# Patient Record
Sex: MALE | Race: OTHER | Employment: UNEMPLOYED | URBAN - METROPOLITAN AREA
[De-identification: names, ages, dates, MRNs, and addresses within clinical notes are randomized per-mention and may not be internally consistent; named-entity substitution may affect disease eponyms.]

---

## 2018-05-16 ENCOUNTER — OFFICE VISIT (OUTPATIENT)
Dept: FAMILY MEDICINE CLINIC | Age: 48
End: 2018-05-16
Payer: MEDICAID

## 2018-05-16 ENCOUNTER — HOSPITAL ENCOUNTER (OUTPATIENT)
Age: 48
Discharge: HOME | End: 2018-05-18
Payer: MEDICAID

## 2018-05-16 VITALS
WEIGHT: 180 LBS | SYSTOLIC BLOOD PRESSURE: 122 MMHG | BODY MASS INDEX: 29.99 KG/M2 | OXYGEN SATURATION: 98 % | HEIGHT: 65 IN | DIASTOLIC BLOOD PRESSURE: 80 MMHG | HEART RATE: 103 BPM | TEMPERATURE: 97.8 F

## 2018-05-16 DIAGNOSIS — Z01.818 PREOPERATIVE CLEARANCE: ICD-10-CM

## 2018-05-16 DIAGNOSIS — M75.101 BILATERAL ROTATOR CUFF SYNDROME: ICD-10-CM

## 2018-05-16 DIAGNOSIS — Z01.818 PREOPERATIVE CLEARANCE: Primary | ICD-10-CM

## 2018-05-16 DIAGNOSIS — M75.102 BILATERAL ROTATOR CUFF SYNDROME: ICD-10-CM

## 2018-05-16 PROBLEM — G43.709 CHRONIC MIGRAINE WITHOUT AURA WITHOUT STATUS MIGRAINOSUS, NOT INTRACTABLE: Status: ACTIVE | Noted: 2017-07-21

## 2018-05-16 PROBLEM — G89.29 CHRONIC BILATERAL LOW BACK PAIN WITH BILATERAL SCIATICA: Status: ACTIVE | Noted: 2018-03-28

## 2018-05-16 PROBLEM — M54.42 CHRONIC BILATERAL LOW BACK PAIN WITH BILATERAL SCIATICA: Status: ACTIVE | Noted: 2018-03-28

## 2018-05-16 PROBLEM — M25.811 SHOULDER IMPINGEMENT, RIGHT: Status: ACTIVE | Noted: 2018-04-16

## 2018-05-16 PROBLEM — M54.41 CHRONIC BILATERAL LOW BACK PAIN WITH BILATERAL SCIATICA: Status: ACTIVE | Noted: 2018-03-28

## 2018-05-16 PROBLEM — M25.812 SHOULDER IMPINGEMENT, LEFT: Status: ACTIVE | Noted: 2018-04-16

## 2018-05-16 PROBLEM — M75.42 SHOULDER IMPINGEMENT, LEFT: Status: ACTIVE | Noted: 2018-04-16

## 2018-05-16 PROBLEM — M75.41 SHOULDER IMPINGEMENT, RIGHT: Status: ACTIVE | Noted: 2018-04-16

## 2018-05-16 LAB
BASOPHILS ABSOLUTE: 0.04 E9/L (ref 0–0.2)
BASOPHILS RELATIVE PERCENT: 0.8 % (ref 0–2)
EOSINOPHILS ABSOLUTE: 0.17 E9/L (ref 0.05–0.5)
EOSINOPHILS RELATIVE PERCENT: 3.3 % (ref 0–6)
HCT VFR BLD CALC: 42.9 % (ref 37–54)
HEMOGLOBIN: 13.5 G/DL (ref 12.5–16.5)
IMMATURE GRANULOCYTES #: 0.02 E9/L
IMMATURE GRANULOCYTES %: 0.4 % (ref 0–5)
LYMPHOCYTES ABSOLUTE: 1.89 E9/L (ref 1.5–4)
LYMPHOCYTES RELATIVE PERCENT: 36.2 % (ref 20–42)
MCH RBC QN AUTO: 22.8 PG (ref 26–35)
MCHC RBC AUTO-ENTMCNC: 31.5 % (ref 32–34.5)
MCV RBC AUTO: 72.5 FL (ref 80–99.9)
MONOCYTES ABSOLUTE: 0.55 E9/L (ref 0.1–0.95)
MONOCYTES RELATIVE PERCENT: 10.5 % (ref 2–12)
NEUTROPHILS ABSOLUTE: 2.55 E9/L (ref 1.8–7.3)
NEUTROPHILS RELATIVE PERCENT: 48.8 % (ref 43–80)
PDW BLD-RTO: 17.5 FL (ref 11.5–15)
PLATELET # BLD: 278 E9/L (ref 130–450)
PMV BLD AUTO: 10.5 FL (ref 7–12)
RBC # BLD: 5.92 E12/L (ref 3.8–5.8)
WBC # BLD: 5.2 E9/L (ref 4.5–11.5)

## 2018-05-16 PROCEDURE — 99204 OFFICE O/P NEW MOD 45 MIN: CPT | Performed by: FAMILY MEDICINE

## 2018-05-16 PROCEDURE — 93000 ELECTROCARDIOGRAM COMPLETE: CPT | Performed by: FAMILY MEDICINE

## 2018-05-16 PROCEDURE — 85025 COMPLETE CBC W/AUTO DIFF WBC: CPT

## 2018-05-16 PROCEDURE — 80053 COMPREHEN METABOLIC PANEL: CPT

## 2018-05-16 ASSESSMENT — PATIENT HEALTH QUESTIONNAIRE - PHQ9
1. LITTLE INTEREST OR PLEASURE IN DOING THINGS: 0
SUM OF ALL RESPONSES TO PHQ9 QUESTIONS 1 & 2: 0
2. FEELING DOWN, DEPRESSED OR HOPELESS: 0
SUM OF ALL RESPONSES TO PHQ QUESTIONS 1-9: 0

## 2018-05-17 LAB
ALBUMIN SERPL-MCNC: 4.6 G/DL (ref 3.5–5.2)
ALP BLD-CCNC: 57 U/L (ref 40–129)
ALT SERPL-CCNC: 110 U/L (ref 0–40)
ANION GAP SERPL CALCULATED.3IONS-SCNC: 13 MMOL/L (ref 7–16)
AST SERPL-CCNC: 60 U/L (ref 0–39)
BILIRUB SERPL-MCNC: 0.4 MG/DL (ref 0–1.2)
BUN BLDV-MCNC: 10 MG/DL (ref 6–20)
CALCIUM SERPL-MCNC: 9.6 MG/DL (ref 8.6–10.2)
CHLORIDE BLD-SCNC: 97 MMOL/L (ref 98–107)
CO2: 28 MMOL/L (ref 22–29)
CREAT SERPL-MCNC: 0.8 MG/DL (ref 0.7–1.2)
GFR AFRICAN AMERICAN: >60
GFR NON-AFRICAN AMERICAN: >60 ML/MIN/1.73
GLUCOSE BLD-MCNC: 105 MG/DL (ref 74–109)
POTASSIUM SERPL-SCNC: 4.5 MMOL/L (ref 3.5–5)
SODIUM BLD-SCNC: 138 MMOL/L (ref 132–146)
TOTAL PROTEIN: 7.7 G/DL (ref 6.4–8.3)

## 2018-05-23 ENCOUNTER — ANESTHESIA EVENT (OUTPATIENT)
Dept: OPERATING ROOM | Age: 48
End: 2018-05-23
Payer: MEDICAID

## 2018-05-24 ENCOUNTER — HOSPITAL ENCOUNTER (OUTPATIENT)
Age: 48
Setting detail: OUTPATIENT SURGERY
Discharge: HOME | End: 2018-05-24
Attending: ORTHOPAEDIC SURGERY | Admitting: ORTHOPAEDIC SURGERY
Payer: MEDICAID

## 2018-05-24 ENCOUNTER — ANESTHESIA (OUTPATIENT)
Dept: OPERATING ROOM | Age: 48
End: 2018-05-24
Payer: MEDICAID

## 2018-05-24 VITALS
HEIGHT: 65 IN | TEMPERATURE: 97 F | HEART RATE: 94 BPM | RESPIRATION RATE: 19 BRPM | SYSTOLIC BLOOD PRESSURE: 141 MMHG | BODY MASS INDEX: 29.16 KG/M2 | DIASTOLIC BLOOD PRESSURE: 87 MMHG | OXYGEN SATURATION: 94 % | WEIGHT: 175 LBS

## 2018-05-24 VITALS
DIASTOLIC BLOOD PRESSURE: 80 MMHG | RESPIRATION RATE: 86 BRPM | TEMPERATURE: 92.7 F | SYSTOLIC BLOOD PRESSURE: 135 MMHG | OXYGEN SATURATION: 97 %

## 2018-05-24 DIAGNOSIS — M75.41 SHOULDER IMPINGEMENT, RIGHT: Primary | ICD-10-CM

## 2018-05-24 PROCEDURE — 7100000000 HC PACU RECOVERY - FIRST 15 MIN: Performed by: ORTHOPAEDIC SURGERY

## 2018-05-24 PROCEDURE — 7100000010 HC PHASE II RECOVERY - FIRST 15 MIN: Performed by: ORTHOPAEDIC SURGERY

## 2018-05-24 PROCEDURE — 3700000000 HC ANESTHESIA ATTENDED CARE: Performed by: ORTHOPAEDIC SURGERY

## 2018-05-24 PROCEDURE — 64415 NJX AA&/STRD BRCH PLXS IMG: CPT | Performed by: ANESTHESIOLOGY

## 2018-05-24 PROCEDURE — 3600000013 HC SURGERY LEVEL 3 ADDTL 15MIN: Performed by: ORTHOPAEDIC SURGERY

## 2018-05-24 PROCEDURE — 6360000002 HC RX W HCPCS: Performed by: ANESTHESIOLOGY

## 2018-05-24 PROCEDURE — 2500000003 HC RX 250 WO HCPCS: Performed by: ORTHOPAEDIC SURGERY

## 2018-05-24 PROCEDURE — 3600000003 HC SURGERY LEVEL 3 BASE: Performed by: ORTHOPAEDIC SURGERY

## 2018-05-24 PROCEDURE — 6360000002 HC RX W HCPCS: Performed by: NURSE ANESTHETIST, CERTIFIED REGISTERED

## 2018-05-24 PROCEDURE — 7100000001 HC PACU RECOVERY - ADDTL 15 MIN: Performed by: ORTHOPAEDIC SURGERY

## 2018-05-24 PROCEDURE — 2500000003 HC RX 250 WO HCPCS

## 2018-05-24 PROCEDURE — 7100000011 HC PHASE II RECOVERY - ADDTL 15 MIN: Performed by: ORTHOPAEDIC SURGERY

## 2018-05-24 PROCEDURE — 6370000000 HC RX 637 (ALT 250 FOR IP): Performed by: ORTHOPAEDIC SURGERY

## 2018-05-24 PROCEDURE — 6360000002 HC RX W HCPCS

## 2018-05-24 PROCEDURE — 2500000003 HC RX 250 WO HCPCS: Performed by: NURSE ANESTHETIST, CERTIFIED REGISTERED

## 2018-05-24 PROCEDURE — A4565 SLINGS: HCPCS | Performed by: ORTHOPAEDIC SURGERY

## 2018-05-24 PROCEDURE — 2709999900 HC NON-CHARGEABLE SUPPLY: Performed by: ORTHOPAEDIC SURGERY

## 2018-05-24 PROCEDURE — 3700000001 HC ADD 15 MINUTES (ANESTHESIA): Performed by: ORTHOPAEDIC SURGERY

## 2018-05-24 PROCEDURE — 2580000003 HC RX 258: Performed by: ORTHOPAEDIC SURGERY

## 2018-05-24 RX ORDER — GLYCOPYRROLATE 1 MG/5 ML
SYRINGE (ML) INTRAVENOUS PRN
Status: DISCONTINUED | OUTPATIENT
Start: 2018-05-24 | End: 2018-05-24 | Stop reason: SDUPTHER

## 2018-05-24 RX ORDER — HYDROCODONE BITARTRATE AND ACETAMINOPHEN 5; 325 MG/1; MG/1
2 TABLET ORAL EVERY 4 HOURS PRN
Status: DISCONTINUED | OUTPATIENT
Start: 2018-05-24 | End: 2018-05-24 | Stop reason: HOSPADM

## 2018-05-24 RX ORDER — FENTANYL CITRATE 50 UG/ML
25 INJECTION, SOLUTION INTRAMUSCULAR; INTRAVENOUS ONCE
Status: COMPLETED | OUTPATIENT
Start: 2018-05-24 | End: 2018-05-24

## 2018-05-24 RX ORDER — MIDAZOLAM HYDROCHLORIDE 1 MG/ML
2 INJECTION INTRAMUSCULAR; INTRAVENOUS ONCE
Status: COMPLETED | OUTPATIENT
Start: 2018-05-24 | End: 2018-05-24

## 2018-05-24 RX ORDER — ROPIVACAINE HYDROCHLORIDE 5 MG/ML
30 INJECTION, SOLUTION EPIDURAL; INFILTRATION; PERINEURAL
Status: COMPLETED | OUTPATIENT
Start: 2018-05-24 | End: 2018-05-24

## 2018-05-24 RX ORDER — BUPIVACAINE HYDROCHLORIDE AND EPINEPHRINE 5; 5 MG/ML; UG/ML
INJECTION, SOLUTION EPIDURAL; INTRACAUDAL; PERINEURAL PRN
Status: DISCONTINUED | OUTPATIENT
Start: 2018-05-24 | End: 2018-05-24 | Stop reason: HOSPADM

## 2018-05-24 RX ORDER — ONDANSETRON 2 MG/ML
4 INJECTION INTRAMUSCULAR; INTRAVENOUS EVERY 6 HOURS PRN
Status: DISCONTINUED | OUTPATIENT
Start: 2018-05-24 | End: 2018-05-24 | Stop reason: HOSPADM

## 2018-05-24 RX ORDER — HYDROCODONE BITARTRATE AND ACETAMINOPHEN 5; 325 MG/1; MG/1
1 TABLET ORAL EVERY 4 HOURS PRN
Status: DISCONTINUED | OUTPATIENT
Start: 2018-05-24 | End: 2018-05-24 | Stop reason: HOSPADM

## 2018-05-24 RX ORDER — SODIUM CHLORIDE 9 MG/ML
INJECTION, SOLUTION INTRAVENOUS CONTINUOUS
Status: DISCONTINUED | OUTPATIENT
Start: 2018-05-24 | End: 2018-05-24 | Stop reason: HOSPADM

## 2018-05-24 RX ORDER — SODIUM CHLORIDE 0.9 % (FLUSH) 0.9 %
10 SYRINGE (ML) INJECTION PRN
Status: DISCONTINUED | OUTPATIENT
Start: 2018-05-24 | End: 2018-05-24 | Stop reason: HOSPADM

## 2018-05-24 RX ORDER — MIDAZOLAM HYDROCHLORIDE 1 MG/ML
0.5 INJECTION INTRAMUSCULAR; INTRAVENOUS PRN
Status: DISCONTINUED | OUTPATIENT
Start: 2018-05-24 | End: 2018-05-24 | Stop reason: HOSPADM

## 2018-05-24 RX ORDER — SODIUM CHLORIDE 0.9 % (FLUSH) 0.9 %
10 SYRINGE (ML) INJECTION EVERY 12 HOURS SCHEDULED
Status: DISCONTINUED | OUTPATIENT
Start: 2018-05-24 | End: 2018-05-24 | Stop reason: HOSPADM

## 2018-05-24 RX ORDER — PROPOFOL 10 MG/ML
INJECTION, EMULSION INTRAVENOUS PRN
Status: DISCONTINUED | OUTPATIENT
Start: 2018-05-24 | End: 2018-05-24 | Stop reason: SDUPTHER

## 2018-05-24 RX ORDER — SULFAMETHOXAZOLE AND TRIMETHOPRIM 800; 160 MG/1; MG/1
1 TABLET ORAL 2 TIMES DAILY
Qty: 4 TABLET | Refills: 0 | Status: SHIPPED | OUTPATIENT
Start: 2018-05-24 | End: 2018-05-26

## 2018-05-24 RX ORDER — ROPIVACAINE HYDROCHLORIDE 5 MG/ML
30 INJECTION, SOLUTION EPIDURAL; INFILTRATION; PERINEURAL
Status: DISCONTINUED | OUTPATIENT
Start: 2018-05-24 | End: 2018-05-24 | Stop reason: HOSPADM

## 2018-05-24 RX ORDER — ACETAMINOPHEN 325 MG/1
650 TABLET ORAL EVERY 4 HOURS PRN
Status: DISCONTINUED | OUTPATIENT
Start: 2018-05-24 | End: 2018-05-24 | Stop reason: HOSPADM

## 2018-05-24 RX ORDER — DEXAMETHASONE SODIUM PHOSPHATE 4 MG/ML
INJECTION, SOLUTION INTRA-ARTICULAR; INTRALESIONAL; INTRAMUSCULAR; INTRAVENOUS; SOFT TISSUE PRN
Status: DISCONTINUED | OUTPATIENT
Start: 2018-05-24 | End: 2018-05-24 | Stop reason: SDUPTHER

## 2018-05-24 RX ORDER — CLINDAMYCIN PHOSPHATE 900 MG/50ML
900 INJECTION INTRAVENOUS
Status: COMPLETED | OUTPATIENT
Start: 2018-05-24 | End: 2018-05-24

## 2018-05-24 RX ORDER — HYDROCODONE BITARTRATE AND ACETAMINOPHEN 5; 325 MG/1; MG/1
1 TABLET ORAL EVERY 4 HOURS PRN
Qty: 42 TABLET | Refills: 0 | Status: SHIPPED | OUTPATIENT
Start: 2018-05-24 | End: 2018-05-31

## 2018-05-24 RX ORDER — FENTANYL CITRATE 50 UG/ML
INJECTION, SOLUTION INTRAMUSCULAR; INTRAVENOUS PRN
Status: DISCONTINUED | OUTPATIENT
Start: 2018-05-24 | End: 2018-05-24 | Stop reason: SDUPTHER

## 2018-05-24 RX ORDER — ONDANSETRON 2 MG/ML
INJECTION INTRAMUSCULAR; INTRAVENOUS PRN
Status: DISCONTINUED | OUTPATIENT
Start: 2018-05-24 | End: 2018-05-24 | Stop reason: SDUPTHER

## 2018-05-24 RX ORDER — NEOSTIGMINE METHYLSULFATE 1 MG/ML
INJECTION, SOLUTION INTRAVENOUS PRN
Status: DISCONTINUED | OUTPATIENT
Start: 2018-05-24 | End: 2018-05-24 | Stop reason: SDUPTHER

## 2018-05-24 RX ORDER — LIDOCAINE HYDROCHLORIDE 20 MG/ML
INJECTION, SOLUTION EPIDURAL; INFILTRATION; INTRACAUDAL; PERINEURAL PRN
Status: DISCONTINUED | OUTPATIENT
Start: 2018-05-24 | End: 2018-05-24 | Stop reason: SDUPTHER

## 2018-05-24 RX ORDER — ROCURONIUM BROMIDE 10 MG/ML
INJECTION, SOLUTION INTRAVENOUS PRN
Status: DISCONTINUED | OUTPATIENT
Start: 2018-05-24 | End: 2018-05-24 | Stop reason: SDUPTHER

## 2018-05-24 RX ADMIN — Medication 3 MG: at 15:03

## 2018-05-24 RX ADMIN — CLINDAMYCIN PHOSPHATE 900 MG: 900 INJECTION INTRAVENOUS at 12:35

## 2018-05-24 RX ADMIN — ONDANSETRON 4 MG: 2 INJECTION, SOLUTION INTRAMUSCULAR; INTRAVENOUS at 14:51

## 2018-05-24 RX ADMIN — MIDAZOLAM HYDROCHLORIDE 2 MG: 1 INJECTION, SOLUTION INTRAMUSCULAR; INTRAVENOUS at 11:28

## 2018-05-24 RX ADMIN — SODIUM CHLORIDE: 9 INJECTION, SOLUTION INTRAVENOUS at 12:55

## 2018-05-24 RX ADMIN — PROPOFOL 200 MG: 10 INJECTION, EMULSION INTRAVENOUS at 12:41

## 2018-05-24 RX ADMIN — DEXAMETHASONE SODIUM PHOSPHATE 10 MG: 4 INJECTION, SOLUTION INTRA-ARTICULAR; INTRALESIONAL; INTRAMUSCULAR; INTRAVENOUS; SOFT TISSUE at 12:50

## 2018-05-24 RX ADMIN — Medication 0.6 MG: at 15:03

## 2018-05-24 RX ADMIN — ROPIVACAINE HYDROCHLORIDE 30 ML: 5 INJECTION, SOLUTION EPIDURAL; INFILTRATION; PERINEURAL at 11:31

## 2018-05-24 RX ADMIN — MIDAZOLAM HYDROCHLORIDE 2 MG: 1 INJECTION, SOLUTION INTRAMUSCULAR; INTRAVENOUS at 12:40

## 2018-05-24 RX ADMIN — ROCURONIUM BROMIDE 40 MG: 10 SOLUTION INTRAVENOUS at 12:41

## 2018-05-24 RX ADMIN — LIDOCAINE HYDROCHLORIDE 60 MG: 20 INJECTION, SOLUTION EPIDURAL; INFILTRATION; INTRACAUDAL; PERINEURAL at 12:41

## 2018-05-24 RX ADMIN — FENTANYL CITRATE 100 MCG: 50 INJECTION, SOLUTION INTRAMUSCULAR; INTRAVENOUS at 12:41

## 2018-05-24 RX ADMIN — FENTANYL CITRATE 100 MCG: 50 INJECTION, SOLUTION INTRAMUSCULAR; INTRAVENOUS at 11:28

## 2018-05-24 RX ADMIN — SODIUM CHLORIDE: 9 INJECTION, SOLUTION INTRAVENOUS at 12:35

## 2018-05-24 RX ADMIN — HYDROCODONE BITARTRATE AND ACETAMINOPHEN 1 TABLET: 5; 325 TABLET ORAL at 16:34

## 2018-05-24 ASSESSMENT — PULMONARY FUNCTION TESTS
PIF_VALUE: 21
PIF_VALUE: 19
PIF_VALUE: 20
PIF_VALUE: 21
PIF_VALUE: 20
PIF_VALUE: 20
PIF_VALUE: 21
PIF_VALUE: 22
PIF_VALUE: 22
PIF_VALUE: 13
PIF_VALUE: 21
PIF_VALUE: 22
PIF_VALUE: 12
PIF_VALUE: 21
PIF_VALUE: 13
PIF_VALUE: 22
PIF_VALUE: 19
PIF_VALUE: 2
PIF_VALUE: 20
PIF_VALUE: 18
PIF_VALUE: 21
PIF_VALUE: 22
PIF_VALUE: 21
PIF_VALUE: 22
PIF_VALUE: 22
PIF_VALUE: 19
PIF_VALUE: 21
PIF_VALUE: 0
PIF_VALUE: 13
PIF_VALUE: 21
PIF_VALUE: 22
PIF_VALUE: 21
PIF_VALUE: 13
PIF_VALUE: 21
PIF_VALUE: 22
PIF_VALUE: 22
PIF_VALUE: 21
PIF_VALUE: 20
PIF_VALUE: 21
PIF_VALUE: 21
PIF_VALUE: 22
PIF_VALUE: 18
PIF_VALUE: 22
PIF_VALUE: 21
PIF_VALUE: 26
PIF_VALUE: 22
PIF_VALUE: 22
PIF_VALUE: 21
PIF_VALUE: 22
PIF_VALUE: 21
PIF_VALUE: 21
PIF_VALUE: 23
PIF_VALUE: 13
PIF_VALUE: 21
PIF_VALUE: 22
PIF_VALUE: 22
PIF_VALUE: 21
PIF_VALUE: 13
PIF_VALUE: 21
PIF_VALUE: 13
PIF_VALUE: 18
PIF_VALUE: 21
PIF_VALUE: 21
PIF_VALUE: 0
PIF_VALUE: 22
PIF_VALUE: 22
PIF_VALUE: 20
PIF_VALUE: 5
PIF_VALUE: 21
PIF_VALUE: 21
PIF_VALUE: 22
PIF_VALUE: 1
PIF_VALUE: 0
PIF_VALUE: 21
PIF_VALUE: 20
PIF_VALUE: 20
PIF_VALUE: 21
PIF_VALUE: 22
PIF_VALUE: 21
PIF_VALUE: 22
PIF_VALUE: 21
PIF_VALUE: 23
PIF_VALUE: 13
PIF_VALUE: 18
PIF_VALUE: 21
PIF_VALUE: 20
PIF_VALUE: 21
PIF_VALUE: 22
PIF_VALUE: 13
PIF_VALUE: 22
PIF_VALUE: 23
PIF_VALUE: 22
PIF_VALUE: 21
PIF_VALUE: 12
PIF_VALUE: 22
PIF_VALUE: 26
PIF_VALUE: 14
PIF_VALUE: 22
PIF_VALUE: 22
PIF_VALUE: 20
PIF_VALUE: 6
PIF_VALUE: 21
PIF_VALUE: 22
PIF_VALUE: 23
PIF_VALUE: 21
PIF_VALUE: 21
PIF_VALUE: 22
PIF_VALUE: 21
PIF_VALUE: 21
PIF_VALUE: 22
PIF_VALUE: 1
PIF_VALUE: 22
PIF_VALUE: 21
PIF_VALUE: 21
PIF_VALUE: 20
PIF_VALUE: 0
PIF_VALUE: 21
PIF_VALUE: 17
PIF_VALUE: 21
PIF_VALUE: 6
PIF_VALUE: 22
PIF_VALUE: 20
PIF_VALUE: 22
PIF_VALUE: 20
PIF_VALUE: 22
PIF_VALUE: 22
PIF_VALUE: 18
PIF_VALUE: 21
PIF_VALUE: 0
PIF_VALUE: 21
PIF_VALUE: 23
PIF_VALUE: 12
PIF_VALUE: 21
PIF_VALUE: 23
PIF_VALUE: 21
PIF_VALUE: 21
PIF_VALUE: 12
PIF_VALUE: 21
PIF_VALUE: 2
PIF_VALUE: 22
PIF_VALUE: 13
PIF_VALUE: 10
PIF_VALUE: 20
PIF_VALUE: 22
PIF_VALUE: 21

## 2018-05-24 ASSESSMENT — PAIN SCALES - GENERAL
PAINLEVEL_OUTOF10: 0
PAINLEVEL_OUTOF10: 5
PAINLEVEL_OUTOF10: 0

## 2018-05-24 ASSESSMENT — PAIN - FUNCTIONAL ASSESSMENT: PAIN_FUNCTIONAL_ASSESSMENT: 0-10

## 2018-05-26 ENCOUNTER — APPOINTMENT (OUTPATIENT)
Dept: ULTRASOUND IMAGING | Age: 48
End: 2018-05-26
Payer: MEDICAID

## 2018-05-26 ENCOUNTER — HOSPITAL ENCOUNTER (EMERGENCY)
Age: 48
Discharge: HOME | End: 2018-05-26
Attending: EMERGENCY MEDICINE
Payer: MEDICAID

## 2018-05-26 VITALS
HEART RATE: 85 BPM | OXYGEN SATURATION: 97 % | DIASTOLIC BLOOD PRESSURE: 87 MMHG | WEIGHT: 175 LBS | BODY MASS INDEX: 29.16 KG/M2 | RESPIRATION RATE: 16 BRPM | SYSTOLIC BLOOD PRESSURE: 144 MMHG | TEMPERATURE: 97.6 F | HEIGHT: 65 IN

## 2018-05-26 DIAGNOSIS — M79.89 LEG SWELLING: Primary | ICD-10-CM

## 2018-05-26 PROCEDURE — 99283 EMERGENCY DEPT VISIT LOW MDM: CPT

## 2018-05-26 PROCEDURE — 6370000000 HC RX 637 (ALT 250 FOR IP): Performed by: PHYSICIAN ASSISTANT

## 2018-05-26 PROCEDURE — 93970 EXTREMITY STUDY: CPT

## 2018-05-26 RX ORDER — HYDROCODONE BITARTRATE AND ACETAMINOPHEN 5; 325 MG/1; MG/1
1 TABLET ORAL ONCE
Status: COMPLETED | OUTPATIENT
Start: 2018-05-26 | End: 2018-05-26

## 2018-05-26 RX ADMIN — HYDROCODONE BITARTRATE AND ACETAMINOPHEN 1 TABLET: 5; 325 TABLET ORAL at 15:58

## 2018-05-26 ASSESSMENT — PAIN SCALES - GENERAL: PAINLEVEL_OUTOF10: 8

## 2018-05-26 ASSESSMENT — PAIN DESCRIPTION - ORIENTATION: ORIENTATION: LEFT;RIGHT

## 2018-05-26 ASSESSMENT — PAIN DESCRIPTION - DESCRIPTORS: DESCRIPTORS: TIGHTNESS

## 2018-05-26 ASSESSMENT — PAIN DESCRIPTION - LOCATION: LOCATION: LEG

## 2018-05-26 ASSESSMENT — PAIN DESCRIPTION - PAIN TYPE: TYPE: ACUTE PAIN

## 2018-05-26 ASSESSMENT — PAIN DESCRIPTION - FREQUENCY: FREQUENCY: CONTINUOUS

## 2018-05-26 ASSESSMENT — PAIN DESCRIPTION - ONSET: ONSET: SUDDEN

## 2018-05-31 ENCOUNTER — HOSPITAL ENCOUNTER (OUTPATIENT)
Age: 48
Discharge: HOME | End: 2018-06-02
Payer: MEDICAID

## 2018-05-31 ENCOUNTER — OFFICE VISIT (OUTPATIENT)
Dept: FAMILY MEDICINE CLINIC | Age: 48
End: 2018-05-31
Payer: MEDICAID

## 2018-05-31 VITALS
OXYGEN SATURATION: 98 % | SYSTOLIC BLOOD PRESSURE: 122 MMHG | BODY MASS INDEX: 29.32 KG/M2 | HEART RATE: 115 BPM | DIASTOLIC BLOOD PRESSURE: 80 MMHG | TEMPERATURE: 97.4 F | HEIGHT: 65 IN | WEIGHT: 176 LBS

## 2018-05-31 DIAGNOSIS — R60.0 BILATERAL LOWER EXTREMITY EDEMA: Primary | ICD-10-CM

## 2018-05-31 DIAGNOSIS — R60.0 BILATERAL LOWER EXTREMITY EDEMA: ICD-10-CM

## 2018-05-31 LAB
ALBUMIN SERPL-MCNC: 5 G/DL (ref 3.5–5.2)
ALP BLD-CCNC: 78 U/L (ref 40–129)
ALT SERPL-CCNC: 60 U/L (ref 0–40)
ANION GAP SERPL CALCULATED.3IONS-SCNC: 16 MMOL/L (ref 7–16)
AST SERPL-CCNC: 38 U/L (ref 0–39)
BILIRUB SERPL-MCNC: 0.5 MG/DL (ref 0–1.2)
BUN BLDV-MCNC: 15 MG/DL (ref 6–20)
CALCIUM SERPL-MCNC: 10.5 MG/DL (ref 8.6–10.2)
CHLORIDE BLD-SCNC: 96 MMOL/L (ref 98–107)
CO2: 29 MMOL/L (ref 22–29)
CREAT SERPL-MCNC: 1.2 MG/DL (ref 0.7–1.2)
GFR AFRICAN AMERICAN: >60
GFR NON-AFRICAN AMERICAN: >60 ML/MIN/1.73
GLUCOSE BLD-MCNC: 90 MG/DL (ref 74–109)
POTASSIUM SERPL-SCNC: 4.1 MMOL/L (ref 3.5–5)
SODIUM BLD-SCNC: 141 MMOL/L (ref 132–146)
TOTAL PROTEIN: 8.5 G/DL (ref 6.4–8.3)

## 2018-05-31 PROCEDURE — 99213 OFFICE O/P EST LOW 20 MIN: CPT | Performed by: FAMILY MEDICINE

## 2018-05-31 PROCEDURE — 80053 COMPREHEN METABOLIC PANEL: CPT

## 2018-05-31 ASSESSMENT — ENCOUNTER SYMPTOMS
RHINORRHEA: 0
DIARRHEA: 0
VOMITING: 0
NAUSEA: 0
ABDOMINAL PAIN: 0
CONSTIPATION: 0
SINUS PRESSURE: 0
SHORTNESS OF BREATH: 0
SORE THROAT: 0
WHEEZING: 0
COUGH: 0
BACK PAIN: 0

## 2018-07-18 ENCOUNTER — OFFICE VISIT (OUTPATIENT)
Dept: FAMILY MEDICINE CLINIC | Age: 48
End: 2018-07-18

## 2018-07-18 VITALS
HEART RATE: 103 BPM | DIASTOLIC BLOOD PRESSURE: 80 MMHG | BODY MASS INDEX: 30.46 KG/M2 | HEIGHT: 65 IN | OXYGEN SATURATION: 97 % | WEIGHT: 182.8 LBS | SYSTOLIC BLOOD PRESSURE: 120 MMHG

## 2018-07-18 DIAGNOSIS — Z01.818 PRE-OP EXAM: Primary | ICD-10-CM

## 2018-07-31 ENCOUNTER — TELEPHONE (OUTPATIENT)
Dept: FAMILY MEDICINE CLINIC | Age: 48
End: 2018-07-31

## 2018-08-03 ENCOUNTER — OFFICE VISIT (OUTPATIENT)
Dept: FAMILY MEDICINE CLINIC | Age: 48
End: 2018-08-03
Payer: MEDICAID

## 2018-08-03 ENCOUNTER — HOSPITAL ENCOUNTER (OUTPATIENT)
Age: 48
Discharge: HOME | End: 2018-08-05
Payer: MEDICAID

## 2018-08-03 VITALS
HEIGHT: 65 IN | DIASTOLIC BLOOD PRESSURE: 80 MMHG | BODY MASS INDEX: 29.66 KG/M2 | SYSTOLIC BLOOD PRESSURE: 110 MMHG | TEMPERATURE: 98.2 F | HEART RATE: 91 BPM | OXYGEN SATURATION: 95 % | WEIGHT: 178 LBS

## 2018-08-03 DIAGNOSIS — Z01.818 PRE-OP EVALUATION: ICD-10-CM

## 2018-08-03 DIAGNOSIS — L30.9 DERMATITIS: ICD-10-CM

## 2018-08-03 DIAGNOSIS — G89.29 CHRONIC BILATERAL LOW BACK PAIN WITH BILATERAL SCIATICA: ICD-10-CM

## 2018-08-03 DIAGNOSIS — M54.42 CHRONIC BILATERAL LOW BACK PAIN WITH BILATERAL SCIATICA: ICD-10-CM

## 2018-08-03 DIAGNOSIS — Z01.818 PRE-OP EVALUATION: Primary | ICD-10-CM

## 2018-08-03 DIAGNOSIS — M54.41 CHRONIC BILATERAL LOW BACK PAIN WITH BILATERAL SCIATICA: ICD-10-CM

## 2018-08-03 LAB
ALBUMIN SERPL-MCNC: 4.8 G/DL (ref 3.5–5.2)
ALP BLD-CCNC: 60 U/L (ref 40–129)
ALT SERPL-CCNC: 64 U/L (ref 0–40)
ANION GAP SERPL CALCULATED.3IONS-SCNC: 11 MMOL/L (ref 7–16)
AST SERPL-CCNC: 36 U/L (ref 0–39)
BILIRUB SERPL-MCNC: 0.8 MG/DL (ref 0–1.2)
BILIRUBIN, POC: NEGATIVE
BLOOD URINE, POC: NEGATIVE
BUN BLDV-MCNC: 13 MG/DL (ref 6–20)
CALCIUM SERPL-MCNC: 9.7 MG/DL (ref 8.6–10.2)
CHLORIDE BLD-SCNC: 99 MMOL/L (ref 98–107)
CLARITY, POC: CLEAR
CO2: 30 MMOL/L (ref 22–29)
COLOR, POC: YELLOW
CREAT SERPL-MCNC: 0.9 MG/DL (ref 0.7–1.2)
GFR AFRICAN AMERICAN: >60
GFR NON-AFRICAN AMERICAN: >60 ML/MIN/1.73
GLUCOSE BLD-MCNC: 90 MG/DL (ref 74–109)
GLUCOSE URINE, POC: NEGATIVE
HCT VFR BLD CALC: 44.5 % (ref 37–54)
HEMOGLOBIN: 14 G/DL (ref 12.5–16.5)
INR BLD: 1
KETONES, POC: NEGATIVE
LEUKOCYTE EST, POC: NEGATIVE
MCH RBC QN AUTO: 23.2 PG (ref 26–35)
MCHC RBC AUTO-ENTMCNC: 31.5 % (ref 32–34.5)
MCV RBC AUTO: 73.7 FL (ref 80–99.9)
NITRITE, POC: NEGATIVE
PDW BLD-RTO: 17.8 FL (ref 11.5–15)
PH, POC: 8.5
PLATELET # BLD: 264 E9/L (ref 130–450)
PMV BLD AUTO: 10.5 FL (ref 7–12)
POTASSIUM SERPL-SCNC: 4.1 MMOL/L (ref 3.5–5)
PROTEIN, POC: 30
PROTHROMBIN TIME: 11.6 SEC (ref 9.3–12.4)
RBC # BLD: 6.04 E12/L (ref 3.8–5.8)
SODIUM BLD-SCNC: 140 MMOL/L (ref 132–146)
SPECIFIC GRAVITY, POC: 1.01
TOTAL PROTEIN: 8.1 G/DL (ref 6.4–8.3)
UROBILINOGEN, POC: 0.2
WBC # BLD: 5.8 E9/L (ref 4.5–11.5)

## 2018-08-03 PROCEDURE — 99214 OFFICE O/P EST MOD 30 MIN: CPT | Performed by: PHYSICIAN ASSISTANT

## 2018-08-03 PROCEDURE — 81002 URINALYSIS NONAUTO W/O SCOPE: CPT | Performed by: PHYSICIAN ASSISTANT

## 2018-08-03 PROCEDURE — 85027 COMPLETE CBC AUTOMATED: CPT

## 2018-08-03 PROCEDURE — 85610 PROTHROMBIN TIME: CPT

## 2018-08-03 PROCEDURE — 87088 URINE BACTERIA CULTURE: CPT

## 2018-08-03 PROCEDURE — 93000 ELECTROCARDIOGRAM COMPLETE: CPT | Performed by: PHYSICIAN ASSISTANT

## 2018-08-03 PROCEDURE — 80053 COMPREHEN METABOLIC PANEL: CPT

## 2018-08-03 NOTE — PROGRESS NOTES
Father     No Known Problems Maternal Grandmother     No Known Problems Maternal Grandfather     No Known Problems Paternal Grandmother     No Known Problems Paternal Grandfather     No Known Problems Daughter        Social History     Social History    Marital status:      Spouse name: N/A    Number of children: 1    Years of education: N/A     Occupational History    Not on file. Social History Main Topics    Smoking status: Former Smoker     Years: 10.00     Types: Cigarettes    Smokeless tobacco: Never Used      Comment: quit 1980's     Alcohol use Yes      Comment: socially    Drug use: No    Sexual activity: Not on file     Other Topics Concern    Not on file     Social History Narrative    No narrative on file       Review of Systems :  Constitutional: negative for - chills, fever, weight loss, or fatigue  Psychological: negative for - anxiety, depression or suicidal ideation  HEENT: negative for - vision changes, nasal congestion, ear pain or pharyngitis   Respiratory: negative for -  Chest heaviness, cough, shortness of breath, or pleuritic pain  Cardiovascular: negative for - diaphoresis, chest pain, palpitations, or edema   Gastrointestinal: negative for -abdominal pain, change in bowel habits, constipation, diarrhea, or nausea/vomiting  Genito-Urinary: negative for - dysuria, frequency, or nocturia  Neurological: negative for - bowel and bladder control changes, dizziness, or headaches   Dermatological: negative for - dry skin, rash, hair or nail symptoms    Physical Exam:   Vitals:    08/03/18 1318   BP: 110/80   Site: Right Arm   Position: Sitting   Cuff Size: Medium Adult   Pulse: 91   Temp: 98.2 °F (36.8 °C)   TempSrc: Oral   SpO2: 95%   Weight: 178 lb (80.7 kg)   Height: 5' 5\" (1.651 m)     Physical Exam   Constitutional: He is oriented to person, place, and time. He appears well-developed and well-nourished. No distress. HENT:   Head: Normocephalic and atraumatic. Right Ear: External ear normal.   Left Ear: External ear normal.   Nose: Nose normal.   Mouth/Throat: Oropharynx is clear and moist.   Eyes: Conjunctivae and EOM are normal. Pupils are equal, round, and reactive to light. No scleral icterus. Neck: Normal range of motion. Neck supple. No thyromegaly present. Cardiovascular: Normal rate, regular rhythm, normal heart sounds and intact distal pulses. No murmur heard. Pulmonary/Chest: Effort normal and breath sounds normal. No accessory muscle usage. No respiratory distress. He has no wheezes. Musculoskeletal: Normal range of motion. Neurological: He is alert and oriented to person, place, and time. He has normal reflexes. Skin: Skin is warm and dry. No rash noted. Psychiatric: He has a normal mood and affect. His speech is normal and behavior is normal.         Assessment/Plan:     Judd Garcia was seen today for pre-op exam.    Diagnoses and all orders for this visit:    Pre-op evaluation  -     EKG 12 Lead  -     CBC; Future  -     COMPREHENSIVE METABOLIC PANEL; Future  -     URINE CULTURE; Future  -     POCT Urinalysis no Micro  -     APTT; Future  -     PROTIME-INR; Future    Dermatitis  -     fluocinonide (LIDEX) 0.05 % cream; Apply topically 2 times daily. Chronic bilateral low back pain with bilateral sciatica        Return in about 4 weeks (around 8/31/2018). patient presents for pre-op clearance. He has no high risk clinical predictors for poor outcome such as rhythm other than NSR on EKG, valvular heart disease, or signs of chf or valvular heart disease. Labs recommended are pending. Urine culture sent. Will review with Dr Brenda Purvis prior to surgery.      CAROLINE Daily

## 2018-08-06 ENCOUNTER — TELEPHONE (OUTPATIENT)
Dept: FAMILY MEDICINE CLINIC | Age: 48
End: 2018-08-06

## 2018-08-06 LAB — URINE CULTURE, ROUTINE: NORMAL

## 2018-08-06 NOTE — TELEPHONE ENCOUNTER
Patient is requesting a referral to podiatry because he has a ingrown toe nail and pus is coming out.

## 2018-08-07 ENCOUNTER — TELEPHONE (OUTPATIENT)
Dept: FAMILY MEDICINE CLINIC | Age: 48
End: 2018-08-07

## 2018-08-07 ENCOUNTER — OFFICE VISIT (OUTPATIENT)
Dept: NEUROSURGERY | Age: 48
End: 2018-08-07
Payer: MEDICAID

## 2018-08-07 ENCOUNTER — HOSPITAL ENCOUNTER (OUTPATIENT)
Age: 48
Discharge: HOME | End: 2018-08-07
Payer: MEDICAID

## 2018-08-07 VITALS
DIASTOLIC BLOOD PRESSURE: 101 MMHG | HEIGHT: 65 IN | SYSTOLIC BLOOD PRESSURE: 141 MMHG | HEART RATE: 87 BPM | BODY MASS INDEX: 29.99 KG/M2 | WEIGHT: 180 LBS

## 2018-08-07 DIAGNOSIS — Z01.818 PRE-OP EVALUATION: ICD-10-CM

## 2018-08-07 DIAGNOSIS — Z01.818 PRE-OP EVALUATION: Primary | ICD-10-CM

## 2018-08-07 DIAGNOSIS — M54.2 NECK PAIN: Primary | ICD-10-CM

## 2018-08-07 LAB
APTT: 32.9 SEC (ref 24.5–35.1)
INR BLD: 1
PROTHROMBIN TIME: 11.8 SEC (ref 9.3–12.4)

## 2018-08-07 PROCEDURE — 85610 PROTHROMBIN TIME: CPT

## 2018-08-07 PROCEDURE — 99203 OFFICE O/P NEW LOW 30 MIN: CPT | Performed by: NEUROLOGICAL SURGERY

## 2018-08-07 PROCEDURE — 85730 THROMBOPLASTIN TIME PARTIAL: CPT

## 2018-08-07 PROCEDURE — 36415 COLL VENOUS BLD VENIPUNCTURE: CPT

## 2018-08-07 RX ORDER — SUMATRIPTAN 6 MG/.5ML
6 INJECTION, SOLUTION SUBCUTANEOUS
COMMUNITY
End: 2018-10-24 | Stop reason: SDUPTHER

## 2018-08-07 NOTE — PATIENT INSTRUCTIONS
Patient Education        Neck Pain: Care Instructions  Your Care Instructions    You can have neck pain anywhere from the bottom of your head to the top of your shoulders. It can spread to the upper back or arms. Injuries, painting a ceiling, sleeping with your neck twisted, staying in one position for too long, and many other activities can cause neck pain. Most neck pain gets better with home care. Your doctor may recommend medicine to relieve pain or relax your muscles. He or she may suggest exercise and physical therapy to increase flexibility and relieve stress. You may need to wear a special (cervical) collar to support your neck for a day or two. Follow-up care is a key part of your treatment and safety. Be sure to make and go to all appointments, and call your doctor if you are having problems. It's also a good idea to know your test results and keep a list of the medicines you take. How can you care for yourself at home? · Try using a heating pad on a low or medium setting for 15 to 20 minutes every 2 or 3 hours. Try a warm shower in place of one session with the heating pad. · You can also try an ice pack for 10 to 15 minutes every 2 to 3 hours. Put a thin cloth between the ice and your skin. · Take pain medicines exactly as directed. ¨ If the doctor gave you a prescription medicine for pain, take it as prescribed. ¨ If you are not taking a prescription pain medicine, ask your doctor if you can take an over-the-counter medicine. · If your doctor recommends a cervical collar, wear it exactly as directed. When should you call for help? Call your doctor now or seek immediate medical care if:    · You have new or worsening numbness in your arms, buttocks or legs.     · You have new or worsening weakness in your arms or legs.  (This could make it hard to stand up.)     · You lose control of your bladder or bowels.    Watch closely for changes in your health, and be sure to contact your doctor if:    · Your neck pain is getting worse.     · You are not getting better after 1 week.     · You do not get better as expected. Where can you learn more? Go to [email or web address] and sign in to your [employer] account. Enter [id number] in the [facility] box to learn more about \"Neck Pain: Care Instructions. \"     If you do not have an account, please click on the \"Sign Up Now\" link. Current as of: November 29, 2017  Content Version: 11.6  © 9115-2778 [employer]. Care instructions adapted under license by [facility] ([facility]). If you have questions about a medical condition or this instruction, always ask your healthcare professional. [address] any warranty or liability for your use of this information.

## 2018-08-07 NOTE — PROGRESS NOTES
Problems Daughter        Review of Systems:  Denies fever, chills, or night sweats  Denies headache, dizziness, syncope  Denies blurred vision, double vision  Denies chest pain, palpitations, SOB  Denies diarrhea, constipation, n/v  Denies dysuria, hematuria  Denies recent infections  Denies easy bruising  Denies anxiety, depression    Physical Exam:  WDWN, resting comfortable, no apparent distress  Appears stated age  Vitals stable  Non-labored breathing   A&O x 3, normal affect   Head is normocephalic, atraumatic   No palpable lymphadenopathy   Abdomen soft, nontender  Pupils equal and reactive, no scleral icterus  EOMI bilaterally  Cranial nerves II-XII intact bilaterally  No drift  5/5 in BUE, pain noted   5/5 in BLE  Sensation to LT intact x 4 ext  Toes going down  Skin warm and dry    Review of Imaging: MRI of cervical spine reviewed from outside facility. Assessment: Patient with multilevel cervical stenosis. Stable. Plan:  -MRI reviewed with patient--findings out of proportion to clinical exam, as the patient is not having true radicular symptoms and the majority of his symptoms are a results of the Lyme Disease. No surgical intervention warranted   -Pain Management--may benefit from cervical SCS trial.   -PT  -RTC PRN. I have interviewed and examined the patient and agree with above. He has more neck pain than radicular symptoms. He has mainly foraminal stenosis. I do not recommend any surgical intervention at this time.  Okay for cervical spinal cord stimulator trial.       Price Bro

## 2018-08-07 NOTE — TELEPHONE ENCOUNTER
Can you please order a PTT blood draw? This needs recollected because it wasn't picked up by the lab in enough time because it only has a 4 hour window to be resulted.

## 2018-08-09 RX ORDER — POLYETHYLENE GLYCOL 3350 17 G/17G
17 POWDER, FOR SOLUTION ORAL DAILY
Qty: 500 G | Refills: 1 | Status: SHIPPED | OUTPATIENT
Start: 2018-08-09 | End: 2018-09-04 | Stop reason: SDUPTHER

## 2018-08-09 NOTE — TELEPHONE ENCOUNTER
Patient called in requesting refill(s) for Miralax powder. Confirmed pharmacy, dosing, and medication strength. Advised I will send to provider for approval, which may take up to 48 hours.

## 2018-08-14 ENCOUNTER — ANESTHESIA EVENT (OUTPATIENT)
Dept: OPERATING ROOM | Age: 48
End: 2018-08-14
Payer: MEDICAID

## 2018-08-14 NOTE — ANESTHESIA PRE PROCEDURE
Department of Anesthesiology  Preprocedure Note       Name:  Charisse Johnston   Age:  50 y.o.  :  1970                                          MRN:  [id number]         Date:  8/15/2018      Surgeon: Kevin Gibbons):  Evette Cogan, DO    Procedure: Procedure(s):  SURGICAL IMPLANTATION OF MEDTRONIC LUMBAR SPINAL CORD  ELECTRODES AND GENERATOR    Medications prior to admission:   Prior to Admission medications    Medication Sig Start Date End Date Taking? Authorizing Provider   polyethylene glycol (GLYCOLAX) powder Take 17 g by mouth daily 18  Pedro Pablo Rothman DO   SUMAtriptan (IMITREX) 6 MG/0.5ML SOLN injection Inject 6 mg into the skin once as needed for Migraine    Historical Provider, MD   fluocinonide (LIDEX) 0.05 % cream Apply topically 2 times daily. 8/3/18   CAROLINE Lopez   cyclobenzaprine (FLEXERIL) 10 MG tablet Take 10 mg by mouth 3 times daily as needed for Muscle spasms    Historical Provider, MD   SUMAtriptan (IMITREX) 100 MG tablet Take 100 mg by mouth 2 times daily as needed for Migraine    Historical Provider, MD   bisacodyl (DULCOLAX) 5 MG EC tablet Take 5 mg by mouth daily as needed for Constipation    Historical Provider, MD       Current medications:    Current Facility-Administered Medications   Medication Dose Route Frequency Provider Last Rate Last Dose    lactated ringers infusion   Intravenous Continuous Carlo Lisa  mL/hr at 08/15/18 0912      clindamycin (CLEOCIN) 900 mg in dextrose 5 % 50 mL IVPB  900 mg Intravenous Once Evette Cogan, DO           Allergies:     Allergies   Allergen Reactions    Penicillins      Family has history to penicillin        Problem List:    Patient Active Problem List   Diagnosis Code    Chronic bilateral low back pain with bilateral sciatica M54.42, M54.41, G89.29    Chronic migraine without aura without status migrainosus, not intractable G43.709    Tear of right rotator cuff M75.101    Shoulder impingement, left M75.42    Shoulder impingement, right M75.41    Neck pain M54.2    Radiculopathy, lumbar region M54.16       Past Medical History:        Diagnosis Date    GERD (gastroesophageal reflux disease)     Lyme disease     Migraines     Rotator cuff injury     right shoulder - for OR 5-10-18        Past Surgical History:        Procedure Laterality Date    ABDOMEN SURGERY      nissen fundoplication    CARPAL TUNNEL RELEASE Bilateral     COLONOSCOPY      GASTRIC FUNDOPLICATION      1069     LA SHOULDER SCOPE BONE SHAVING Right 5/24/2018    RIGHT SHOULDER ARTHROSCOPY, DEBRIDEMENT, SUBACROMIAL DECOMPRESSION, DISTAL CLAVICLE RESECTION, ACROMIOPLASTY performed by Jona Henley MD at [address] [place] [address] History:    Social History   Substance Use Topics    Smoking status: Former Smoker     Years: 10.00     Types: Cigarettes    Smokeless tobacco: Never Used      Comment: quit 1980's     Alcohol use Yes      Comment: socially                                Counseling given: Not Answered      Vital Signs (Current):   Vitals:    08/02/18 0945 08/15/18 0853   BP:  (!) 138/93   Pulse:  83   Resp:  18   Temp:  97 °F (36.1 °C)   SpO2:  99%   Weight: 173 lb (78.5 kg) 177 lb 6.4 oz (80.5 kg)   Height: 5' 5\" (1.651 m)                                               BP Readings from Last 3 Encounters:   08/15/18 (!) 138/93   08/07/18 (!) 141/101   08/03/18 110/80       NPO Status: Time of last liquid consumption: 2200                        Time of last solid consumption: 2200                        Date of last liquid consumption: 08/14/18                        Date of last solid food consumption: 08/14/18    BMI:   Wt Readings from Last 3 Encounters:   08/15/18 177 lb 6.4 oz (80.5 kg)   08/07/18 180 lb (81.6 kg)   08/03/18 178 lb (80.7 kg)     Body mass index is 29.52 kg/m².     CBC:   Lab Results   Component Value Date    WBC 5.8 08/03/2018    RBC 6.04 08/03/2018    HGB 14.0 08/03/2018    HCT 44.5 08/03/2018    MCV 73.7 08/03/2018    RDW 17.8 08/03/2018     08/03/2018       CMP:   Lab Results   Component Value Date     08/03/2018    K 4.1 08/03/2018    CL 99 08/03/2018    CO2 30 08/03/2018    BUN 13 08/03/2018    CREATININE 0.9 08/03/2018    GFRAA >60 08/03/2018    LABGLOM >60 08/03/2018    GLUCOSE 90 08/03/2018    PROT 8.1 08/03/2018    CALCIUM 9.7 08/03/2018    BILITOT 0.8 08/03/2018    ALKPHOS 60 08/03/2018    AST 36 08/03/2018    ALT 64 08/03/2018       POC Tests: No results for input(s): POCGLU, POCNA, POCK, POCCL, POCBUN, POCHEMO, POCHCT in the last 72 hours. Coags:   Lab Results   Component Value Date    PROTIME 11.8 08/07/2018    INR 1.0 08/07/2018    APTT 32.9 08/07/2018       HCG (If Applicable): No results found for: PREGTESTUR, PREGSERUM, HCG, HCGQUANT     ABGs: No results found for: PHART, PO2ART, GBS1PAB, MFA8KNI, BEART, N7LAQBKO     Type & Screen (If Applicable):  No results found for: LABABO, [address]    Anesthesia Evaluation  Patient summary reviewed no history of anesthetic complications:   Airway: Mallampati: II  TM distance: >3 FB   Neck ROM: full   Dental: normal exam         Pulmonary: breath sounds clear to auscultation      (-) COPD and asthma                          ROS comment: Former smoker   Cardiovascular:Negative CV ROS  Exercise tolerance: good (>4 METS),         ECG reviewed  Rhythm: regular  Rate: normal                    Neuro/Psych:   (+) neuromuscular disease:, headaches: migraine headaches,             GI/Hepatic/Renal:   (+) GERD:,           Endo/Other:                     Abdominal:           Vascular:                                      Anesthesia Plan      MAC     ASA 2       Induction: intravenous. Anesthetic plan and risks discussed with patient. Plan discussed with CRNA.                 Juan David Lozano MD   8/15/2018

## 2018-08-15 ENCOUNTER — ANESTHESIA (OUTPATIENT)
Dept: OPERATING ROOM | Age: 48
End: 2018-08-15
Payer: MEDICAID

## 2018-08-15 ENCOUNTER — HOSPITAL ENCOUNTER (OUTPATIENT)
Age: 48
Setting detail: OUTPATIENT SURGERY
Discharge: HOME | End: 2018-08-15
Attending: ANESTHESIOLOGY | Admitting: ANESTHESIOLOGY
Payer: MEDICAID

## 2018-08-15 ENCOUNTER — HOSPITAL ENCOUNTER (OUTPATIENT)
Dept: OPERATING ROOM | Age: 48
Setting detail: OUTPATIENT SURGERY
Discharge: HOME | End: 2018-08-15
Attending: ANESTHESIOLOGY
Payer: MEDICAID

## 2018-08-15 VITALS
DIASTOLIC BLOOD PRESSURE: 93 MMHG | SYSTOLIC BLOOD PRESSURE: 138 MMHG | WEIGHT: 177.4 LBS | BODY MASS INDEX: 29.56 KG/M2 | OXYGEN SATURATION: 99 % | HEART RATE: 83 BPM | TEMPERATURE: 97 F | RESPIRATION RATE: 18 BRPM | HEIGHT: 65 IN

## 2018-08-15 VITALS
RESPIRATION RATE: 9 BRPM | OXYGEN SATURATION: 97 % | TEMPERATURE: 98.6 F | DIASTOLIC BLOOD PRESSURE: 83 MMHG | SYSTOLIC BLOOD PRESSURE: 122 MMHG

## 2018-08-15 DIAGNOSIS — Z96.89 S/P INSERTION OF SPINAL CORD STIMULATOR: ICD-10-CM

## 2018-08-15 DIAGNOSIS — M54.16 RADICULOPATHY, LUMBAR REGION: Primary | Chronic | ICD-10-CM

## 2018-08-15 PROCEDURE — 3700000000 HC ANESTHESIA ATTENDED CARE: Performed by: ANESTHESIOLOGY

## 2018-08-15 PROCEDURE — 2500000003 HC RX 250 WO HCPCS: Performed by: NURSE ANESTHETIST, CERTIFIED REGISTERED

## 2018-08-15 PROCEDURE — 6370000000 HC RX 637 (ALT 250 FOR IP): Performed by: ANESTHESIOLOGY

## 2018-08-15 PROCEDURE — L8689 EXTERNAL RECHARG SYS INTERN: HCPCS | Performed by: ANESTHESIOLOGY

## 2018-08-15 PROCEDURE — 7100000011 HC PHASE II RECOVERY - ADDTL 15 MIN: Performed by: ANESTHESIOLOGY

## 2018-08-15 PROCEDURE — 3700000001 HC ADD 15 MINUTES (ANESTHESIA): Performed by: ANESTHESIOLOGY

## 2018-08-15 PROCEDURE — 3600000005 HC SURGERY LEVEL 5 BASE: Performed by: ANESTHESIOLOGY

## 2018-08-15 PROCEDURE — 2500000003 HC RX 250 WO HCPCS: Performed by: ANESTHESIOLOGY

## 2018-08-15 PROCEDURE — 7100000010 HC PHASE II RECOVERY - FIRST 15 MIN: Performed by: ANESTHESIOLOGY

## 2018-08-15 PROCEDURE — 2720000010 HC SURG SUPPLY STERILE: Performed by: ANESTHESIOLOGY

## 2018-08-15 PROCEDURE — C1787 PATIENT PROGR, NEUROSTIM: HCPCS | Performed by: ANESTHESIOLOGY

## 2018-08-15 PROCEDURE — 3600000015 HC SURGERY LEVEL 5 ADDTL 15MIN: Performed by: ANESTHESIOLOGY

## 2018-08-15 PROCEDURE — 2580000003 HC RX 258: Performed by: ANESTHESIOLOGY

## 2018-08-15 PROCEDURE — 2580000003 HC RX 258

## 2018-08-15 PROCEDURE — C1820 GENERATOR NEURO RECHG BAT SY: HCPCS | Performed by: ANESTHESIOLOGY

## 2018-08-15 PROCEDURE — C1778 LEAD, NEUROSTIMULATOR: HCPCS | Performed by: ANESTHESIOLOGY

## 2018-08-15 PROCEDURE — 2709999900 HC NON-CHARGEABLE SUPPLY: Performed by: ANESTHESIOLOGY

## 2018-08-15 PROCEDURE — 6360000002 HC RX W HCPCS: Performed by: NURSE ANESTHETIST, CERTIFIED REGISTERED

## 2018-08-15 PROCEDURE — 3209999900 FLUORO FOR SURGICAL PROCEDURES

## 2018-08-15 DEVICE — DEVICE NEUROSTIMULATOR 13.9CC W1.9XH2.2IN 29.1GM RECHRG: Type: IMPLANTABLE DEVICE | Site: BUTTOCKS | Status: FUNCTIONAL

## 2018-08-15 DEVICE — KIT LD L60CM 8 ELECTRD PERC COMP CONTAIN LD ANCHR GWIRE NDL: Type: IMPLANTABLE DEVICE | Status: FUNCTIONAL

## 2018-08-15 RX ORDER — OXYCODONE HYDROCHLORIDE AND ACETAMINOPHEN 5; 325 MG/1; MG/1
2 TABLET ORAL ONCE
Status: COMPLETED | OUTPATIENT
Start: 2018-08-15 | End: 2018-08-15

## 2018-08-15 RX ORDER — LIDOCAINE HYDROCHLORIDE 20 MG/ML
INJECTION, SOLUTION EPIDURAL; INFILTRATION; INTRACAUDAL; PERINEURAL PRN
Status: DISCONTINUED | OUTPATIENT
Start: 2018-08-15 | End: 2018-08-15 | Stop reason: HOSPADM

## 2018-08-15 RX ORDER — ONDANSETRON 2 MG/ML
4 INJECTION INTRAMUSCULAR; INTRAVENOUS
Status: DISCONTINUED | OUTPATIENT
Start: 2018-08-15 | End: 2018-08-15 | Stop reason: HOSPADM

## 2018-08-15 RX ORDER — MIDAZOLAM HYDROCHLORIDE 1 MG/ML
INJECTION INTRAMUSCULAR; INTRAVENOUS PRN
Status: DISCONTINUED | OUTPATIENT
Start: 2018-08-15 | End: 2018-08-15 | Stop reason: SDUPTHER

## 2018-08-15 RX ORDER — KETOROLAC TROMETHAMINE 30 MG/ML
INJECTION, SOLUTION INTRAMUSCULAR; INTRAVENOUS PRN
Status: DISCONTINUED | OUTPATIENT
Start: 2018-08-15 | End: 2018-08-15 | Stop reason: SDUPTHER

## 2018-08-15 RX ORDER — FENTANYL CITRATE 50 UG/ML
50 INJECTION, SOLUTION INTRAMUSCULAR; INTRAVENOUS EVERY 5 MIN PRN
Status: DISCONTINUED | OUTPATIENT
Start: 2018-08-15 | End: 2018-08-15 | Stop reason: HOSPADM

## 2018-08-15 RX ORDER — FENTANYL CITRATE 50 UG/ML
INJECTION, SOLUTION INTRAMUSCULAR; INTRAVENOUS PRN
Status: DISCONTINUED | OUTPATIENT
Start: 2018-08-15 | End: 2018-08-15 | Stop reason: SDUPTHER

## 2018-08-15 RX ORDER — SODIUM CHLORIDE, SODIUM LACTATE, POTASSIUM CHLORIDE, CALCIUM CHLORIDE 600; 310; 30; 20 MG/100ML; MG/100ML; MG/100ML; MG/100ML
INJECTION, SOLUTION INTRAVENOUS CONTINUOUS
Status: DISCONTINUED | OUTPATIENT
Start: 2018-08-15 | End: 2018-08-15 | Stop reason: HOSPADM

## 2018-08-15 RX ORDER — PROPOFOL 10 MG/ML
INJECTION, EMULSION INTRAVENOUS CONTINUOUS PRN
Status: DISCONTINUED | OUTPATIENT
Start: 2018-08-15 | End: 2018-08-15 | Stop reason: SDUPTHER

## 2018-08-15 RX ORDER — CIPROFLOXACIN 500 MG/1
500 TABLET, FILM COATED ORAL 2 TIMES DAILY
Qty: 20 TABLET | Refills: 0 | Status: SHIPPED | OUTPATIENT
Start: 2018-08-15 | End: 2018-08-25

## 2018-08-15 RX ORDER — FENTANYL CITRATE 50 UG/ML
25 INJECTION, SOLUTION INTRAMUSCULAR; INTRAVENOUS EVERY 5 MIN PRN
Status: DISCONTINUED | OUTPATIENT
Start: 2018-08-15 | End: 2018-08-15 | Stop reason: HOSPADM

## 2018-08-15 RX ORDER — CLINDAMYCIN PHOSPHATE 900 MG/50ML
900 INJECTION INTRAVENOUS ONCE
Status: COMPLETED | OUTPATIENT
Start: 2018-08-15 | End: 2018-08-15

## 2018-08-15 RX ORDER — LIDOCAINE HYDROCHLORIDE 20 MG/ML
INJECTION, SOLUTION INFILTRATION; PERINEURAL PRN
Status: DISCONTINUED | OUTPATIENT
Start: 2018-08-15 | End: 2018-08-15 | Stop reason: SDUPTHER

## 2018-08-15 RX ORDER — OXYCODONE HYDROCHLORIDE AND ACETAMINOPHEN 5; 325 MG/1; MG/1
1 TABLET ORAL EVERY 6 HOURS PRN
Qty: 30 TABLET | Refills: 0 | Status: SHIPPED | OUTPATIENT
Start: 2018-08-15 | End: 2018-08-25

## 2018-08-15 RX ADMIN — FENTANYL CITRATE 50 MCG: 50 INJECTION, SOLUTION INTRAMUSCULAR; INTRAVENOUS at 10:33

## 2018-08-15 RX ADMIN — OXYCODONE HYDROCHLORIDE AND ACETAMINOPHEN 2 TABLET: 5; 325 TABLET ORAL at 12:18

## 2018-08-15 RX ADMIN — KETOROLAC TROMETHAMINE 30 MG: 30 INJECTION, SOLUTION INTRAMUSCULAR; INTRAVENOUS at 11:30

## 2018-08-15 RX ADMIN — SODIUM CHLORIDE, SODIUM LACTATE, POTASSIUM CHLORIDE, CALCIUM CHLORIDE: 600; 310; 30; 20 INJECTION, SOLUTION INTRAVENOUS at 09:12

## 2018-08-15 RX ADMIN — CLINDAMYCIN PHOSPHATE 900 MG: 18 INJECTION, SOLUTION INTRAVENOUS at 10:24

## 2018-08-15 RX ADMIN — FENTANYL CITRATE 50 MCG: 50 INJECTION, SOLUTION INTRAMUSCULAR; INTRAVENOUS at 10:29

## 2018-08-15 RX ADMIN — MIDAZOLAM 2 MG: 1 INJECTION INTRAMUSCULAR; INTRAVENOUS at 10:27

## 2018-08-15 RX ADMIN — LIDOCAINE HYDROCHLORIDE 25 MG: 20 INJECTION, SOLUTION INFILTRATION; PERINEURAL at 10:55

## 2018-08-15 RX ADMIN — FENTANYL CITRATE 50 MCG: 50 INJECTION, SOLUTION INTRAMUSCULAR; INTRAVENOUS at 10:45

## 2018-08-15 RX ADMIN — FENTANYL CITRATE 50 MCG: 50 INJECTION, SOLUTION INTRAMUSCULAR; INTRAVENOUS at 10:35

## 2018-08-15 RX ADMIN — PROPOFOL 100 MCG/KG/MIN: 10 INJECTION, EMULSION INTRAVENOUS at 10:55

## 2018-08-15 RX ADMIN — FENTANYL CITRATE 50 MCG: 50 INJECTION, SOLUTION INTRAMUSCULAR; INTRAVENOUS at 10:55

## 2018-08-15 ASSESSMENT — PAIN - FUNCTIONAL ASSESSMENT: PAIN_FUNCTIONAL_ASSESSMENT: 0-10

## 2018-08-15 ASSESSMENT — PULMONARY FUNCTION TESTS
PIF_VALUE: 0

## 2018-08-15 ASSESSMENT — PAIN SCALES - GENERAL: PAINLEVEL_OUTOF10: 8

## 2018-08-16 NOTE — OP NOTE
[address]                                 OPERATIVE REPORT    PATIENT NAME: Lenora Holloway                    :        1970  MED REC NO:   [id number]                            ROOM:  ACCOUNT NO:   [de-identified]                           ADMIT DATE: 08/15/2018  PROVIDER:     Dimitri Fernando DO    DATE OF PROCEDURE:  08/15/2018    LOCATION:  [address] [place] [address] Operating Room #4[facility] PREPROCEDURE DIAGNOSES:  Chronic lumbar radiculopathy, M54.16, chronic  neuropathic pain of the back and legs, and chronic pain syndrome. POSTOPERATIVE DIAGNOSES:  Chronic lumbar radiculopathy, M54.16, chronic  neuropathic pain of the back and legs, and chronic pain syndrome. PROCEDURES:  1. Surgical implantation of Medtronic SureScan, rechargeable battery for  stage 2 spinal cord stimulator surgical implant. 2.  Surgical implantation of Medtronic Octrode electrode lead 1, stage 2  spinal cord stimulator implant. 3.  Surgical implantation of Medtronic SureScan, lead 2, stage 2 spinal  cord stimulator implant SureScan. 4.  Direct x-ray guidance. 5.  Analyze and reprogram spinal cord stimulator and surgery with  physician. SURGEON:  Dimitri Fernando DO    COMPLICATIONS:  None. ASSISTANTS:  None. BLOOD LOSS:  Less than 100 mL of blood loss. ANESTHESIA:  IV anesthesia per Department of Anesthesia. Local per Dr Elisabeth Breaux. INDICATIONS:  The patient comes in today on 08/15/2018 to the [address] [place] [address] Operating Room #4 via Archie Moulding for a stage 2  spinal cord stimulator implant.     The patient is suffering with severe intractable pain of the back and legs  for years and has not responded to conservative care including oral  medications, rehabilitation, physical therapy, therapeutic injections, and  surgical options have been ruled out.    The patient passed a psychological screening profile and underwent a  successful spinal cord stimulator trial.    The patient presents today for the stage 2 permanent implantation. The patient and I discussed this at length including details of the risks,  potential complications, and alternatives to care, and he did request to  proceed as he did in the office. The patient did receive IV antibiotics preoperatively, please see the  medical record. The patient was seen by Department of Anesthesia and IV anesthetic is  required, please see the anesthesia record. OPERATIVE PROCEDURE:  The patient was brought to the operating room #4 of  the [facility] and placed in the prone position in a neutral  fashion per the Department of Anesthesia; and once secured and monitored,  the patient's thoracolumbar spine was prepped and draped including full  surgical sterile technique utilized throughout. Direct x-ray guidance was used to identify the L2 disc space, and this area  was marked on the skin. The patient had marked on the skin in the holding  area the area where we requested the battery be implanted, in this case the  left paraspinal area above the left iliac crest.    We started with a vertical back incision and localized with 10 mL of 1%  Xylocaine and then made our usual incision 2 to 3 cm long at the L2-L3  level. Careful blunt dissection was carried out down in the interspinous  ligament area. A small pocket was carried out. Strict hemostasis was  assured. We then introduced the first Medtronic epidural Touchy needle into the  posterior epidural space without difficulty on the first attempt under  x-ray guidance with using loss of resistance test.    We then advanced the first Medtronic Octrode electrode through the needle  cephalad in the posterior epidural space up to the vertebral body of T8.    Trial stimulation of this lead produced approximately 80% to 85% coverage  of the battery into the battery pocket making sure the stencil  was facing outward and excess curl of the electrode was placed behind the  battery to reduce interference during reprogramming and recharging. We  secured the battery down with 1-0 silk anchor stitches x2 in the usual  manner. The wound was once again checked for strict hemostasis, irrigated  with antibiotic solution, and closed using 2-0 and 1-0 Vicryl interrupted  stitches up to the skin edges followed by continuous 3-0 Polysorb plastic  closure technique along the skin edges followed by Steri-Strips, Aquacel  dressing, and ABD pressure bandage. We then turned our attention to the vertical back incision to make sure the  4x4 had been removed, irrigated with antibiotic solution, checked for  strict hemostasis, and closed using eight 1-0 Vicryl interrupted stitches  up to the skin edges followed by continuous 3-0 Polysorb plastic closure  technique along the skin edges followed by Steri-Strips, Aquacel dressing,  and ABD pressure bandage. The patient was fitted for an abdominal binder. The patient was taken to the recovery room where he was found to be in  stable condition with good results without complications, neurologically  unchanged postprocedure and preprocedure with good results, no  complications. The patient was given written going-home instructions, a copy of which is  on the chart, a copy was given to the patient and the patient will  be followed up in our [facility] in one week or sooner if need be. The  patient will be discharged from the facility depending his ability to meet  full discharge criteria per the Department of Anesthesia. I will sign off  in usual manner.             Zach Salas DO  D: 08/15/2018 11:41:51       T: 08/15/2018 15:12:55     [email or web address]  Job#: [id number]     Doc#: [id number]    CC:

## 2018-09-04 ENCOUNTER — TELEPHONE (OUTPATIENT)
Dept: FAMILY MEDICINE CLINIC | Age: 48
End: 2018-09-04

## 2018-09-04 DIAGNOSIS — L30.9 DERMATITIS: ICD-10-CM

## 2018-09-04 RX ORDER — POLYETHYLENE GLYCOL 3350 17 G/17G
17 POWDER, FOR SOLUTION ORAL DAILY
Qty: 510 G | Refills: 0 | Status: SHIPPED | OUTPATIENT
Start: 2018-09-04 | End: 2018-10-24 | Stop reason: SDUPTHER

## 2018-09-04 NOTE — TELEPHONE ENCOUNTER
Patient called back and said he needs the pain cream frequency increased to 4 times daily. He also requested a refill for Miralax.

## 2018-09-04 NOTE — TELEPHONE ENCOUNTER
Patient left a message stating he takes his medication for \"flare ups\" up to 4 times per day, but we only wrote it for 2 times daily. He is also requesting a referral to dermatology. I called him to clarify which medication he is referring to & inform him he may self-schedule dermatology appointment, but there was no answer. Left detailed message requesting he call the office.

## 2018-09-06 NOTE — TELEPHONE ENCOUNTER
Patient called back, he is not accepting your answer. He is adament that you are not understanding what he is asking. I explained multiple times that you do understand, you are simply not going to fill this prescription any differently than twice daily. He is very upset and being rude to me and he says he wants to talk to you. I told him he needs to make an appointment, he says no, he needs Dr Hieu Logan to call him. There is nothing I can say to get through to him that the answer is no. Please advise.

## 2018-09-07 ENCOUNTER — TELEPHONE (OUTPATIENT)
Dept: FAMILY MEDICINE CLINIC | Age: 48
End: 2018-09-07

## 2018-09-11 ENCOUNTER — TELEPHONE (OUTPATIENT)
Dept: FAMILY MEDICINE CLINIC | Age: 48
End: 2018-09-11

## 2018-09-11 NOTE — TELEPHONE ENCOUNTER
Patient called me numerous times regarding Lidex cream Rx. Stated he had this prescribed for 3+ years by [facility] ([phone]). I left a message for that office to fax medication records to the office. Patient stated that this had been previously Rx'd for 2-4 times daily. Also stated he has tube which states this on it. He is dependent on rides and is unable to currently come into the office. I offered him [facility] as an interim option as well as a referral to dermatology. Patient stated he had previously requested one from the office and was given the number to Dr. Tawana Subramanian. He has an appointment with him in November. I am requesting that the office staff call local dermatologists to see if any can get patient in sooner. He is requesting an adjustment to this medication just until he can be seen by dermatology. Patient was not hostile on the phone with me whatsoever. He stated multiple times that he very much likes the care he receives from Dr. Kwan Brothers and does not want to hinder the provider/patient relationship. If unable to change dosing instructions, are we able to do a prior authorization for an early fill possibly? If not agreeable to any of these recommendations, please have office staff follow up with patient to let him know of any other options. Thank you.

## 2018-09-11 NOTE — TELEPHONE ENCOUNTER
Prescription will not be changed without records. We do not schedule dermatology appointments for patients due to strict no show policies. We give them numbers or tell them to call their insurance company to find out which dermatologists are covered.

## 2018-09-12 NOTE — TELEPHONE ENCOUNTER
Patient's Dermatologist from [place] retired and the current Dr in that office has no access to old medical records. Polina called several offices and only Dae takes his insurance locally.  He's limited to a 30 mile radius for his transportation

## 2018-09-24 ENCOUNTER — HOSPITAL ENCOUNTER (OUTPATIENT)
Age: 48
Discharge: HOME | End: 2018-09-26
Payer: MEDICAID

## 2018-09-24 ENCOUNTER — OFFICE VISIT (OUTPATIENT)
Dept: FAMILY MEDICINE CLINIC | Age: 48
End: 2018-09-24
Payer: MEDICAID

## 2018-09-24 ENCOUNTER — TELEPHONE (OUTPATIENT)
Dept: ADMINISTRATIVE | Age: 48
End: 2018-09-24

## 2018-09-24 VITALS
BODY MASS INDEX: 29.02 KG/M2 | SYSTOLIC BLOOD PRESSURE: 124 MMHG | RESPIRATION RATE: 16 BRPM | OXYGEN SATURATION: 99 % | WEIGHT: 174.2 LBS | DIASTOLIC BLOOD PRESSURE: 86 MMHG | HEIGHT: 65 IN | HEART RATE: 88 BPM

## 2018-09-24 DIAGNOSIS — R09.89 PAIN IN TONSIL: ICD-10-CM

## 2018-09-24 DIAGNOSIS — L30.9 DERMATITIS: ICD-10-CM

## 2018-09-24 DIAGNOSIS — R22.32 AXILLARY LUMP, LEFT: ICD-10-CM

## 2018-09-24 DIAGNOSIS — Z13.220 SCREENING, LIPID: ICD-10-CM

## 2018-09-24 DIAGNOSIS — R13.10 DYSPHAGIA, UNSPECIFIED TYPE: Primary | ICD-10-CM

## 2018-09-24 LAB
BASOPHILS ABSOLUTE: 0.03 E9/L (ref 0–0.2)
BASOPHILS RELATIVE PERCENT: 0.5 % (ref 0–2)
CHOLESTEROL, TOTAL: 230 MG/DL (ref 0–199)
EOSINOPHILS ABSOLUTE: 0.18 E9/L (ref 0.05–0.5)
EOSINOPHILS RELATIVE PERCENT: 2.9 % (ref 0–6)
HCT VFR BLD CALC: 47.4 % (ref 37–54)
HDLC SERPL-MCNC: 37 MG/DL
HEMOGLOBIN: 14.6 G/DL (ref 12.5–16.5)
IMMATURE GRANULOCYTES #: 0.01 E9/L
IMMATURE GRANULOCYTES %: 0.2 % (ref 0–5)
LDL CHOLESTEROL CALCULATED: 172 MG/DL (ref 0–99)
LYMPHOCYTES ABSOLUTE: 2.16 E9/L (ref 1.5–4)
LYMPHOCYTES RELATIVE PERCENT: 34.5 % (ref 20–42)
MCH RBC QN AUTO: 22.7 PG (ref 26–35)
MCHC RBC AUTO-ENTMCNC: 30.8 % (ref 32–34.5)
MCV RBC AUTO: 73.8 FL (ref 80–99.9)
MONOCYTES ABSOLUTE: 0.58 E9/L (ref 0.1–0.95)
MONOCYTES RELATIVE PERCENT: 9.3 % (ref 2–12)
NEUTROPHILS ABSOLUTE: 3.3 E9/L (ref 1.8–7.3)
NEUTROPHILS RELATIVE PERCENT: 52.6 % (ref 43–80)
PDW BLD-RTO: 17.5 FL (ref 11.5–15)
PLATELET # BLD: 274 E9/L (ref 130–450)
PMV BLD AUTO: 10.9 FL (ref 7–12)
RBC # BLD: 6.42 E12/L (ref 3.8–5.8)
TRIGL SERPL-MCNC: 103 MG/DL (ref 0–149)
VLDLC SERPL CALC-MCNC: 21 MG/DL
WBC # BLD: 6.3 E9/L (ref 4.5–11.5)

## 2018-09-24 PROCEDURE — 99214 OFFICE O/P EST MOD 30 MIN: CPT | Performed by: FAMILY MEDICINE

## 2018-09-24 PROCEDURE — 85025 COMPLETE CBC W/AUTO DIFF WBC: CPT

## 2018-09-24 PROCEDURE — 80061 LIPID PANEL: CPT

## 2018-09-24 ASSESSMENT — ENCOUNTER SYMPTOMS
COUGH: 0
RHINORRHEA: 0
ABDOMINAL PAIN: 0
NAUSEA: 0
BACK PAIN: 0
WHEEZING: 0
TROUBLE SWALLOWING: 1
CONSTIPATION: 0
SHORTNESS OF BREATH: 0
VOMITING: 0
SORE THROAT: 0
SINUS PRESSURE: 0
DIARRHEA: 0

## 2018-09-24 NOTE — TELEPHONE ENCOUNTER
Patient had referral in system from Dr Rakesh Monzon, states that pain bilateral tonsils with itch. Patient is very concerned due to a strong family history of cancer. Advised we would ask office to review his chart and if there is a sooner appointment they will contact him at that time.

## 2018-09-28 ENCOUNTER — HOSPITAL ENCOUNTER (OUTPATIENT)
Age: 48
Setting detail: OUTPATIENT SURGERY
Discharge: HOME | End: 2018-09-28
Attending: ORTHOPAEDIC SURGERY | Admitting: ORTHOPAEDIC SURGERY
Payer: MEDICAID

## 2018-09-28 ENCOUNTER — ANESTHESIA EVENT (OUTPATIENT)
Dept: OPERATING ROOM | Age: 48
End: 2018-09-28
Payer: MEDICAID

## 2018-09-28 ENCOUNTER — ANESTHESIA (OUTPATIENT)
Dept: OPERATING ROOM | Age: 48
End: 2018-09-28
Payer: MEDICAID

## 2018-09-28 VITALS
OXYGEN SATURATION: 97 % | DIASTOLIC BLOOD PRESSURE: 61 MMHG | RESPIRATION RATE: 2 BRPM | SYSTOLIC BLOOD PRESSURE: 106 MMHG

## 2018-09-28 VITALS
BODY MASS INDEX: 28.32 KG/M2 | HEIGHT: 65 IN | TEMPERATURE: 97.2 F | HEART RATE: 79 BPM | DIASTOLIC BLOOD PRESSURE: 68 MMHG | OXYGEN SATURATION: 98 % | WEIGHT: 170 LBS | RESPIRATION RATE: 16 BRPM | SYSTOLIC BLOOD PRESSURE: 137 MMHG

## 2018-09-28 DIAGNOSIS — R22.32 AXILLARY LUMP, LEFT: ICD-10-CM

## 2018-09-28 DIAGNOSIS — M75.42 SHOULDER IMPINGEMENT, LEFT: Primary | ICD-10-CM

## 2018-09-28 PROCEDURE — 2720000010 HC SURG SUPPLY STERILE: Performed by: ORTHOPAEDIC SURGERY

## 2018-09-28 PROCEDURE — 3600000013 HC SURGERY LEVEL 3 ADDTL 15MIN: Performed by: ORTHOPAEDIC SURGERY

## 2018-09-28 PROCEDURE — 3700000000 HC ANESTHESIA ATTENDED CARE: Performed by: ORTHOPAEDIC SURGERY

## 2018-09-28 PROCEDURE — 3600000003 HC SURGERY LEVEL 3 BASE: Performed by: ORTHOPAEDIC SURGERY

## 2018-09-28 PROCEDURE — 6360000002 HC RX W HCPCS: Performed by: ANESTHESIOLOGY

## 2018-09-28 PROCEDURE — 7100000000 HC PACU RECOVERY - FIRST 15 MIN: Performed by: ORTHOPAEDIC SURGERY

## 2018-09-28 PROCEDURE — 7100000011 HC PHASE II RECOVERY - ADDTL 15 MIN: Performed by: ORTHOPAEDIC SURGERY

## 2018-09-28 PROCEDURE — 2500000003 HC RX 250 WO HCPCS: Performed by: ANESTHESIOLOGY

## 2018-09-28 PROCEDURE — 7100000010 HC PHASE II RECOVERY - FIRST 15 MIN: Performed by: ORTHOPAEDIC SURGERY

## 2018-09-28 PROCEDURE — 6360000002 HC RX W HCPCS: Performed by: NURSE ANESTHETIST, CERTIFIED REGISTERED

## 2018-09-28 PROCEDURE — 3700000001 HC ADD 15 MINUTES (ANESTHESIA): Performed by: ORTHOPAEDIC SURGERY

## 2018-09-28 PROCEDURE — 2709999900 HC NON-CHARGEABLE SUPPLY: Performed by: ORTHOPAEDIC SURGERY

## 2018-09-28 PROCEDURE — 7100000001 HC PACU RECOVERY - ADDTL 15 MIN: Performed by: ORTHOPAEDIC SURGERY

## 2018-09-28 PROCEDURE — C1713 ANCHOR/SCREW BN/BN,TIS/BN: HCPCS | Performed by: ORTHOPAEDIC SURGERY

## 2018-09-28 PROCEDURE — 2500000003 HC RX 250 WO HCPCS: Performed by: NURSE ANESTHETIST, CERTIFIED REGISTERED

## 2018-09-28 PROCEDURE — 2580000003 HC RX 258: Performed by: NURSE ANESTHETIST, CERTIFIED REGISTERED

## 2018-09-28 PROCEDURE — L3650 SO 8 ABD RESTRAINT PRE OTS: HCPCS | Performed by: ORTHOPAEDIC SURGERY

## 2018-09-28 PROCEDURE — 6370000000 HC RX 637 (ALT 250 FOR IP): Performed by: ORTHOPAEDIC SURGERY

## 2018-09-28 PROCEDURE — 2500000003 HC RX 250 WO HCPCS: Performed by: ORTHOPAEDIC SURGERY

## 2018-09-28 PROCEDURE — 64415 NJX AA&/STRD BRCH PLXS IMG: CPT | Performed by: ANESTHESIOLOGY

## 2018-09-28 DEVICE — ANCHOR SUTURE BIOCOMP 4.75X22 MM DBL LD WHT SWIVELOCK C: Type: IMPLANTABLE DEVICE | Site: SHOULDER | Status: FUNCTIONAL

## 2018-09-28 RX ORDER — DEXAMETHASONE SODIUM PHOSPHATE 4 MG/ML
INJECTION, SOLUTION INTRA-ARTICULAR; INTRALESIONAL; INTRAMUSCULAR; INTRAVENOUS; SOFT TISSUE PRN
Status: DISCONTINUED | OUTPATIENT
Start: 2018-09-28 | End: 2018-09-28

## 2018-09-28 RX ORDER — HYDROCODONE BITARTRATE AND ACETAMINOPHEN 5; 325 MG/1; MG/1
2 TABLET ORAL EVERY 4 HOURS PRN
Status: DISCONTINUED | OUTPATIENT
Start: 2018-09-28 | End: 2018-09-28 | Stop reason: HOSPADM

## 2018-09-28 RX ORDER — SODIUM CHLORIDE 0.9 % (FLUSH) 0.9 %
10 SYRINGE (ML) INJECTION PRN
Status: DISCONTINUED | OUTPATIENT
Start: 2018-09-28 | End: 2018-09-28 | Stop reason: HOSPADM

## 2018-09-28 RX ORDER — NEOSTIGMINE METHYLSULFATE 1 MG/ML
INJECTION, SOLUTION INTRAVENOUS PRN
Status: DISCONTINUED | OUTPATIENT
Start: 2018-09-28 | End: 2018-09-28 | Stop reason: SDUPTHER

## 2018-09-28 RX ORDER — BUPIVACAINE HYDROCHLORIDE AND EPINEPHRINE 5; 5 MG/ML; UG/ML
INJECTION, SOLUTION EPIDURAL; INTRACAUDAL; PERINEURAL PRN
Status: DISCONTINUED | OUTPATIENT
Start: 2018-09-28 | End: 2018-09-28 | Stop reason: HOSPADM

## 2018-09-28 RX ORDER — SODIUM CHLORIDE 9 MG/ML
INJECTION, SOLUTION INTRAVENOUS CONTINUOUS
Status: DISCONTINUED | OUTPATIENT
Start: 2018-09-28 | End: 2018-09-28 | Stop reason: HOSPADM

## 2018-09-28 RX ORDER — LIDOCAINE HYDROCHLORIDE 10 MG/ML
INJECTION, SOLUTION EPIDURAL; INFILTRATION; INTRACAUDAL; PERINEURAL PRN
Status: DISCONTINUED | OUTPATIENT
Start: 2018-09-28 | End: 2018-09-28 | Stop reason: SDUPTHER

## 2018-09-28 RX ORDER — CEPHALEXIN 500 MG/1
500 CAPSULE ORAL 3 TIMES DAILY
Qty: 3 CAPSULE | Refills: 0 | Status: SHIPPED | OUTPATIENT
Start: 2018-09-28 | End: 2018-10-24 | Stop reason: ALTCHOICE

## 2018-09-28 RX ORDER — HYDROCODONE BITARTRATE AND ACETAMINOPHEN 5; 325 MG/1; MG/1
1 TABLET ORAL EVERY 4 HOURS PRN
Qty: 42 TABLET | Refills: 0 | Status: SHIPPED | OUTPATIENT
Start: 2018-09-28 | End: 2018-10-05

## 2018-09-28 RX ORDER — DIPHENHYDRAMINE HYDROCHLORIDE 50 MG/ML
12.5 INJECTION INTRAMUSCULAR; INTRAVENOUS
Status: DISCONTINUED | OUTPATIENT
Start: 2018-09-28 | End: 2018-09-28 | Stop reason: HOSPADM

## 2018-09-28 RX ORDER — SODIUM CHLORIDE 0.9 % (FLUSH) 0.9 %
10 SYRINGE (ML) INJECTION EVERY 12 HOURS SCHEDULED
Status: DISCONTINUED | OUTPATIENT
Start: 2018-09-28 | End: 2018-09-28 | Stop reason: HOSPADM

## 2018-09-28 RX ORDER — MIDAZOLAM HYDROCHLORIDE 1 MG/ML
1 INJECTION INTRAMUSCULAR; INTRAVENOUS EVERY 5 MIN PRN
Status: DISCONTINUED | OUTPATIENT
Start: 2018-09-28 | End: 2018-09-28 | Stop reason: HOSPADM

## 2018-09-28 RX ORDER — PROPOFOL 10 MG/ML
INJECTION, EMULSION INTRAVENOUS PRN
Status: DISCONTINUED | OUTPATIENT
Start: 2018-09-28 | End: 2018-09-28 | Stop reason: SDUPTHER

## 2018-09-28 RX ORDER — DEXAMETHASONE SODIUM PHOSPHATE 4 MG/ML
4 INJECTION, SOLUTION INTRA-ARTICULAR; INTRALESIONAL; INTRAMUSCULAR; INTRAVENOUS; SOFT TISSUE ONCE
Status: DISCONTINUED | OUTPATIENT
Start: 2018-09-28 | End: 2018-09-28 | Stop reason: HOSPADM

## 2018-09-28 RX ORDER — ROPIVACAINE HYDROCHLORIDE 5 MG/ML
30 INJECTION, SOLUTION EPIDURAL; INFILTRATION; PERINEURAL
Status: COMPLETED | OUTPATIENT
Start: 2018-09-28 | End: 2018-09-28

## 2018-09-28 RX ORDER — MEPERIDINE HYDROCHLORIDE 25 MG/ML
12.5 INJECTION INTRAMUSCULAR; INTRAVENOUS; SUBCUTANEOUS EVERY 5 MIN PRN
Status: DISCONTINUED | OUTPATIENT
Start: 2018-09-28 | End: 2018-09-28 | Stop reason: HOSPADM

## 2018-09-28 RX ORDER — FENTANYL CITRATE 50 UG/ML
100 INJECTION, SOLUTION INTRAMUSCULAR; INTRAVENOUS ONCE
Status: DISCONTINUED | OUTPATIENT
Start: 2018-09-28 | End: 2018-09-28 | Stop reason: HOSPADM

## 2018-09-28 RX ORDER — DEXAMETHASONE SODIUM PHOSPHATE 10 MG/ML
4 INJECTION, SOLUTION INTRAMUSCULAR; INTRAVENOUS ONCE
Status: COMPLETED | OUTPATIENT
Start: 2018-09-28 | End: 2018-09-28

## 2018-09-28 RX ORDER — ROCURONIUM BROMIDE 10 MG/ML
INJECTION, SOLUTION INTRAVENOUS PRN
Status: DISCONTINUED | OUTPATIENT
Start: 2018-09-28 | End: 2018-09-28 | Stop reason: SDUPTHER

## 2018-09-28 RX ORDER — LIDOCAINE HYDROCHLORIDE 20 MG/ML
INJECTION, SOLUTION INFILTRATION; PERINEURAL PRN
Status: DISCONTINUED | OUTPATIENT
Start: 2018-09-28 | End: 2018-09-28 | Stop reason: SDUPTHER

## 2018-09-28 RX ORDER — ROPIVACAINE HYDROCHLORIDE 5 MG/ML
INJECTION, SOLUTION EPIDURAL; INFILTRATION; PERINEURAL PRN
Status: DISCONTINUED | OUTPATIENT
Start: 2018-09-28 | End: 2018-09-28 | Stop reason: SDUPTHER

## 2018-09-28 RX ORDER — ONDANSETRON 2 MG/ML
4 INJECTION INTRAMUSCULAR; INTRAVENOUS EVERY 6 HOURS PRN
Status: DISCONTINUED | OUTPATIENT
Start: 2018-09-28 | End: 2018-09-28 | Stop reason: HOSPADM

## 2018-09-28 RX ORDER — GLYCOPYRROLATE 0.2 MG/ML
INJECTION INTRAMUSCULAR; INTRAVENOUS PRN
Status: DISCONTINUED | OUTPATIENT
Start: 2018-09-28 | End: 2018-09-28 | Stop reason: SDUPTHER

## 2018-09-28 RX ORDER — LIDOCAINE HYDROCHLORIDE 10 MG/ML
10 INJECTION, SOLUTION INFILTRATION; PERINEURAL
Status: COMPLETED | OUTPATIENT
Start: 2018-09-28 | End: 2018-09-28

## 2018-09-28 RX ORDER — FENTANYL CITRATE 50 UG/ML
INJECTION, SOLUTION INTRAMUSCULAR; INTRAVENOUS PRN
Status: DISCONTINUED | OUTPATIENT
Start: 2018-09-28 | End: 2018-09-28 | Stop reason: SDUPTHER

## 2018-09-28 RX ORDER — OXYCODONE HYDROCHLORIDE AND ACETAMINOPHEN 5; 325 MG/1; MG/1
1 TABLET ORAL PRN
Status: DISCONTINUED | OUTPATIENT
Start: 2018-09-28 | End: 2018-09-28 | Stop reason: HOSPADM

## 2018-09-28 RX ORDER — DEXAMETHASONE SODIUM PHOSPHATE 10 MG/ML
INJECTION, SOLUTION INTRAMUSCULAR; INTRAVENOUS PRN
Status: DISCONTINUED | OUTPATIENT
Start: 2018-09-28 | End: 2018-09-28 | Stop reason: SDUPTHER

## 2018-09-28 RX ORDER — DEXAMETHASONE SODIUM PHOSPHATE 10 MG/ML
INJECTION, SOLUTION INTRAMUSCULAR; INTRAVENOUS
Status: COMPLETED
Start: 2018-09-28 | End: 2018-09-28

## 2018-09-28 RX ORDER — SODIUM CHLORIDE 9 MG/ML
INJECTION, SOLUTION INTRAVENOUS CONTINUOUS PRN
Status: DISCONTINUED | OUTPATIENT
Start: 2018-09-28 | End: 2018-09-28 | Stop reason: SDUPTHER

## 2018-09-28 RX ORDER — OXYCODONE HYDROCHLORIDE AND ACETAMINOPHEN 5; 325 MG/1; MG/1
2 TABLET ORAL PRN
Status: DISCONTINUED | OUTPATIENT
Start: 2018-09-28 | End: 2018-09-28 | Stop reason: HOSPADM

## 2018-09-28 RX ORDER — HYDROCODONE BITARTRATE AND ACETAMINOPHEN 5; 325 MG/1; MG/1
1 TABLET ORAL EVERY 4 HOURS PRN
Status: DISCONTINUED | OUTPATIENT
Start: 2018-09-28 | End: 2018-09-28 | Stop reason: HOSPADM

## 2018-09-28 RX ORDER — ONDANSETRON 2 MG/ML
INJECTION INTRAMUSCULAR; INTRAVENOUS PRN
Status: DISCONTINUED | OUTPATIENT
Start: 2018-09-28 | End: 2018-09-28 | Stop reason: SDUPTHER

## 2018-09-28 RX ORDER — ACETAMINOPHEN 325 MG/1
650 TABLET ORAL EVERY 4 HOURS PRN
Status: DISCONTINUED | OUTPATIENT
Start: 2018-09-28 | End: 2018-09-28 | Stop reason: HOSPADM

## 2018-09-28 RX ADMIN — DEXAMETHASONE SODIUM PHOSPHATE 8 MG: 10 INJECTION, SOLUTION INTRAMUSCULAR; INTRAVENOUS at 12:55

## 2018-09-28 RX ADMIN — HYDROCODONE BITARTRATE AND ACETAMINOPHEN 2 TABLET: 5; 325 TABLET ORAL at 16:52

## 2018-09-28 RX ADMIN — CEFAZOLIN SODIUM 2 G: 2 SOLUTION INTRAVENOUS at 12:38

## 2018-09-28 RX ADMIN — ROCURONIUM BROMIDE 40 MG: 10 SOLUTION INTRAVENOUS at 12:38

## 2018-09-28 RX ADMIN — ONDANSETRON HYDROCHLORIDE 4 MG: 2 INJECTION, SOLUTION INTRAMUSCULAR; INTRAVENOUS at 14:45

## 2018-09-28 RX ADMIN — LIDOCAINE HYDROCHLORIDE 1 ML: 10 INJECTION, SOLUTION EPIDURAL; INFILTRATION; INTRACAUDAL; PERINEURAL at 11:37

## 2018-09-28 RX ADMIN — SODIUM CHLORIDE: 9 INJECTION, SOLUTION INTRAVENOUS at 14:40

## 2018-09-28 RX ADMIN — FENTANYL CITRATE 100 MCG: 50 INJECTION, SOLUTION INTRAMUSCULAR; INTRAVENOUS at 12:38

## 2018-09-28 RX ADMIN — FENTANYL CITRATE 50 MCG: 50 INJECTION, SOLUTION INTRAMUSCULAR; INTRAVENOUS at 13:47

## 2018-09-28 RX ADMIN — ROPIVACAINE HYDROCHLORIDE 30 ML: 5 INJECTION, SOLUTION EPIDURAL; INFILTRATION; PERINEURAL at 11:41

## 2018-09-28 RX ADMIN — DEXAMETHASONE SODIUM PHOSPHATE 4 MG: 10 INJECTION, SOLUTION INTRAMUSCULAR; INTRAVENOUS at 11:38

## 2018-09-28 RX ADMIN — MIDAZOLAM HYDROCHLORIDE 2 MG: 1 INJECTION, SOLUTION INTRAMUSCULAR; INTRAVENOUS at 11:31

## 2018-09-28 RX ADMIN — GLYCOPYRROLATE 0.6 MG: 0.2 INJECTION, SOLUTION INTRAMUSCULAR; INTRAVENOUS at 14:45

## 2018-09-28 RX ADMIN — LIDOCAINE HYDROCHLORIDE 5 ML: 10 INJECTION, SOLUTION INFILTRATION; PERINEURAL at 11:32

## 2018-09-28 RX ADMIN — DEXAMETHASONE SODIUM PHOSPHATE 4 MG: 10 INJECTION, SOLUTION INTRAMUSCULAR; INTRAVENOUS at 11:41

## 2018-09-28 RX ADMIN — Medication 3 MG: at 14:45

## 2018-09-28 RX ADMIN — ROPIVACAINE HYDROCHLORIDE 30 ML: 5 INJECTION, SOLUTION EPIDURAL; INFILTRATION; PERINEURAL at 11:38

## 2018-09-28 RX ADMIN — LIDOCAINE HYDROCHLORIDE 100 MG: 20 INJECTION, SOLUTION INFILTRATION; PERINEURAL at 12:38

## 2018-09-28 RX ADMIN — PROPOFOL 150 MG: 10 INJECTION, EMULSION INTRAVENOUS at 12:38

## 2018-09-28 RX ADMIN — SODIUM CHLORIDE: 9 INJECTION, SOLUTION INTRAVENOUS at 12:30

## 2018-09-28 ASSESSMENT — PULMONARY FUNCTION TESTS
PIF_VALUE: 22
PIF_VALUE: 20
PIF_VALUE: 21
PIF_VALUE: 23
PIF_VALUE: 22
PIF_VALUE: 28
PIF_VALUE: 24
PIF_VALUE: 23
PIF_VALUE: 18
PIF_VALUE: 20
PIF_VALUE: 23
PIF_VALUE: 24
PIF_VALUE: 24
PIF_VALUE: 18
PIF_VALUE: 0
PIF_VALUE: 22
PIF_VALUE: 23
PIF_VALUE: 11
PIF_VALUE: 24
PIF_VALUE: 23
PIF_VALUE: 21
PIF_VALUE: 21
PIF_VALUE: 24
PIF_VALUE: 24
PIF_VALUE: 22
PIF_VALUE: 24
PIF_VALUE: 20
PIF_VALUE: 23
PIF_VALUE: 24
PIF_VALUE: 41
PIF_VALUE: 22
PIF_VALUE: 25
PIF_VALUE: 22
PIF_VALUE: 24
PIF_VALUE: 24
PIF_VALUE: 25
PIF_VALUE: 21
PIF_VALUE: 23
PIF_VALUE: 23
PIF_VALUE: 21
PIF_VALUE: 24
PIF_VALUE: 21
PIF_VALUE: 24
PIF_VALUE: 24
PIF_VALUE: 21
PIF_VALUE: 22
PIF_VALUE: 22
PIF_VALUE: 41
PIF_VALUE: 24
PIF_VALUE: 21
PIF_VALUE: 21
PIF_VALUE: 20
PIF_VALUE: 22
PIF_VALUE: 8
PIF_VALUE: 19
PIF_VALUE: 22
PIF_VALUE: 24
PIF_VALUE: 22
PIF_VALUE: 21
PIF_VALUE: 22
PIF_VALUE: 26
PIF_VALUE: 27
PIF_VALUE: 2
PIF_VALUE: 14
PIF_VALUE: 22
PIF_VALUE: 1
PIF_VALUE: 36
PIF_VALUE: 22
PIF_VALUE: 21
PIF_VALUE: 23
PIF_VALUE: 35
PIF_VALUE: 35
PIF_VALUE: 24
PIF_VALUE: 23
PIF_VALUE: 21
PIF_VALUE: 21
PIF_VALUE: 22
PIF_VALUE: 23
PIF_VALUE: 22
PIF_VALUE: 0
PIF_VALUE: 22
PIF_VALUE: 24
PIF_VALUE: 23
PIF_VALUE: 21
PIF_VALUE: 22
PIF_VALUE: 21
PIF_VALUE: 23
PIF_VALUE: 21
PIF_VALUE: 24
PIF_VALUE: 23
PIF_VALUE: 25
PIF_VALUE: 22
PIF_VALUE: 22
PIF_VALUE: 16
PIF_VALUE: 21
PIF_VALUE: 22
PIF_VALUE: 22
PIF_VALUE: 20
PIF_VALUE: 24
PIF_VALUE: 24
PIF_VALUE: 23
PIF_VALUE: 4
PIF_VALUE: 24
PIF_VALUE: 23
PIF_VALUE: 24
PIF_VALUE: 21
PIF_VALUE: 22
PIF_VALUE: 23
PIF_VALUE: 0
PIF_VALUE: 23
PIF_VALUE: 21
PIF_VALUE: 25
PIF_VALUE: 21
PIF_VALUE: 22
PIF_VALUE: 2
PIF_VALUE: 24
PIF_VALUE: 39
PIF_VALUE: 24
PIF_VALUE: 24
PIF_VALUE: 25
PIF_VALUE: 23
PIF_VALUE: 22
PIF_VALUE: 23
PIF_VALUE: 42
PIF_VALUE: 22
PIF_VALUE: 0
PIF_VALUE: 24
PIF_VALUE: 23
PIF_VALUE: 22
PIF_VALUE: 24
PIF_VALUE: 22
PIF_VALUE: 24
PIF_VALUE: 24
PIF_VALUE: 23
PIF_VALUE: 22
PIF_VALUE: 2
PIF_VALUE: 30
PIF_VALUE: 23

## 2018-09-28 ASSESSMENT — PAIN SCALES - GENERAL
PAINLEVEL_OUTOF10: 4
PAINLEVEL_OUTOF10: 7
PAINLEVEL_OUTOF10: 4
PAINLEVEL_OUTOF10: 0

## 2018-09-28 NOTE — ANESTHESIA PRE PROCEDURE
(DILAUDID) injection 0.5 mg  0.5 mg Intravenous Q5 Min PRN Chaim Mayfield MD        HYDROmorphone (DILAUDID) injection 0.25 mg  0.25 mg Intravenous Q5 Min PRN Chaim Mayfield MD           Allergies: Allergies   Allergen Reactions    Penicillins      Family has history to penicillin        Problem List:    Patient Active Problem List   Diagnosis Code    Chronic bilateral low back pain with bilateral sciatica M54.42, M54.41, G89.29    Chronic migraine without aura without status migrainosus, not intractable G43.709    Tear of right rotator cuff M75.101    Shoulder impingement, left M75.42    Shoulder impingement, right M75.41    Neck pain M54.2    Radiculopathy, lumbar region M54.16       Past Medical History:        Diagnosis Date    Back pain     Dermatitis     skin intact.     GERD (gastroesophageal reflux disease)     Lyme disease     Migraines     Rotator cuff injury     right shoulder - for OR 5-124-18     Shoulder pain, left     for or 9/28/2018       Past Surgical History:        Procedure Laterality Date    ABDOMEN SURGERY      nissen fundoplication    CARPAL TUNNEL RELEASE Bilateral     COLONOSCOPY      GASTRIC FUNDOPLICATION      2000     OTHER SURGICAL HISTORY  08/15/2018    implantation of medtronic lumbar spinal cord generator    CT IMPLANT NEUROSTIM/ N/A 8/15/2018    SURGICAL IMPLANTATION OF MEDTRONIC LUMBAR SPINAL CORD  ELECTRODES AND GENERATOR performed by Darwin Ramirez DO at [address] Right 5/24/2018    RIGHT SHOULDER ARTHROSCOPY, DEBRIDEMENT, SUBACROMIAL DECOMPRESSION, DISTAL CLAVICLE RESECTION, ACROMIOPLASTY performed by Chaim Hollis MD at [facility] OR       Social History:    Social History   Substance Use Topics    Smoking status: Former Smoker     Years: 10.00     Types: Cigarettes    Smokeless tobacco: Never Used      Comment: quit 1980's     Alcohol use Yes      Comment: socially Counseling given: Not Answered      Vital Signs (Current):   Vitals:    09/20/18 1344   Weight: 170 lb (77.1 kg)   Height: 5' 5\" (1.651 m)                                              BP Readings from Last 3 Encounters:   09/24/18 124/86   08/15/18 122/83   08/15/18 (!) 138/93       NPO Status: Time of last liquid consumption: 2100                                                                               BMI:   Wt Readings from Last 3 Encounters:   09/20/18 170 lb (77.1 kg)   09/24/18 174 lb 3.2 oz (79 kg)   08/15/18 177 lb 6.4 oz (80.5 kg)     Body mass index is 28.29 kg/m². CBC:   Lab Results   Component Value Date    WBC 6.3 09/24/2018    RBC 6.42 09/24/2018    HGB 14.6 09/24/2018    HCT 47.4 09/24/2018    MCV 73.8 09/24/2018    RDW 17.5 09/24/2018     09/24/2018       CMP:   Lab Results   Component Value Date     08/03/2018    K 4.1 08/03/2018    CL 99 08/03/2018    CO2 30 08/03/2018    BUN 13 08/03/2018    CREATININE 0.9 08/03/2018    GFRAA >60 08/03/2018    LABGLOM >60 08/03/2018    GLUCOSE 90 08/03/2018    PROT 8.1 08/03/2018    CALCIUM 9.7 08/03/2018    BILITOT 0.8 08/03/2018    ALKPHOS 60 08/03/2018    AST 36 08/03/2018    ALT 64 08/03/2018       POC Tests: No results for input(s): POCGLU, POCNA, POCK, POCCL, POCBUN, POCHEMO, POCHCT in the last 72 hours.     Coags:   Lab Results   Component Value Date    PROTIME 11.8 08/07/2018    INR 1.0 08/07/2018    APTT 32.9 08/07/2018       HCG (If Applicable): No results found for: PREGTESTUR, PREGSERUM, HCG, HCGQUANT     ABGs: No results found for: PHART, PO2ART, GVV5TCQ, RQF7JWX, BEART, G9TYMFDN     Type & Screen (If Applicable):  No results found for: LABABO, [address]    Anesthesia Evaluation  Patient summary reviewed and Nursing notes reviewed no history of anesthetic complications:   Airway: Mallampati: III  TM distance: >3 FB   Neck ROM: full  Mouth opening: > = 3 FB Dental: normal exam         Pulmonary:normal exam

## 2018-09-28 NOTE — ANESTHESIA POSTPROCEDURE EVALUATION
Department of Anesthesiology  Postprocedure Note    Patient: Nancy Cali  MRN: [id number]  YOB: 1970  Date of evaluation: 9/28/2018  Time:  5:04 PM     Procedure Summary     Date:  09/28/18 Room / Location:  [facility] OR 05 / [facility] OR    Anesthesia Start:  1230 Anesthesia Stop:  9529    Procedure:  LEFT SHOULDER ARTHROSCOPY SUBACROMIAL DECOMPRESSION, CROMIAL PLASTY, DISTAL CLAVICAL EXCISION  ++ARTHREX, BEACH CHAIR, SPIDER++ (Left ) Diagnosis:  (IMPINGEMENT LEFT SHOULDER)    Surgeon:  Best Martínez MD Responsible Provider:  Emilio Melo MD    Anesthesia Type:  general ASA Status:  2          Anesthesia Type: general    Philippe Phase I: Philippe Score: 10    Philippe Phase II: Philippe Score: 10    Last vitals: Reviewed and per EMR flowsheets.        Anesthesia Post Evaluation    Patient location during evaluation: PACU  Patient participation: complete - patient participated  Level of consciousness: awake and alert  Airway patency: patent  Nausea & Vomiting: no nausea and no vomiting  Complications: no  Cardiovascular status: hemodynamically stable  Respiratory status: acceptable  Hydration status: euvolemic

## 2018-10-24 ENCOUNTER — OFFICE VISIT (OUTPATIENT)
Dept: FAMILY MEDICINE CLINIC | Age: 48
End: 2018-10-24
Payer: MEDICAID

## 2018-10-24 VITALS
HEIGHT: 65 IN | HEART RATE: 93 BPM | DIASTOLIC BLOOD PRESSURE: 68 MMHG | OXYGEN SATURATION: 97 % | BODY MASS INDEX: 28.82 KG/M2 | SYSTOLIC BLOOD PRESSURE: 128 MMHG | WEIGHT: 173 LBS

## 2018-10-24 DIAGNOSIS — G43.709 CHRONIC MIGRAINE WITHOUT AURA WITHOUT STATUS MIGRAINOSUS, NOT INTRACTABLE: ICD-10-CM

## 2018-10-24 DIAGNOSIS — R22.32 AXILLARY MASS, LEFT: Primary | ICD-10-CM

## 2018-10-24 PROCEDURE — 99213 OFFICE O/P EST LOW 20 MIN: CPT | Performed by: FAMILY MEDICINE

## 2018-10-24 RX ORDER — POLYETHYLENE GLYCOL 3350 17 G/17G
17 POWDER, FOR SOLUTION ORAL DAILY
Qty: 510 G | Refills: 3 | Status: SHIPPED | OUTPATIENT
Start: 2018-10-24 | End: 2019-01-14

## 2018-10-24 RX ORDER — SUMATRIPTAN 6 MG/.5ML
6 INJECTION, SOLUTION SUBCUTANEOUS
Qty: 0.5 ML | Refills: 1 | Status: SHIPPED | OUTPATIENT
Start: 2018-10-24 | End: 2018-10-31 | Stop reason: SDUPTHER

## 2018-10-24 ASSESSMENT — ENCOUNTER SYMPTOMS
COUGH: 0
VOMITING: 0
WHEEZING: 0
SHORTNESS OF BREATH: 0
RHINORRHEA: 0
ABDOMINAL PAIN: 0
SINUS PRESSURE: 0
BACK PAIN: 0
CONSTIPATION: 0
NAUSEA: 0
DIARRHEA: 0
SORE THROAT: 0

## 2018-10-24 NOTE — PROGRESS NOTES
Patient is a 50 y.o. male presenting today for follow up of axillary mass. He had an ultrasound which was normal.  No mass was seen. He states that he can still feel something but it is not painful. He has not seen anyone for any of the other appointments. Patient refused flu vaccine. Patient's past medical, surgical, social and/or family history reviewed, updated in chart, and are non-contributory (unless otherwise stated). Medications and allergies also reviewed and updated in chart. /68 (Site: Right Upper Arm, Position: Sitting, Cuff Size: Medium Adult)   Pulse 93   Ht 5' 5\" (1.651 m)   Wt 173 lb (78.5 kg)   SpO2 97%   BMI 28.79 kg/m²     Review of Systems   Constitutional: Negative for chills, fatigue and fever. HENT: Negative for congestion, ear discharge, ear pain, postnasal drip, rhinorrhea, sinus pressure, sneezing and sore throat. Respiratory: Negative for cough, shortness of breath and wheezing. Cardiovascular: Negative for chest pain, palpitations and leg swelling. Gastrointestinal: Negative for abdominal pain, constipation, diarrhea, nausea and vomiting. Genitourinary: Negative for dysuria, frequency and hematuria. Musculoskeletal: Negative for arthralgias, back pain and myalgias. Skin: Negative for rash. Axillary mass   Neurological: Negative for dizziness, light-headedness and headaches. Physical Exam   Constitutional: He is oriented to person, place, and time. He appears well-developed and well-nourished. HENT:   Head: Normocephalic and atraumatic. Right Ear: External ear normal.   Left Ear: External ear normal.   Nose: Nose normal.   Mouth/Throat: Oropharynx is clear and moist.   Eyes: Pupils are equal, round, and reactive to light. Conjunctivae and EOM are normal.   Neck: Normal range of motion. Neck supple. No thyromegaly present. Cardiovascular: Normal rate, regular rhythm, normal heart sounds and intact distal pulses.

## 2018-10-26 ENCOUNTER — TELEPHONE (OUTPATIENT)
Dept: FAMILY MEDICINE CLINIC | Age: 48
End: 2018-10-26

## 2018-10-31 DIAGNOSIS — G43.709 CHRONIC MIGRAINE WITHOUT AURA WITHOUT STATUS MIGRAINOSUS, NOT INTRACTABLE: ICD-10-CM

## 2018-10-31 RX ORDER — CYCLOBENZAPRINE HCL 10 MG
10 TABLET ORAL 3 TIMES DAILY PRN
Qty: 90 TABLET | Refills: 0 | Status: ON HOLD
Start: 2018-10-31 | End: 2021-06-11 | Stop reason: SDUPTHER

## 2018-10-31 RX ORDER — SUMATRIPTAN 6 MG/.5ML
6 INJECTION, SOLUTION SUBCUTANEOUS
Qty: 1 VIAL | Refills: 0 | Status: SHIPPED | OUTPATIENT
Start: 2018-10-31 | End: 2019-01-14

## 2018-10-31 NOTE — TELEPHONE ENCOUNTER
He needs to schedule an appointment. imitrex is an as needed medication. If he is taking it daily, he is using it inappropriately and we need to discussed a migraine prophylaxis medication.

## 2018-10-31 NOTE — TELEPHONE ENCOUNTER
We have never prescribed his flexeril. If he is unhappy with our office since he constantly seems to get agitated with us. I'd recommend he find a new physician. We prescribe imitrex the same for all of our patients and if using more than what is prescribed for month, recommend appointments to discuss other options for treatment. If this doesn't suit him then I think it would be best he find a new physician.

## 2018-11-21 ENCOUNTER — TELEPHONE (OUTPATIENT)
Dept: FAMILY MEDICINE CLINIC | Age: 48
End: 2018-11-21

## 2018-11-29 RX ORDER — CYCLOBENZAPRINE HCL 10 MG
10 TABLET ORAL 3 TIMES DAILY PRN
Qty: 90 TABLET | Refills: 0 | OUTPATIENT
Start: 2018-11-29

## 2018-12-04 ENCOUNTER — OFFICE VISIT (OUTPATIENT)
Dept: ENT CLINIC | Age: 48
End: 2018-12-04
Payer: MEDICAID

## 2018-12-04 VITALS
DIASTOLIC BLOOD PRESSURE: 96 MMHG | HEART RATE: 96 BPM | BODY MASS INDEX: 28.32 KG/M2 | WEIGHT: 170 LBS | SYSTOLIC BLOOD PRESSURE: 131 MMHG | HEIGHT: 65 IN

## 2018-12-04 DIAGNOSIS — J35.1 TONSILLAR HYPERTROPHY: Primary | ICD-10-CM

## 2018-12-04 PROCEDURE — 99203 OFFICE O/P NEW LOW 30 MIN: CPT | Performed by: OTOLARYNGOLOGY

## 2018-12-04 ASSESSMENT — ENCOUNTER SYMPTOMS
RHINORRHEA: 0
SINUS PRESSURE: 0
VOICE CHANGE: 0
STRIDOR: 0
EYE PAIN: 0
SORE THROAT: 1
EYE DISCHARGE: 0
ABDOMINAL PAIN: 0
SHORTNESS OF BREATH: 0
NAUSEA: 0

## 2018-12-04 NOTE — PATIENT INSTRUCTIONS
Thank you for choosing our [facility], or [facility]. We are committed to your medical treatment and  care. To prepare you for surgery, the surgery scheduler will be  calling you to confirm a date for both your surgery and follow up  appointment. Please allow at least 72 hours after your office visit to  receive your appointment dates and times. If you need to reschedule or cancel your surgery or follow up  appointment, please call the surgery scheduler at [phone]. INSTRUCTIONS FOR SURGERY    Nothing to eat or drink after midnight the night before surgery unless surgery is at [facility] or otherwise instructed by the hospital.    DO NOT TAKE ANY ASPIRIN PRODUCTS 7 days prior to surgery. Tylenol only. No Advil, Motrin, Aleve, or Ibuprofen    Any illegal drugs in your system (including Marijuana even if legally prescribed) will result in your surgery being cancelled. Please be sure to check with our office or the hospital on time frame for the drugs to be out of your system. Should your insurance change at any time you must contact our office. Failure to do so may result in your surgery being rescheduled. [address], [facility] will call you a couple of days prior to the surgery and will give you further instructions, if any questions call them at [phone] extension # [phone]. [phone] will call you a couple of days prior to the surgery and will give you further instructions, if any questions call them [address] [facility], [address] [facility] will call you a couple of days prior to surgery and give you further instructions, if any questions call them at [address] [facility], [address] [facility] will call you a couple days prior to your surgery and give you further instructions, if any questions call them at [phone]      [address] [facility]), Hayder Becker , [facility] will call you the day prior to your surgery and give you further instructions, if any questions call them at [phone].      ? Pre-Surgery/Anesthesia Video ([facility] ONLY)  Located on [facility]  Steps to locate video online:  1. Scroll over Health Information  1. Select Audio and Video  2. Select [employer]  1. Your Child and Anesthesia  2.  [address] Restrictions ([facility] ONLY)   Food Type Stop Prior to Surgery   Solid Food/Milk Products 8 Hours   Formula 6 Hours   Breast Milk 4 Hours   Clear Liquids   (Water, Gatorade, Pedialtye) 2 Hours

## 2018-12-04 NOTE — PROGRESS NOTES
Eyes: Pupils are equal, round, and reactive to light. EOM are normal.   Neck: Normal range of motion. Neck supple. Cardiovascular: Normal rate. Pulmonary/Chest: Effort normal. No respiratory distress. Abdominal: Soft. He exhibits no distension. Musculoskeletal: Normal range of motion. He exhibits no edema. Neurological: He is alert and oriented to person, place, and time. No cranial nerve deficit. Coordination normal.   Skin: Skin is warm and dry. Nursing note and vitals reviewed. Assessment:       Diagnosis Orders   1. Tonsillar hypertrophy               Plan:      I recommend:  Given the chronic asymetry and and continued pain and infections we recommend tonsillectomy. Patient is aware of surgery and recovery. He is accepting of the plan. Tonsillectomy   I will not keep the patient overnight for observation after surgery  The procedure risks and benefits were discussed with the patient and family including:  --Bleeding occurs in 1 to 4% of patients  --Poor speech (hyper nasal speech) occurs in 1/3000 patients. --Nasopharyngeal Stenosis  --Chipped Teeth  --Electrocautery Purvis  --Death  Pt and family understood and decided to proceed with the surgery. Patient seen, examined, and plan discussed with Dr. Nanette Rodriguez    Electronically signed by Kati Chen DO on 12/4/2018 at [facility]  1970    I have discussed the case, including pertinent history and exam findings with the resident. I have seen and examined the patient and the key elements of the encounter have been performed by me. I agree with the assessment, plan and orders as documented by the  resident    Patient is here to establish care as a new patient in the clinic. Remainder of medical problems as per  resident  note. Patient seen and examined. Agree with above exam, assessment and plan.       Electronically signed by Jeralyn Angelucci, DO on 12/13/18 at 2:44 PM

## 2018-12-07 ENCOUNTER — TELEPHONE (OUTPATIENT)
Dept: ENT CLINIC | Age: 48
End: 2018-12-07

## 2018-12-14 ENCOUNTER — TELEPHONE (OUTPATIENT)
Dept: ENT CLINIC | Age: 48
End: 2018-12-14

## 2018-12-20 ENCOUNTER — TELEPHONE (OUTPATIENT)
Dept: ENT CLINIC | Age: 48
End: 2018-12-20

## 2018-12-20 NOTE — TELEPHONE ENCOUNTER
Spoke with patient, he just wanted to confirm doctor was okay keeping him over night after surgery. Patient notified Dr. Mark Castro does not usually have problems keeping a patient over night if there is issues preventing them from going home same day.

## 2018-12-26 ENCOUNTER — TELEPHONE (OUTPATIENT)
Dept: NEUROLOGY | Age: 48
End: 2018-12-26

## 2019-01-08 ENCOUNTER — OFFICE VISIT (OUTPATIENT)
Dept: NEUROSURGERY | Age: 49
End: 2019-01-08
Payer: MEDICAID

## 2019-01-08 VITALS
SYSTOLIC BLOOD PRESSURE: 146 MMHG | HEIGHT: 65 IN | HEART RATE: 98 BPM | WEIGHT: 189 LBS | DIASTOLIC BLOOD PRESSURE: 101 MMHG | BODY MASS INDEX: 31.49 KG/M2

## 2019-01-08 DIAGNOSIS — M54.2 NECK PAIN: Primary | ICD-10-CM

## 2019-01-08 PROCEDURE — 99213 OFFICE O/P EST LOW 20 MIN: CPT | Performed by: NEUROLOGICAL SURGERY

## 2019-01-14 ENCOUNTER — TELEPHONE (OUTPATIENT)
Dept: ENT CLINIC | Age: 49
End: 2019-01-14

## 2019-01-14 RX ORDER — OXYCODONE HYDROCHLORIDE AND ACETAMINOPHEN 5; 325 MG/1; MG/1
1 TABLET ORAL EVERY 8 HOURS PRN
Status: ON HOLD | COMMUNITY
End: 2019-02-28 | Stop reason: HOSPADM

## 2019-01-18 ENCOUNTER — ANESTHESIA EVENT (OUTPATIENT)
Dept: OPERATING ROOM | Age: 49
End: 2019-01-18
Payer: MEDICAID

## 2019-01-21 ENCOUNTER — TELEPHONE (OUTPATIENT)
Dept: ENT CLINIC | Age: 49
End: 2019-01-21

## 2019-01-21 ENCOUNTER — ANESTHESIA (OUTPATIENT)
Dept: OPERATING ROOM | Age: 49
End: 2019-01-21
Payer: MEDICAID

## 2019-01-21 ENCOUNTER — HOSPITAL ENCOUNTER (OUTPATIENT)
Age: 49
Discharge: HOME | End: 2019-01-22
Attending: OTOLARYNGOLOGY | Admitting: OTOLARYNGOLOGY
Payer: MEDICAID

## 2019-01-21 VITALS — SYSTOLIC BLOOD PRESSURE: 133 MMHG | OXYGEN SATURATION: 94 % | TEMPERATURE: 74.1 F | DIASTOLIC BLOOD PRESSURE: 77 MMHG

## 2019-01-21 PROBLEM — Z98.890 AT RISK FOR BLEEDING ASSOCIATED WITH TONSILLECTOMY AND ADENOIDECTOMY: Status: ACTIVE | Noted: 2019-01-21

## 2019-01-21 PROBLEM — Z91.89 AT RISK FOR BLEEDING ASSOCIATED WITH TONSILLECTOMY AND ADENOIDECTOMY: Status: ACTIVE | Noted: 2019-01-21

## 2019-01-21 PROBLEM — G89.18 POST-OP PAIN: Status: ACTIVE | Noted: 2019-01-21

## 2019-01-21 PROCEDURE — 2720000010 HC SURG SUPPLY STERILE: Performed by: OTOLARYNGOLOGY

## 2019-01-21 PROCEDURE — 3700000000 HC ANESTHESIA ATTENDED CARE: Performed by: OTOLARYNGOLOGY

## 2019-01-21 PROCEDURE — 3600000002 HC SURGERY LEVEL 2 BASE: Performed by: OTOLARYNGOLOGY

## 2019-01-21 PROCEDURE — 6360000002 HC RX W HCPCS

## 2019-01-21 PROCEDURE — 6370000000 HC RX 637 (ALT 250 FOR IP): Performed by: STUDENT IN AN ORGANIZED HEALTH CARE EDUCATION/TRAINING PROGRAM

## 2019-01-21 PROCEDURE — 2700000000 HC OXYGEN THERAPY PER DAY

## 2019-01-21 PROCEDURE — 6360000002 HC RX W HCPCS: Performed by: STUDENT IN AN ORGANIZED HEALTH CARE EDUCATION/TRAINING PROGRAM

## 2019-01-21 PROCEDURE — 7100000001 HC PACU RECOVERY - ADDTL 15 MIN: Performed by: OTOLARYNGOLOGY

## 2019-01-21 PROCEDURE — 2580000003 HC RX 258

## 2019-01-21 PROCEDURE — 6360000002 HC RX W HCPCS: Performed by: ANESTHESIOLOGY

## 2019-01-21 PROCEDURE — 7100000000 HC PACU RECOVERY - FIRST 15 MIN: Performed by: OTOLARYNGOLOGY

## 2019-01-21 PROCEDURE — 88304 TISSUE EXAM BY PATHOLOGIST: CPT

## 2019-01-21 PROCEDURE — 2709999900 HC NON-CHARGEABLE SUPPLY: Performed by: OTOLARYNGOLOGY

## 2019-01-21 PROCEDURE — 3600000012 HC SURGERY LEVEL 2 ADDTL 15MIN: Performed by: OTOLARYNGOLOGY

## 2019-01-21 PROCEDURE — 2580000003 HC RX 258: Performed by: STUDENT IN AN ORGANIZED HEALTH CARE EDUCATION/TRAINING PROGRAM

## 2019-01-21 PROCEDURE — 2500000003 HC RX 250 WO HCPCS

## 2019-01-21 PROCEDURE — 3700000001 HC ADD 15 MINUTES (ANESTHESIA): Performed by: OTOLARYNGOLOGY

## 2019-01-21 RX ORDER — FENTANYL CITRATE 50 UG/ML
25 INJECTION, SOLUTION INTRAMUSCULAR; INTRAVENOUS EVERY 5 MIN PRN
Status: DISCONTINUED | OUTPATIENT
Start: 2019-01-21 | End: 2019-01-21 | Stop reason: HOSPADM

## 2019-01-21 RX ORDER — DEXAMETHASONE SODIUM PHOSPHATE 4 MG/ML
INJECTION, SOLUTION INTRA-ARTICULAR; INTRALESIONAL; INTRAMUSCULAR; INTRAVENOUS; SOFT TISSUE PRN
Status: DISCONTINUED | OUTPATIENT
Start: 2019-01-21 | End: 2019-01-21 | Stop reason: SDUPTHER

## 2019-01-21 RX ORDER — DEXAMETHASONE SODIUM PHOSPHATE 10 MG/ML
10 INJECTION INTRAMUSCULAR; INTRAVENOUS EVERY 8 HOURS
Status: COMPLETED | OUTPATIENT
Start: 2019-01-21 | End: 2019-01-22

## 2019-01-21 RX ORDER — SODIUM CHLORIDE 9 MG/ML
INJECTION, SOLUTION INTRAVENOUS CONTINUOUS PRN
Status: DISCONTINUED | OUTPATIENT
Start: 2019-01-21 | End: 2019-01-21 | Stop reason: SDUPTHER

## 2019-01-21 RX ORDER — ONDANSETRON 2 MG/ML
INJECTION INTRAMUSCULAR; INTRAVENOUS PRN
Status: DISCONTINUED | OUTPATIENT
Start: 2019-01-21 | End: 2019-01-21 | Stop reason: SDUPTHER

## 2019-01-21 RX ORDER — PROPOFOL 10 MG/ML
INJECTION, EMULSION INTRAVENOUS PRN
Status: DISCONTINUED | OUTPATIENT
Start: 2019-01-21 | End: 2019-01-21 | Stop reason: SDUPTHER

## 2019-01-21 RX ORDER — LIDOCAINE HYDROCHLORIDE 20 MG/ML
INJECTION, SOLUTION EPIDURAL; INFILTRATION; INTRACAUDAL; PERINEURAL PRN
Status: DISCONTINUED | OUTPATIENT
Start: 2019-01-21 | End: 2019-01-21 | Stop reason: SDUPTHER

## 2019-01-21 RX ORDER — SODIUM CHLORIDE 0.9 % (FLUSH) 0.9 %
10 SYRINGE (ML) INJECTION EVERY 12 HOURS SCHEDULED
Status: DISCONTINUED | OUTPATIENT
Start: 2019-01-21 | End: 2019-01-21 | Stop reason: HOSPADM

## 2019-01-21 RX ORDER — ACETAMINOPHEN 325 MG/1
325 TABLET ORAL EVERY 4 HOURS
Status: DISCONTINUED | OUTPATIENT
Start: 2019-01-21 | End: 2019-01-22 | Stop reason: HOSPADM

## 2019-01-21 RX ORDER — ROCURONIUM BROMIDE 10 MG/ML
INJECTION, SOLUTION INTRAVENOUS PRN
Status: DISCONTINUED | OUTPATIENT
Start: 2019-01-21 | End: 2019-01-21 | Stop reason: SDUPTHER

## 2019-01-21 RX ORDER — MIDAZOLAM HYDROCHLORIDE 1 MG/ML
INJECTION INTRAMUSCULAR; INTRAVENOUS PRN
Status: DISCONTINUED | OUTPATIENT
Start: 2019-01-21 | End: 2019-01-21 | Stop reason: SDUPTHER

## 2019-01-21 RX ORDER — SODIUM CHLORIDE 0.9 % (FLUSH) 0.9 %
10 SYRINGE (ML) INJECTION EVERY 12 HOURS SCHEDULED
Status: DISCONTINUED | OUTPATIENT
Start: 2019-01-21 | End: 2019-01-22 | Stop reason: HOSPADM

## 2019-01-21 RX ORDER — GLYCOPYRROLATE 0.2 MG/ML
INJECTION INTRAMUSCULAR; INTRAVENOUS PRN
Status: DISCONTINUED | OUTPATIENT
Start: 2019-01-21 | End: 2019-01-21 | Stop reason: SDUPTHER

## 2019-01-21 RX ORDER — SODIUM CHLORIDE 0.9 % (FLUSH) 0.9 %
10 SYRINGE (ML) INJECTION PRN
Status: DISCONTINUED | OUTPATIENT
Start: 2019-01-21 | End: 2019-01-21 | Stop reason: HOSPADM

## 2019-01-21 RX ORDER — OXYCODONE HYDROCHLORIDE AND ACETAMINOPHEN 5; 325 MG/1; MG/1
1 TABLET ORAL EVERY 8 HOURS PRN
Status: DISCONTINUED | OUTPATIENT
Start: 2019-01-21 | End: 2019-01-22 | Stop reason: HOSPADM

## 2019-01-21 RX ORDER — SODIUM CHLORIDE 9 MG/ML
INJECTION, SOLUTION INTRAVENOUS CONTINUOUS
Status: DISCONTINUED | OUTPATIENT
Start: 2019-01-21 | End: 2019-01-22 | Stop reason: HOSPADM

## 2019-01-21 RX ORDER — IBUPROFEN 400 MG/1
400 TABLET ORAL EVERY 4 HOURS
Status: DISCONTINUED | OUTPATIENT
Start: 2019-01-21 | End: 2019-01-22 | Stop reason: HOSPADM

## 2019-01-21 RX ORDER — PREDNISONE 20 MG/1
40 TABLET ORAL DAILY
Qty: 10 TABLET | Refills: 0 | Status: SHIPPED | OUTPATIENT
Start: 2019-01-21 | End: 2019-01-26

## 2019-01-21 RX ORDER — CYCLOBENZAPRINE HCL 10 MG
10 TABLET ORAL 3 TIMES DAILY PRN
Status: DISCONTINUED | OUTPATIENT
Start: 2019-01-21 | End: 2019-01-22 | Stop reason: HOSPADM

## 2019-01-21 RX ORDER — FENTANYL CITRATE 50 UG/ML
INJECTION, SOLUTION INTRAMUSCULAR; INTRAVENOUS PRN
Status: DISCONTINUED | OUTPATIENT
Start: 2019-01-21 | End: 2019-01-21 | Stop reason: SDUPTHER

## 2019-01-21 RX ORDER — SODIUM CHLORIDE 0.9 % (FLUSH) 0.9 %
10 SYRINGE (ML) INJECTION PRN
Status: DISCONTINUED | OUTPATIENT
Start: 2019-01-21 | End: 2019-01-22 | Stop reason: HOSPADM

## 2019-01-21 RX ORDER — ONDANSETRON 2 MG/ML
4 INJECTION INTRAMUSCULAR; INTRAVENOUS EVERY 6 HOURS PRN
Status: DISCONTINUED | OUTPATIENT
Start: 2019-01-21 | End: 2019-01-22 | Stop reason: HOSPADM

## 2019-01-21 RX ORDER — NEOSTIGMINE METHYLSULFATE 1 MG/ML
INJECTION, SOLUTION INTRAVENOUS PRN
Status: DISCONTINUED | OUTPATIENT
Start: 2019-01-21 | End: 2019-01-21 | Stop reason: SDUPTHER

## 2019-01-21 RX ADMIN — OXYCODONE AND ACETAMINOPHEN 1 TABLET: 5; 325 TABLET ORAL at 12:48

## 2019-01-21 RX ADMIN — GLYCOPYRROLATE 0.2 MG: 0.2 INJECTION, SOLUTION INTRAMUSCULAR; INTRAVENOUS at 09:12

## 2019-01-21 RX ADMIN — ACETAMINOPHEN 325 MG: 325 TABLET ORAL at 17:21

## 2019-01-21 RX ADMIN — OXYCODONE AND ACETAMINOPHEN 1 TABLET: 5; 325 TABLET ORAL at 21:24

## 2019-01-21 RX ADMIN — SODIUM CHLORIDE: 9 INJECTION, SOLUTION INTRAVENOUS at 09:12

## 2019-01-21 RX ADMIN — FENTANYL CITRATE 50 MCG: 50 INJECTION, SOLUTION INTRAMUSCULAR; INTRAVENOUS at 09:43

## 2019-01-21 RX ADMIN — SODIUM CHLORIDE: 9 INJECTION, SOLUTION INTRAVENOUS at 12:42

## 2019-01-21 RX ADMIN — IBUPROFEN 400 MG: 400 TABLET, FILM COATED ORAL at 20:35

## 2019-01-21 RX ADMIN — PROPOFOL 200 MG: 10 INJECTION, EMULSION INTRAVENOUS at 09:18

## 2019-01-21 RX ADMIN — Medication 3 MG: at 10:15

## 2019-01-21 RX ADMIN — DEXAMETHASONE SODIUM PHOSPHATE 10 MG: 4 INJECTION, SOLUTION INTRAMUSCULAR; INTRAVENOUS at 09:27

## 2019-01-21 RX ADMIN — GLYCOPYRROLATE 0.6 MG: 0.2 INJECTION, SOLUTION INTRAMUSCULAR; INTRAVENOUS at 10:15

## 2019-01-21 RX ADMIN — BENZOCAINE AND MENTHOL, UNSPECIFIED FORM 1 LOZENGE: 15; 20 LOZENGE ORAL at 17:20

## 2019-01-21 RX ADMIN — IBUPROFEN 400 MG: 400 TABLET, FILM COATED ORAL at 17:21

## 2019-01-21 RX ADMIN — ROCURONIUM BROMIDE 30 MG: 10 SOLUTION INTRAVENOUS at 09:18

## 2019-01-21 RX ADMIN — FENTANYL CITRATE 50 MCG: 50 INJECTION, SOLUTION INTRAMUSCULAR; INTRAVENOUS at 09:58

## 2019-01-21 RX ADMIN — LIDOCAINE HYDROCHLORIDE 100 MG: 20 INJECTION, SOLUTION EPIDURAL; INFILTRATION; INTRACAUDAL; PERINEURAL at 09:18

## 2019-01-21 RX ADMIN — MIDAZOLAM HYDROCHLORIDE 2 MG: 1 INJECTION, SOLUTION INTRAMUSCULAR; INTRAVENOUS at 09:12

## 2019-01-21 RX ADMIN — ONDANSETRON HYDROCHLORIDE 4 MG: 2 INJECTION, SOLUTION INTRAMUSCULAR; INTRAVENOUS at 09:27

## 2019-01-21 RX ADMIN — HYDROMORPHONE HYDROCHLORIDE 0.5 MG: 1 INJECTION, SOLUTION INTRAMUSCULAR; INTRAVENOUS; SUBCUTANEOUS at 10:51

## 2019-01-21 RX ADMIN — DEXAMETHASONE SODIUM PHOSPHATE 10 MG: 10 INJECTION INTRAMUSCULAR; INTRAVENOUS at 17:22

## 2019-01-21 RX ADMIN — ACETAMINOPHEN 325 MG: 325 TABLET ORAL at 20:35

## 2019-01-21 RX ADMIN — HYDROMORPHONE HYDROCHLORIDE 0.5 MG: 1 INJECTION, SOLUTION INTRAMUSCULAR; INTRAVENOUS; SUBCUTANEOUS at 10:58

## 2019-01-21 RX ADMIN — FENTANYL CITRATE 100 MCG: 50 INJECTION, SOLUTION INTRAMUSCULAR; INTRAVENOUS at 09:18

## 2019-01-21 ASSESSMENT — PAIN DESCRIPTION - DESCRIPTORS
DESCRIPTORS: DISCOMFORT;SORE
DESCRIPTORS: ACHING;DISCOMFORT
DESCRIPTORS: ACHING;DISCOMFORT;DULL
DESCRIPTORS: ACHING;DISCOMFORT
DESCRIPTORS: DISCOMFORT;SORE
DESCRIPTORS: DISCOMFORT;SORE
DESCRIPTORS: ACHING;DISCOMFORT
DESCRIPTORS: ACHING;DISCOMFORT;DULL

## 2019-01-21 ASSESSMENT — PULMONARY FUNCTION TESTS
PIF_VALUE: 32
PIF_VALUE: 40
PIF_VALUE: 12
PIF_VALUE: 2
PIF_VALUE: 36
PIF_VALUE: 37
PIF_VALUE: 39
PIF_VALUE: 35
PIF_VALUE: 37
PIF_VALUE: 35
PIF_VALUE: 32
PIF_VALUE: 40
PIF_VALUE: 35
PIF_VALUE: 35
PIF_VALUE: 22
PIF_VALUE: 39
PIF_VALUE: 35
PIF_VALUE: 53
PIF_VALUE: 1
PIF_VALUE: 33
PIF_VALUE: 35
PIF_VALUE: 29
PIF_VALUE: 2
PIF_VALUE: 36
PIF_VALUE: 38
PIF_VALUE: 0
PIF_VALUE: 1
PIF_VALUE: 35
PIF_VALUE: 40
PIF_VALUE: 35
PIF_VALUE: 34
PIF_VALUE: 35
PIF_VALUE: 35
PIF_VALUE: 2
PIF_VALUE: 4
PIF_VALUE: 0
PIF_VALUE: 24
PIF_VALUE: 38
PIF_VALUE: 39
PIF_VALUE: 34
PIF_VALUE: 35
PIF_VALUE: 37
PIF_VALUE: 35
PIF_VALUE: 26
PIF_VALUE: 35
PIF_VALUE: 38
PIF_VALUE: 30
PIF_VALUE: 0
PIF_VALUE: 35
PIF_VALUE: 0
PIF_VALUE: 34
PIF_VALUE: 35
PIF_VALUE: 38
PIF_VALUE: 40
PIF_VALUE: 37
PIF_VALUE: 30
PIF_VALUE: 35
PIF_VALUE: 26
PIF_VALUE: 39
PIF_VALUE: 34
PIF_VALUE: 38
PIF_VALUE: 2
PIF_VALUE: 40
PIF_VALUE: 35
PIF_VALUE: 3
PIF_VALUE: 35
PIF_VALUE: 14
PIF_VALUE: 29
PIF_VALUE: 30
PIF_VALUE: 0
PIF_VALUE: 33
PIF_VALUE: 34
PIF_VALUE: 31
PIF_VALUE: 29
PIF_VALUE: 1
PIF_VALUE: 39

## 2019-01-21 ASSESSMENT — PAIN DESCRIPTION - ORIENTATION
ORIENTATION: INNER

## 2019-01-21 ASSESSMENT — PAIN SCALES - GENERAL
PAINLEVEL_OUTOF10: 3
PAINLEVEL_OUTOF10: 7
PAINLEVEL_OUTOF10: 10
PAINLEVEL_OUTOF10: 2
PAINLEVEL_OUTOF10: 5
PAINLEVEL_OUTOF10: 7
PAINLEVEL_OUTOF10: 0
PAINLEVEL_OUTOF10: 4
PAINLEVEL_OUTOF10: 9
PAINLEVEL_OUTOF10: 0
PAINLEVEL_OUTOF10: 7
PAINLEVEL_OUTOF10: 10
PAINLEVEL_OUTOF10: 9
PAINLEVEL_OUTOF10: 0
PAINLEVEL_OUTOF10: 5

## 2019-01-21 ASSESSMENT — PAIN DESCRIPTION - ONSET
ONSET: ON-GOING
ONSET: ON-GOING
ONSET: GRADUAL

## 2019-01-21 ASSESSMENT — PAIN DESCRIPTION - LOCATION
LOCATION: THROAT

## 2019-01-21 ASSESSMENT — PAIN DESCRIPTION - PROGRESSION
CLINICAL_PROGRESSION: GRADUALLY WORSENING

## 2019-01-21 ASSESSMENT — PAIN DESCRIPTION - PAIN TYPE
TYPE: SURGICAL PAIN

## 2019-01-21 ASSESSMENT — PAIN DESCRIPTION - FREQUENCY
FREQUENCY: CONTINUOUS

## 2019-01-21 ASSESSMENT — PAIN - FUNCTIONAL ASSESSMENT
PAIN_FUNCTIONAL_ASSESSMENT: 0-10
PAIN_FUNCTIONAL_ASSESSMENT: PREVENTS OR INTERFERES SOME ACTIVE ACTIVITIES AND ADLS

## 2019-01-22 VITALS
RESPIRATION RATE: 16 BRPM | OXYGEN SATURATION: 99 % | WEIGHT: 180 LBS | BODY MASS INDEX: 29.99 KG/M2 | HEIGHT: 65 IN | HEART RATE: 94 BPM | SYSTOLIC BLOOD PRESSURE: 131 MMHG | DIASTOLIC BLOOD PRESSURE: 85 MMHG | TEMPERATURE: 97.4 F

## 2019-01-22 PROCEDURE — 42826 REMOVAL OF TONSILS: CPT | Performed by: OTOLARYNGOLOGY

## 2019-01-22 PROCEDURE — 6360000002 HC RX W HCPCS: Performed by: STUDENT IN AN ORGANIZED HEALTH CARE EDUCATION/TRAINING PROGRAM

## 2019-01-22 PROCEDURE — 6370000000 HC RX 637 (ALT 250 FOR IP): Performed by: STUDENT IN AN ORGANIZED HEALTH CARE EDUCATION/TRAINING PROGRAM

## 2019-01-22 RX ADMIN — DEXAMETHASONE SODIUM PHOSPHATE 10 MG: 10 INJECTION INTRAMUSCULAR; INTRAVENOUS at 01:00

## 2019-01-22 RX ADMIN — IBUPROFEN 400 MG: 400 TABLET, FILM COATED ORAL at 08:41

## 2019-01-22 RX ADMIN — DEXAMETHASONE SODIUM PHOSPHATE 10 MG: 10 INJECTION INTRAMUSCULAR; INTRAVENOUS at 08:41

## 2019-01-22 RX ADMIN — IBUPROFEN 400 MG: 400 TABLET, FILM COATED ORAL at 00:59

## 2019-01-22 RX ADMIN — IBUPROFEN 400 MG: 400 TABLET, FILM COATED ORAL at 04:11

## 2019-01-22 RX ADMIN — IBUPROFEN 400 MG: 400 TABLET, FILM COATED ORAL at 12:20

## 2019-01-22 RX ADMIN — ACETAMINOPHEN 325 MG: 325 TABLET ORAL at 00:59

## 2019-01-22 RX ADMIN — ACETAMINOPHEN 325 MG: 325 TABLET ORAL at 12:20

## 2019-01-22 RX ADMIN — ACETAMINOPHEN 325 MG: 325 TABLET ORAL at 08:41

## 2019-01-22 RX ADMIN — OXYCODONE AND ACETAMINOPHEN 1 TABLET: 5; 325 TABLET ORAL at 06:17

## 2019-01-22 RX ADMIN — BENZOCAINE AND MENTHOL, UNSPECIFIED FORM 1 LOZENGE: 15; 20 LOZENGE ORAL at 08:43

## 2019-01-22 RX ADMIN — ACETAMINOPHEN 325 MG: 325 TABLET ORAL at 04:11

## 2019-01-22 ASSESSMENT — PAIN SCALES - GENERAL
PAINLEVEL_OUTOF10: 0
PAINLEVEL_OUTOF10: 3
PAINLEVEL_OUTOF10: 0
PAINLEVEL_OUTOF10: 5
PAINLEVEL_OUTOF10: 8
PAINLEVEL_OUTOF10: 8
PAINLEVEL_OUTOF10: 7
PAINLEVEL_OUTOF10: 7

## 2019-01-22 ASSESSMENT — PAIN DESCRIPTION - ORIENTATION
ORIENTATION: INNER

## 2019-01-22 ASSESSMENT — PAIN - FUNCTIONAL ASSESSMENT: PAIN_FUNCTIONAL_ASSESSMENT: PREVENTS OR INTERFERES SOME ACTIVE ACTIVITIES AND ADLS

## 2019-01-22 ASSESSMENT — PAIN DESCRIPTION - DESCRIPTORS
DESCRIPTORS: ACHING;DISCOMFORT
DESCRIPTORS: ACHING;DISCOMFORT;DULL
DESCRIPTORS: ACHING;DISCOMFORT;DULL

## 2019-01-22 ASSESSMENT — PAIN DESCRIPTION - LOCATION
LOCATION: THROAT

## 2019-01-22 ASSESSMENT — PAIN DESCRIPTION - FREQUENCY
FREQUENCY: CONTINUOUS

## 2019-01-22 ASSESSMENT — PAIN DESCRIPTION - PROGRESSION
CLINICAL_PROGRESSION: GRADUALLY WORSENING

## 2019-01-22 ASSESSMENT — PAIN DESCRIPTION - PAIN TYPE
TYPE: SURGICAL PAIN

## 2019-01-22 ASSESSMENT — PAIN DESCRIPTION - ONSET
ONSET: ON-GOING
ONSET: GRADUAL
ONSET: ON-GOING

## 2019-01-28 ENCOUNTER — TELEPHONE (OUTPATIENT)
Dept: ENT CLINIC | Age: 49
End: 2019-01-28

## 2019-01-30 ENCOUNTER — PREP FOR PROCEDURE (OUTPATIENT)
Dept: NEUROSURGERY | Age: 49
End: 2019-01-30

## 2019-01-30 DIAGNOSIS — M54.2 CERVICALGIA: Primary | ICD-10-CM

## 2019-01-30 RX ORDER — SODIUM CHLORIDE 0.9 % (FLUSH) 0.9 %
10 SYRINGE (ML) INJECTION EVERY 12 HOURS SCHEDULED
Status: CANCELLED | OUTPATIENT
Start: 2019-01-30

## 2019-01-30 RX ORDER — SODIUM CHLORIDE 0.9 % (FLUSH) 0.9 %
10 SYRINGE (ML) INJECTION PRN
Status: CANCELLED | OUTPATIENT
Start: 2019-01-30

## 2019-01-30 RX ORDER — SODIUM CHLORIDE 9 MG/ML
INJECTION, SOLUTION INTRAVENOUS CONTINUOUS
Status: CANCELLED | OUTPATIENT
Start: 2019-01-30

## 2019-02-05 ENCOUNTER — OFFICE VISIT (OUTPATIENT)
Dept: ENT CLINIC | Age: 49
End: 2019-02-05

## 2019-02-05 VITALS
OXYGEN SATURATION: 96 % | HEART RATE: 109 BPM | TEMPERATURE: 98 F | BODY MASS INDEX: 29.99 KG/M2 | DIASTOLIC BLOOD PRESSURE: 83 MMHG | WEIGHT: 180 LBS | HEIGHT: 65 IN | SYSTOLIC BLOOD PRESSURE: 132 MMHG

## 2019-02-05 DIAGNOSIS — J35.1 TONSILLAR HYPERTROPHY: Primary | ICD-10-CM

## 2019-02-05 PROCEDURE — 99024 POSTOP FOLLOW-UP VISIT: CPT | Performed by: OTOLARYNGOLOGY

## 2019-02-05 RX ORDER — SUMATRIPTAN 6 MG/.5ML
6 INJECTION, SOLUTION SUBCUTANEOUS
COMMUNITY

## 2019-02-21 ENCOUNTER — HOSPITAL ENCOUNTER (OUTPATIENT)
Dept: GENERAL RADIOLOGY | Age: 49
Discharge: HOME | End: 2019-02-23
Payer: MEDICAID

## 2019-02-21 ENCOUNTER — HOSPITAL ENCOUNTER (OUTPATIENT)
Dept: PREADMISSION TESTING | Age: 49
Discharge: HOME | End: 2019-02-21
Payer: MEDICAID

## 2019-02-21 VITALS
SYSTOLIC BLOOD PRESSURE: 147 MMHG | HEART RATE: 97 BPM | OXYGEN SATURATION: 95 % | BODY MASS INDEX: 29.32 KG/M2 | HEIGHT: 65 IN | DIASTOLIC BLOOD PRESSURE: 100 MMHG | RESPIRATION RATE: 12 BRPM | WEIGHT: 176 LBS | TEMPERATURE: 98.2 F

## 2019-02-21 DIAGNOSIS — M54.2 CERVICALGIA: ICD-10-CM

## 2019-02-21 LAB
ALBUMIN SERPL-MCNC: 4.7 G/DL (ref 3.5–5.2)
ALP BLD-CCNC: 92 U/L (ref 40–129)
ALT SERPL-CCNC: 186 U/L (ref 0–40)
ANION GAP SERPL CALCULATED.3IONS-SCNC: 12 MMOL/L (ref 7–16)
AST SERPL-CCNC: 151 U/L (ref 0–39)
BASOPHILS ABSOLUTE: 0.02 E9/L (ref 0–0.2)
BASOPHILS RELATIVE PERCENT: 0.3 % (ref 0–2)
BILIRUB SERPL-MCNC: 0.8 MG/DL (ref 0–1.2)
BUN BLDV-MCNC: 17 MG/DL (ref 6–20)
CALCIUM SERPL-MCNC: 9.6 MG/DL (ref 8.6–10.2)
CHLORIDE BLD-SCNC: 98 MMOL/L (ref 98–107)
CO2: 28 MMOL/L (ref 22–29)
CREAT SERPL-MCNC: 0.9 MG/DL (ref 0.7–1.2)
EKG ATRIAL RATE: 88 BPM
EKG P AXIS: 21 DEGREES
EKG P-R INTERVAL: 150 MS
EKG Q-T INTERVAL: 360 MS
EKG QRS DURATION: 86 MS
EKG QTC CALCULATION (BAZETT): 435 MS
EKG R AXIS: -23 DEGREES
EKG T AXIS: 2 DEGREES
EKG VENTRICULAR RATE: 88 BPM
EOSINOPHILS ABSOLUTE: 0.16 E9/L (ref 0.05–0.5)
EOSINOPHILS RELATIVE PERCENT: 2.3 % (ref 0–6)
GFR AFRICAN AMERICAN: >60
GFR NON-AFRICAN AMERICAN: >60 ML/MIN/1.73
GLUCOSE BLD-MCNC: 112 MG/DL (ref 74–99)
HCT VFR BLD CALC: 45.3 % (ref 37–54)
HEMOGLOBIN: 14.5 G/DL (ref 12.5–16.5)
IMMATURE GRANULOCYTES #: 0.02 E9/L
IMMATURE GRANULOCYTES %: 0.3 % (ref 0–5)
INR BLD: 1.1
LYMPHOCYTES ABSOLUTE: 2.1 E9/L (ref 1.5–4)
LYMPHOCYTES RELATIVE PERCENT: 30.6 % (ref 20–42)
MCH RBC QN AUTO: 23 PG (ref 26–35)
MCHC RBC AUTO-ENTMCNC: 32 % (ref 32–34.5)
MCV RBC AUTO: 71.8 FL (ref 80–99.9)
MONOCYTES ABSOLUTE: 0.68 E9/L (ref 0.1–0.95)
MONOCYTES RELATIVE PERCENT: 9.9 % (ref 2–12)
NEUTROPHILS ABSOLUTE: 3.88 E9/L (ref 1.8–7.3)
NEUTROPHILS RELATIVE PERCENT: 56.6 % (ref 43–80)
PDW BLD-RTO: 18 FL (ref 11.5–15)
PLATELET # BLD: 267 E9/L (ref 130–450)
PMV BLD AUTO: 10.1 FL (ref 7–12)
POTASSIUM SERPL-SCNC: 4 MMOL/L (ref 3.5–5)
PROTHROMBIN TIME: 12.2 SEC (ref 9.3–12.4)
RBC # BLD: 6.31 E12/L (ref 3.8–5.8)
SODIUM BLD-SCNC: 138 MMOL/L (ref 132–146)
TOTAL PROTEIN: 8.2 G/DL (ref 6.4–8.3)
WBC # BLD: 6.9 E9/L (ref 4.5–11.5)

## 2019-02-21 PROCEDURE — 93010 ELECTROCARDIOGRAM REPORT: CPT | Performed by: INTERNAL MEDICINE

## 2019-02-21 PROCEDURE — 80053 COMPREHEN METABOLIC PANEL: CPT

## 2019-02-21 PROCEDURE — 93005 ELECTROCARDIOGRAM TRACING: CPT | Performed by: PHYSICIAN ASSISTANT

## 2019-02-21 PROCEDURE — 85025 COMPLETE CBC W/AUTO DIFF WBC: CPT

## 2019-02-21 PROCEDURE — 36415 COLL VENOUS BLD VENIPUNCTURE: CPT

## 2019-02-21 PROCEDURE — 85610 PROTHROMBIN TIME: CPT

## 2019-02-21 PROCEDURE — 71046 X-RAY EXAM CHEST 2 VIEWS: CPT

## 2019-02-21 RX ORDER — IBUPROFEN 200 MG
200 TABLET ORAL EVERY 6 HOURS PRN
Status: ON HOLD | COMMUNITY
End: 2019-02-28 | Stop reason: HOSPADM

## 2019-02-21 ASSESSMENT — PAIN SCALES - GENERAL: PAINLEVEL_OUTOF10: 8

## 2019-02-21 ASSESSMENT — PAIN DESCRIPTION - PROGRESSION: CLINICAL_PROGRESSION: GRADUALLY WORSENING

## 2019-02-21 ASSESSMENT — PAIN DESCRIPTION - LOCATION: LOCATION: BACK

## 2019-02-21 ASSESSMENT — PAIN DESCRIPTION - FREQUENCY: FREQUENCY: CONTINUOUS

## 2019-02-21 ASSESSMENT — PAIN DESCRIPTION - ONSET: ONSET: ON-GOING

## 2019-02-26 ENCOUNTER — TELEPHONE (OUTPATIENT)
Dept: ENT CLINIC | Age: 49
End: 2019-02-26

## 2019-02-27 ENCOUNTER — PREP FOR PROCEDURE (OUTPATIENT)
Dept: NEUROSURGERY | Age: 49
End: 2019-02-27

## 2019-02-27 ENCOUNTER — ANESTHESIA EVENT (OUTPATIENT)
Dept: OPERATING ROOM | Age: 49
End: 2019-02-27
Payer: MEDICAID

## 2019-02-28 ENCOUNTER — HOSPITAL ENCOUNTER (OUTPATIENT)
Age: 49
Setting detail: OUTPATIENT SURGERY
Discharge: HOME | End: 2019-02-28
Attending: NEUROLOGICAL SURGERY | Admitting: NEUROLOGICAL SURGERY
Payer: MEDICAID

## 2019-02-28 ENCOUNTER — ANESTHESIA (OUTPATIENT)
Dept: OPERATING ROOM | Age: 49
End: 2019-02-28
Payer: MEDICAID

## 2019-02-28 ENCOUNTER — APPOINTMENT (OUTPATIENT)
Dept: GENERAL RADIOLOGY | Age: 49
End: 2019-02-28
Attending: NEUROLOGICAL SURGERY
Payer: MEDICAID

## 2019-02-28 VITALS
HEART RATE: 102 BPM | WEIGHT: 176 LBS | BODY MASS INDEX: 29.32 KG/M2 | SYSTOLIC BLOOD PRESSURE: 158 MMHG | RESPIRATION RATE: 16 BRPM | TEMPERATURE: 98 F | HEIGHT: 65 IN | DIASTOLIC BLOOD PRESSURE: 99 MMHG | OXYGEN SATURATION: 95 %

## 2019-02-28 VITALS — TEMPERATURE: 95.5 F | DIASTOLIC BLOOD PRESSURE: 103 MMHG | SYSTOLIC BLOOD PRESSURE: 135 MMHG | OXYGEN SATURATION: 99 %

## 2019-02-28 DIAGNOSIS — M54.41 CHRONIC BILATERAL LOW BACK PAIN WITH BILATERAL SCIATICA: Primary | ICD-10-CM

## 2019-02-28 DIAGNOSIS — G89.29 CHRONIC BILATERAL LOW BACK PAIN WITH BILATERAL SCIATICA: Primary | ICD-10-CM

## 2019-02-28 DIAGNOSIS — M54.42 CHRONIC BILATERAL LOW BACK PAIN WITH BILATERAL SCIATICA: Primary | ICD-10-CM

## 2019-02-28 DIAGNOSIS — M54.2 NECK PAIN: ICD-10-CM

## 2019-02-28 PROCEDURE — C1787 PATIENT PROGR, NEUROSTIM: HCPCS | Performed by: NEUROLOGICAL SURGERY

## 2019-02-28 PROCEDURE — 7100000011 HC PHASE II RECOVERY - ADDTL 15 MIN: Performed by: NEUROLOGICAL SURGERY

## 2019-02-28 PROCEDURE — 7100000000 HC PACU RECOVERY - FIRST 15 MIN: Performed by: NEUROLOGICAL SURGERY

## 2019-02-28 PROCEDURE — 7100000010 HC PHASE II RECOVERY - FIRST 15 MIN: Performed by: NEUROLOGICAL SURGERY

## 2019-02-28 PROCEDURE — 3700000000 HC ANESTHESIA ATTENDED CARE: Performed by: NEUROLOGICAL SURGERY

## 2019-02-28 PROCEDURE — 7100000001 HC PACU RECOVERY - ADDTL 15 MIN: Performed by: NEUROLOGICAL SURGERY

## 2019-02-28 PROCEDURE — 3209999900 FLUORO FOR SURGICAL PROCEDURES

## 2019-02-28 PROCEDURE — C1820 GENERATOR NEURO RECHG BAT SY: HCPCS | Performed by: NEUROLOGICAL SURGERY

## 2019-02-28 PROCEDURE — 6360000002 HC RX W HCPCS: Performed by: NEUROLOGICAL SURGERY

## 2019-02-28 PROCEDURE — 2720000010 HC SURG SUPPLY STERILE: Performed by: NEUROLOGICAL SURGERY

## 2019-02-28 PROCEDURE — 2709999900 HC NON-CHARGEABLE SUPPLY: Performed by: NEUROLOGICAL SURGERY

## 2019-02-28 PROCEDURE — 6370000000 HC RX 637 (ALT 250 FOR IP): Performed by: NEUROLOGICAL SURGERY

## 2019-02-28 PROCEDURE — 2580000003 HC RX 258: Performed by: NEUROLOGICAL SURGERY

## 2019-02-28 PROCEDURE — 6360000002 HC RX W HCPCS: Performed by: ANESTHESIOLOGY

## 2019-02-28 PROCEDURE — 3700000001 HC ADD 15 MINUTES (ANESTHESIA): Performed by: NEUROLOGICAL SURGERY

## 2019-02-28 PROCEDURE — 2580000003 HC RX 258

## 2019-02-28 PROCEDURE — 6360000002 HC RX W HCPCS

## 2019-02-28 PROCEDURE — 63685 INS/RPLC SPI NPG/RCVR POCKET: CPT | Performed by: PHYSICIAN ASSISTANT

## 2019-02-28 PROCEDURE — 2500000003 HC RX 250 WO HCPCS: Performed by: NEUROLOGICAL SURGERY

## 2019-02-28 PROCEDURE — 2580000003 HC RX 258: Performed by: PHYSICIAN ASSISTANT

## 2019-02-28 PROCEDURE — 3600000003 HC SURGERY LEVEL 3 BASE: Performed by: NEUROLOGICAL SURGERY

## 2019-02-28 PROCEDURE — 6360000002 HC RX W HCPCS: Performed by: PHYSICIAN ASSISTANT

## 2019-02-28 PROCEDURE — 3600000013 HC SURGERY LEVEL 3 ADDTL 15MIN: Performed by: NEUROLOGICAL SURGERY

## 2019-02-28 PROCEDURE — 63655 IMPLANT NEUROELECTRODES: CPT | Performed by: PHYSICIAN ASSISTANT

## 2019-02-28 PROCEDURE — 63685 INS/RPLC SPI NPG/RCVR POCKET: CPT | Performed by: NEUROLOGICAL SURGERY

## 2019-02-28 PROCEDURE — L8689 EXTERNAL RECHARG SYS INTERN: HCPCS | Performed by: NEUROLOGICAL SURGERY

## 2019-02-28 PROCEDURE — 63655 IMPLANT NEUROELECTRODES: CPT | Performed by: NEUROLOGICAL SURGERY

## 2019-02-28 PROCEDURE — 2500000003 HC RX 250 WO HCPCS

## 2019-02-28 PROCEDURE — C1778 LEAD, NEUROSTIMULATOR: HCPCS | Performed by: NEUROLOGICAL SURGERY

## 2019-02-28 DEVICE — DEVICE NEUROSTIMULATOR 13.9CC W1.9XH2.2IN 29.1GM RECHRG: Type: IMPLANTABLE DEVICE | Site: BACK | Status: FUNCTIONAL

## 2019-02-28 DEVICE — COLLAGEN DURAL REGENERATION MEMBRANE 2IN X 2IN (5CM X 5CM)
Type: IMPLANTABLE DEVICE | Site: SPINE CERVICAL | Status: FUNCTIONAL
Brand: DURAMATRIX-ONLAY PLUS

## 2019-02-28 DEVICE — KIT LD L65CM 2X8 MRI COMPATIBLE SURESCAN SPEC: Type: IMPLANTABLE DEVICE | Site: SPINE CERVICAL | Status: FUNCTIONAL

## 2019-02-28 DEVICE — DURASEAL® EXACT SPINAL SEALANT SYSTEM 5ML 5 PACK
Type: IMPLANTABLE DEVICE | Site: SPINE CERVICAL | Status: FUNCTIONAL
Brand: DURASEAL EXACT SPINAL SEALANT SYSTEM

## 2019-02-28 RX ORDER — CYCLOBENZAPRINE HCL 10 MG
10 TABLET ORAL 3 TIMES DAILY PRN
Qty: 21 TABLET | Refills: 2 | Status: SHIPPED | OUTPATIENT
Start: 2019-02-28 | End: 2019-03-07

## 2019-02-28 RX ORDER — NEOSTIGMINE METHYLSULFATE 1 MG/ML
INJECTION, SOLUTION INTRAVENOUS PRN
Status: DISCONTINUED | OUTPATIENT
Start: 2019-02-28 | End: 2019-02-28 | Stop reason: SDUPTHER

## 2019-02-28 RX ORDER — CYCLOBENZAPRINE HCL 10 MG
10 TABLET ORAL 3 TIMES DAILY PRN
Status: DISCONTINUED | OUTPATIENT
Start: 2019-02-28 | End: 2019-02-28 | Stop reason: HOSPADM

## 2019-02-28 RX ORDER — PROMETHAZINE HYDROCHLORIDE 25 MG/ML
6.25 INJECTION, SOLUTION INTRAMUSCULAR; INTRAVENOUS
Status: DISCONTINUED | OUTPATIENT
Start: 2019-02-28 | End: 2019-02-28 | Stop reason: HOSPADM

## 2019-02-28 RX ORDER — CLINDAMYCIN HYDROCHLORIDE 300 MG/1
300 CAPSULE ORAL EVERY 8 HOURS SCHEDULED
Qty: 30 CAPSULE | Refills: 0 | Status: SHIPPED | OUTPATIENT
Start: 2019-02-28 | End: 2019-03-10

## 2019-02-28 RX ORDER — SODIUM CHLORIDE 0.9 % (FLUSH) 0.9 %
10 SYRINGE (ML) INJECTION PRN
Status: DISCONTINUED | OUTPATIENT
Start: 2019-02-28 | End: 2019-02-28 | Stop reason: HOSPADM

## 2019-02-28 RX ORDER — GLYCOPYRROLATE 1 MG/5 ML
SYRINGE (ML) INTRAVENOUS PRN
Status: DISCONTINUED | OUTPATIENT
Start: 2019-02-28 | End: 2019-02-28 | Stop reason: SDUPTHER

## 2019-02-28 RX ORDER — SODIUM CHLORIDE 9 MG/ML
INJECTION, SOLUTION INTRAVENOUS CONTINUOUS PRN
Status: DISCONTINUED | OUTPATIENT
Start: 2019-02-28 | End: 2019-02-28 | Stop reason: SDUPTHER

## 2019-02-28 RX ORDER — SODIUM CHLORIDE 9 MG/ML
INJECTION, SOLUTION INTRAVENOUS CONTINUOUS
Status: DISCONTINUED | OUTPATIENT
Start: 2019-02-28 | End: 2019-02-28 | Stop reason: HOSPADM

## 2019-02-28 RX ORDER — SODIUM CHLORIDE 0.9 % (FLUSH) 0.9 %
10 SYRINGE (ML) INJECTION EVERY 12 HOURS SCHEDULED
Status: DISCONTINUED | OUTPATIENT
Start: 2019-02-28 | End: 2019-02-28 | Stop reason: HOSPADM

## 2019-02-28 RX ORDER — CLINDAMYCIN HYDROCHLORIDE 150 MG/1
300 CAPSULE ORAL EVERY 8 HOURS SCHEDULED
Status: DISCONTINUED | OUTPATIENT
Start: 2019-02-28 | End: 2019-02-28 | Stop reason: HOSPADM

## 2019-02-28 RX ORDER — LIDOCAINE HYDROCHLORIDE 20 MG/ML
INJECTION, SOLUTION EPIDURAL; INFILTRATION; INTRACAUDAL; PERINEURAL PRN
Status: DISCONTINUED | OUTPATIENT
Start: 2019-02-28 | End: 2019-02-28 | Stop reason: SDUPTHER

## 2019-02-28 RX ORDER — CEFAZOLIN SODIUM 2 G/50ML
2 SOLUTION INTRAVENOUS
Status: COMPLETED | OUTPATIENT
Start: 2019-02-28 | End: 2019-02-28

## 2019-02-28 RX ORDER — FENTANYL CITRATE 50 UG/ML
INJECTION, SOLUTION INTRAMUSCULAR; INTRAVENOUS PRN
Status: DISCONTINUED | OUTPATIENT
Start: 2019-02-28 | End: 2019-02-28 | Stop reason: SDUPTHER

## 2019-02-28 RX ORDER — DEXAMETHASONE SODIUM PHOSPHATE 10 MG/ML
INJECTION, SOLUTION INTRAMUSCULAR; INTRAVENOUS PRN
Status: DISCONTINUED | OUTPATIENT
Start: 2019-02-28 | End: 2019-02-28 | Stop reason: SDUPTHER

## 2019-02-28 RX ORDER — PROPOFOL 10 MG/ML
INJECTION, EMULSION INTRAVENOUS PRN
Status: DISCONTINUED | OUTPATIENT
Start: 2019-02-28 | End: 2019-02-28 | Stop reason: SDUPTHER

## 2019-02-28 RX ORDER — CEFAZOLIN SODIUM 2 G/50ML
2 SOLUTION INTRAVENOUS
Status: DISCONTINUED | OUTPATIENT
Start: 2019-02-28 | End: 2019-02-28

## 2019-02-28 RX ORDER — VANCOMYCIN HYDROCHLORIDE 500 MG/10ML
INJECTION, POWDER, LYOPHILIZED, FOR SOLUTION INTRAVENOUS PRN
Status: DISCONTINUED | OUTPATIENT
Start: 2019-02-28 | End: 2019-02-28 | Stop reason: ALTCHOICE

## 2019-02-28 RX ORDER — LIDOCAINE HYDROCHLORIDE AND EPINEPHRINE BITARTRATE 20; .01 MG/ML; MG/ML
INJECTION, SOLUTION SUBCUTANEOUS PRN
Status: DISCONTINUED | OUTPATIENT
Start: 2019-02-28 | End: 2019-02-28 | Stop reason: ALTCHOICE

## 2019-02-28 RX ORDER — BUPIVACAINE HYDROCHLORIDE 2.5 MG/ML
INJECTION, SOLUTION EPIDURAL; INFILTRATION; INTRACAUDAL PRN
Status: DISCONTINUED | OUTPATIENT
Start: 2019-02-28 | End: 2019-02-28 | Stop reason: ALTCHOICE

## 2019-02-28 RX ORDER — OXYCODONE HYDROCHLORIDE AND ACETAMINOPHEN 5; 325 MG/1; MG/1
1 TABLET ORAL EVERY 4 HOURS PRN
Status: DISCONTINUED | OUTPATIENT
Start: 2019-02-28 | End: 2019-02-28 | Stop reason: HOSPADM

## 2019-02-28 RX ORDER — MIDAZOLAM HYDROCHLORIDE 1 MG/ML
INJECTION INTRAMUSCULAR; INTRAVENOUS PRN
Status: DISCONTINUED | OUTPATIENT
Start: 2019-02-28 | End: 2019-02-28 | Stop reason: SDUPTHER

## 2019-02-28 RX ORDER — MORPHINE SULFATE 4 MG/ML
4 INJECTION, SOLUTION INTRAMUSCULAR; INTRAVENOUS
Status: DISCONTINUED | OUTPATIENT
Start: 2019-02-28 | End: 2019-02-28 | Stop reason: HOSPADM

## 2019-02-28 RX ORDER — OXYCODONE HYDROCHLORIDE AND ACETAMINOPHEN 5; 325 MG/1; MG/1
1 TABLET ORAL EVERY 4 HOURS PRN
Qty: 42 TABLET | Refills: 0 | Status: ON HOLD | OUTPATIENT
Start: 2019-02-28 | End: 2019-03-03 | Stop reason: HOSPADM

## 2019-02-28 RX ORDER — ONDANSETRON 2 MG/ML
INJECTION INTRAMUSCULAR; INTRAVENOUS PRN
Status: DISCONTINUED | OUTPATIENT
Start: 2019-02-28 | End: 2019-02-28 | Stop reason: SDUPTHER

## 2019-02-28 RX ORDER — ROCURONIUM BROMIDE 10 MG/ML
INJECTION, SOLUTION INTRAVENOUS PRN
Status: DISCONTINUED | OUTPATIENT
Start: 2019-02-28 | End: 2019-02-28 | Stop reason: SDUPTHER

## 2019-02-28 RX ADMIN — DEXAMETHASONE SODIUM PHOSPHATE 10 MG: 10 INJECTION INTRAMUSCULAR; INTRAVENOUS at 08:25

## 2019-02-28 RX ADMIN — HYDROMORPHONE HYDROCHLORIDE 0.5 MG: 1 INJECTION, SOLUTION INTRAMUSCULAR; INTRAVENOUS; SUBCUTANEOUS at 11:43

## 2019-02-28 RX ADMIN — SODIUM CHLORIDE: 9 INJECTION, SOLUTION INTRAVENOUS at 08:00

## 2019-02-28 RX ADMIN — Medication 0.2 MG: at 10:35

## 2019-02-28 RX ADMIN — FENTANYL CITRATE 100 MCG: 50 INJECTION, SOLUTION INTRAMUSCULAR; INTRAVENOUS at 08:03

## 2019-02-28 RX ADMIN — MIDAZOLAM HYDROCHLORIDE 2 MG: 1 INJECTION, SOLUTION INTRAMUSCULAR; INTRAVENOUS at 07:58

## 2019-02-28 RX ADMIN — ONDANSETRON HYDROCHLORIDE 4 MG: 2 INJECTION, SOLUTION INTRAMUSCULAR; INTRAVENOUS at 10:25

## 2019-02-28 RX ADMIN — FENTANYL CITRATE 50 MCG: 50 INJECTION, SOLUTION INTRAMUSCULAR; INTRAVENOUS at 08:35

## 2019-02-28 RX ADMIN — SODIUM CHLORIDE: 9 INJECTION, SOLUTION INTRAVENOUS at 09:00

## 2019-02-28 RX ADMIN — HYDROMORPHONE HYDROCHLORIDE 0.5 MG: 1 INJECTION, SOLUTION INTRAMUSCULAR; INTRAVENOUS; SUBCUTANEOUS at 11:48

## 2019-02-28 RX ADMIN — ROCURONIUM BROMIDE 40 MG: 10 INJECTION, SOLUTION INTRAVENOUS at 08:03

## 2019-02-28 RX ADMIN — SODIUM CHLORIDE: 9 INJECTION, SOLUTION INTRAVENOUS at 07:13

## 2019-02-28 RX ADMIN — LIDOCAINE HYDROCHLORIDE 100 MG: 20 INJECTION, SOLUTION EPIDURAL; INFILTRATION; INTRACAUDAL; PERINEURAL at 08:03

## 2019-02-28 RX ADMIN — Medication 1 MG: at 10:35

## 2019-02-28 RX ADMIN — CEFAZOLIN SODIUM 2 G: 2 SOLUTION INTRAVENOUS at 08:11

## 2019-02-28 RX ADMIN — PROPOFOL 200 MG: 10 INJECTION, EMULSION INTRAVENOUS at 08:03

## 2019-02-28 RX ADMIN — FENTANYL CITRATE 50 MCG: 50 INJECTION, SOLUTION INTRAMUSCULAR; INTRAVENOUS at 10:35

## 2019-02-28 RX ADMIN — CYCLOBENZAPRINE HYDROCHLORIDE 10 MG: 10 TABLET, FILM COATED ORAL at 14:02

## 2019-02-28 RX ADMIN — OXYCODONE AND ACETAMINOPHEN 1 TABLET: 5; 325 TABLET ORAL at 13:21

## 2019-02-28 ASSESSMENT — PULMONARY FUNCTION TESTS
PIF_VALUE: 29
PIF_VALUE: 28
PIF_VALUE: 28
PIF_VALUE: 4
PIF_VALUE: 27
PIF_VALUE: 30
PIF_VALUE: 25
PIF_VALUE: 29
PIF_VALUE: 29
PIF_VALUE: 28
PIF_VALUE: 29
PIF_VALUE: 28
PIF_VALUE: 39
PIF_VALUE: 28
PIF_VALUE: 24
PIF_VALUE: 28
PIF_VALUE: 30
PIF_VALUE: 3
PIF_VALUE: 29
PIF_VALUE: 29
PIF_VALUE: 27
PIF_VALUE: 27
PIF_VALUE: 29
PIF_VALUE: 25
PIF_VALUE: 30
PIF_VALUE: 29
PIF_VALUE: 26
PIF_VALUE: 33
PIF_VALUE: 30
PIF_VALUE: 29
PIF_VALUE: 28
PIF_VALUE: 29
PIF_VALUE: 24
PIF_VALUE: 42
PIF_VALUE: 29
PIF_VALUE: 29
PIF_VALUE: 1
PIF_VALUE: 28
PIF_VALUE: 28
PIF_VALUE: 29
PIF_VALUE: 30
PIF_VALUE: 28
PIF_VALUE: 28
PIF_VALUE: 2
PIF_VALUE: 27
PIF_VALUE: 28
PIF_VALUE: 0
PIF_VALUE: 28
PIF_VALUE: 29
PIF_VALUE: 30
PIF_VALUE: 30
PIF_VALUE: 28
PIF_VALUE: 29
PIF_VALUE: 28
PIF_VALUE: 2
PIF_VALUE: 28
PIF_VALUE: 29
PIF_VALUE: 29
PIF_VALUE: 30
PIF_VALUE: 28
PIF_VALUE: 26
PIF_VALUE: 28
PIF_VALUE: 27
PIF_VALUE: 28
PIF_VALUE: 29
PIF_VALUE: 28
PIF_VALUE: 28
PIF_VALUE: 29
PIF_VALUE: 1
PIF_VALUE: 34
PIF_VALUE: 28
PIF_VALUE: 28
PIF_VALUE: 3
PIF_VALUE: 30
PIF_VALUE: 28
PIF_VALUE: 29
PIF_VALUE: 28
PIF_VALUE: 20
PIF_VALUE: 28
PIF_VALUE: 28
PIF_VALUE: 29
PIF_VALUE: 28
PIF_VALUE: 28
PIF_VALUE: 33
PIF_VALUE: 30
PIF_VALUE: 29
PIF_VALUE: 29
PIF_VALUE: 31
PIF_VALUE: 28
PIF_VALUE: 4
PIF_VALUE: 28
PIF_VALUE: 29
PIF_VALUE: 28
PIF_VALUE: 1
PIF_VALUE: 30
PIF_VALUE: 25
PIF_VALUE: 28
PIF_VALUE: 29
PIF_VALUE: 27
PIF_VALUE: 24
PIF_VALUE: 30
PIF_VALUE: 29
PIF_VALUE: 29
PIF_VALUE: 28
PIF_VALUE: 29
PIF_VALUE: 30
PIF_VALUE: 29
PIF_VALUE: 28
PIF_VALUE: 29
PIF_VALUE: 29
PIF_VALUE: 28
PIF_VALUE: 29
PIF_VALUE: 29
PIF_VALUE: 28
PIF_VALUE: 29
PIF_VALUE: 29
PIF_VALUE: 0
PIF_VALUE: 0
PIF_VALUE: 27
PIF_VALUE: 2
PIF_VALUE: 29
PIF_VALUE: 29
PIF_VALUE: 1
PIF_VALUE: 24
PIF_VALUE: 28
PIF_VALUE: 30
PIF_VALUE: 28
PIF_VALUE: 29
PIF_VALUE: 30
PIF_VALUE: 29
PIF_VALUE: 28
PIF_VALUE: 29
PIF_VALUE: 29
PIF_VALUE: 6
PIF_VALUE: 30
PIF_VALUE: 29
PIF_VALUE: 28
PIF_VALUE: 29
PIF_VALUE: 28
PIF_VALUE: 29
PIF_VALUE: 28
PIF_VALUE: 29
PIF_VALUE: 28
PIF_VALUE: 29
PIF_VALUE: 29
PIF_VALUE: 27
PIF_VALUE: 28
PIF_VALUE: 28
PIF_VALUE: 29
PIF_VALUE: 29
PIF_VALUE: 30
PIF_VALUE: 30
PIF_VALUE: 29
PIF_VALUE: 28
PIF_VALUE: 28
PIF_VALUE: 27
PIF_VALUE: 29
PIF_VALUE: 30
PIF_VALUE: 29
PIF_VALUE: 26
PIF_VALUE: 28

## 2019-02-28 ASSESSMENT — PAIN DESCRIPTION - ORIENTATION
ORIENTATION: RIGHT;LEFT
ORIENTATION: RIGHT;LEFT;UPPER;LOWER
ORIENTATION: RIGHT;LEFT
ORIENTATION: RIGHT
ORIENTATION: RIGHT
ORIENTATION: LEFT;RIGHT;UPPER;LOWER
ORIENTATION: RIGHT;LEFT
ORIENTATION: RIGHT;LEFT

## 2019-02-28 ASSESSMENT — PAIN DESCRIPTION - FREQUENCY
FREQUENCY: CONTINUOUS

## 2019-02-28 ASSESSMENT — PAIN DESCRIPTION - DESCRIPTORS
DESCRIPTORS: ACHING;CRAMPING
DESCRIPTORS: ACHING;DISCOMFORT
DESCRIPTORS: BURNING;SPASM
DESCRIPTORS: ACHING;DISCOMFORT;CRAMPING
DESCRIPTORS: BURNING;CRAMPING
DESCRIPTORS: BURNING;SPASM

## 2019-02-28 ASSESSMENT — PAIN SCALES - GENERAL
PAINLEVEL_OUTOF10: 10
PAINLEVEL_OUTOF10: 10
PAINLEVEL_OUTOF10: 0
PAINLEVEL_OUTOF10: 0
PAINLEVEL_OUTOF10: 10
PAINLEVEL_OUTOF10: 0
PAINLEVEL_OUTOF10: 10
PAINLEVEL_OUTOF10: 0
PAINLEVEL_OUTOF10: 10
PAINLEVEL_OUTOF10: 0
PAINLEVEL_OUTOF10: 10
PAINLEVEL_OUTOF10: 10
PAINLEVEL_OUTOF10: 0
PAINLEVEL_OUTOF10: 10
PAINLEVEL_OUTOF10: 0

## 2019-02-28 ASSESSMENT — PAIN DESCRIPTION - PAIN TYPE
TYPE: SURGICAL PAIN
TYPE: SURGICAL PAIN
TYPE: CHRONIC PAIN;SURGICAL PAIN
TYPE: SURGICAL PAIN
TYPE: CHRONIC PAIN;SURGICAL PAIN

## 2019-02-28 ASSESSMENT — PAIN DESCRIPTION - ONSET
ONSET: AWAKENED FROM SLEEP
ONSET: AWAKENED FROM SLEEP
ONSET: ON-GOING
ONSET: AWAKENED FROM SLEEP
ONSET: ON-GOING

## 2019-02-28 ASSESSMENT — PAIN DESCRIPTION - LOCATION
LOCATION: ARM
LOCATION: GENERALIZED;ARM;LEG
LOCATION: ARM;GENERALIZED
LOCATION: ARM
LOCATION: NECK
LOCATION: GENERALIZED;ARM;LEG
LOCATION: ARM;GENERALIZED
LOCATION: ARM

## 2019-02-28 ASSESSMENT — PAIN - FUNCTIONAL ASSESSMENT: PAIN_FUNCTIONAL_ASSESSMENT: 0-10

## 2019-03-01 ENCOUNTER — TELEPHONE (OUTPATIENT)
Dept: ENT CLINIC | Age: 49
End: 2019-03-01

## 2019-03-02 ENCOUNTER — ANESTHESIA (OUTPATIENT)
Dept: OPERATING ROOM | Age: 49
End: 2019-03-02
Payer: MEDICAID

## 2019-03-02 ENCOUNTER — ANESTHESIA EVENT (OUTPATIENT)
Dept: OPERATING ROOM | Age: 49
End: 2019-03-02
Payer: MEDICAID

## 2019-03-02 ENCOUNTER — HOSPITAL ENCOUNTER (OUTPATIENT)
Age: 49
Setting detail: OBSERVATION
Discharge: HOME | End: 2019-03-03
Attending: EMERGENCY MEDICINE | Admitting: NEUROLOGICAL SURGERY
Payer: MEDICAID

## 2019-03-02 VITALS
RESPIRATION RATE: 10 BRPM | SYSTOLIC BLOOD PRESSURE: 117 MMHG | DIASTOLIC BLOOD PRESSURE: 79 MMHG | OXYGEN SATURATION: 77 %

## 2019-03-02 DIAGNOSIS — M54.41 CHRONIC BILATERAL LOW BACK PAIN WITH BILATERAL SCIATICA: ICD-10-CM

## 2019-03-02 DIAGNOSIS — R52 INTRACTABLE PAIN: ICD-10-CM

## 2019-03-02 DIAGNOSIS — M54.12 CERVICAL NEUROPATHIC PAIN: Primary | ICD-10-CM

## 2019-03-02 DIAGNOSIS — M54.42 CHRONIC BILATERAL LOW BACK PAIN WITH BILATERAL SCIATICA: ICD-10-CM

## 2019-03-02 DIAGNOSIS — G89.29 CHRONIC BILATERAL LOW BACK PAIN WITH BILATERAL SCIATICA: ICD-10-CM

## 2019-03-02 PROCEDURE — 3700000001 HC ADD 15 MINUTES (ANESTHESIA): Performed by: NEUROLOGICAL SURGERY

## 2019-03-02 PROCEDURE — 2709999900 HC NON-CHARGEABLE SUPPLY: Performed by: NEUROLOGICAL SURGERY

## 2019-03-02 PROCEDURE — 6360000002 HC RX W HCPCS: Performed by: NEUROLOGICAL SURGERY

## 2019-03-02 PROCEDURE — 6360000002 HC RX W HCPCS

## 2019-03-02 PROCEDURE — 6370000000 HC RX 637 (ALT 250 FOR IP): Performed by: NEUROLOGICAL SURGERY

## 2019-03-02 PROCEDURE — 3600000012 HC SURGERY LEVEL 2 ADDTL 15MIN: Performed by: NEUROLOGICAL SURGERY

## 2019-03-02 PROCEDURE — G0378 HOSPITAL OBSERVATION PER HR: HCPCS

## 2019-03-02 PROCEDURE — 63662 REMOVE SPINE ELTRD PLATE: CPT | Performed by: NEUROLOGICAL SURGERY

## 2019-03-02 PROCEDURE — 7100000001 HC PACU RECOVERY - ADDTL 15 MIN: Performed by: NEUROLOGICAL SURGERY

## 2019-03-02 PROCEDURE — 96374 THER/PROPH/DIAG INJ IV PUSH: CPT

## 2019-03-02 PROCEDURE — 2500000003 HC RX 250 WO HCPCS: Performed by: NEUROLOGICAL SURGERY

## 2019-03-02 PROCEDURE — 2500000003 HC RX 250 WO HCPCS

## 2019-03-02 PROCEDURE — 63688 REV/RMV IMP SP NPG/R DTCH CN: CPT | Performed by: NEUROLOGICAL SURGERY

## 2019-03-02 PROCEDURE — 99284 EMERGENCY DEPT VISIT MOD MDM: CPT

## 2019-03-02 PROCEDURE — 2580000003 HC RX 258: Performed by: NEUROLOGICAL SURGERY

## 2019-03-02 PROCEDURE — 3600000002 HC SURGERY LEVEL 2 BASE: Performed by: NEUROLOGICAL SURGERY

## 2019-03-02 PROCEDURE — 7100000000 HC PACU RECOVERY - FIRST 15 MIN: Performed by: NEUROLOGICAL SURGERY

## 2019-03-02 PROCEDURE — 6360000002 HC RX W HCPCS: Performed by: EMERGENCY MEDICINE

## 2019-03-02 PROCEDURE — 2700000000 HC OXYGEN THERAPY PER DAY

## 2019-03-02 PROCEDURE — 96375 TX/PRO/DX INJ NEW DRUG ADDON: CPT

## 2019-03-02 PROCEDURE — 3700000000 HC ANESTHESIA ATTENDED CARE: Performed by: NEUROLOGICAL SURGERY

## 2019-03-02 PROCEDURE — 88300 SURGICAL PATH GROSS: CPT

## 2019-03-02 PROCEDURE — 2580000003 HC RX 258

## 2019-03-02 RX ORDER — NEOSTIGMINE METHYLSULFATE 1 MG/ML
INJECTION, SOLUTION INTRAVENOUS PRN
Status: DISCONTINUED | OUTPATIENT
Start: 2019-03-02 | End: 2019-03-02 | Stop reason: SDUPTHER

## 2019-03-02 RX ORDER — METHYLPREDNISOLONE 4 MG/1
4 TABLET ORAL
Status: DISCONTINUED | OUTPATIENT
Start: 2019-03-03 | End: 2019-03-03 | Stop reason: HOSPADM

## 2019-03-02 RX ORDER — METHYLPREDNISOLONE 4 MG/1
4 TABLET ORAL NIGHTLY
Status: DISCONTINUED | OUTPATIENT
Start: 2019-03-04 | End: 2019-03-03 | Stop reason: HOSPADM

## 2019-03-02 RX ORDER — ONDANSETRON 2 MG/ML
4 INJECTION INTRAMUSCULAR; INTRAVENOUS EVERY 6 HOURS PRN
Status: DISCONTINUED | OUTPATIENT
Start: 2019-03-02 | End: 2019-03-03 | Stop reason: HOSPADM

## 2019-03-02 RX ORDER — MORPHINE SULFATE 4 MG/ML
4 INJECTION, SOLUTION INTRAMUSCULAR; INTRAVENOUS
Status: DISCONTINUED | OUTPATIENT
Start: 2019-03-02 | End: 2019-03-03 | Stop reason: HOSPADM

## 2019-03-02 RX ORDER — GLYCOPYRROLATE 1 MG/5 ML
SYRINGE (ML) INTRAVENOUS PRN
Status: DISCONTINUED | OUTPATIENT
Start: 2019-03-02 | End: 2019-03-02 | Stop reason: SDUPTHER

## 2019-03-02 RX ORDER — MIDAZOLAM HYDROCHLORIDE 1 MG/ML
INJECTION INTRAMUSCULAR; INTRAVENOUS PRN
Status: DISCONTINUED | OUTPATIENT
Start: 2019-03-02 | End: 2019-03-02 | Stop reason: SDUPTHER

## 2019-03-02 RX ORDER — NITROGLYCERIN 20 MG/100ML
5 INJECTION INTRAVENOUS CONTINUOUS
Status: DISCONTINUED | OUTPATIENT
Start: 2019-03-02 | End: 2019-03-02

## 2019-03-02 RX ORDER — GABAPENTIN 300 MG/1
300 CAPSULE ORAL 3 TIMES DAILY
Qty: 30 CAPSULE | Refills: 0 | Status: SHIPPED | OUTPATIENT
Start: 2019-03-02 | End: 2020-01-10 | Stop reason: ALTCHOICE

## 2019-03-02 RX ORDER — GABAPENTIN 300 MG/1
300 CAPSULE ORAL 3 TIMES DAILY
Status: DISCONTINUED | OUTPATIENT
Start: 2019-03-02 | End: 2019-03-03 | Stop reason: HOSPADM

## 2019-03-02 RX ORDER — LIDOCAINE HYDROCHLORIDE AND EPINEPHRINE 10; 10 MG/ML; UG/ML
INJECTION, SOLUTION INFILTRATION; PERINEURAL PRN
Status: DISCONTINUED | OUTPATIENT
Start: 2019-03-02 | End: 2019-03-02 | Stop reason: SDUPTHER

## 2019-03-02 RX ORDER — CEFAZOLIN SODIUM 2 G/50ML
2 SOLUTION INTRAVENOUS
Status: CANCELLED | OUTPATIENT
Start: 2019-03-02 | End: 2019-03-02

## 2019-03-02 RX ORDER — FENTANYL CITRATE 50 UG/ML
100 INJECTION, SOLUTION INTRAMUSCULAR; INTRAVENOUS ONCE
Status: COMPLETED | OUTPATIENT
Start: 2019-03-02 | End: 2019-03-02

## 2019-03-02 RX ORDER — METHYLPREDNISOLONE 4 MG/1
TABLET ORAL
Qty: 1 KIT | Status: SHIPPED | OUTPATIENT
Start: 2019-03-02 | End: 2019-03-08

## 2019-03-02 RX ORDER — SODIUM CHLORIDE 0.9 % (FLUSH) 0.9 %
10 SYRINGE (ML) INJECTION PRN
Status: DISCONTINUED | OUTPATIENT
Start: 2019-03-02 | End: 2019-03-03 | Stop reason: HOSPADM

## 2019-03-02 RX ORDER — ONDANSETRON 2 MG/ML
INJECTION INTRAMUSCULAR; INTRAVENOUS PRN
Status: DISCONTINUED | OUTPATIENT
Start: 2019-03-02 | End: 2019-03-02 | Stop reason: SDUPTHER

## 2019-03-02 RX ORDER — SODIUM CHLORIDE 0.9 % (FLUSH) 0.9 %
10 SYRINGE (ML) INJECTION EVERY 12 HOURS SCHEDULED
Status: CANCELLED | OUTPATIENT
Start: 2019-03-02

## 2019-03-02 RX ORDER — METHYLPREDNISOLONE 4 MG/1
24 TABLET ORAL ONCE
Status: DISCONTINUED | OUTPATIENT
Start: 2019-03-02 | End: 2019-03-03 | Stop reason: HOSPADM

## 2019-03-02 RX ORDER — CEFAZOLIN SODIUM 2 G/50ML
2 SOLUTION INTRAVENOUS EVERY 8 HOURS
Status: DISCONTINUED | OUTPATIENT
Start: 2019-03-03 | End: 2019-03-03 | Stop reason: HOSPADM

## 2019-03-02 RX ORDER — SODIUM CHLORIDE 9 MG/ML
INJECTION, SOLUTION INTRAVENOUS CONTINUOUS PRN
Status: DISCONTINUED | OUTPATIENT
Start: 2019-03-02 | End: 2019-03-02 | Stop reason: SDUPTHER

## 2019-03-02 RX ORDER — DIAPER,BRIEF,INFANT-TODD,DISP
EACH MISCELLANEOUS PRN
Status: DISCONTINUED | OUTPATIENT
Start: 2019-03-02 | End: 2019-03-02 | Stop reason: ALTCHOICE

## 2019-03-02 RX ORDER — METHYLPREDNISOLONE 4 MG/1
8 TABLET ORAL NIGHTLY
Status: DISCONTINUED | OUTPATIENT
Start: 2019-03-03 | End: 2019-03-03 | Stop reason: HOSPADM

## 2019-03-02 RX ORDER — CYCLOBENZAPRINE HCL 10 MG
10 TABLET ORAL 3 TIMES DAILY PRN
Status: DISCONTINUED | OUTPATIENT
Start: 2019-03-02 | End: 2019-03-03 | Stop reason: HOSPADM

## 2019-03-02 RX ORDER — SODIUM CHLORIDE 0.9 % (FLUSH) 0.9 %
10 SYRINGE (ML) INJECTION PRN
Status: CANCELLED | OUTPATIENT
Start: 2019-03-02

## 2019-03-02 RX ORDER — CEFAZOLIN SODIUM 1 G/3ML
INJECTION, POWDER, FOR SOLUTION INTRAMUSCULAR; INTRAVENOUS PRN
Status: DISCONTINUED | OUTPATIENT
Start: 2019-03-02 | End: 2019-03-02 | Stop reason: SDUPTHER

## 2019-03-02 RX ORDER — ROCURONIUM BROMIDE 10 MG/ML
INJECTION, SOLUTION INTRAVENOUS PRN
Status: DISCONTINUED | OUTPATIENT
Start: 2019-03-02 | End: 2019-03-02 | Stop reason: SDUPTHER

## 2019-03-02 RX ORDER — 0.9 % SODIUM CHLORIDE 0.9 %
500 INTRAVENOUS SOLUTION INTRAVENOUS ONCE
Status: DISCONTINUED | OUTPATIENT
Start: 2019-03-02 | End: 2019-03-02

## 2019-03-02 RX ORDER — OXYCODONE HYDROCHLORIDE 10 MG/1
10 TABLET ORAL EVERY 4 HOURS PRN
Status: DISCONTINUED | OUTPATIENT
Start: 2019-03-02 | End: 2019-03-03 | Stop reason: HOSPADM

## 2019-03-02 RX ORDER — BUPIVACAINE HYDROCHLORIDE 5 MG/ML
INJECTION, SOLUTION EPIDURAL; INTRACAUDAL PRN
Status: DISCONTINUED | OUTPATIENT
Start: 2019-03-02 | End: 2019-03-02 | Stop reason: ALTCHOICE

## 2019-03-02 RX ORDER — PROPOFOL 10 MG/ML
INJECTION, EMULSION INTRAVENOUS PRN
Status: DISCONTINUED | OUTPATIENT
Start: 2019-03-02 | End: 2019-03-02 | Stop reason: SDUPTHER

## 2019-03-02 RX ORDER — MORPHINE SULFATE 4 MG/ML
6 INJECTION, SOLUTION INTRAMUSCULAR; INTRAVENOUS ONCE
Status: COMPLETED | OUTPATIENT
Start: 2019-03-02 | End: 2019-03-02

## 2019-03-02 RX ORDER — VANCOMYCIN HYDROCHLORIDE 1 G/20ML
INJECTION, POWDER, LYOPHILIZED, FOR SOLUTION INTRAVENOUS PRN
Status: DISCONTINUED | OUTPATIENT
Start: 2019-03-02 | End: 2019-03-02 | Stop reason: ALTCHOICE

## 2019-03-02 RX ORDER — DOCUSATE SODIUM 100 MG/1
100 CAPSULE, LIQUID FILLED ORAL 2 TIMES DAILY
Status: DISCONTINUED | OUTPATIENT
Start: 2019-03-02 | End: 2019-03-03 | Stop reason: HOSPADM

## 2019-03-02 RX ORDER — SODIUM CHLORIDE 0.9 % (FLUSH) 0.9 %
10 SYRINGE (ML) INJECTION EVERY 12 HOURS SCHEDULED
Status: DISCONTINUED | OUTPATIENT
Start: 2019-03-02 | End: 2019-03-03 | Stop reason: HOSPADM

## 2019-03-02 RX ORDER — FENTANYL CITRATE 50 UG/ML
INJECTION, SOLUTION INTRAMUSCULAR; INTRAVENOUS PRN
Status: DISCONTINUED | OUTPATIENT
Start: 2019-03-02 | End: 2019-03-02 | Stop reason: SDUPTHER

## 2019-03-02 RX ORDER — MORPHINE SULFATE 2 MG/ML
2 INJECTION, SOLUTION INTRAMUSCULAR; INTRAVENOUS
Status: DISCONTINUED | OUTPATIENT
Start: 2019-03-02 | End: 2019-03-03 | Stop reason: HOSPADM

## 2019-03-02 RX ORDER — DEXAMETHASONE SODIUM PHOSPHATE 10 MG/ML
INJECTION INTRAMUSCULAR; INTRAVENOUS PRN
Status: DISCONTINUED | OUTPATIENT
Start: 2019-03-02 | End: 2019-03-02 | Stop reason: SDUPTHER

## 2019-03-02 RX ADMIN — MIDAZOLAM HYDROCHLORIDE 2 MG: 1 INJECTION, SOLUTION INTRAMUSCULAR; INTRAVENOUS at 16:59

## 2019-03-02 RX ADMIN — SODIUM CHLORIDE: 9 INJECTION, SOLUTION INTRAVENOUS at 16:59

## 2019-03-02 RX ADMIN — FENTANYL CITRATE 100 MCG: 50 INJECTION INTRAMUSCULAR; INTRAVENOUS at 12:47

## 2019-03-02 RX ADMIN — PROPOFOL 150 MG: 10 INJECTION, EMULSION INTRAVENOUS at 17:04

## 2019-03-02 RX ADMIN — Medication 3 MG: at 17:59

## 2019-03-02 RX ADMIN — MORPHINE SULFATE 6 MG: 4 INJECTION INTRAVENOUS at 14:39

## 2019-03-02 RX ADMIN — LIDOCAINE HYDROCHLORIDE AND EPINEPHRINE 60 ML: 10; 10 INJECTION, SOLUTION INFILTRATION; PERINEURAL at 17:04

## 2019-03-02 RX ADMIN — FENTANYL CITRATE 100 MCG: 50 INJECTION, SOLUTION INTRAMUSCULAR; INTRAVENOUS at 17:04

## 2019-03-02 RX ADMIN — Medication 0.6 MG: at 17:59

## 2019-03-02 RX ADMIN — DEXAMETHASONE SODIUM PHOSPHATE 10 MG: 10 INJECTION INTRAMUSCULAR; INTRAVENOUS at 17:19

## 2019-03-02 RX ADMIN — OXYCODONE HYDROCHLORIDE 10 MG: 10 TABLET ORAL at 23:24

## 2019-03-02 RX ADMIN — ONDANSETRON HYDROCHLORIDE 4 MG: 2 INJECTION, SOLUTION INTRAMUSCULAR; INTRAVENOUS at 16:15

## 2019-03-02 RX ADMIN — ROCURONIUM BROMIDE 30 MG: 10 INJECTION, SOLUTION INTRAVENOUS at 17:04

## 2019-03-02 RX ADMIN — CEFAZOLIN 2000 MG: 1 INJECTION, POWDER, FOR SOLUTION INTRAMUSCULAR; INTRAVENOUS at 16:59

## 2019-03-02 RX ADMIN — MORPHINE SULFATE 4 MG: 4 INJECTION INTRAVENOUS at 20:27

## 2019-03-02 RX ADMIN — Medication 10 ML: at 20:28

## 2019-03-02 ASSESSMENT — PULMONARY FUNCTION TESTS
PIF_VALUE: 29
PIF_VALUE: 32
PIF_VALUE: 32
PIF_VALUE: 29
PIF_VALUE: 31
PIF_VALUE: 29
PIF_VALUE: 29
PIF_VALUE: 22
PIF_VALUE: 32
PIF_VALUE: 3
PIF_VALUE: 32
PIF_VALUE: 33
PIF_VALUE: 29
PIF_VALUE: 38
PIF_VALUE: 1
PIF_VALUE: 22
PIF_VALUE: 3
PIF_VALUE: 29
PIF_VALUE: 33
PIF_VALUE: 29
PIF_VALUE: 29
PIF_VALUE: 31
PIF_VALUE: 27
PIF_VALUE: 33
PIF_VALUE: 32
PIF_VALUE: 34
PIF_VALUE: 2
PIF_VALUE: 28
PIF_VALUE: 33
PIF_VALUE: 29
PIF_VALUE: 1
PIF_VALUE: 32
PIF_VALUE: 1
PIF_VALUE: 33
PIF_VALUE: 32
PIF_VALUE: 29
PIF_VALUE: 49
PIF_VALUE: 6
PIF_VALUE: 32
PIF_VALUE: 29
PIF_VALUE: 1
PIF_VALUE: 29
PIF_VALUE: 32
PIF_VALUE: 32
PIF_VALUE: 29
PIF_VALUE: 32
PIF_VALUE: 29
PIF_VALUE: 32
PIF_VALUE: 32
PIF_VALUE: 23
PIF_VALUE: 35
PIF_VALUE: 29
PIF_VALUE: 29
PIF_VALUE: 1
PIF_VALUE: 29
PIF_VALUE: 9
PIF_VALUE: 18
PIF_VALUE: 32
PIF_VALUE: 45
PIF_VALUE: 29
PIF_VALUE: 29
PIF_VALUE: 33
PIF_VALUE: 36
PIF_VALUE: 29
PIF_VALUE: 29
PIF_VALUE: 30

## 2019-03-02 ASSESSMENT — PAIN DESCRIPTION - ONSET
ONSET: ON-GOING
ONSET: ON-GOING

## 2019-03-02 ASSESSMENT — PAIN SCALES - GENERAL
PAINLEVEL_OUTOF10: 10
PAINLEVEL_OUTOF10: 0
PAINLEVEL_OUTOF10: 10
PAINLEVEL_OUTOF10: 0
PAINLEVEL_OUTOF10: 10
PAINLEVEL_OUTOF10: 0
PAINLEVEL_OUTOF10: 0
PAINLEVEL_OUTOF10: 9
PAINLEVEL_OUTOF10: 9
PAINLEVEL_OUTOF10: 0
PAINLEVEL_OUTOF10: 8

## 2019-03-02 ASSESSMENT — PAIN DESCRIPTION - DESCRIPTORS: DESCRIPTORS: ACHING;CONSTANT;BURNING

## 2019-03-02 ASSESSMENT — PAIN DESCRIPTION - ORIENTATION: ORIENTATION: OTHER (COMMENT)

## 2019-03-02 ASSESSMENT — PAIN DESCRIPTION - LOCATION
LOCATION: BACK
LOCATION: BACK

## 2019-03-02 ASSESSMENT — PAIN DESCRIPTION - PROGRESSION
CLINICAL_PROGRESSION: NOT CHANGED
CLINICAL_PROGRESSION: NOT CHANGED

## 2019-03-02 ASSESSMENT — PAIN - FUNCTIONAL ASSESSMENT
PAIN_FUNCTIONAL_ASSESSMENT: PREVENTS OR INTERFERES SOME ACTIVE ACTIVITIES AND ADLS
PAIN_FUNCTIONAL_ASSESSMENT: PREVENTS OR INTERFERES SOME ACTIVE ACTIVITIES AND ADLS

## 2019-03-02 ASSESSMENT — ENCOUNTER SYMPTOMS: SHORTNESS OF BREATH: 0

## 2019-03-02 ASSESSMENT — PAIN DESCRIPTION - PAIN TYPE
TYPE: ACUTE PAIN;SURGICAL PAIN
TYPE: ACUTE PAIN

## 2019-03-02 ASSESSMENT — PAIN DESCRIPTION - FREQUENCY
FREQUENCY: CONTINUOUS
FREQUENCY: CONTINUOUS

## 2019-03-02 ASSESSMENT — LIFESTYLE VARIABLES: SMOKING_STATUS: 0

## 2019-03-03 VITALS
DIASTOLIC BLOOD PRESSURE: 89 MMHG | WEIGHT: 196 LBS | OXYGEN SATURATION: 95 % | HEIGHT: 65 IN | BODY MASS INDEX: 32.65 KG/M2 | HEART RATE: 85 BPM | TEMPERATURE: 97.8 F | SYSTOLIC BLOOD PRESSURE: 136 MMHG | RESPIRATION RATE: 16 BRPM

## 2019-03-03 PROCEDURE — 6360000002 HC RX W HCPCS: Performed by: NEUROLOGICAL SURGERY

## 2019-03-03 PROCEDURE — 97165 OT EVAL LOW COMPLEX 30 MIN: CPT

## 2019-03-03 PROCEDURE — 6370000000 HC RX 637 (ALT 250 FOR IP): Performed by: NEUROLOGICAL SURGERY

## 2019-03-03 PROCEDURE — G0378 HOSPITAL OBSERVATION PER HR: HCPCS

## 2019-03-03 PROCEDURE — 97162 PT EVAL MOD COMPLEX 30 MIN: CPT

## 2019-03-03 PROCEDURE — 99024 POSTOP FOLLOW-UP VISIT: CPT | Performed by: PHYSICIAN ASSISTANT

## 2019-03-03 PROCEDURE — 2580000003 HC RX 258: Performed by: NEUROLOGICAL SURGERY

## 2019-03-03 PROCEDURE — 2500000003 HC RX 250 WO HCPCS: Performed by: PHYSICIAN ASSISTANT

## 2019-03-03 RX ORDER — METOPROLOL TARTRATE 5 MG/5ML
5 INJECTION INTRAVENOUS ONCE
Status: COMPLETED | OUTPATIENT
Start: 2019-03-03 | End: 2019-03-03

## 2019-03-03 RX ORDER — OXYCODONE HYDROCHLORIDE 10 MG/1
10 TABLET ORAL EVERY 4 HOURS PRN
Qty: 42 TABLET | Refills: 0 | Status: SHIPPED | OUTPATIENT
Start: 2019-03-03 | End: 2019-03-11 | Stop reason: SDUPTHER

## 2019-03-03 RX ORDER — PSEUDOEPHEDRINE HCL 30 MG
100 TABLET ORAL 2 TIMES DAILY PRN
Qty: 60 CAPSULE | Refills: 0 | Status: SHIPPED | OUTPATIENT
Start: 2019-03-03 | End: 2020-01-22 | Stop reason: ALTCHOICE

## 2019-03-03 RX ADMIN — MORPHINE SULFATE 4 MG: 4 INJECTION INTRAVENOUS at 00:34

## 2019-03-03 RX ADMIN — METOPROLOL TARTRATE 5 MG: 1 INJECTION, SOLUTION INTRAVENOUS at 11:37

## 2019-03-03 RX ADMIN — Medication 10 ML: at 08:04

## 2019-03-03 RX ADMIN — OXYCODONE HYDROCHLORIDE 10 MG: 10 TABLET ORAL at 10:42

## 2019-03-03 RX ADMIN — METHYLPREDNISOLONE 4 MG: 4 TABLET ORAL at 06:25

## 2019-03-03 RX ADMIN — CEFAZOLIN SODIUM 2 G: 2 SOLUTION INTRAVENOUS at 00:34

## 2019-03-03 RX ADMIN — MORPHINE SULFATE 4 MG: 4 INJECTION INTRAVENOUS at 02:45

## 2019-03-03 RX ADMIN — MORPHINE SULFATE 4 MG: 4 INJECTION INTRAVENOUS at 08:04

## 2019-03-03 RX ADMIN — CEFAZOLIN SODIUM 2 G: 2 SOLUTION INTRAVENOUS at 10:42

## 2019-03-03 RX ADMIN — DOCUSATE SODIUM 100 MG: 100 CAPSULE, LIQUID FILLED ORAL at 09:33

## 2019-03-03 RX ADMIN — CYCLOBENZAPRINE 10 MG: 10 TABLET, FILM COATED ORAL at 00:34

## 2019-03-03 RX ADMIN — MORPHINE SULFATE 4 MG: 4 INJECTION INTRAVENOUS at 13:44

## 2019-03-03 RX ADMIN — GABAPENTIN 300 MG: 300 CAPSULE ORAL at 13:43

## 2019-03-03 RX ADMIN — MORPHINE SULFATE 4 MG: 4 INJECTION INTRAVENOUS at 11:38

## 2019-03-03 RX ADMIN — GABAPENTIN 300 MG: 300 CAPSULE ORAL at 09:32

## 2019-03-03 RX ADMIN — CYCLOBENZAPRINE 10 MG: 10 TABLET, FILM COATED ORAL at 10:47

## 2019-03-03 RX ADMIN — METHYLPREDNISOLONE 4 MG: 4 TABLET ORAL at 11:38

## 2019-03-03 RX ADMIN — OXYCODONE HYDROCHLORIDE 10 MG: 10 TABLET ORAL at 06:25

## 2019-03-03 ASSESSMENT — PAIN DESCRIPTION - LOCATION: LOCATION: BACK;NECK;HAND

## 2019-03-03 ASSESSMENT — PAIN DESCRIPTION - ORIENTATION: ORIENTATION: POSTERIOR;LEFT;RIGHT

## 2019-03-03 ASSESSMENT — PAIN SCALES - GENERAL
PAINLEVEL_OUTOF10: 6
PAINLEVEL_OUTOF10: 4
PAINLEVEL_OUTOF10: 8
PAINLEVEL_OUTOF10: 7
PAINLEVEL_OUTOF10: 7
PAINLEVEL_OUTOF10: 8
PAINLEVEL_OUTOF10: 8
PAINLEVEL_OUTOF10: 7
PAINLEVEL_OUTOF10: 4

## 2019-03-03 ASSESSMENT — PAIN DESCRIPTION - ONSET: ONSET: ON-GOING

## 2019-03-03 ASSESSMENT — PAIN DESCRIPTION - FREQUENCY: FREQUENCY: CONTINUOUS

## 2019-03-03 ASSESSMENT — PAIN DESCRIPTION - PAIN TYPE: TYPE: ACUTE PAIN;SURGICAL PAIN

## 2019-03-03 ASSESSMENT — PAIN DESCRIPTION - PROGRESSION: CLINICAL_PROGRESSION: NOT CHANGED

## 2019-03-07 ENCOUNTER — TELEPHONE (OUTPATIENT)
Dept: NEUROSURGERY | Age: 49
End: 2019-03-07

## 2019-03-11 ENCOUNTER — TELEPHONE (OUTPATIENT)
Dept: NEUROSURGERY | Age: 49
End: 2019-03-11

## 2019-03-11 DIAGNOSIS — M54.12 CERVICAL NEUROPATHIC PAIN: ICD-10-CM

## 2019-03-11 DIAGNOSIS — M54.42 CHRONIC BILATERAL LOW BACK PAIN WITH BILATERAL SCIATICA: ICD-10-CM

## 2019-03-11 DIAGNOSIS — R52 INTRACTABLE PAIN: ICD-10-CM

## 2019-03-11 DIAGNOSIS — M54.41 CHRONIC BILATERAL LOW BACK PAIN WITH BILATERAL SCIATICA: ICD-10-CM

## 2019-03-11 DIAGNOSIS — G89.29 CHRONIC BILATERAL LOW BACK PAIN WITH BILATERAL SCIATICA: ICD-10-CM

## 2019-03-11 RX ORDER — OXYCODONE HYDROCHLORIDE 10 MG/1
10 TABLET ORAL EVERY 4 HOURS PRN
Qty: 42 TABLET | Refills: 0 | Status: SHIPPED | OUTPATIENT
Start: 2019-03-11 | End: 2019-03-18

## 2019-03-19 ENCOUNTER — OFFICE VISIT (OUTPATIENT)
Dept: NEUROSURGERY | Age: 49
End: 2019-03-19

## 2019-03-19 VITALS
SYSTOLIC BLOOD PRESSURE: 133 MMHG | DIASTOLIC BLOOD PRESSURE: 98 MMHG | BODY MASS INDEX: 31.99 KG/M2 | HEART RATE: 114 BPM | WEIGHT: 192 LBS | HEIGHT: 65 IN

## 2019-03-19 DIAGNOSIS — M54.2 NECK PAIN: Primary | ICD-10-CM

## 2019-03-19 PROCEDURE — 99024 POSTOP FOLLOW-UP VISIT: CPT | Performed by: NEUROLOGICAL SURGERY

## 2019-03-19 RX ORDER — OXYCODONE HYDROCHLORIDE 10 MG/1
10 TABLET ORAL EVERY 8 HOURS PRN
COMMUNITY
End: 2019-03-19 | Stop reason: SDUPTHER

## 2019-03-19 RX ORDER — OXYCODONE HYDROCHLORIDE 10 MG/1
10 TABLET ORAL EVERY 8 HOURS PRN
Qty: 21 TABLET | Refills: 0 | Status: SHIPPED | OUTPATIENT
Start: 2019-03-19 | End: 2019-03-22 | Stop reason: SDUPTHER

## 2019-03-22 ENCOUNTER — TELEPHONE (OUTPATIENT)
Dept: NEUROSURGERY | Age: 49
End: 2019-03-22

## 2019-03-22 DIAGNOSIS — M54.2 NECK PAIN: ICD-10-CM

## 2019-03-22 RX ORDER — OXYCODONE HYDROCHLORIDE 10 MG/1
10 TABLET ORAL EVERY 6 HOURS PRN
Qty: 28 TABLET | Refills: 0 | Status: SHIPPED | OUTPATIENT
Start: 2019-03-22 | End: 2019-04-01 | Stop reason: SDUPTHER

## 2019-03-28 DIAGNOSIS — M54.2 NECK PAIN: ICD-10-CM

## 2019-03-31 DIAGNOSIS — L30.9 DERMATITIS: ICD-10-CM

## 2019-04-01 ENCOUNTER — TELEPHONE (OUTPATIENT)
Dept: NEUROSURGERY | Age: 49
End: 2019-04-01

## 2019-04-01 DIAGNOSIS — M54.2 NECK PAIN: ICD-10-CM

## 2019-04-01 RX ORDER — OXYCODONE HYDROCHLORIDE 10 MG/1
10 TABLET ORAL EVERY 6 HOURS PRN
Qty: 28 TABLET | Refills: 0 | Status: SHIPPED | OUTPATIENT
Start: 2019-04-01 | End: 2019-04-08

## 2019-04-01 NOTE — TELEPHONE ENCOUNTER
Patient requesting Oxy IR refill to [address] on [facility].

## 2019-04-02 ENCOUNTER — TELEPHONE (OUTPATIENT)
Dept: NEUROSURGERY | Age: 49
End: 2019-04-02

## 2019-04-02 ENCOUNTER — OFFICE VISIT (OUTPATIENT)
Dept: NEUROSURGERY | Age: 49
End: 2019-04-02

## 2019-04-02 VITALS
DIASTOLIC BLOOD PRESSURE: 82 MMHG | BODY MASS INDEX: 32.12 KG/M2 | WEIGHT: 193 LBS | SYSTOLIC BLOOD PRESSURE: 148 MMHG | HEART RATE: 106 BPM

## 2019-04-02 DIAGNOSIS — M54.12 CERVICAL RADICULOPATHY: Primary | ICD-10-CM

## 2019-04-02 PROCEDURE — 99024 POSTOP FOLLOW-UP VISIT: CPT | Performed by: PHYSICIAN ASSISTANT

## 2019-04-03 ENCOUNTER — TELEPHONE (OUTPATIENT)
Dept: NEUROSURGERY | Age: 49
End: 2019-04-03

## 2019-04-03 RX ORDER — GABAPENTIN 600 MG/1
600 TABLET ORAL 3 TIMES DAILY
Qty: 90 TABLET | Refills: 3 | Status: SHIPPED | OUTPATIENT
Start: 2019-04-03 | End: 2020-01-10 | Stop reason: ALTCHOICE

## 2019-04-03 RX ORDER — GABAPENTIN 600 MG
600 TABLET ORAL 3 TIMES DAILY
Qty: 90 TABLET | Refills: 3 | Status: SHIPPED | OUTPATIENT
Start: 2019-04-03 | End: 2020-01-10 | Stop reason: ALTCHOICE

## 2019-04-03 NOTE — TELEPHONE ENCOUNTER
Script was written out for [facility] to be written for [facility]abapentin due to insurance.

## 2019-04-03 NOTE — PROGRESS NOTES
Post Operative Follow-up    Patient is status post:  Placement and subsequent removal of cervical SCS. He continues to have severe neck and right arm pain    Physical Exam  Alert and Oriented X 3  PERRLA, EOMI  ARGUETA 5/5, except right arm, patient uncooraperative for exam due to pain  Wound: C/D/I    A/P: patient is s/p  Placement and subsequent removal of cervical SCS with continued pain. Cervical MRI without any significant stenosis or acute abnormality other than expected post op changes. Continue with analgesics if needed. No further neurosurgical intervention indicated. Pt was seen by Dr. Virgen White today as well.

## 2019-04-23 ENCOUNTER — TELEPHONE (OUTPATIENT)
Dept: NEUROSURGERY | Age: 49
End: 2019-04-23

## 2019-04-23 DIAGNOSIS — M54.2 NECK PAIN OF OVER 3 MONTHS DURATION: Primary | ICD-10-CM

## 2019-04-23 RX ORDER — PREGABALIN 75 MG/1
75 CAPSULE ORAL 3 TIMES DAILY
Qty: 60 CAPSULE | Refills: 2 | Status: SHIPPED | OUTPATIENT
Start: 2019-04-23 | End: 2020-01-10 | Stop reason: ALTCHOICE

## 2019-05-08 ENCOUNTER — TELEPHONE (OUTPATIENT)
Dept: NEUROLOGY | Age: 49
End: 2019-05-08

## 2019-05-08 NOTE — TELEPHONE ENCOUNTER
Pt called stating he received a reminder call for his appt with Dr. Anjana Peña 5/9/19 for 10am but when scheduled pt reports his appt was made for 11am. Pt states after the appt was scheduled, he informed his transportation company and they are not able to bring him in earlier at [place] [address]. [facility] rescheduled pt for 6/13/19 at 8am with Dr. Anjana Peña in [facility] but pt understood he is to arrive 20-30 mins early. Pt will inform his transportation company of his new appt.   Electronically signed by Vinay Siddiqui on 5/8/19 at 2:15 PM

## 2019-05-23 ENCOUNTER — TELEPHONE (OUTPATIENT)
Dept: NEUROSURGERY | Age: 49
End: 2019-05-23

## 2019-05-23 NOTE — TELEPHONE ENCOUNTER
Pt called in stating that the lyrica is not helping. His numbness in his right arm is worse to the point that he can no loner use his arm. He is going to pain clinic and they also prescribed cymbalta. Pain clinic also is setting up an EMG. Should he still take lyrica or can there be something else prescribed?

## 2019-06-12 PROBLEM — G44.86 CERVICOGENIC HEADACHE: Status: ACTIVE | Noted: 2019-06-12

## 2019-06-13 ENCOUNTER — TELEPHONE (OUTPATIENT)
Dept: NEUROSURGERY | Age: 49
End: 2019-06-13

## 2019-06-13 ENCOUNTER — OFFICE VISIT (OUTPATIENT)
Dept: NEUROLOGY | Age: 49
End: 2019-06-13
Payer: MEDICAID

## 2019-06-13 VITALS
WEIGHT: 190 LBS | SYSTOLIC BLOOD PRESSURE: 130 MMHG | DIASTOLIC BLOOD PRESSURE: 100 MMHG | BODY MASS INDEX: 31.65 KG/M2 | HEIGHT: 65 IN

## 2019-06-13 DIAGNOSIS — M54.5 CHRONIC BILATERAL LOW BACK PAIN, WITH SCIATICA PRESENCE UNSPECIFIED: ICD-10-CM

## 2019-06-13 DIAGNOSIS — R51.9 CHRONIC DAILY HEADACHE: ICD-10-CM

## 2019-06-13 DIAGNOSIS — M50.20 CERVICAL DISC DISPLACEMENT: Primary | ICD-10-CM

## 2019-06-13 DIAGNOSIS — G43.709 TRANSFORMED MIGRAINE WITHOUT AURA: Chronic | ICD-10-CM

## 2019-06-13 DIAGNOSIS — F41.9 ANXIETY: ICD-10-CM

## 2019-06-13 DIAGNOSIS — G89.29 CHRONIC BILATERAL LOW BACK PAIN, WITH SCIATICA PRESENCE UNSPECIFIED: ICD-10-CM

## 2019-06-13 DIAGNOSIS — G44.86 CERVICOGENIC HEADACHE: ICD-10-CM

## 2019-06-13 DIAGNOSIS — M54.2 CERVICALGIA: ICD-10-CM

## 2019-06-13 PROCEDURE — 99214 OFFICE O/P EST MOD 30 MIN: CPT | Performed by: PSYCHIATRY & NEUROLOGY

## 2019-06-13 RX ORDER — HYDROXYZINE HYDROCHLORIDE 10 MG/1
10 TABLET, FILM COATED ORAL EVERY 4 HOURS PRN
COMMUNITY
End: 2020-01-10 | Stop reason: ALTCHOICE

## 2019-06-13 SDOH — HEALTH STABILITY: MENTAL HEALTH: HOW OFTEN DO YOU HAVE A DRINK CONTAINING ALCOHOL?: MONTHLY OR LESS

## 2019-06-13 ASSESSMENT — ENCOUNTER SYMPTOMS
RESPIRATORY NEGATIVE: 1
PHOTOPHOBIA: 1
GASTROINTESTINAL NEGATIVE: 1
ALLERGIC/IMMUNOLOGIC NEGATIVE: 1
BACK PAIN: 1

## 2019-06-13 NOTE — TELEPHONE ENCOUNTER
Jose A Alexis would like to speak with you and give you an update on his \"ever failing health\" Pt was informed you're in surgery and have patients this afternoon but will call him once you get a moment.  Pt #[phone]    (Callie Ravi)

## 2019-06-13 NOTE — PROGRESS NOTES
Neurology Consult Note:    Patient: Nicanor Mcardle  : 1970  Date: 19  Referring provider: Staci Hoffman MD    Referral to Neurology: hx cervicogenic-related headaches, chronic daily headaches; reports history of migraines and chronic daily headache pattern since . Pain management doctor: Dr. Rita Tao     Dear Staci Hoffman MD     I have seen Nicanor Mcardle referred to the Neurology clinic for hx of chronic daily headaches reported since . 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 He describes intermittent 'chiseling' and throbbing headache pains, varying in intensity and duration. In , he reports he moved to [facility] from [facility] and headaches were becoming progressively worse. He tried chiropractic care, massage therapy, saw many headaches specialists and neurologists at [facility], [facility], and elsewhere, and states he tried release 35 different headache pain meds. and dual meds. for \"anxiety\", \"seizure\", antidepressant, and BP type pain meds. for headache, also evaluated at [facility], [facility], [facility], [facility]. He was treated for Lyme disease in the past and states he saw ID specialists  and was diagnosed with \"chronic Lyme disease\". He was advised that I do not treat Lyme disease. He has chronic lumbar and cervical degenerative disease. He is agitated and argumentative often speaking over me when I have tried to obtain more detailed headache history. He reports he uses prn sumatriptan injections who is prescribed by his primary provider. He states I am \"negative\" since I have indicated that I would be unlikely to help him with his headaches given his above history and past failures with multiple headache meds. and headache specialists. He stated he would need to find another \"headache neurologist\"-\"who better understands him\".  He is also asking for me to prescribe 'darker lenses' for his glasses and tinted car windows and I directed him to see his eye DrTru and primary provider. He states he doesn't want any oral meds. and since he has \"done this all this stuff\". He only wants to continue prn sumatriptan injections which he reports helps his headaches partially-but he reports a chronic daily headache pattern. I suspect he may be experiencing a medication overuse headache, transformed migraine or cervicogenic-related headaches. He does not wish me to proceed further and basically spoke the entire time of this visit. I attempted to ask and interject a few questions to detail his headache history but he indicated he wished to speak and did not want me to interrupt him at all or guide the interview. He decided to leave the clinic and wanted to pursue another headache specialist and referral from your office. Medical records reviewed, [facility], Care Everywhere. Lab Data: 2/21/19, labs reviewed. Imaging Data: 12/18/18, MR brain scan, [facility]: wnl.    MR C-spine, 3/28/19, [facility]: Status post posterior decompression at C3-4 and C4-5 with postoperative   fluid collection in laminectomy defect which extends through the dorsal   fascia into subcutaneous soft tissues as detailed.  There is surrounding   enhancing fibrosis.  There is no associated compromise to the thecal sac. Infection is not excluded. Multilevel spondylosis most notably mild to moderate disc osteophyte   complexes at C4-5, C5-6 and C6-7 with minor cord impingement at C4-5 as   well as spinal canal stenosis  and foraminal stenosis at all these levels   similar to that seen previously. Anatomic Variant:  None.  Assume 7 cervical vertebrae with counting from   the craniocervical junction.     Current Outpatient Medications   Medication Sig Dispense Refill    fluocinonide (LIDEX) 0.05 % cream   3    hydrOXYzine (ATARAX) 10 MG tablet Take 10 mg by mouth every 4 hours as needed for Itching      docusate sodium (COLACE, DULCOLAX) 100 MG CAPS Take 100 mg by mouth 2 times daily as needed for Constipation 60 capsule 0    SUMAtriptan (IMITREX) 6 MG/0.5ML SOLN injection Inject 6 mg into the skin once as needed for Migraine      hydrocortisone 2.5 % cream Apply topically as needed Apply topically 2 times daily.  cyclobenzaprine (FLEXERIL) 10 MG tablet Take 1 tablet by mouth 3 times daily as needed for Muscle spasms 90 tablet 0    pregabalin (LYRICA) 75 MG capsule Take 1 capsule by mouth 3 times daily for 30 days. 60 capsule 2    NEURONTIN 600 MG tablet Take 1 tablet by mouth 3 times daily for 30 days. 90 tablet 3    gabapentin (NEURONTIN) 600 MG tablet Take 1 tablet by mouth 3 times daily for 30 days. 90 tablet 3    gabapentin (NEURONTIN) 300 MG capsule Take 1 capsule by mouth 3 times daily for 10 days. Intended supply: 30 days 30 capsule 0    gabapentin (NEURONTIN) 300 MG capsule Take 1 capsule by mouth 3 times daily for 10 days. Intended supply: 30 days 30 capsule 0     No current facility-administered medications for this visit.         Allergies   Allergen Reactions    Penicillins      Family has history to penicillin        Patient Active Problem List   Diagnosis    Chronic bilateral low back pain with bilateral sciatica    Chronic migraine without aura without status migrainosus, not intractable    Tear of right rotator cuff    Shoulder impingement, left    Shoulder impingement, right    Neck pain    Radiculopathy, lumbar region    At risk for bleeding associated with tonsillectomy and adenoidectomy    Post-op pain    Chronic tonsillitis    Intractable pain    Cervical neuropathic pain    Cervicogenic headache    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    Chronic daily headache       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 [address] Right 5/24/2018    RIGHT SHOULDER ARTHROSCOPY, DEBRIDEMENT, SUBACROMIAL DECOMPRESSION, DISTAL CLAVICLE RESECTION, ACROMIOPLASTY performed by Yasmine Bernal MD at [address] [facility] Left 9/28/2018    LEFT SHOULDER ARTHROSCOPY SUBACROMIAL DECOMPRESSION, CROMIAL PLASTY, DISTAL CLAVICAL EXCISION  ++ARTHREX, BEACH CHAIR, SPIDER++ performed by Yasmine Bernal MD at [address] [place] [address] N/A 2/28/2019    [address] --MEDTRONIC performed by Haritha Lieberman MD at [facility] N/A 1/21/2019    TONSILLECTOMY performed by Mone Wooten DO at [facility] OR       Family History   Problem Relation Age of Onset    No Known Problems Mother     Prostate Cancer Father     No Known Problems Maternal Grandmother     No Known Problems Maternal Grandfather     No Known Problems Paternal Grandmother     No Known Problems Paternal Grandfather     No Known Problems Daughter        Social History     Socioeconomic History    Marital status:      Spouse name: Not on file    Number of children: 1    Years of education: Not on file    Highest education level: Not on file   Occupational History    Not on file   Social Needs    Financial resource strain: Not on file    Food insecurity: Worry: Not on file     Inability: Not on file    Transportation needs:     Medical: Not on file     Non-medical: Not on file   Tobacco Use    Smoking status: Former Smoker     Years: 10.00     Types: Cigarettes    Smokeless tobacco: Never Used    Tobacco comment: quit 1980's    Substance and Sexual Activity    Alcohol use: Yes     Frequency: Monthly or less     Comment: socially    Drug use: No    Sexual activity: Not on file   Lifestyle    Physical activity:     Days per week: Not on file     Minutes per session: Not on file    Stress: Not on file   Relationships    Social connections:     Talks on phone: Not on file     Gets together: Not on file     Attends Faith service: Not on file     Active member of club or organization: Not on file     Attends meetings of clubs or organizations: Not on file     Relationship status: Not on file    Intimate partner violence:     Fear of current or ex partner: Not on file     Emotionally abused: Not on file     Physically abused: Not on file     Forced sexual activity: Not on file   Other Topics Concern    Not on file   Social History Narrative    Not on file     Review of Systems   HENT: Negative. Eyes: Positive for photophobia. Respiratory: Negative. Cardiovascular: Negative. Gastrointestinal: Negative. Endocrine: Negative. Musculoskeletal: Positive for back pain and neck pain. Hx lumbar spine stimulator, Dr. Robert Rodriguez, pain mgmt. Skin: Negative. Allergic/Immunologic: Negative. Neurological: Positive for headaches. Hematological: Negative. Psychiatric/Behavioral: Positive for agitation, decreased concentration and dysphoric mood. The patient is nervous/anxious. All other systems reviewed and are negative.     Neurologic Exam:  BP (!) 130/100 (Site: Left Upper Arm, Position: Sitting, Cuff Size: Medium Adult)   Ht 5' 5\" (1.651 m)   Wt 190 lb (86.2 kg)   BMI 31.62 kg/m²   General appearance: Appears alert, obese, volition and stated he would need to find a \"headache specialist\". Sincerely,      Sanchez George MD    This note was created using speech recognition transcription software. Despite proofreading, there may be several typographical errors present that may affect the meaning of the content. Please call with any questions. Note: More than 50% of this 40-minute face-face visit time included counseling and coordination of care based on clinical impression, review of test results, treatment plan, risk factor reduction and patient and/or family education.

## 2019-07-05 ENCOUNTER — TELEPHONE (OUTPATIENT)
Dept: NEUROSURGERY | Age: 49
End: 2019-07-05

## 2019-11-27 ENCOUNTER — HOSPITAL ENCOUNTER (OUTPATIENT)
Dept: MRI IMAGING | Age: 49
Discharge: HOME | End: 2019-11-29
Payer: MEDICAID

## 2019-11-27 DIAGNOSIS — M75.22 BICEPS TENDINITIS OF LEFT SHOULDER: ICD-10-CM

## 2019-11-27 PROCEDURE — 73221 MRI JOINT UPR EXTREM W/O DYE: CPT

## 2020-01-10 ENCOUNTER — OFFICE VISIT (OUTPATIENT)
Dept: PAIN MANAGEMENT | Age: 50
End: 2020-01-10
Payer: MEDICAID

## 2020-01-10 VITALS
SYSTOLIC BLOOD PRESSURE: 118 MMHG | HEART RATE: 98 BPM | RESPIRATION RATE: 16 BRPM | WEIGHT: 180 LBS | HEIGHT: 65 IN | BODY MASS INDEX: 29.99 KG/M2 | DIASTOLIC BLOOD PRESSURE: 90 MMHG | OXYGEN SATURATION: 95 % | TEMPERATURE: 98.3 F

## 2020-01-10 PROCEDURE — 99205 OFFICE O/P NEW HI 60 MIN: CPT | Performed by: ANESTHESIOLOGY

## 2020-01-10 PROCEDURE — 99213 OFFICE O/P EST LOW 20 MIN: CPT | Performed by: ANESTHESIOLOGY

## 2020-01-10 RX ORDER — CEFUROXIME AXETIL 250 MG/1
TABLET ORAL
Refills: 2 | COMMUNITY
Start: 2019-11-12 | End: 2020-01-22 | Stop reason: SDUPTHER

## 2020-01-10 RX ORDER — POLYETHYLENE GLYCOL 3350 17 G/17G
POWDER, FOR SOLUTION ORAL
Refills: 3 | COMMUNITY
Start: 2019-11-01 | End: 2021-05-21

## 2020-01-10 RX ORDER — OXYCODONE HYDROCHLORIDE AND ACETAMINOPHEN 5; 325 MG/1; MG/1
1 TABLET ORAL EVERY 4 HOURS PRN
Status: ON HOLD | COMMUNITY
Start: 2019-11-12 | End: 2020-01-28 | Stop reason: HOSPADM

## 2020-01-10 NOTE — PROGRESS NOTES
[address] [facility], [address] [facility]      [phone]                  Consult Note      Patient:  Cesar Garrison,  1970    Date of Service:  01/10/20     Requesting Physician:  Ramakrishna Atwood MD    Reason for Consult:      Patient presents with complaints of diffuse whole body pain    HISTORY OF PRESENT ILLNESS:      Mr. Cesar Garrison is a 44 y.o. male presented today to the [facility] Management Center for evaluation of Diffuse whole body pain. Started in  as diffuse pain and daily headaches. Initially was treated for CTS. Subsequently was diagnosed with Lyme's disease. Was treated in [place] - treated with IV atbx. He was treated at different pain centers. Most recently he was treated at [facility] with Dr. Rosalva Archer and Dr. Judith De Oliveira. He has undergone multiple modalities of treatment - conservative treatment and interventions. Failed multiple treatment- PT, Multiple trial of meds, interventions, SCS trial/ implant- subsequent SCS explant due to increased pain (SCS implant - by Dr. Brannon Telles). He has Spinal cord stimulator implant for LE pain: Aug 2018- helps while it is on- still working well. Cervical SCS implant done in 2019. Post implant he had severe neck pain/ UE pain and SCS had to be explanted in 2019. He has been having significant upper extremity, upper chest wall pain, sensitivity, intermittent color changes and pain has increased significantly. He has followed up subsequently with NSG and his pain management MD's. He was evaluated by Dr. Judith De Oliveira - had cervical spine interventions- did not help. Multiple spine interventions in the past; TPI, ROBINSON, etc.    He has been chronic narcotics in 2018. Currently taking percocet 5-325 po tid. OARRS- reviewed.   He was getting medication from doctor's pain clinic and currently getting medication from his primary care physician. She has been evaluated at the [address] [place] [address] pain management center by Dr. Velazquez Son recently on 12/19/2019. Detailed consult note reviewed and there is plan for stellate ganglion block and a multidisciplinary treatment including biofeedback, desensitization and aggressive PT. He has consulted [facility] at [facility] in 2018. He has been evaluated by neurology for his headaches. Pain is constant and is described as aching, burning and throbbing. Diffuse whole body. And has marked the entire body as painful. Patient does not have bladder or bowel dysfunction. Alleviating factors include: nothing. Aggravating factors include: movement, bending, lifting, touching. Pain causes functional limitations/ limits Adl's : Yes    Nursing notes and details of the pain history reviewed. Please see intake notes for details. Extensive outside records were reviewed patient had more than 130 pages of records. Extensive records documented in epic was also reviewed. Previous treatments:   Physical Therapy : yes, multiple sessions     Medications: - NSAID's : yes -             - Membrane stabilizers : yes - Has tried multiple meds            - Opioids : yes            - Adjuvants or Others : yes, multiple meds     TENS Unit: yes,     Surgeries: yes, : SCS implant    Interventional Pain procedures/ nerve blocks: yes, multiple interventional procedures. He has not been on anticoagulation medications no. He has not been on herbal supplements. He is not diabetic. H/O Smoking: no  H/O alcohol abuse : no  H/O Illicit drug use : denies    Employment: disability. Used to work as a musician.     Imaging:   MRI CERVICAL SPINE WO/W IVCON3/28/2019  [facility]  Result Impression   IMPRESSION:    Status post posterior decompression at C3-4 and C4-5 with postoperative   fluid collection in laminectomy defect which extends through the dorsal   fascia into subcutaneous soft tissues as Transportation needs:     Medical: Not on file     Non-medical: Not on file   Tobacco Use    Smoking status: Former Smoker     Years: 10.00     Types: Cigarettes    Smokeless tobacco: Never Used    Tobacco comment: quit 1980's    Substance and Sexual Activity    Alcohol use: Yes     Frequency: Monthly or less     Comment: socially    Drug use: No    Sexual activity: Never   Lifestyle    Physical activity:     Days per week: Not on file     Minutes per session: Not on file    Stress: Not on file   Relationships    Social connections:     Talks on phone: Not on file     Gets together: Not on file     Attends Mormon service: Not on file     Active member of club or organization: Not on file     Attends meetings of clubs or organizations: Not on file     Relationship status: Not on file    Intimate partner violence:     Fear of current or ex partner: Not on file     Emotionally abused: Not on file     Physically abused: Not on file     Forced sexual activity: Not on file   Other Topics Concern    Not on file   Social History Narrative    Not on file       Family History   Problem Relation Age of Onset    No Known Problems Mother     Prostate Cancer Father     No Known Problems Maternal Grandmother     No Known Problems Maternal Grandfather     No Known Problems Paternal Grandmother     No Known Problems Paternal Grandfather     No Known Problems Daughter        REVIEW OF SYSTEMS:     Patient specifically denies fever/chills, chest pain, shortness of breath, new bowel or bladder complaints. All other review of systems was negative.   General ROS: positive for  - weight gain  Psychological ROS: negative  Ophthalmic ROS: negative  ENT ROS: negative  Allergy and Immunology ROS: negative  Hematological and Lymphatic ROS: negative  Endocrine ROS: negative  Respiratory ROS: no cough, shortness of breath, or wheezing  Cardiovascular ROS: no chest pain or dyspnea on exertion  Gastrointestinal ROS: +

## 2020-01-22 NOTE — PROGRESS NOTES
[facility] PRE-ADMISSION TESTING INSTRUCTIONS    The Preadmission Testing patient is instructed accordingly using the following criteria (check applicable):    ARRIVAL INSTRUCTIONS:  [x] Parking the day of Surgery is located in the Main Entrance lot. Upon entering the door, make an immediate right to the surgery reception desk    [] [id number] is available Monday through Friday 6 am to 6 pm    [x] Bring photo ID and insurance card    [] Bring in a copy of Living will or Durable Power of  papers. [x] Please be sure to arrange for responsible adult to provide transportation to and from the hospital    [x] Please arrange for responsible adult to be with you for the 24 hour period post procedure due to having anesthesia      GENERAL INSTRUCTIONS:    [x] Nothing by mouth after midnight, including gum, candy, mints or water    [x] You may brush your teeth, but do not swallow any water    [x] Take medications as instructed with 1-2 oz of water    [] Stop herbal supplements and vitamins 5 days prior to procedure    [] Follow preop dosing of blood thinners per physician instructions    [] Take 1/2 dose of evening insulin, but no insulin after midnight    [x] No oral diabetic medications after midnight    [x] If diabetic and have low blood sugar or feel symptomatic, take 1-2oz apple juice only    [] Bring inhalers day of surgery    [] Bring C-PAP/ Bi-Pap day of surgery    [] Bring urine specimen day of surgery    [x] Shower or bath with soap, lather and rinse well, AM of Surgery, no lotion, powders or creams to surgical site    [] Follow bowel prep as instructed per surgeon    [x] No tobacco products within 24 hours of surgery     [x] No alcohol or illegal drug use within 24 hours of surgery.     [x] Jewelry, body piercing's, eyeglasses, contact lenses and dentures are not permitted into surgery (bring cases)      [] Please do not wear any nail polish, make up or hair products on the day of surgery    [x] If not already done, you can expect a call from registration    [x] You can expect a call the business day prior to procedure to notify you if your arrival time changes    [x] If you receive a survey after surgery we would greatly appreciate your comments    [] Parent/guardian of a minor must accompany their child and remain on the premises  the entire time they are under our care     [] Pediatric patients may bring favorite toy, blanket or comfort item with them    [] A caregiver or family member must remain with the patient during their stay if they are mentally handicapped, have dementia, disoriented or unable to use a call light or would be a safety concern if left unattended    [x] Please notify surgeon if you develop any illness between now and time of surgery (cold, cough, sore throat, fever, nausea, vomiting) or any signs of infections  including skin, wounds, and dental.    [x]  The Outpatient Pharmacy is available to fill your prescription here on your day of surgery, ask your preop nurse for details    [] Other instructions  EDUCATIONAL MATERIALS PROVIDED:    [] PAT Preoperative Education Packet/Booklet     [] Medication List    [] Fluoroscopy Information Pamphlet    [] Transfusion bracelet applied with instructions    [] Joint replacement video reviewed    [] Shower with soap, lather and rinse well, and use CHG wipes provided the evening before surgery as instructed

## 2020-01-27 NOTE — PROGRESS NOTES
Spoke with Carly Heck from Dr. Sage Peterson office, and updated her that we still did not receive the updated  medical clearance from the PCP yet. She will follow up, and call us back.

## 2020-01-28 ENCOUNTER — ANESTHESIA EVENT (OUTPATIENT)
Dept: OPERATING ROOM | Age: 50
End: 2020-01-28
Payer: MEDICAID

## 2020-01-28 ENCOUNTER — HOSPITAL ENCOUNTER (OUTPATIENT)
Age: 50
Setting detail: OBSERVATION
Discharge: HOME | End: 2020-01-29
Attending: ORTHOPAEDIC SURGERY | Admitting: ORTHOPAEDIC SURGERY
Payer: MEDICAID

## 2020-01-28 ENCOUNTER — ANESTHESIA (OUTPATIENT)
Dept: OPERATING ROOM | Age: 50
End: 2020-01-28
Payer: MEDICAID

## 2020-01-28 VITALS
SYSTOLIC BLOOD PRESSURE: 89 MMHG | DIASTOLIC BLOOD PRESSURE: 51 MMHG | OXYGEN SATURATION: 85 % | RESPIRATION RATE: 6 BRPM | TEMPERATURE: 88.5 F

## 2020-01-28 LAB
ANION GAP SERPL CALCULATED.3IONS-SCNC: 13 MMOL/L (ref 7–16)
BUN BLDV-MCNC: 14 MG/DL (ref 6–20)
CALCIUM SERPL-MCNC: 9.9 MG/DL (ref 8.6–10.2)
CHLORIDE BLD-SCNC: 100 MMOL/L (ref 98–107)
CO2: 24 MMOL/L (ref 22–29)
CREAT SERPL-MCNC: 0.7 MG/DL (ref 0.7–1.2)
EKG ATRIAL RATE: 77 BPM
EKG P AXIS: 28 DEGREES
EKG P-R INTERVAL: 144 MS
EKG Q-T INTERVAL: 388 MS
EKG QRS DURATION: 86 MS
EKG QTC CALCULATION (BAZETT): 439 MS
EKG R AXIS: -26 DEGREES
EKG T AXIS: 9 DEGREES
EKG VENTRICULAR RATE: 77 BPM
GFR AFRICAN AMERICAN: >60
GFR NON-AFRICAN AMERICAN: >60 ML/MIN/1.73
GLUCOSE BLD-MCNC: 100 MG/DL (ref 74–99)
HCT VFR BLD CALC: 47.8 % (ref 37–54)
HEMOGLOBIN: 15.1 G/DL (ref 12.5–16.5)
MCH RBC QN AUTO: 23.6 PG (ref 26–35)
MCHC RBC AUTO-ENTMCNC: 31.6 % (ref 32–34.5)
MCV RBC AUTO: 74.6 FL (ref 80–99.9)
PDW BLD-RTO: 18.5 FL (ref 11.5–15)
PLATELET # BLD: 299 E9/L (ref 130–450)
PMV BLD AUTO: 9.8 FL (ref 7–12)
POTASSIUM SERPL-SCNC: 4.3 MMOL/L (ref 3.5–5)
RBC # BLD: 6.41 E12/L (ref 3.8–5.8)
SODIUM BLD-SCNC: 137 MMOL/L (ref 132–146)
WBC # BLD: 7.3 E9/L (ref 4.5–11.5)

## 2020-01-28 PROCEDURE — 93005 ELECTROCARDIOGRAM TRACING: CPT | Performed by: ANESTHESIOLOGY

## 2020-01-28 PROCEDURE — 85027 COMPLETE CBC AUTOMATED: CPT

## 2020-01-28 PROCEDURE — 2709999900 HC NON-CHARGEABLE SUPPLY: Performed by: ORTHOPAEDIC SURGERY

## 2020-01-28 PROCEDURE — 2580000003 HC RX 258: Performed by: ORTHOPAEDIC SURGERY

## 2020-01-28 PROCEDURE — 2720000010 HC SURG SUPPLY STERILE: Performed by: ORTHOPAEDIC SURGERY

## 2020-01-28 PROCEDURE — 36415 COLL VENOUS BLD VENIPUNCTURE: CPT

## 2020-01-28 PROCEDURE — 6360000002 HC RX W HCPCS: Performed by: ANESTHESIOLOGY

## 2020-01-28 PROCEDURE — 3700000001 HC ADD 15 MINUTES (ANESTHESIA): Performed by: ORTHOPAEDIC SURGERY

## 2020-01-28 PROCEDURE — 2500000003 HC RX 250 WO HCPCS: Performed by: NURSE ANESTHETIST, CERTIFIED REGISTERED

## 2020-01-28 PROCEDURE — 6360000002 HC RX W HCPCS: Performed by: ORTHOPAEDIC SURGERY

## 2020-01-28 PROCEDURE — 6370000000 HC RX 637 (ALT 250 FOR IP): Performed by: ORTHOPAEDIC SURGERY

## 2020-01-28 PROCEDURE — 7100000000 HC PACU RECOVERY - FIRST 15 MIN: Performed by: ORTHOPAEDIC SURGERY

## 2020-01-28 PROCEDURE — 2500000003 HC RX 250 WO HCPCS: Performed by: ORTHOPAEDIC SURGERY

## 2020-01-28 PROCEDURE — G0378 HOSPITAL OBSERVATION PER HR: HCPCS

## 2020-01-28 PROCEDURE — 7100000010 HC PHASE II RECOVERY - FIRST 15 MIN: Performed by: ORTHOPAEDIC SURGERY

## 2020-01-28 PROCEDURE — 64415 NJX AA&/STRD BRCH PLXS IMG: CPT | Performed by: ANESTHESIOLOGY

## 2020-01-28 PROCEDURE — 93010 ELECTROCARDIOGRAM REPORT: CPT | Performed by: INTERNAL MEDICINE

## 2020-01-28 PROCEDURE — 3600000013 HC SURGERY LEVEL 3 ADDTL 15MIN: Performed by: ORTHOPAEDIC SURGERY

## 2020-01-28 PROCEDURE — 80048 BASIC METABOLIC PNL TOTAL CA: CPT

## 2020-01-28 PROCEDURE — 7100000001 HC PACU RECOVERY - ADDTL 15 MIN: Performed by: ORTHOPAEDIC SURGERY

## 2020-01-28 PROCEDURE — C1713 ANCHOR/SCREW BN/BN,TIS/BN: HCPCS | Performed by: ORTHOPAEDIC SURGERY

## 2020-01-28 PROCEDURE — 3700000000 HC ANESTHESIA ATTENDED CARE: Performed by: ORTHOPAEDIC SURGERY

## 2020-01-28 PROCEDURE — 2500000003 HC RX 250 WO HCPCS: Performed by: ANESTHESIOLOGY

## 2020-01-28 PROCEDURE — 6360000002 HC RX W HCPCS: Performed by: NURSE ANESTHETIST, CERTIFIED REGISTERED

## 2020-01-28 PROCEDURE — L3650 SO 8 ABD RESTRAINT PRE OTS: HCPCS | Performed by: ORTHOPAEDIC SURGERY

## 2020-01-28 PROCEDURE — 7100000011 HC PHASE II RECOVERY - ADDTL 15 MIN: Performed by: ORTHOPAEDIC SURGERY

## 2020-01-28 PROCEDURE — 3600000003 HC SURGERY LEVEL 3 BASE: Performed by: ORTHOPAEDIC SURGERY

## 2020-01-28 DEVICE — ANCHOR SUTURE BIOCOMP 4.75X22 MM DBL LD WHT SWIVELOCK C: Type: IMPLANTABLE DEVICE | Site: SHOULDER | Status: FUNCTIONAL

## 2020-01-28 RX ORDER — OXYCODONE HYDROCHLORIDE AND ACETAMINOPHEN 5; 325 MG/1; MG/1
1 TABLET ORAL ONCE
Status: DISCONTINUED | OUTPATIENT
Start: 2020-01-28 | End: 2020-01-28 | Stop reason: CLARIF

## 2020-01-28 RX ORDER — DEXAMETHASONE SODIUM PHOSPHATE 10 MG/ML
4 INJECTION, SOLUTION INTRAMUSCULAR; INTRAVENOUS ONCE
Status: COMPLETED | OUTPATIENT
Start: 2020-01-28 | End: 2020-01-28

## 2020-01-28 RX ORDER — OXYCODONE HYDROCHLORIDE AND ACETAMINOPHEN 5; 325 MG/1; MG/1
2 TABLET ORAL EVERY 4 HOURS PRN
Status: DISCONTINUED | OUTPATIENT
Start: 2020-01-28 | End: 2020-01-29 | Stop reason: HOSPADM

## 2020-01-28 RX ORDER — SODIUM CHLORIDE 0.9 % (FLUSH) 0.9 %
10 SYRINGE (ML) INJECTION PRN
Status: DISCONTINUED | OUTPATIENT
Start: 2020-01-28 | End: 2020-01-29 | Stop reason: HOSPADM

## 2020-01-28 RX ORDER — FENTANYL CITRATE 50 UG/ML
25 INJECTION, SOLUTION INTRAMUSCULAR; INTRAVENOUS EVERY 5 MIN PRN
Status: DISCONTINUED | OUTPATIENT
Start: 2020-01-28 | End: 2020-01-28 | Stop reason: HOSPADM

## 2020-01-28 RX ORDER — SODIUM CHLORIDE 9 MG/ML
INJECTION, SOLUTION INTRAVENOUS CONTINUOUS
Status: DISCONTINUED | OUTPATIENT
Start: 2020-01-28 | End: 2020-01-29 | Stop reason: HOSPADM

## 2020-01-28 RX ORDER — CLINDAMYCIN HYDROCHLORIDE 150 MG/1
600 CAPSULE ORAL 3 TIMES DAILY
Status: DISCONTINUED | OUTPATIENT
Start: 2020-01-28 | End: 2020-01-28 | Stop reason: CLARIF

## 2020-01-28 RX ORDER — BUPIVACAINE HYDROCHLORIDE AND EPINEPHRINE 5; 5 MG/ML; UG/ML
INJECTION, SOLUTION EPIDURAL; INTRACAUDAL; PERINEURAL PRN
Status: DISCONTINUED | OUTPATIENT
Start: 2020-01-28 | End: 2020-01-28 | Stop reason: ALTCHOICE

## 2020-01-28 RX ORDER — OXYCODONE HYDROCHLORIDE AND ACETAMINOPHEN 5; 325 MG/1; MG/1
1 TABLET ORAL PRN
Status: DISCONTINUED | OUTPATIENT
Start: 2020-01-28 | End: 2020-01-28

## 2020-01-28 RX ORDER — OXYCODONE HYDROCHLORIDE 5 MG/1
5 TABLET ORAL EVERY 4 HOURS PRN
Status: DISCONTINUED | OUTPATIENT
Start: 2020-01-28 | End: 2020-01-29

## 2020-01-28 RX ORDER — CLINDAMYCIN HYDROCHLORIDE 300 MG/1
600 CAPSULE ORAL 3 TIMES DAILY
Qty: 3 CAPSULE | Refills: 0 | Status: SHIPPED | OUTPATIENT
Start: 2020-01-28 | End: 2021-05-21

## 2020-01-28 RX ORDER — ROPIVACAINE HYDROCHLORIDE 5 MG/ML
INJECTION, SOLUTION EPIDURAL; INFILTRATION; PERINEURAL
Status: COMPLETED | OUTPATIENT
Start: 2020-01-28 | End: 2020-01-28

## 2020-01-28 RX ORDER — KETOROLAC TROMETHAMINE 30 MG/ML
INJECTION, SOLUTION INTRAMUSCULAR; INTRAVENOUS PRN
Status: DISCONTINUED | OUTPATIENT
Start: 2020-01-28 | End: 2020-01-28 | Stop reason: SDUPTHER

## 2020-01-28 RX ORDER — OXYCODONE HYDROCHLORIDE AND ACETAMINOPHEN 5; 325 MG/1; MG/1
1 TABLET ORAL ONCE
Status: COMPLETED | OUTPATIENT
Start: 2020-01-28 | End: 2020-01-28

## 2020-01-28 RX ORDER — GLYCOPYRROLATE 1 MG/5 ML
SYRINGE (ML) INTRAVENOUS PRN
Status: DISCONTINUED | OUTPATIENT
Start: 2020-01-28 | End: 2020-01-28 | Stop reason: SDUPTHER

## 2020-01-28 RX ORDER — ONDANSETRON 2 MG/ML
INJECTION INTRAMUSCULAR; INTRAVENOUS PRN
Status: DISCONTINUED | OUTPATIENT
Start: 2020-01-28 | End: 2020-01-28 | Stop reason: SDUPTHER

## 2020-01-28 RX ORDER — ONDANSETRON 2 MG/ML
4 INJECTION INTRAMUSCULAR; INTRAVENOUS EVERY 6 HOURS PRN
Status: DISCONTINUED | OUTPATIENT
Start: 2020-01-28 | End: 2020-01-29 | Stop reason: HOSPADM

## 2020-01-28 RX ORDER — LABETALOL HYDROCHLORIDE 5 MG/ML
INJECTION, SOLUTION INTRAVENOUS PRN
Status: DISCONTINUED | OUTPATIENT
Start: 2020-01-28 | End: 2020-01-28 | Stop reason: SDUPTHER

## 2020-01-28 RX ORDER — CLINDAMYCIN HYDROCHLORIDE 150 MG/1
600 CAPSULE ORAL 3 TIMES DAILY
Status: COMPLETED | OUTPATIENT
Start: 2020-01-28 | End: 2020-01-29

## 2020-01-28 RX ORDER — DEXAMETHASONE SODIUM PHOSPHATE 4 MG/ML
INJECTION, SOLUTION INTRA-ARTICULAR; INTRALESIONAL; INTRAMUSCULAR; INTRAVENOUS; SOFT TISSUE PRN
Status: DISCONTINUED | OUTPATIENT
Start: 2020-01-28 | End: 2020-01-28 | Stop reason: SDUPTHER

## 2020-01-28 RX ORDER — CLINDAMYCIN PHOSPHATE 900 MG/50ML
900 INJECTION INTRAVENOUS
Status: COMPLETED | OUTPATIENT
Start: 2020-01-28 | End: 2020-01-28

## 2020-01-28 RX ORDER — OXYCODONE HYDROCHLORIDE 5 MG/1
2.5 TABLET ORAL ONCE
Status: COMPLETED | OUTPATIENT
Start: 2020-01-28 | End: 2020-01-28

## 2020-01-28 RX ORDER — NEOSTIGMINE METHYLSULFATE 1 MG/ML
INJECTION, SOLUTION INTRAVENOUS PRN
Status: DISCONTINUED | OUTPATIENT
Start: 2020-01-28 | End: 2020-01-28 | Stop reason: SDUPTHER

## 2020-01-28 RX ORDER — ROCURONIUM BROMIDE 10 MG/ML
INJECTION, SOLUTION INTRAVENOUS PRN
Status: DISCONTINUED | OUTPATIENT
Start: 2020-01-28 | End: 2020-01-28 | Stop reason: SDUPTHER

## 2020-01-28 RX ORDER — PROPOFOL 10 MG/ML
INJECTION, EMULSION INTRAVENOUS PRN
Status: DISCONTINUED | OUTPATIENT
Start: 2020-01-28 | End: 2020-01-28 | Stop reason: SDUPTHER

## 2020-01-28 RX ORDER — SODIUM CHLORIDE 0.9 % (FLUSH) 0.9 %
10 SYRINGE (ML) INJECTION EVERY 12 HOURS SCHEDULED
Status: DISCONTINUED | OUTPATIENT
Start: 2020-01-28 | End: 2020-01-29 | Stop reason: HOSPADM

## 2020-01-28 RX ORDER — MEPERIDINE HYDROCHLORIDE 25 MG/ML
12.5 INJECTION INTRAMUSCULAR; INTRAVENOUS; SUBCUTANEOUS EVERY 10 MIN PRN
Status: DISCONTINUED | OUTPATIENT
Start: 2020-01-28 | End: 2020-01-28 | Stop reason: HOSPADM

## 2020-01-28 RX ORDER — OXYCODONE HYDROCHLORIDE AND ACETAMINOPHEN 5; 325 MG/1; MG/1
1 TABLET ORAL EVERY 6 HOURS PRN
Qty: 40 TABLET | Refills: 0 | Status: SHIPPED | OUTPATIENT
Start: 2020-01-28 | End: 2020-01-28 | Stop reason: HOSPADM

## 2020-01-28 RX ORDER — DOCUSATE SODIUM 100 MG/1
100 CAPSULE, LIQUID FILLED ORAL 2 TIMES DAILY
Status: DISCONTINUED | OUTPATIENT
Start: 2020-01-28 | End: 2020-01-29 | Stop reason: HOSPADM

## 2020-01-28 RX ORDER — MIDAZOLAM HYDROCHLORIDE 1 MG/ML
INJECTION INTRAMUSCULAR; INTRAVENOUS PRN
Status: DISCONTINUED | OUTPATIENT
Start: 2020-01-28 | End: 2020-01-28 | Stop reason: SDUPTHER

## 2020-01-28 RX ORDER — LIDOCAINE HYDROCHLORIDE 10 MG/ML
10 INJECTION, SOLUTION INFILTRATION; PERINEURAL
Status: COMPLETED | OUTPATIENT
Start: 2020-01-28 | End: 2020-01-28

## 2020-01-28 RX ORDER — FENTANYL CITRATE 50 UG/ML
INJECTION, SOLUTION INTRAMUSCULAR; INTRAVENOUS PRN
Status: DISCONTINUED | OUTPATIENT
Start: 2020-01-28 | End: 2020-01-28 | Stop reason: SDUPTHER

## 2020-01-28 RX ORDER — OXYCODONE HYDROCHLORIDE AND ACETAMINOPHEN 5; 325 MG/1; MG/1
1 TABLET ORAL EVERY 4 HOURS PRN
Status: DISCONTINUED | OUTPATIENT
Start: 2020-01-28 | End: 2020-01-29 | Stop reason: HOSPADM

## 2020-01-28 RX ORDER — MIDAZOLAM HYDROCHLORIDE 1 MG/ML
0.5 INJECTION INTRAMUSCULAR; INTRAVENOUS PRN
Status: DISCONTINUED | OUTPATIENT
Start: 2020-01-28 | End: 2020-01-28 | Stop reason: HOSPADM

## 2020-01-28 RX ORDER — OXYCODONE HYDROCHLORIDE AND ACETAMINOPHEN 5; 325 MG/1; MG/1
1 TABLET ORAL EVERY 4 HOURS PRN
Status: DISCONTINUED | OUTPATIENT
Start: 2020-01-28 | End: 2020-01-28

## 2020-01-28 RX ORDER — PROMETHAZINE HYDROCHLORIDE 25 MG/ML
6.25 INJECTION, SOLUTION INTRAMUSCULAR; INTRAVENOUS PRN
Status: DISCONTINUED | OUTPATIENT
Start: 2020-01-28 | End: 2020-01-28 | Stop reason: HOSPADM

## 2020-01-28 RX ORDER — FENTANYL CITRATE 50 UG/ML
25 INJECTION, SOLUTION INTRAMUSCULAR; INTRAVENOUS PRN
Status: DISCONTINUED | OUTPATIENT
Start: 2020-01-28 | End: 2020-01-28 | Stop reason: HOSPADM

## 2020-01-28 RX ORDER — DIPHENHYDRAMINE HYDROCHLORIDE 50 MG/ML
12.5 INJECTION INTRAMUSCULAR; INTRAVENOUS
Status: DISCONTINUED | OUTPATIENT
Start: 2020-01-28 | End: 2020-01-28 | Stop reason: HOSPADM

## 2020-01-28 RX ORDER — OXYCODONE AND ACETAMINOPHEN 7.5; 325 MG/1; MG/1
1 TABLET ORAL EVERY 4 HOURS PRN
Qty: 40 TABLET | Refills: 0 | Status: SHIPPED | OUTPATIENT
Start: 2020-01-28 | End: 2020-02-04

## 2020-01-28 RX ORDER — SODIUM CHLORIDE 0.9 % (FLUSH) 0.9 %
10 SYRINGE (ML) INJECTION EVERY 12 HOURS SCHEDULED
Status: DISCONTINUED | OUTPATIENT
Start: 2020-01-28 | End: 2020-01-28 | Stop reason: HOSPADM

## 2020-01-28 RX ORDER — OXYCODONE HYDROCHLORIDE 5 MG/1
2.5 TABLET ORAL EVERY 4 HOURS PRN
Status: DISCONTINUED | OUTPATIENT
Start: 2020-01-28 | End: 2020-01-29

## 2020-01-28 RX ORDER — LIDOCAINE HYDROCHLORIDE 20 MG/ML
INJECTION, SOLUTION EPIDURAL; INFILTRATION; INTRACAUDAL; PERINEURAL PRN
Status: DISCONTINUED | OUTPATIENT
Start: 2020-01-28 | End: 2020-01-28 | Stop reason: SDUPTHER

## 2020-01-28 RX ORDER — SODIUM CHLORIDE 0.9 % (FLUSH) 0.9 %
10 SYRINGE (ML) INJECTION PRN
Status: DISCONTINUED | OUTPATIENT
Start: 2020-01-28 | End: 2020-01-28 | Stop reason: HOSPADM

## 2020-01-28 RX ORDER — ROPIVACAINE HYDROCHLORIDE 5 MG/ML
30 INJECTION, SOLUTION EPIDURAL; INFILTRATION; PERINEURAL
Status: COMPLETED | OUTPATIENT
Start: 2020-01-28 | End: 2020-01-28

## 2020-01-28 RX ORDER — OXYCODONE HYDROCHLORIDE AND ACETAMINOPHEN 5; 325 MG/1; MG/1
1 TABLET ORAL EVERY 4 HOURS PRN
Status: DISCONTINUED | OUTPATIENT
Start: 2020-01-28 | End: 2020-01-28 | Stop reason: CLARIF

## 2020-01-28 RX ADMIN — ONDANSETRON HYDROCHLORIDE 4 MG: 2 INJECTION, SOLUTION INTRAMUSCULAR; INTRAVENOUS at 16:53

## 2020-01-28 RX ADMIN — FENTANYL CITRATE 100 MCG: 50 INJECTION, SOLUTION INTRAMUSCULAR; INTRAVENOUS at 15:32

## 2020-01-28 RX ADMIN — KETOROLAC TROMETHAMINE 30 MG: 30 INJECTION, SOLUTION INTRAMUSCULAR; INTRAVENOUS at 16:53

## 2020-01-28 RX ADMIN — MIDAZOLAM HYDROCHLORIDE 1 MG: 1 INJECTION, SOLUTION INTRAMUSCULAR; INTRAVENOUS at 13:29

## 2020-01-28 RX ADMIN — OXYCODONE HYDROCHLORIDE 5 MG: 5 TABLET ORAL at 21:41

## 2020-01-28 RX ADMIN — ROPIVACAINE HYDROCHLORIDE 30 ML: 5 INJECTION, SOLUTION EPIDURAL; INFILTRATION; PERINEURAL at 13:50

## 2020-01-28 RX ADMIN — FENTANYL CITRATE 50 MCG: 50 INJECTION, SOLUTION INTRAMUSCULAR; INTRAVENOUS at 13:29

## 2020-01-28 RX ADMIN — DEXAMETHASONE SODIUM PHOSPHATE 4 MG: 10 INJECTION, SOLUTION INTRAMUSCULAR; INTRAVENOUS at 13:47

## 2020-01-28 RX ADMIN — OXYCODONE HYDROCHLORIDE AND ACETAMINOPHEN 2 TABLET: 5; 325 TABLET ORAL at 21:41

## 2020-01-28 RX ADMIN — HYDROMORPHONE HYDROCHLORIDE 1 MG: 1 INJECTION, SOLUTION INTRAMUSCULAR; INTRAVENOUS; SUBCUTANEOUS at 22:44

## 2020-01-28 RX ADMIN — ROCURONIUM BROMIDE 50 MG: 10 SOLUTION INTRAVENOUS at 15:32

## 2020-01-28 RX ADMIN — ROPIVACAINE HYDROCHLORIDE 30 ML: 5 INJECTION, SOLUTION EPIDURAL; INFILTRATION; PERINEURAL at 13:47

## 2020-01-28 RX ADMIN — HYDROMORPHONE HYDROCHLORIDE 0.5 MG: 1 INJECTION, SOLUTION INTRAMUSCULAR; INTRAVENOUS; SUBCUTANEOUS at 19:28

## 2020-01-28 RX ADMIN — FENTANYL CITRATE 50 MCG: 50 INJECTION, SOLUTION INTRAMUSCULAR; INTRAVENOUS at 13:35

## 2020-01-28 RX ADMIN — SODIUM CHLORIDE: 9 INJECTION, SOLUTION INTRAVENOUS at 16:00

## 2020-01-28 RX ADMIN — LABETALOL HYDROCHLORIDE 5 MG: 5 INJECTION INTRAVENOUS at 15:53

## 2020-01-28 RX ADMIN — OXYCODONE HYDROCHLORIDE 2.5 MG: 5 TABLET ORAL at 18:46

## 2020-01-28 RX ADMIN — LIDOCAINE HYDROCHLORIDE 100 MG: 20 INJECTION, SOLUTION EPIDURAL; INFILTRATION; INTRACAUDAL; PERINEURAL at 15:32

## 2020-01-28 RX ADMIN — PROPOFOL 200 MG: 10 INJECTION, EMULSION INTRAVENOUS at 15:32

## 2020-01-28 RX ADMIN — SODIUM CHLORIDE: 9 INJECTION, SOLUTION INTRAVENOUS at 15:26

## 2020-01-28 RX ADMIN — DEXAMETHASONE SODIUM PHOSPHATE 10 MG: 4 INJECTION, SOLUTION INTRAMUSCULAR; INTRAVENOUS at 15:32

## 2020-01-28 RX ADMIN — Medication 3 MG: at 16:53

## 2020-01-28 RX ADMIN — SODIUM CHLORIDE: 9 INJECTION, SOLUTION INTRAVENOUS at 19:40

## 2020-01-28 RX ADMIN — OXYCODONE HYDROCHLORIDE AND ACETAMINOPHEN 1 TABLET: 5; 325 TABLET ORAL at 18:46

## 2020-01-28 RX ADMIN — MIDAZOLAM 2 MG: 1 INJECTION INTRAMUSCULAR; INTRAVENOUS at 15:26

## 2020-01-28 RX ADMIN — LIDOCAINE HYDROCHLORIDE 5 ML: 10 INJECTION, SOLUTION INFILTRATION; PERINEURAL at 13:30

## 2020-01-28 RX ADMIN — CLINDAMYCIN IN 5 PERCENT DEXTROSE 900 MG: 18 INJECTION, SOLUTION INTRAVENOUS at 15:26

## 2020-01-28 RX ADMIN — CLINDAMYCIN HYDROCHLORIDE 600 MG: 150 CAPSULE ORAL at 22:29

## 2020-01-28 RX ADMIN — MIDAZOLAM HYDROCHLORIDE 1 MG: 1 INJECTION, SOLUTION INTRAMUSCULAR; INTRAVENOUS at 13:35

## 2020-01-28 RX ADMIN — Medication 0.4 MG: at 16:53

## 2020-01-28 RX ADMIN — HYDROMORPHONE HYDROCHLORIDE 0.5 MG: 1 INJECTION, SOLUTION INTRAMUSCULAR; INTRAVENOUS; SUBCUTANEOUS at 19:40

## 2020-01-28 ASSESSMENT — PULMONARY FUNCTION TESTS
PIF_VALUE: 22
PIF_VALUE: 22
PIF_VALUE: 20
PIF_VALUE: 21
PIF_VALUE: 20
PIF_VALUE: 22
PIF_VALUE: 21
PIF_VALUE: 22
PIF_VALUE: 21
PIF_VALUE: 3
PIF_VALUE: 21
PIF_VALUE: 2
PIF_VALUE: 22
PIF_VALUE: 21
PIF_VALUE: 22
PIF_VALUE: 5
PIF_VALUE: 12
PIF_VALUE: 22
PIF_VALUE: 20
PIF_VALUE: 22
PIF_VALUE: 21
PIF_VALUE: 22
PIF_VALUE: 22
PIF_VALUE: 21
PIF_VALUE: 20
PIF_VALUE: 22
PIF_VALUE: 22
PIF_VALUE: 21
PIF_VALUE: 22
PIF_VALUE: 22
PIF_VALUE: 21
PIF_VALUE: 22
PIF_VALUE: 20
PIF_VALUE: 21
PIF_VALUE: 22
PIF_VALUE: 2
PIF_VALUE: 21
PIF_VALUE: 2
PIF_VALUE: 22
PIF_VALUE: 22
PIF_VALUE: 20
PIF_VALUE: 22
PIF_VALUE: 20
PIF_VALUE: 22
PIF_VALUE: 22
PIF_VALUE: 15
PIF_VALUE: 21
PIF_VALUE: 21
PIF_VALUE: 22
PIF_VALUE: 25
PIF_VALUE: 22
PIF_VALUE: 6
PIF_VALUE: 22
PIF_VALUE: 22
PIF_VALUE: 21
PIF_VALUE: 22
PIF_VALUE: 15
PIF_VALUE: 4
PIF_VALUE: 22
PIF_VALUE: 20
PIF_VALUE: 22
PIF_VALUE: 22
PIF_VALUE: 21
PIF_VALUE: 21
PIF_VALUE: 15
PIF_VALUE: 24
PIF_VALUE: 20
PIF_VALUE: 20
PIF_VALUE: 22
PIF_VALUE: 20
PIF_VALUE: 24
PIF_VALUE: 8
PIF_VALUE: 22
PIF_VALUE: 20
PIF_VALUE: 22
PIF_VALUE: 20
PIF_VALUE: 22
PIF_VALUE: 21
PIF_VALUE: 22
PIF_VALUE: 6
PIF_VALUE: 22
PIF_VALUE: 21
PIF_VALUE: 21
PIF_VALUE: 20
PIF_VALUE: 20
PIF_VALUE: 22
PIF_VALUE: 21
PIF_VALUE: 15
PIF_VALUE: 16
PIF_VALUE: 22
PIF_VALUE: 18
PIF_VALUE: 22

## 2020-01-28 ASSESSMENT — PAIN SCALES - GENERAL
PAINLEVEL_OUTOF10: 8
PAINLEVEL_OUTOF10: 0
PAINLEVEL_OUTOF10: 8
PAINLEVEL_OUTOF10: 7
PAINLEVEL_OUTOF10: 9
PAINLEVEL_OUTOF10: 9
PAINLEVEL_OUTOF10: 8
PAINLEVEL_OUTOF10: 3

## 2020-01-28 ASSESSMENT — PAIN DESCRIPTION - PAIN TYPE
TYPE: SURGICAL PAIN

## 2020-01-28 ASSESSMENT — PAIN DESCRIPTION - DESCRIPTORS
DESCRIPTORS: DISCOMFORT
DESCRIPTORS: ACHING;DISCOMFORT;DULL
DESCRIPTORS: DISCOMFORT
DESCRIPTORS: DISCOMFORT
DESCRIPTORS: ACHING;DISCOMFORT;DULL

## 2020-01-28 ASSESSMENT — PAIN SCALES - WONG BAKER
WONGBAKER_NUMERICALRESPONSE: 0
WONGBAKER_NUMERICALRESPONSE: 0

## 2020-01-28 ASSESSMENT — PAIN DESCRIPTION - ORIENTATION
ORIENTATION: LEFT

## 2020-01-28 ASSESSMENT — PAIN DESCRIPTION - PROGRESSION
CLINICAL_PROGRESSION: NOT CHANGED
CLINICAL_PROGRESSION: GRADUALLY WORSENING
CLINICAL_PROGRESSION: NOT CHANGED
CLINICAL_PROGRESSION: NOT CHANGED

## 2020-01-28 ASSESSMENT — PAIN DESCRIPTION - LOCATION
LOCATION: BACK;ARM;SHOULDER
LOCATION: SHOULDER
LOCATION: ARM;SHOULDER
LOCATION: SHOULDER
LOCATION: SHOULDER

## 2020-01-28 ASSESSMENT — PAIN DESCRIPTION - FREQUENCY
FREQUENCY: CONTINUOUS
FREQUENCY: INTERMITTENT

## 2020-01-28 ASSESSMENT — PAIN - FUNCTIONAL ASSESSMENT
PAIN_FUNCTIONAL_ASSESSMENT: PREVENTS OR INTERFERES SOME ACTIVE ACTIVITIES AND ADLS
PAIN_FUNCTIONAL_ASSESSMENT: PREVENTS OR INTERFERES SOME ACTIVE ACTIVITIES AND ADLS
PAIN_FUNCTIONAL_ASSESSMENT: 0-10

## 2020-01-28 ASSESSMENT — PAIN DESCRIPTION - ONSET
ONSET: PROGRESSIVE
ONSET: ON-GOING

## 2020-01-28 NOTE — ANESTHESIA PRE PROCEDURE
Department of Anesthesiology  Preprocedure Note       Name:  Fela Coleman   Age:  44 y.o.  :  1970                                          MRN:  [id number]         Date:  2020      Surgeon: Johanny Moya):  Pasquale Manning MD    Procedure: LEFT SHOULDER ARTHROSCOPIC BICEPS TENOTOMY LABRAL DEBRIDEMENT WITH REPAIR WITH CYST DECOMPRESSION  +++ARTHREX+++   +++PNB+++ (Left )    Medications prior to admission:   Prior to Admission medications    Medication Sig Start Date End Date Taking? Authorizing Provider   oxyCODONE-acetaminophen (PERCOCET) 5-325 MG per tablet Take 1 tablet by mouth every 6 hours as needed for Pain for up to 3 days. Intended supply: 3 days.  Take lowest dose possible to manage pain 20 Yes Pasquale Manning MD   clindamycin (CLEOCIN) 300 MG capsule Take 2 capsules by mouth 3 times daily 20  Yes Pasquale Manning MD   metFORMIN (GLUCOPHAGE) 500 MG tablet Take 500 mg by mouth 2 times daily (with meals)  19   Historical Provider, MD   polyethylene glycol (GLYCOLAX) powder MIX 17 GRAMS OF POWDER IN 4 8 OUNCES OF LIQUID OF CHOICE AND DRINK DAILY AS NEEDED 19   Historical Provider, MD   SUMAtriptan (IMITREX) 6 MG/0.5ML SOLN injection Inject 6 mg into the skin once as needed for Migraine    Historical Provider, MD   cyclobenzaprine (FLEXERIL) 10 MG tablet Take 1 tablet by mouth 3 times daily as needed for Muscle spasms 10/31/18   Clinch Glance, DO       Current medications:    Current Facility-Administered Medications   Medication Dose Route Frequency Provider Last Rate Last Dose    0.9 % sodium chloride infusion   Intravenous Continuous Pasquale Manning MD        sodium chloride flush 0.9 % injection 10 mL  10 mL Intravenous 2 times per day Pasquale Manning MD        sodium chloride flush 0.9 % injection 10 mL  10 mL Intravenous PRN Pasquale Manning MD        clindamycin (CLEOCIN) 900 mg in dextrose 5 % 50 mL IVPB  900 mg Intravenous On Call Yeimi Doyle MD at [address] [facility] [address] Left 9/28/2018    LEFT SHOULDER ARTHROSCOPY SUBACROMIAL DECOMPRESSION, CROMIAL PLASTY, DISTAL CLAVICAL EXCISION  ++ARTHREX, BEACH CHAIR, SPIDER++ performed by Nino Ashford MD at [address] [facility] N/A 2/28/2019    [address] [facility] --MEDTRONIC performed by Jessica Pike MD at [address] [facility] N/A 1/21/2019    TONSILLECTOMY performed by Temo Ro DO at [address] [address] History:    Social History     Tobacco Use    Smoking status: Former Smoker     Years: 10.00     Types: Cigarettes    Smokeless tobacco: Never Used    Tobacco comment: quit 1980's    Substance Use Topics    Alcohol use: Yes     Frequency: Monthly or less     Comment: socially                                Counseling given: Not Answered  Comment: quit 1980's       Vital Signs (Current):   Vitals:    01/22/20 1138   Weight: 178 lb (80.7 kg)   Height: 5' 5\" (1.651 m)                                              BP Readings from Last 3 Encounters:   01/10/20 (!) 118/90   06/13/19 (!) 130/100   04/02/19 (!) 148/82       NPO Status:                                                                                 BMI:   Wt Readings from Last 3 Encounters:   01/22/20 178 lb (80.7 kg)   01/10/20 180 lb (81.6 kg)   06/13/19 190 lb (86.2 kg)     Body mass index is 29.62 kg/m².     CBC:   Lab Results   Component Value Date    WBC 7.3 01/28/2020    RBC 6.41 01/28/2020    HGB 15.1 01/28/2020    HCT 47.8 01/28/2020    MCV 74.6 01/28/2020    RDW 18.5 01/28/2020     01/28/2020       CMP:   Lab Results   Component Value Date     01/28/2020    K 4.3 01/28/2020     01/28/2020    CO2 24 01/28/2020    BUN 14 01/28/2020    CREATININE 0.7 01/28/2020    GFRAA >60 01/28/2020    LABGLOM >60 01/28/2020    GLUCOSE 100 01/28/2020    PROT 8.2 02/21/2019    CALCIUM 9.9 01/28/2020    BILITOT 0.8 02/21/2019    ALKPHOS 92 02/21/2019     02/21/2019     02/21/2019       POC Tests: No results for input(s): POCGLU, POCNA, POCK, POCCL, POCBUN, POCHEMO, POCHCT in the last 72 hours. Coags:   Lab Results   Component Value Date    PROTIME 12.2 02/21/2019    INR 1.1 02/21/2019    APTT 32.9 08/07/2018       HCG (If Applicable): No results found for: PREGTESTUR, PREGSERUM, HCG, HCGQUANT     ABGs: No results found for: PHART, PO2ART, DTG8GRJ, IGH1YYA, BEART, B3IALTMC     Type & Screen (If Applicable):  No results found for: LABABO, [address]    Anesthesia Evaluation  Patient summary reviewed and Nursing notes reviewed no history of anesthetic complications:   Airway: Mallampati: II  TM distance: >3 FB   Neck ROM: full  Mouth opening: > = 3 FB Dental: normal exam         Pulmonary:Negative Pulmonary ROS breath sounds clear to auscultation                             Cardiovascular:Negative CV ROS  Exercise tolerance: good (>4 METS),           Rhythm: regular  Rate: normal           Beta Blocker:  Not on Beta Blocker         Neuro/Psych:   (+) neuromuscular disease:, headaches: migraine headaches,              ROS comment: Nerve stimulator for back pain  Chronic pain lyme disease GI/Hepatic/Renal: Neg GI/Hepatic/Renal ROS            Endo/Other:    (+) Diabetes, . Abdominal:           Vascular: negative vascular ROS. Anesthesia Plan      general     ASA 3             Anesthetic plan and risks discussed with patient. PNB Consent  Benefits, alternatives and risks of PNBs were discussed with patient. Risks include but are not limited to; bleeding, LAST, infection and possible nerve trauma. All questions answered. PNB was recommended and encouraged as part of multi-modal pain therapy. After consideration;    patient agrees to:   left        Brachial Plexus Block. for management of post-op pain.       Claudia Floyd MD  Staff Anesthesiologist  January 28,

## 2020-01-29 VITALS
DIASTOLIC BLOOD PRESSURE: 69 MMHG | HEIGHT: 65 IN | BODY MASS INDEX: 29.99 KG/M2 | HEART RATE: 92 BPM | RESPIRATION RATE: 16 BRPM | WEIGHT: 180 LBS | OXYGEN SATURATION: 96 % | SYSTOLIC BLOOD PRESSURE: 122 MMHG | TEMPERATURE: 97.8 F

## 2020-01-29 PROCEDURE — 6360000002 HC RX W HCPCS: Performed by: ORTHOPAEDIC SURGERY

## 2020-01-29 PROCEDURE — 2700000000 HC OXYGEN THERAPY PER DAY

## 2020-01-29 PROCEDURE — 6370000000 HC RX 637 (ALT 250 FOR IP): Performed by: ORTHOPAEDIC SURGERY

## 2020-01-29 PROCEDURE — 2580000003 HC RX 258: Performed by: ORTHOPAEDIC SURGERY

## 2020-01-29 PROCEDURE — 96374 THER/PROPH/DIAG INJ IV PUSH: CPT

## 2020-01-29 PROCEDURE — G0378 HOSPITAL OBSERVATION PER HR: HCPCS

## 2020-01-29 RX ADMIN — CLINDAMYCIN HYDROCHLORIDE 600 MG: 150 CAPSULE ORAL at 16:52

## 2020-01-29 RX ADMIN — CLINDAMYCIN HYDROCHLORIDE 600 MG: 150 CAPSULE ORAL at 09:38

## 2020-01-29 RX ADMIN — OXYCODONE HYDROCHLORIDE AND ACETAMINOPHEN 2 TABLET: 5; 325 TABLET ORAL at 09:38

## 2020-01-29 RX ADMIN — Medication 10 ML: at 08:01

## 2020-01-29 RX ADMIN — OXYCODONE HYDROCHLORIDE AND ACETAMINOPHEN 2 TABLET: 5; 325 TABLET ORAL at 03:04

## 2020-01-29 RX ADMIN — OXYCODONE HYDROCHLORIDE 5 MG: 5 TABLET ORAL at 03:04

## 2020-01-29 RX ADMIN — DOCUSATE SODIUM 100 MG: 100 CAPSULE, LIQUID FILLED ORAL at 09:38

## 2020-01-29 RX ADMIN — HYDROMORPHONE HYDROCHLORIDE 1 MG: 1 INJECTION, SOLUTION INTRAMUSCULAR; INTRAVENOUS; SUBCUTANEOUS at 07:59

## 2020-01-29 RX ADMIN — OXYCODONE HYDROCHLORIDE AND ACETAMINOPHEN 2 TABLET: 5; 325 TABLET ORAL at 18:26

## 2020-01-29 RX ADMIN — OXYCODONE HYDROCHLORIDE AND ACETAMINOPHEN 2 TABLET: 5; 325 TABLET ORAL at 13:50

## 2020-01-29 ASSESSMENT — PAIN SCALES - GENERAL
PAINLEVEL_OUTOF10: 9
PAINLEVEL_OUTOF10: 8

## 2020-01-29 ASSESSMENT — PAIN DESCRIPTION - ONSET: ONSET: ON-GOING

## 2020-01-29 ASSESSMENT — PAIN DESCRIPTION - LOCATION: LOCATION: SHOULDER

## 2020-01-29 ASSESSMENT — PAIN DESCRIPTION - DESCRIPTORS: DESCRIPTORS: ACHING;DISCOMFORT;DULL

## 2020-01-29 ASSESSMENT — PAIN DESCRIPTION - PAIN TYPE: TYPE: SURGICAL PAIN

## 2020-01-29 ASSESSMENT — PAIN - FUNCTIONAL ASSESSMENT: PAIN_FUNCTIONAL_ASSESSMENT: PREVENTS OR INTERFERES SOME ACTIVE ACTIVITIES AND ADLS

## 2020-01-29 ASSESSMENT — PAIN DESCRIPTION - ORIENTATION: ORIENTATION: LEFT

## 2020-01-29 ASSESSMENT — PAIN DESCRIPTION - FREQUENCY: FREQUENCY: CONTINUOUS

## 2020-01-29 ASSESSMENT — PAIN DESCRIPTION - PROGRESSION: CLINICAL_PROGRESSION: GRADUALLY WORSENING

## 2020-01-29 NOTE — ANESTHESIA POSTPROCEDURE EVALUATION
Department of Anesthesiology  Postprocedure Note    Patient: Debra Esqueda  MRN: [id number]  YOB: 1970  Date of evaluation: 1/29/2020  Time:  12:38 AM     Procedure Summary     Date:  01/28/20 Room / Location:  [facility] OR 05 / [facility]    Anesthesia Start:  5006 Anesthesia Stop:  9960    Procedure:  LEFT SHOULDER ARTHROSCOPIC BICEPS TENODESIS, ROTATOR CUFF REPAIR, EXTENSIVE DEBRIDEMENT  +++ARTHREX+++   +++PNB+++ (Left ) Diagnosis:  (BICEPS TEAR)    Surgeon:  Rip Mata MD Responsible Provider:  Roopa Christian MD    Anesthesia Type:  general ASA Status:  3          Anesthesia Type: general    Philippe Phase I: Philippe Score: 10    Philippe Phase II: Philippe Score: 10    Last vitals: Reviewed and per EMR flowsheets.        Anesthesia Post Evaluation    Patient location during evaluation: PACU  Patient participation: complete - patient participated  Level of consciousness: awake and alert  Pain score: 10  Airway patency: patent  Nausea & Vomiting: no vomiting and no nausea  Complications: no  Cardiovascular status: blood pressure returned to baseline  Respiratory status: acceptable  Hydration status: euvolemic

## 2020-01-29 NOTE — PROGRESS NOTES
Hung Haque on [address] [facility] called with report. Dr. Frandy Núñez updated on admission.

## 2020-01-29 NOTE — DISCHARGE SUMMARY
Orthopaedic Surgery Discharge Summary  Nino Ashford MD    Admission Date: 1/28/20    Admitting Attending: Yeimi Doyle    Admission Diagnosis: Left RCR    Discharge Date: 1/29/20    Consults Obtained: none    Chief Complaint: Left shoulder pain. History of Present Illness: 43 yo male with left shoulder pain after RCR. Past Medical History: see H&P    Past Surgical History: see H&P    Family Medical History: see H&P    Social History: see H&P    Allergies: see H&P    Review of Systems: see H&P    Physical:  General - NAD, awake, alert  Extremity - left arm - dressing clean and dry. Diagnostic Tests: labs    Hospital Course: patient had surgery. Had increased pain. Patient fearful of returning home. Admitted. Pain managed.     Assessment: 43 yo male sp left RCR    Discharge Instructions: NWB left arm    Discharge Medications: Percocet    Disposition: home    Condition: good    Follow-Up: Yeimi Doyle in 2 weeks

## 2020-01-29 NOTE — PROGRESS NOTES
Message sent to [employer] [place] regarding patient stating Roxicodone \"makes him sick\".

## 2020-01-29 NOTE — PROGRESS NOTES
CALCIUM 9.9 01/28/2020    BILITOT 0.8 02/21/2019    ALKPHOS 92 02/21/2019     02/21/2019     02/21/2019     BMP:    Lab Results   Component Value Date     01/28/2020    K 4.3 01/28/2020     01/28/2020    CO2 24 01/28/2020    BUN 14 01/28/2020    CREATININE 0.7 01/28/2020    CALCIUM 9.9 01/28/2020    GFRAA >60 01/28/2020    LABGLOM >60 01/28/2020    GLUCOSE 100 01/28/2020     COAGS:    Lab Results   Component Value Date    PROTIME 12.2 02/21/2019    INR 1.1 02/21/2019    APTT 32.9 08/07/2018       Adjuvant pain medication  oxycontin-Thank you for opportunity to participate in this patient's care.     Sarah Kaplan MD

## 2020-01-29 NOTE — PLAN OF CARE
Problem: Pain:  Goal: Pain level will decrease  Description  Pain level will decrease  Outcome: Met This Shift     Problem: Pain:  Goal: Control of acute pain  Description  Control of acute pain  Outcome: Met This Shift     Problem: Falls - Risk of:  Goal: Will remain free from falls  Description  Will remain free from falls  Outcome: Met This Shift     Problem: Infection - Surgical Site:  Goal: Will show no infection signs and symptoms  Description  Will show no infection signs and symptoms  Outcome: Met This Shift

## 2020-02-08 NOTE — H&P
Orthopaedic Surgery History and Physical  Roxie Barreto MD    Admission Date: 1/29/20    Admitting Physician: Dharmesh Lund    Admission Diagnosis: left shoulder pain    Chief Complaint: left shoulder pain    History of Present Illness: 45 yo male who sustained a fall    Past Medical History: RC tear    Past Surgical History: RCR    Family Medical History: not obtained    Social History: not obtained    Allergies: pcn    Review of Systems: neg    Physical:  General - NAD, awake, alert  Extremity - left arm pain with ROM    Diagnostic Tests: none    Assessment: 45 yo male with left RC tear    Plan:   RCR, pedro

## 2020-04-04 ENCOUNTER — NURSE TRIAGE (OUTPATIENT)
Dept: OTHER | Facility: CLINIC | Age: 50
End: 2020-04-04

## 2021-02-23 ENCOUNTER — OFFICE VISIT (OUTPATIENT)
Dept: NEUROSURGERY | Age: 51
End: 2021-02-23
Payer: MEDICAID

## 2021-02-23 ENCOUNTER — HOSPITAL ENCOUNTER (OUTPATIENT)
Age: 51
Discharge: HOME | End: 2021-02-23
Payer: MEDICAID

## 2021-02-23 VITALS
SYSTOLIC BLOOD PRESSURE: 130 MMHG | TEMPERATURE: 98 F | HEART RATE: 120 BPM | DIASTOLIC BLOOD PRESSURE: 94 MMHG | BODY MASS INDEX: 31.16 KG/M2 | HEIGHT: 65 IN | WEIGHT: 187 LBS

## 2021-02-23 DIAGNOSIS — M54.2 NECK PAIN: Primary | ICD-10-CM

## 2021-02-23 LAB
FERRITIN: 120 NG/ML
IRON SATURATION: 46 % (ref 20–55)
IRON: 202 MCG/DL (ref 59–158)
TOTAL IRON BINDING CAPACITY: 442 MCG/DL (ref 250–450)

## 2021-02-23 PROCEDURE — 82103 ALPHA-1-ANTITRYPSIN TOTAL: CPT

## 2021-02-23 PROCEDURE — 82728 ASSAY OF FERRITIN: CPT

## 2021-02-23 PROCEDURE — 86708 HEPATITIS A ANTIBODY: CPT

## 2021-02-23 PROCEDURE — 99212 OFFICE O/P EST SF 10 MIN: CPT | Performed by: NEUROLOGICAL SURGERY

## 2021-02-23 PROCEDURE — 82390 ASSAY OF CERULOPLASMIN: CPT

## 2021-02-23 PROCEDURE — 83550 IRON BINDING TEST: CPT

## 2021-02-23 PROCEDURE — 87340 HEPATITIS B SURFACE AG IA: CPT

## 2021-02-23 PROCEDURE — 86706 HEP B SURFACE ANTIBODY: CPT

## 2021-02-23 PROCEDURE — 86709 HEPATITIS A IGM ANTIBODY: CPT

## 2021-02-23 PROCEDURE — 86039 ANTINUCLEAR ANTIBODIES (ANA): CPT

## 2021-02-23 PROCEDURE — 86038 ANTINUCLEAR ANTIBODIES: CPT

## 2021-02-23 PROCEDURE — 86255 FLUORESCENT ANTIBODY SCREEN: CPT

## 2021-02-23 PROCEDURE — 83540 ASSAY OF IRON: CPT

## 2021-02-23 PROCEDURE — 86803 HEPATITIS C AB TEST: CPT

## 2021-02-23 PROCEDURE — 36415 COLL VENOUS BLD VENIPUNCTURE: CPT

## 2021-02-23 NOTE — PROGRESS NOTES
Patient is here for follow up consult for: neck and arm pain. Physical exam  Alert and Oriented X3  PERRLA, EOMI  ARGUETA 4/5  Sensation intact to LT and PP  Reflexes are 2+ and symmetric    A/P: patient is here for follow up for: neck nohemy arm pain. He has tried physical therapy and oral pain medications for over 3 months. I am recommending a repeat cervical MRI to rule out stenosis. He may be developing RSD. I will send him to Dr. Edyta Patino for stellate ganglion block.   I spent 15 minutes with the patient    Josefa Vickers

## 2021-02-24 LAB
ANTI-MITOCHONDRIAL AB, IFA: NEGATIVE
HAV IGM SER IA-ACNC: NORMAL
HBV SURFACE AB TITR SER: NORMAL {TITER}
HEPATITIS B SURFACE ANTIGEN INTERPRETATION: NORMAL
HEPATITIS C ANTIBODY INTERPRETATION: NORMAL
SMOOTH MUSCLE ANTIBODY: NEGATIVE

## 2021-02-25 LAB
ANA PATTERN: ABNORMAL
ANA TITER: ABNORMAL
ANTI-NUCLEAR ANTIBODY (ANA): POSITIVE
CERULOPLASMIN: 28 MG/DL (ref 15–30)

## 2021-02-26 LAB
ALPHA-1 ANTITRYPSIN: 118 MG/DL (ref 90–200)
HAV AB SERPL IA-ACNC: NEGATIVE

## 2021-03-08 ENCOUNTER — TELEPHONE (OUTPATIENT)
Dept: NEUROSURGERY | Age: 51
End: 2021-03-08

## 2021-03-08 NOTE — TELEPHONE ENCOUNTER
Tulio told patient he can't see Dr. Meli James because he previously saw Dr. Kia Amador. Patient feels Tulio is not treating him properly and he should be able to see who Dr. Carlos Del Rio referred him to. Told him I will talk to Dr. Carlos Del Rio and call him back tomorrow.

## 2021-03-08 NOTE — TELEPHONE ENCOUNTER
Megan from [facility] called stating they received Tao's referral on 2/24. She left him a message on 3/1 and 3/3. She called him again and was told he has an emergency and will call her back. He called today for an appointment. Patient previously seen Dr. Kia Amador but is requesting Dr. Meli James. Patient was informed their office policy is patients can only see one provider and since he saw Dr. Kia Amador he will stay with Dr. Kia Amador. Patient was nice to her but just didn't understand why he couldn't see Dr. Meli James like Dr. Carlos Del Rio referred him to see. Patient was explained the policy again but which point he asked to speak with the manager. Once he got on the phone with Olinda Portillo he became irate and was very rude to Olinda Portillo. At which point, Olinda Portillo informed patient to speak calmly and respectfully. Patient continued to be irate. Patient was previously seen by Dr. Kia Amador and prescribed Oxycodone but had a drug test that did not show Oxycodone. At that time he was switched to a non-narcotic plan. Per Martin Geller at [address] [facility] they will not see the patient in their office.

## 2021-03-09 ENCOUNTER — TELEPHONE (OUTPATIENT)
Dept: NEUROSURGERY | Age: 51
End: 2021-03-09

## 2021-03-29 ENCOUNTER — TELEPHONE (OUTPATIENT)
Dept: NEUROSURGERY | Age: 51
End: 2021-03-29

## 2021-03-29 DIAGNOSIS — M54.2 NECK PAIN: Primary | ICD-10-CM

## 2021-03-29 DIAGNOSIS — G89.4 CHRONIC PAIN SYNDROME: ICD-10-CM

## 2021-03-29 NOTE — TELEPHONE ENCOUNTER
Dr. Laurie Hassan referred patient to Dr. Geovanni Abarca for stellate ganglion block. [facility] will not see him. Needs new referral to [facility].

## 2021-04-06 ENCOUNTER — HOSPITAL ENCOUNTER (OUTPATIENT)
Dept: MRI IMAGING | Age: 51
Discharge: HOME | End: 2021-04-08
Payer: MEDICAID

## 2021-04-06 DIAGNOSIS — M54.2 NECK PAIN: ICD-10-CM

## 2021-04-12 ENCOUNTER — IMMUNIZATION (OUTPATIENT)
Dept: PRIMARY CARE CLINIC | Age: 51
End: 2021-04-12
Payer: MEDICAID

## 2021-04-12 PROCEDURE — 91301 COVID-19, MODERNA VACCINE 100MCG/0.5ML DOSE: CPT | Performed by: FAMILY MEDICINE

## 2021-04-12 PROCEDURE — 0011A COVID-19, MODERNA VACCINE 100MCG/0.5ML DOSE: CPT | Performed by: FAMILY MEDICINE

## 2021-04-20 ENCOUNTER — HOSPITAL ENCOUNTER (OUTPATIENT)
Dept: MRI IMAGING | Age: 51
Discharge: HOME | End: 2021-04-22
Payer: MEDICAID

## 2021-04-20 PROCEDURE — 72141 MRI NECK SPINE W/O DYE: CPT

## 2021-05-03 ENCOUNTER — TELEPHONE (OUTPATIENT)
Dept: NEUROSURGERY | Age: 51
End: 2021-05-03

## 2021-05-03 NOTE — TELEPHONE ENCOUNTER
Pt had his cervical mri done and would like results . Pt stated he had his results given to him by a family member who is a neurosurgeon or in the field. He said his family member told him to reach out because he needs to see a surgeon. Pt wanted me to inform you of this .

## 2021-05-18 ENCOUNTER — PREP FOR PROCEDURE (OUTPATIENT)
Dept: NEUROSURGERY | Age: 51
End: 2021-05-18

## 2021-05-18 DIAGNOSIS — Z01.818 PRE-OP TESTING: Primary | ICD-10-CM

## 2021-05-18 RX ORDER — SODIUM CHLORIDE 0.9 % (FLUSH) 0.9 %
10 SYRINGE (ML) INJECTION EVERY 12 HOURS SCHEDULED
Status: CANCELLED | OUTPATIENT
Start: 2021-05-18

## 2021-05-18 RX ORDER — SODIUM CHLORIDE 9 MG/ML
INJECTION, SOLUTION INTRAVENOUS CONTINUOUS
Status: CANCELLED | OUTPATIENT
Start: 2021-05-18

## 2021-05-18 RX ORDER — SODIUM CHLORIDE 9 MG/ML
25 INJECTION, SOLUTION INTRAVENOUS PRN
Status: CANCELLED | OUTPATIENT
Start: 2021-05-18

## 2021-05-18 RX ORDER — SODIUM CHLORIDE 0.9 % (FLUSH) 0.9 %
10 SYRINGE (ML) INJECTION PRN
Status: CANCELLED | OUTPATIENT
Start: 2021-05-18

## 2021-05-21 RX ORDER — DEXTROMETHORPHAN HYDROBROMIDE AND PROMETHAZINE HYDROCHLORIDE 15; 6.25 MG/5ML; MG/5ML
5 SYRUP ORAL 4 TIMES DAILY PRN
COMMUNITY
Start: 2021-04-22 | End: 2022-04-25 | Stop reason: ALTCHOICE

## 2021-05-21 NOTE — PROGRESS NOTES
[address] PRE-ADMISSION TESTING GENERAL INSTRUCTIONS- [facility]-phone number:[phone]    GENERAL INSTRUCTIONS  [x] Antibacterial Soap shower Night before followed by CHG wipes   [x] CHG wipe instruction sheet and wipes given. [x] Nothing by mouth after midnight, including gum, candy, mints, or water. [x] You may brush your teeth, gargle, but do NOT swallow water. [x]No smoking, chewing tobacco, illegal drugs, or alcohol within 24 hours of your surgery. [x] Jewelry, valuables or body piercing's should not be brought to the hospital. All body and/or tongue piercing's must be removed prior to arriving to hospital.  ALL hair pins must be removed. [x] Do not wear makeup, lotions, powders, deodorant. Nail polish as directed by the nurse. [x] Bring insurance card and photo ID. [x] Transfusion Bracelet: Please bring with you to hospital, day of surgery    PARKING INSTRUCTIONS:   [x] Arrival Time:  5 AM.  ONE PERSON MAY COME IN WITH YOU. WEAR MASK. · [x] Parking lot '\"I\"  is located on [facility] (the corner of [address] and [facility]). To enter, press the button and the gate will lift. A free token will be provided to exit the lot. Follow the sidewalk to the [facility] entrance. The door will be locked and someone will let you enter. EDUCATION INSTRUCTIONS:      [x] Pre-admission Testing educational folder given  [x] Incentive Spirometry,coughing & deep breathing exercises reviewed. [x]Medication information sheet(s)   [x]Fluoroscopy-Xray used in surgery reviewed with patient. Educational pamphlet placed in chart. [x]Pain: Post-op pain is normal and to be expected. You will be asked to rate your pain from 0-10(a zero is not acceptable-education is needed). Your post-op pain goal is:5  [x] Ask your nurse for your pain medication. [x] Other:  Swallowing my be difficult after surgery, avoid large bites and meats till returns to normal.    Bring brace.     No bending, lifting or twisting until permitted by surgeon. MEDICATION INSTRUCTIONS:   [x]Bring a complete list of your medications, please write the last time you took the medicine, give this list to the nurse. [x] Take the following medications the morning of surgery with 1-2 ounces of water: Fanta Zuñiga  [] Stop herbal supplements and vitamins 5 days before your surgery. [x] DO NOT take any diabetic medicine the morning of surgery. WHAT TO EXPECT:  [x] The day of surgery you will be greeted and checked in by the [employer].  In addition, you will be registered in the [place] by a Patient Access Representative. Please bring your photo ID and insurance card. A nurse will greet you in accordance to the time you are needed in the pre-op area to prepare you for surgery. Please do not be discouraged if you are not greeted in the order you arrive as there are many variables that are involved in patient preparation. Your patience is greatly appreciated as you wait for your nurse. [x]  Delays may occur with surgery and staff will make a sincere effort to keep you informed of delays. If any delays occur with your procedure, we apologize ahead of time for your inconvenience as we recognize the value of your time.

## 2021-05-26 ENCOUNTER — HOSPITAL ENCOUNTER (OUTPATIENT)
Dept: PREADMISSION TESTING | Age: 51
Discharge: HOME | End: 2021-05-26
Payer: MEDICAID

## 2021-05-26 ENCOUNTER — HOSPITAL ENCOUNTER (OUTPATIENT)
Dept: GENERAL RADIOLOGY | Age: 51
Discharge: HOME | End: 2021-05-28
Payer: MEDICAID

## 2021-05-26 VITALS
HEART RATE: 98 BPM | HEIGHT: 65 IN | BODY MASS INDEX: 30.49 KG/M2 | WEIGHT: 183 LBS | TEMPERATURE: 98.1 F | DIASTOLIC BLOOD PRESSURE: 100 MMHG | OXYGEN SATURATION: 97 % | SYSTOLIC BLOOD PRESSURE: 184 MMHG

## 2021-05-26 DIAGNOSIS — Z01.818 PRE-OP TESTING: ICD-10-CM

## 2021-05-26 LAB
ABO/RH: NORMAL
ANION GAP SERPL CALCULATED.3IONS-SCNC: 14 MMOL/L (ref 7–16)
ANTIBODY SCREEN: NORMAL
BASOPHILS ABSOLUTE: 0.05 E9/L (ref 0–0.2)
BASOPHILS RELATIVE PERCENT: 0.8 % (ref 0–2)
BILIRUBIN URINE: NEGATIVE
BLOOD, URINE: NEGATIVE
BUN BLDV-MCNC: 13 MG/DL (ref 6–20)
CALCIUM SERPL-MCNC: 9.5 MG/DL (ref 8.6–10.2)
CHLORIDE BLD-SCNC: 100 MMOL/L (ref 98–107)
CLARITY: CLEAR
CO2: 25 MMOL/L (ref 22–29)
COLOR: YELLOW
CREAT SERPL-MCNC: 1 MG/DL (ref 0.7–1.2)
EKG ATRIAL RATE: 85 BPM
EKG P AXIS: 30 DEGREES
EKG P-R INTERVAL: 148 MS
EKG Q-T INTERVAL: 354 MS
EKG QRS DURATION: 84 MS
EKG QTC CALCULATION (BAZETT): 421 MS
EKG R AXIS: -26 DEGREES
EKG T AXIS: 12 DEGREES
EKG VENTRICULAR RATE: 85 BPM
EOSINOPHILS ABSOLUTE: 0.17 E9/L (ref 0.05–0.5)
EOSINOPHILS RELATIVE PERCENT: 2.6 % (ref 0–6)
GFR AFRICAN AMERICAN: >60
GFR NON-AFRICAN AMERICAN: >60 ML/MIN/1.73
GLUCOSE BLD-MCNC: 107 MG/DL (ref 74–99)
GLUCOSE URINE: NEGATIVE MG/DL
HCT VFR BLD CALC: 43.6 % (ref 37–54)
HEMOGLOBIN: 13.9 G/DL (ref 12.5–16.5)
IMMATURE GRANULOCYTES #: 0.02 E9/L
IMMATURE GRANULOCYTES %: 0.3 % (ref 0–5)
INR BLD: 1
KETONES, URINE: NEGATIVE MG/DL
LEUKOCYTE ESTERASE, URINE: NEGATIVE
LYMPHOCYTES ABSOLUTE: 2.63 E9/L (ref 1.5–4)
LYMPHOCYTES RELATIVE PERCENT: 40.4 % (ref 20–42)
MCH RBC QN AUTO: 23 PG (ref 26–35)
MCHC RBC AUTO-ENTMCNC: 31.9 % (ref 32–34.5)
MCV RBC AUTO: 72.1 FL (ref 80–99.9)
MONOCYTES ABSOLUTE: 0.57 E9/L (ref 0.1–0.95)
MONOCYTES RELATIVE PERCENT: 8.8 % (ref 2–12)
NEUTROPHILS ABSOLUTE: 3.07 E9/L (ref 1.8–7.3)
NEUTROPHILS RELATIVE PERCENT: 47.1 % (ref 43–80)
NITRITE, URINE: NEGATIVE
PDW BLD-RTO: 16.8 FL (ref 11.5–15)
PH UA: 5.5 (ref 5–9)
PLATELET # BLD: 242 E9/L (ref 130–450)
PMV BLD AUTO: 10.5 FL (ref 7–12)
POTASSIUM REFLEX MAGNESIUM: 4.3 MMOL/L (ref 3.5–5)
PROTEIN UA: NEGATIVE MG/DL
PROTHROMBIN TIME: 10.8 SEC (ref 9.3–12.4)
RBC # BLD: 6.05 E12/L (ref 3.8–5.8)
SODIUM BLD-SCNC: 139 MMOL/L (ref 132–146)
SPECIFIC GRAVITY UA: >=1.03 (ref 1–1.03)
UROBILINOGEN, URINE: 0.2 E.U./DL
WBC # BLD: 6.5 E9/L (ref 4.5–11.5)

## 2021-05-26 PROCEDURE — 71046 X-RAY EXAM CHEST 2 VIEWS: CPT

## 2021-05-26 PROCEDURE — 87088 URINE BACTERIA CULTURE: CPT

## 2021-05-26 PROCEDURE — 93010 ELECTROCARDIOGRAM REPORT: CPT | Performed by: INTERNAL MEDICINE

## 2021-05-26 PROCEDURE — 85610 PROTHROMBIN TIME: CPT

## 2021-05-26 PROCEDURE — 36415 COLL VENOUS BLD VENIPUNCTURE: CPT

## 2021-05-26 PROCEDURE — 86900 BLOOD TYPING SEROLOGIC ABO: CPT

## 2021-05-26 PROCEDURE — 87081 CULTURE SCREEN ONLY: CPT

## 2021-05-26 PROCEDURE — 93005 ELECTROCARDIOGRAM TRACING: CPT

## 2021-05-26 PROCEDURE — 85025 COMPLETE CBC W/AUTO DIFF WBC: CPT

## 2021-05-26 PROCEDURE — 86850 RBC ANTIBODY SCREEN: CPT

## 2021-05-26 PROCEDURE — 86901 BLOOD TYPING SEROLOGIC RH(D): CPT

## 2021-05-26 PROCEDURE — 80048 BASIC METABOLIC PNL TOTAL CA: CPT

## 2021-05-26 PROCEDURE — 81003 URINALYSIS AUTO W/O SCOPE: CPT

## 2021-05-26 ASSESSMENT — PAIN DESCRIPTION - PAIN TYPE: TYPE: CHRONIC PAIN

## 2021-05-26 ASSESSMENT — PAIN DESCRIPTION - DESCRIPTORS: DESCRIPTORS: SHOOTING;ACHING

## 2021-05-26 ASSESSMENT — PAIN DESCRIPTION - LOCATION: LOCATION: NECK

## 2021-05-27 LAB — MRSA CULTURE ONLY: NORMAL

## 2021-05-28 LAB — URINE CULTURE, ROUTINE: NORMAL

## 2021-06-01 ENCOUNTER — ANESTHESIA EVENT (OUTPATIENT)
Dept: OPERATING ROOM | Age: 51
DRG: 321 | End: 2021-06-01
Payer: MEDICAID

## 2021-06-02 ENCOUNTER — HOSPITAL ENCOUNTER (INPATIENT)
Age: 51
LOS: 11 days | Discharge: HOME HEALTH | DRG: 321 | End: 2021-06-14
Attending: NEUROLOGICAL SURGERY | Admitting: NEUROLOGICAL SURGERY
Payer: MEDICAID

## 2021-06-02 ENCOUNTER — APPOINTMENT (OUTPATIENT)
Dept: GENERAL RADIOLOGY | Age: 51
DRG: 321 | End: 2021-06-02
Attending: NEUROLOGICAL SURGERY
Payer: MEDICAID

## 2021-06-02 ENCOUNTER — ANESTHESIA (OUTPATIENT)
Dept: OPERATING ROOM | Age: 51
DRG: 321 | End: 2021-06-02
Payer: MEDICAID

## 2021-06-02 VITALS — OXYGEN SATURATION: 81 % | DIASTOLIC BLOOD PRESSURE: 95 MMHG | TEMPERATURE: 95.5 F | SYSTOLIC BLOOD PRESSURE: 151 MMHG

## 2021-06-02 DIAGNOSIS — M54.2 NECK PAIN: ICD-10-CM

## 2021-06-02 DIAGNOSIS — Z01.818 PRE-OP TESTING: Primary | ICD-10-CM

## 2021-06-02 PROBLEM — M50.20 CERVICAL HERNIATED DISC: Status: ACTIVE | Noted: 2021-06-02

## 2021-06-02 LAB
METER GLUCOSE: 101 MG/DL (ref 74–99)
METER GLUCOSE: 125 MG/DL (ref 74–99)
METER GLUCOSE: 182 MG/DL (ref 74–99)

## 2021-06-02 PROCEDURE — 3600000014 HC SURGERY LEVEL 4 ADDTL 15MIN: Performed by: NEUROLOGICAL SURGERY

## 2021-06-02 PROCEDURE — 6370000000 HC RX 637 (ALT 250 FOR IP): Performed by: NEUROLOGICAL SURGERY

## 2021-06-02 PROCEDURE — 2580000003 HC RX 258: Performed by: NEUROLOGICAL SURGERY

## 2021-06-02 PROCEDURE — 22552 ARTHRD ANT NTRBD CERVICAL EA: CPT | Performed by: NEUROLOGICAL SURGERY

## 2021-06-02 PROCEDURE — G0378 HOSPITAL OBSERVATION PER HR: HCPCS

## 2021-06-02 PROCEDURE — 2500000003 HC RX 250 WO HCPCS

## 2021-06-02 PROCEDURE — 2709999900 HC NON-CHARGEABLE SUPPLY: Performed by: NEUROLOGICAL SURGERY

## 2021-06-02 PROCEDURE — 3209999900 FLUORO FOR SURGICAL PROCEDURES

## 2021-06-02 PROCEDURE — 3700000001 HC ADD 15 MINUTES (ANESTHESIA): Performed by: NEUROLOGICAL SURGERY

## 2021-06-02 PROCEDURE — 7100000000 HC PACU RECOVERY - FIRST 15 MIN: Performed by: NEUROLOGICAL SURGERY

## 2021-06-02 PROCEDURE — C1713 ANCHOR/SCREW BN/BN,TIS/BN: HCPCS | Performed by: NEUROLOGICAL SURGERY

## 2021-06-02 PROCEDURE — 22551 ARTHRD ANT NTRBDY CERVICAL: CPT | Performed by: NEUROLOGICAL SURGERY

## 2021-06-02 PROCEDURE — 2780000010 HC IMPLANT OTHER: Performed by: NEUROLOGICAL SURGERY

## 2021-06-02 PROCEDURE — 0RG2070 FUSION OF 2 OR MORE CERVICAL VERTEBRAL JOINTS WITH AUTOLOGOUS TISSUE SUBSTITUTE, ANTERIOR APPROACH, ANTERIOR COLUMN, OPEN APPROACH: ICD-10-PCS | Performed by: NEUROLOGICAL SURGERY

## 2021-06-02 PROCEDURE — 22552 ARTHRD ANT NTRBD CERVICAL EA: CPT | Performed by: PHYSICIAN ASSISTANT

## 2021-06-02 PROCEDURE — 22853 INSJ BIOMECHANICAL DEVICE: CPT | Performed by: PHYSICIAN ASSISTANT

## 2021-06-02 PROCEDURE — 3700000000 HC ANESTHESIA ATTENDED CARE: Performed by: NEUROLOGICAL SURGERY

## 2021-06-02 PROCEDURE — 2700000000 HC OXYGEN THERAPY PER DAY

## 2021-06-02 PROCEDURE — 3600000004 HC SURGERY LEVEL 4 BASE: Performed by: NEUROLOGICAL SURGERY

## 2021-06-02 PROCEDURE — 88311 DECALCIFY TISSUE: CPT

## 2021-06-02 PROCEDURE — 6360000002 HC RX W HCPCS: Performed by: PHYSICIAN ASSISTANT

## 2021-06-02 PROCEDURE — 6360000002 HC RX W HCPCS

## 2021-06-02 PROCEDURE — 7100000001 HC PACU RECOVERY - ADDTL 15 MIN: Performed by: NEUROLOGICAL SURGERY

## 2021-06-02 PROCEDURE — 2580000003 HC RX 258: Performed by: PHYSICIAN ASSISTANT

## 2021-06-02 PROCEDURE — 0RB30ZZ EXCISION OF CERVICAL VERTEBRAL DISC, OPEN APPROACH: ICD-10-PCS | Performed by: NEUROLOGICAL SURGERY

## 2021-06-02 PROCEDURE — 2580000003 HC RX 258

## 2021-06-02 PROCEDURE — 6360000002 HC RX W HCPCS: Performed by: ANESTHESIOLOGY

## 2021-06-02 PROCEDURE — 2720000010 HC SURG SUPPLY STERILE: Performed by: NEUROLOGICAL SURGERY

## 2021-06-02 PROCEDURE — 2500000003 HC RX 250 WO HCPCS: Performed by: ANESTHESIOLOGY

## 2021-06-02 PROCEDURE — 6360000002 HC RX W HCPCS: Performed by: NEUROLOGICAL SURGERY

## 2021-06-02 PROCEDURE — 97161 PT EVAL LOW COMPLEX 20 MIN: CPT

## 2021-06-02 PROCEDURE — 22853 INSJ BIOMECHANICAL DEVICE: CPT | Performed by: NEUROLOGICAL SURGERY

## 2021-06-02 PROCEDURE — 2500000003 HC RX 250 WO HCPCS: Performed by: NEUROLOGICAL SURGERY

## 2021-06-02 PROCEDURE — 88304 TISSUE EXAM BY PATHOLOGIST: CPT

## 2021-06-02 PROCEDURE — 82962 GLUCOSE BLOOD TEST: CPT

## 2021-06-02 PROCEDURE — 22551 ARTHRD ANT NTRBDY CERVICAL: CPT | Performed by: PHYSICIAN ASSISTANT

## 2021-06-02 DEVICE — XTEND 4.2MM VARIABLE ANGLE SCREW, SELF-DRILLING, 14MM
Type: IMPLANTABLE DEVICE | Site: NECK | Status: FUNCTIONAL
Brand: XTEND

## 2021-06-02 DEVICE — IMPLANTABLE DEVICE: Type: IMPLANTABLE DEVICE | Site: NECK | Status: FUNCTIONAL

## 2021-06-02 DEVICE — XTEND 4.2MM VARIABLE ANGLE SCREW, SELF-DRILLING, 16MM
Type: IMPLANTABLE DEVICE | Site: NECK | Status: FUNCTIONAL
Brand: XTEND

## 2021-06-02 DEVICE — XTEND ANTERIOR CERVICAL PLATE, 3-LEVEL, 42MM
Type: IMPLANTABLE DEVICE | Site: NECK | Status: FUNCTIONAL
Brand: XTEND

## 2021-06-02 DEVICE — GRAFT BNE SUB W12XH7XL14MM CANC CORT ANT CERV INTBDY FUS: Type: IMPLANTABLE DEVICE | Site: NECK | Status: FUNCTIONAL

## 2021-06-02 RX ORDER — POLYETHYLENE GLYCOL 3350 17 G/17G
17 POWDER, FOR SOLUTION ORAL DAILY
Status: DISCONTINUED | OUTPATIENT
Start: 2021-06-02 | End: 2021-06-14 | Stop reason: HOSPADM

## 2021-06-02 RX ORDER — PROPOFOL 10 MG/ML
INJECTION, EMULSION INTRAVENOUS PRN
Status: DISCONTINUED | OUTPATIENT
Start: 2021-06-02 | End: 2021-06-02 | Stop reason: SDUPTHER

## 2021-06-02 RX ORDER — CYCLOBENZAPRINE HCL 10 MG
10 TABLET ORAL 3 TIMES DAILY PRN
Status: DISCONTINUED | OUTPATIENT
Start: 2021-06-02 | End: 2021-06-13

## 2021-06-02 RX ORDER — DIPHENHYDRAMINE HYDROCHLORIDE 50 MG/ML
25 INJECTION INTRAMUSCULAR; INTRAVENOUS EVERY 6 HOURS PRN
Status: DISCONTINUED | OUTPATIENT
Start: 2021-06-02 | End: 2021-06-14 | Stop reason: HOSPADM

## 2021-06-02 RX ORDER — SODIUM CHLORIDE 0.9 % (FLUSH) 0.9 %
10 SYRINGE (ML) INJECTION PRN
Status: DISCONTINUED | OUTPATIENT
Start: 2021-06-02 | End: 2021-06-02 | Stop reason: HOSPADM

## 2021-06-02 RX ORDER — SODIUM CHLORIDE 9 MG/ML
25 INJECTION, SOLUTION INTRAVENOUS PRN
Status: DISCONTINUED | OUTPATIENT
Start: 2021-06-02 | End: 2021-06-02 | Stop reason: HOSPADM

## 2021-06-02 RX ORDER — MORPHINE SULFATE 2 MG/ML
2 INJECTION, SOLUTION INTRAMUSCULAR; INTRAVENOUS
Status: DISCONTINUED | OUTPATIENT
Start: 2021-06-02 | End: 2021-06-05

## 2021-06-02 RX ORDER — OXYCODONE HYDROCHLORIDE 10 MG/1
10 TABLET ORAL EVERY 4 HOURS PRN
Status: DISCONTINUED | OUTPATIENT
Start: 2021-06-02 | End: 2021-06-05

## 2021-06-02 RX ORDER — SENNA AND DOCUSATE SODIUM 50; 8.6 MG/1; MG/1
1 TABLET, FILM COATED ORAL 2 TIMES DAILY
Status: DISCONTINUED | OUTPATIENT
Start: 2021-06-02 | End: 2021-06-14 | Stop reason: HOSPADM

## 2021-06-02 RX ORDER — SODIUM CHLORIDE 0.9 % (FLUSH) 0.9 %
10 SYRINGE (ML) INJECTION EVERY 12 HOURS SCHEDULED
Status: DISCONTINUED | OUTPATIENT
Start: 2021-06-02 | End: 2021-06-02 | Stop reason: HOSPADM

## 2021-06-02 RX ORDER — DEXAMETHASONE SODIUM PHOSPHATE 10 MG/ML
INJECTION INTRAMUSCULAR; INTRAVENOUS PRN
Status: DISCONTINUED | OUTPATIENT
Start: 2021-06-02 | End: 2021-06-02 | Stop reason: SDUPTHER

## 2021-06-02 RX ORDER — VANCOMYCIN HYDROCHLORIDE 500 MG/10ML
INJECTION, POWDER, LYOPHILIZED, FOR SOLUTION INTRAVENOUS PRN
Status: DISCONTINUED | OUTPATIENT
Start: 2021-06-02 | End: 2021-06-02 | Stop reason: ALTCHOICE

## 2021-06-02 RX ORDER — LABETALOL HYDROCHLORIDE 5 MG/ML
5 INJECTION, SOLUTION INTRAVENOUS EVERY 10 MIN PRN
Status: DISCONTINUED | OUTPATIENT
Start: 2021-06-02 | End: 2021-06-02 | Stop reason: HOSPADM

## 2021-06-02 RX ORDER — ONDANSETRON 4 MG/1
4 TABLET, ORALLY DISINTEGRATING ORAL EVERY 8 HOURS PRN
Status: DISCONTINUED | OUTPATIENT
Start: 2021-06-02 | End: 2021-06-07

## 2021-06-02 RX ORDER — GLYCOPYRROLATE 1 MG/5 ML
SYRINGE (ML) INTRAVENOUS PRN
Status: DISCONTINUED | OUTPATIENT
Start: 2021-06-02 | End: 2021-06-02 | Stop reason: SDUPTHER

## 2021-06-02 RX ORDER — PROMETHAZINE HYDROCHLORIDE 25 MG/ML
6.25 INJECTION, SOLUTION INTRAMUSCULAR; INTRAVENOUS
Status: DISCONTINUED | OUTPATIENT
Start: 2021-06-02 | End: 2021-06-02 | Stop reason: HOSPADM

## 2021-06-02 RX ORDER — DEXTROSE MONOHYDRATE 50 MG/ML
100 INJECTION, SOLUTION INTRAVENOUS PRN
Status: DISCONTINUED | OUTPATIENT
Start: 2021-06-02 | End: 2021-06-14 | Stop reason: HOSPADM

## 2021-06-02 RX ORDER — MORPHINE SULFATE 4 MG/ML
4 INJECTION, SOLUTION INTRAMUSCULAR; INTRAVENOUS
Status: DISCONTINUED | OUTPATIENT
Start: 2021-06-02 | End: 2021-06-05

## 2021-06-02 RX ORDER — MIDAZOLAM HYDROCHLORIDE 1 MG/ML
INJECTION INTRAMUSCULAR; INTRAVENOUS PRN
Status: DISCONTINUED | OUTPATIENT
Start: 2021-06-02 | End: 2021-06-02 | Stop reason: SDUPTHER

## 2021-06-02 RX ORDER — NICOTINE POLACRILEX 4 MG
15 LOZENGE BUCCAL PRN
Status: DISCONTINUED | OUTPATIENT
Start: 2021-06-02 | End: 2021-06-14 | Stop reason: HOSPADM

## 2021-06-02 RX ORDER — SODIUM CHLORIDE 9 MG/ML
25 INJECTION, SOLUTION INTRAVENOUS PRN
Status: DISCONTINUED | OUTPATIENT
Start: 2021-06-02 | End: 2021-06-14 | Stop reason: HOSPADM

## 2021-06-02 RX ORDER — ROCURONIUM BROMIDE 10 MG/ML
INJECTION, SOLUTION INTRAVENOUS PRN
Status: DISCONTINUED | OUTPATIENT
Start: 2021-06-02 | End: 2021-06-02 | Stop reason: SDUPTHER

## 2021-06-02 RX ORDER — SODIUM CHLORIDE 9 MG/ML
INJECTION, SOLUTION INTRAVENOUS CONTINUOUS PRN
Status: DISCONTINUED | OUTPATIENT
Start: 2021-06-02 | End: 2021-06-02 | Stop reason: SDUPTHER

## 2021-06-02 RX ORDER — ONDANSETRON 2 MG/ML
4 INJECTION INTRAMUSCULAR; INTRAVENOUS EVERY 6 HOURS PRN
Status: DISCONTINUED | OUTPATIENT
Start: 2021-06-02 | End: 2021-06-07

## 2021-06-02 RX ORDER — SODIUM CHLORIDE 0.9 % (FLUSH) 0.9 %
5-40 SYRINGE (ML) INJECTION PRN
Status: DISCONTINUED | OUTPATIENT
Start: 2021-06-02 | End: 2021-06-14 | Stop reason: HOSPADM

## 2021-06-02 RX ORDER — DEXTROSE MONOHYDRATE 25 G/50ML
12.5 INJECTION, SOLUTION INTRAVENOUS PRN
Status: DISCONTINUED | OUTPATIENT
Start: 2021-06-02 | End: 2021-06-14 | Stop reason: HOSPADM

## 2021-06-02 RX ORDER — NEOSTIGMINE METHYLSULFATE 1 MG/ML
INJECTION, SOLUTION INTRAVENOUS PRN
Status: DISCONTINUED | OUTPATIENT
Start: 2021-06-02 | End: 2021-06-02 | Stop reason: SDUPTHER

## 2021-06-02 RX ORDER — OXYCODONE HYDROCHLORIDE 5 MG/1
5 TABLET ORAL EVERY 4 HOURS PRN
Status: DISCONTINUED | OUTPATIENT
Start: 2021-06-02 | End: 2021-06-05

## 2021-06-02 RX ORDER — MEPERIDINE HYDROCHLORIDE 25 MG/ML
12.5 INJECTION INTRAMUSCULAR; INTRAVENOUS; SUBCUTANEOUS EVERY 5 MIN PRN
Status: DISCONTINUED | OUTPATIENT
Start: 2021-06-02 | End: 2021-06-02 | Stop reason: HOSPADM

## 2021-06-02 RX ORDER — SUMATRIPTAN 6 MG/.5ML
6 INJECTION, SOLUTION SUBCUTANEOUS
Status: ACTIVE | OUTPATIENT
Start: 2021-06-02 | End: 2021-06-02

## 2021-06-02 RX ORDER — SODIUM CHLORIDE 9 MG/ML
INJECTION, SOLUTION INTRAVENOUS CONTINUOUS
Status: DISCONTINUED | OUTPATIENT
Start: 2021-06-02 | End: 2021-06-02

## 2021-06-02 RX ORDER — LIDOCAINE HYDROCHLORIDE 20 MG/ML
INJECTION, SOLUTION INTRAVENOUS PRN
Status: DISCONTINUED | OUTPATIENT
Start: 2021-06-02 | End: 2021-06-02 | Stop reason: SDUPTHER

## 2021-06-02 RX ORDER — OXYCODONE HYDROCHLORIDE AND ACETAMINOPHEN 5; 325 MG/1; MG/1
1 TABLET ORAL
Status: DISCONTINUED | OUTPATIENT
Start: 2021-06-02 | End: 2021-06-02 | Stop reason: HOSPADM

## 2021-06-02 RX ORDER — ACETAMINOPHEN 325 MG/1
650 TABLET ORAL EVERY 4 HOURS PRN
Status: DISCONTINUED | OUTPATIENT
Start: 2021-06-02 | End: 2021-06-14 | Stop reason: HOSPADM

## 2021-06-02 RX ORDER — SUCCINYLCHOLINE/SOD CL,ISO/PF 200MG/10ML
SYRINGE (ML) INTRAVENOUS PRN
Status: DISCONTINUED | OUTPATIENT
Start: 2021-06-02 | End: 2021-06-02 | Stop reason: SDUPTHER

## 2021-06-02 RX ORDER — SODIUM CHLORIDE 9 MG/ML
INJECTION, SOLUTION INTRAVENOUS CONTINUOUS
Status: DISCONTINUED | OUTPATIENT
Start: 2021-06-02 | End: 2021-06-03

## 2021-06-02 RX ORDER — ONDANSETRON 2 MG/ML
INJECTION INTRAMUSCULAR; INTRAVENOUS PRN
Status: DISCONTINUED | OUTPATIENT
Start: 2021-06-02 | End: 2021-06-02 | Stop reason: SDUPTHER

## 2021-06-02 RX ORDER — HYDRALAZINE HYDROCHLORIDE 20 MG/ML
5 INJECTION INTRAMUSCULAR; INTRAVENOUS EVERY 10 MIN PRN
Status: DISCONTINUED | OUTPATIENT
Start: 2021-06-02 | End: 2021-06-02 | Stop reason: HOSPADM

## 2021-06-02 RX ORDER — SODIUM CHLORIDE 0.9 % (FLUSH) 0.9 %
5-40 SYRINGE (ML) INJECTION EVERY 12 HOURS SCHEDULED
Status: DISCONTINUED | OUTPATIENT
Start: 2021-06-02 | End: 2021-06-14 | Stop reason: HOSPADM

## 2021-06-02 RX ORDER — SODIUM CHLORIDE 0.9 % (FLUSH) 0.9 %
SYRINGE (ML) INJECTION
Status: DISPENSED
Start: 2021-06-02 | End: 2021-06-03

## 2021-06-02 RX ORDER — FENTANYL CITRATE 50 UG/ML
INJECTION, SOLUTION INTRAMUSCULAR; INTRAVENOUS PRN
Status: DISCONTINUED | OUTPATIENT
Start: 2021-06-02 | End: 2021-06-02 | Stop reason: SDUPTHER

## 2021-06-02 RX ADMIN — OXYCODONE HYDROCHLORIDE 10 MG: 10 TABLET ORAL at 12:40

## 2021-06-02 RX ADMIN — HYDROMORPHONE HYDROCHLORIDE 0.5 MG: 1 INJECTION, SOLUTION INTRAMUSCULAR; INTRAVENOUS; SUBCUTANEOUS at 10:40

## 2021-06-02 RX ADMIN — FENTANYL CITRATE 100 MCG: 50 INJECTION, SOLUTION INTRAMUSCULAR; INTRAVENOUS at 06:43

## 2021-06-02 RX ADMIN — CYCLOBENZAPRINE 10 MG: 10 TABLET, FILM COATED ORAL at 21:47

## 2021-06-02 RX ADMIN — OXYCODONE HYDROCHLORIDE 10 MG: 10 TABLET ORAL at 17:41

## 2021-06-02 RX ADMIN — LIDOCAINE HYDROCHLORIDE 100 MG: 20 INJECTION, SOLUTION INTRAVENOUS at 06:43

## 2021-06-02 RX ADMIN — VANCOMYCIN HYDROCHLORIDE 1000 MG: 1 INJECTION, POWDER, LYOPHILIZED, FOR SOLUTION INTRAVENOUS at 15:04

## 2021-06-02 RX ADMIN — SODIUM CHLORIDE: 9 INJECTION, SOLUTION INTRAVENOUS at 08:12

## 2021-06-02 RX ADMIN — MIDAZOLAM 2 MG: 1 INJECTION INTRAMUSCULAR; INTRAVENOUS at 06:25

## 2021-06-02 RX ADMIN — ONDANSETRON HYDROCHLORIDE 4 MG: 2 INJECTION, SOLUTION INTRAMUSCULAR; INTRAVENOUS at 08:58

## 2021-06-02 RX ADMIN — LABETALOL HYDROCHLORIDE 5 MG: 5 INJECTION INTRAVENOUS at 11:02

## 2021-06-02 RX ADMIN — Medication 160 MG: at 06:43

## 2021-06-02 RX ADMIN — PREGABALIN 225 MG: 150 CAPSULE ORAL at 21:47

## 2021-06-02 RX ADMIN — Medication 10 ML: at 19:11

## 2021-06-02 RX ADMIN — ROCURONIUM BROMIDE 30 MG: 10 INJECTION, SOLUTION INTRAVENOUS at 07:06

## 2021-06-02 RX ADMIN — Medication 0.6 MG: at 09:15

## 2021-06-02 RX ADMIN — BENZOCAINE AND MENTHOL 1 LOZENGE: 15; 3.6 LOZENGE ORAL at 19:16

## 2021-06-02 RX ADMIN — Medication 4 MG: at 19:12

## 2021-06-02 RX ADMIN — SODIUM CHLORIDE, PRESERVATIVE FREE 10 ML: 5 INJECTION INTRAVENOUS at 16:38

## 2021-06-02 RX ADMIN — Medication 4 MG: at 14:21

## 2021-06-02 RX ADMIN — SODIUM CHLORIDE: 9 INJECTION, SOLUTION INTRAVENOUS at 06:25

## 2021-06-02 RX ADMIN — VANCOMYCIN HYDROCHLORIDE 1000 MG: 1 INJECTION, POWDER, LYOPHILIZED, FOR SOLUTION INTRAVENOUS at 05:58

## 2021-06-02 RX ADMIN — DIPHENHYDRAMINE HYDROCHLORIDE 25 MG: 50 INJECTION, SOLUTION INTRAMUSCULAR; INTRAVENOUS at 19:11

## 2021-06-02 RX ADMIN — ROCURONIUM BROMIDE 10 MG: 10 INJECTION, SOLUTION INTRAVENOUS at 06:43

## 2021-06-02 RX ADMIN — PROPOFOL 200 MG: 10 INJECTION, EMULSION INTRAVENOUS at 06:43

## 2021-06-02 RX ADMIN — OXYCODONE HYDROCHLORIDE 10 MG: 10 TABLET ORAL at 21:47

## 2021-06-02 RX ADMIN — SODIUM CHLORIDE: 9 INJECTION, SOLUTION INTRAVENOUS at 06:49

## 2021-06-02 RX ADMIN — FENTANYL CITRATE 50 MCG: 50 INJECTION, SOLUTION INTRAMUSCULAR; INTRAVENOUS at 06:52

## 2021-06-02 RX ADMIN — SENNOSIDES AND DOCUSATE SODIUM 1 TABLET: 8.6; 5 TABLET ORAL at 21:47

## 2021-06-02 RX ADMIN — FENTANYL CITRATE 50 MCG: 50 INJECTION, SOLUTION INTRAMUSCULAR; INTRAVENOUS at 08:38

## 2021-06-02 RX ADMIN — HYDROMORPHONE HYDROCHLORIDE 0.5 MG: 1 INJECTION, SOLUTION INTRAMUSCULAR; INTRAVENOUS; SUBCUTANEOUS at 10:45

## 2021-06-02 RX ADMIN — DEXAMETHASONE SODIUM PHOSPHATE 10 MG: 10 INJECTION INTRAMUSCULAR; INTRAVENOUS at 06:43

## 2021-06-02 RX ADMIN — SODIUM CHLORIDE: 9 INJECTION, SOLUTION INTRAVENOUS at 06:06

## 2021-06-02 RX ADMIN — ACETAMINOPHEN 650 MG: 325 TABLET ORAL at 12:41

## 2021-06-02 RX ADMIN — METFORMIN HYDROCHLORIDE 500 MG: 500 TABLET ORAL at 16:38

## 2021-06-02 RX ADMIN — DIPHENHYDRAMINE HYDROCHLORIDE 25 MG: 50 INJECTION, SOLUTION INTRAMUSCULAR; INTRAVENOUS at 12:43

## 2021-06-02 RX ADMIN — HYDROMORPHONE HYDROCHLORIDE 0.5 MG: 1 INJECTION, SOLUTION INTRAMUSCULAR; INTRAVENOUS; SUBCUTANEOUS at 11:17

## 2021-06-02 RX ADMIN — Medication 4 MG: at 16:37

## 2021-06-02 RX ADMIN — ROCURONIUM BROMIDE 40 MG: 10 INJECTION, SOLUTION INTRAVENOUS at 06:51

## 2021-06-02 RX ADMIN — SODIUM CHLORIDE: 9 INJECTION, SOLUTION INTRAVENOUS at 12:31

## 2021-06-02 RX ADMIN — PREGABALIN 225 MG: 150 CAPSULE ORAL at 15:06

## 2021-06-02 RX ADMIN — LABETALOL HYDROCHLORIDE 5 MG: 5 INJECTION INTRAVENOUS at 10:47

## 2021-06-02 RX ADMIN — FENTANYL CITRATE 50 MCG: 50 INJECTION, SOLUTION INTRAMUSCULAR; INTRAVENOUS at 07:55

## 2021-06-02 RX ADMIN — LABETALOL HYDROCHLORIDE 5 MG: 5 INJECTION INTRAVENOUS at 11:19

## 2021-06-02 RX ADMIN — Medication 3 MG: at 09:15

## 2021-06-02 RX ADMIN — CYCLOBENZAPRINE 10 MG: 10 TABLET, FILM COATED ORAL at 12:40

## 2021-06-02 ASSESSMENT — PULMONARY FUNCTION TESTS
PIF_VALUE: 28
PIF_VALUE: 28
PIF_VALUE: 25
PIF_VALUE: 27
PIF_VALUE: 5
PIF_VALUE: 28
PIF_VALUE: 27
PIF_VALUE: 27
PIF_VALUE: 28
PIF_VALUE: 27
PIF_VALUE: 28
PIF_VALUE: 27
PIF_VALUE: 19
PIF_VALUE: 27
PIF_VALUE: 27
PIF_VALUE: 28
PIF_VALUE: 21
PIF_VALUE: 28
PIF_VALUE: 27
PIF_VALUE: 29
PIF_VALUE: 28
PIF_VALUE: 27
PIF_VALUE: 28
PIF_VALUE: 28
PIF_VALUE: 20
PIF_VALUE: 2
PIF_VALUE: 25
PIF_VALUE: 15
PIF_VALUE: 12
PIF_VALUE: 28
PIF_VALUE: 28
PIF_VALUE: 24
PIF_VALUE: 27
PIF_VALUE: 11
PIF_VALUE: 25
PIF_VALUE: 28
PIF_VALUE: 26
PIF_VALUE: 19
PIF_VALUE: 28
PIF_VALUE: 22
PIF_VALUE: 27
PIF_VALUE: 25
PIF_VALUE: 7
PIF_VALUE: 27
PIF_VALUE: 25
PIF_VALUE: 27
PIF_VALUE: 26
PIF_VALUE: 27
PIF_VALUE: 15
PIF_VALUE: 27
PIF_VALUE: 21
PIF_VALUE: 27
PIF_VALUE: 2
PIF_VALUE: 28
PIF_VALUE: 19
PIF_VALUE: 29
PIF_VALUE: 27
PIF_VALUE: 27
PIF_VALUE: 28
PIF_VALUE: 19
PIF_VALUE: 4
PIF_VALUE: 6
PIF_VALUE: 27
PIF_VALUE: 28
PIF_VALUE: 28
PIF_VALUE: 27
PIF_VALUE: 28
PIF_VALUE: 10
PIF_VALUE: 27
PIF_VALUE: 26
PIF_VALUE: 27
PIF_VALUE: 27
PIF_VALUE: 26
PIF_VALUE: 28
PIF_VALUE: 25
PIF_VALUE: 37
PIF_VALUE: 2
PIF_VALUE: 26
PIF_VALUE: 27
PIF_VALUE: 11
PIF_VALUE: 27
PIF_VALUE: 27
PIF_VALUE: 1
PIF_VALUE: 2
PIF_VALUE: 27
PIF_VALUE: 13
PIF_VALUE: 27
PIF_VALUE: 19
PIF_VALUE: 28
PIF_VALUE: 27
PIF_VALUE: 19
PIF_VALUE: 26
PIF_VALUE: 27
PIF_VALUE: 27
PIF_VALUE: 2
PIF_VALUE: 28
PIF_VALUE: 28
PIF_VALUE: 2
PIF_VALUE: 28
PIF_VALUE: 27
PIF_VALUE: 11
PIF_VALUE: 28
PIF_VALUE: 27
PIF_VALUE: 27
PIF_VALUE: 21
PIF_VALUE: 27
PIF_VALUE: 28
PIF_VALUE: 27
PIF_VALUE: 28
PIF_VALUE: 27
PIF_VALUE: 25
PIF_VALUE: 28
PIF_VALUE: 25
PIF_VALUE: 28
PIF_VALUE: 27
PIF_VALUE: 19
PIF_VALUE: 25
PIF_VALUE: 15
PIF_VALUE: 26
PIF_VALUE: 27
PIF_VALUE: 28
PIF_VALUE: 28
PIF_VALUE: 27
PIF_VALUE: 27
PIF_VALUE: 22
PIF_VALUE: 26
PIF_VALUE: 25
PIF_VALUE: 2
PIF_VALUE: 37
PIF_VALUE: 27
PIF_VALUE: 28
PIF_VALUE: 28
PIF_VALUE: 27
PIF_VALUE: 28
PIF_VALUE: 28
PIF_VALUE: 27
PIF_VALUE: 28
PIF_VALUE: 26
PIF_VALUE: 6
PIF_VALUE: 27
PIF_VALUE: 28
PIF_VALUE: 27
PIF_VALUE: 28
PIF_VALUE: 28
PIF_VALUE: 26
PIF_VALUE: 27

## 2021-06-02 ASSESSMENT — PAIN DESCRIPTION - ORIENTATION
ORIENTATION: ANTERIOR;POSTERIOR
ORIENTATION: ANTERIOR;MID
ORIENTATION: ANTERIOR;MID
ORIENTATION: ANTERIOR;POSTERIOR;RIGHT;LEFT
ORIENTATION: ANTERIOR;MID
ORIENTATION: POSTERIOR;ANTERIOR

## 2021-06-02 ASSESSMENT — PAIN SCALES - GENERAL
PAINLEVEL_OUTOF10: 6
PAINLEVEL_OUTOF10: 0
PAINLEVEL_OUTOF10: 0
PAINLEVEL_OUTOF10: 10
PAINLEVEL_OUTOF10: 8
PAINLEVEL_OUTOF10: 7
PAINLEVEL_OUTOF10: 9
PAINLEVEL_OUTOF10: 3
PAINLEVEL_OUTOF10: 10
PAINLEVEL_OUTOF10: 3
PAINLEVEL_OUTOF10: 8
PAINLEVEL_OUTOF10: 0
PAINLEVEL_OUTOF10: 0
PAINLEVEL_OUTOF10: 7
PAINLEVEL_OUTOF10: 9
PAINLEVEL_OUTOF10: 7

## 2021-06-02 ASSESSMENT — PAIN DESCRIPTION - FREQUENCY
FREQUENCY: CONTINUOUS

## 2021-06-02 ASSESSMENT — PAIN DESCRIPTION - LOCATION
LOCATION: NECK

## 2021-06-02 ASSESSMENT — PAIN DESCRIPTION - PAIN TYPE
TYPE: SURGICAL PAIN
TYPE: ACUTE PAIN;SURGICAL PAIN
TYPE: SURGICAL PAIN
TYPE: SURGICAL PAIN

## 2021-06-02 ASSESSMENT — PAIN DESCRIPTION - ONSET: ONSET: ON-GOING

## 2021-06-02 ASSESSMENT — PAIN DESCRIPTION - DESCRIPTORS
DESCRIPTORS: HEAVINESS;DISCOMFORT
DESCRIPTORS: ACHING;SHARP
DESCRIPTORS: CONSTANT;ACHING;TENDER
DESCRIPTORS: CONSTANT;SHOOTING;ACHING
DESCRIPTORS: HEAVINESS;ACHING

## 2021-06-02 ASSESSMENT — PAIN - FUNCTIONAL ASSESSMENT
PAIN_FUNCTIONAL_ASSESSMENT: PREVENTS OR INTERFERES SOME ACTIVE ACTIVITIES AND ADLS
PAIN_FUNCTIONAL_ASSESSMENT: 0-10
PAIN_FUNCTIONAL_ASSESSMENT: PREVENTS OR INTERFERES SOME ACTIVE ACTIVITIES AND ADLS

## 2021-06-02 ASSESSMENT — PAIN DESCRIPTION - DIRECTION: RADIATING_TOWARDS: SHOULDERS

## 2021-06-02 ASSESSMENT — PAIN DESCRIPTION - PROGRESSION
CLINICAL_PROGRESSION: GRADUALLY IMPROVING
CLINICAL_PROGRESSION: GRADUALLY WORSENING
CLINICAL_PROGRESSION: GRADUALLY IMPROVING

## 2021-06-02 ASSESSMENT — LIFESTYLE VARIABLES: SMOKING_STATUS: 0

## 2021-06-02 NOTE — PROGRESS NOTES
Call placed to [employer]. Ticket to ride, facesheet, order, ICD 10 code, height and weight faxed over.   Electronically signed by Joyce Choe RN on 6/2/2021 at 12:26 PM

## 2021-06-02 NOTE — CONSULTS
Consult       PCP: Robe Dyson MD    Date of Admission: 6/2/2021    Date of Service: Pt seen/examined on 6/2/2021 and is admitted to Inpatient with expected LOS greater than two midnights due to medical therapy. Chief Complaint:  had no chief complaint listed for this encounter. History Of Present Illness:    Mr. Jennie Mccormick, a 44y.o. 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 A 68-year-old male with past medical history significant for chronic generalized pains, headache, migraines who lives by himself with some help by aids. Patient states that he had been having neck issues since 2009, mostly burning/aching and stiffening neck pain, radiating down left side of his back. Patient had topical intra-articular injections with no significant relief. Finally, he decided to go for cervical spine surgery. This surgery was done on 6/2/2021: C4-C5, C5-C6, C6-C7  ANTERIOR CERVICAL DISCECTOMY AND FUSION -- NEEDS REGULAR BED WITH HORSE SHOE, PLATES, SCREWS -- DOCUS  I have seen and examined this patient today in his room. Patient is alert awake oriented x3 and providing most of the information. Complains of some neck pain and discomfort. Denies any chest pain, nausea, vomiting, shortness of breath.       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       Past Surgical History:   Procedure Laterality Date    ABDOMEN SURGERY      nissen fundoplication    CARPAL TUNNEL RELEASE Bilateral     CERVICAL FUSION N/A 6/2/2021    C4-C5, C5-C6, C6-C7  ANTERIOR CERVICAL DISCECTOMY AND FUSION -- NEEDS REGULAR BED WITH HORSE SHOE, PLATES, SCREWS -- GLOBUS performed by Hank Bell MD at [facility] GASTRIC FUNDOPLICATION      [address]  03/37/4250    UMBILICAL- [facility]    INSERT/ REPLACE INFUSN PUMP,PROGRAMMABLE N/A 3/2/2019    SPINAL CORD STIMULATOR REMOVAL performed by Hank Bell MD at [address] [facility] [address]  08/15/2018    implantation of medtronic lumbar spinal cord generator    IN IMPLANT NEUROSTIM/ N/A 8/15/2018    SURGICAL IMPLANTATION OF MEDTRONIC LUMBAR SPINAL CORD  ELECTRODES AND GENERATOR performed by Rodriguez Thomas DO at [address] Right 5/24/2018    RIGHT SHOULDER ARTHROSCOPY, DEBRIDEMENT, SUBACROMIAL DECOMPRESSION, DISTAL CLAVICLE RESECTION, ACROMIOPLASTY performed by Any Underwood MD at [address] [facility] Left 9/28/2018    LEFT SHOULDER ARTHROSCOPY SUBACROMIAL DECOMPRESSION, CROMIAL PLASTY, DISTAL CLAVICAL EXCISION  ++ARTHREX, BEACH CHAIR, SPIDER++ performed by Any Underwood MD at [address] [facility] ARTHROSCOPY Left 1/28/2020    LEFT SHOULDER ARTHROSCOPIC BICEPS TENODESIS, ROTATOR CUFF REPAIR, EXTENSIVE DEBRIDEMENT  +++ARTHREX+++   +++PNB+++ performed by Any Underwood MD at [address] [facility] N/A 2/28/2019    C2 SPINAL CORD STIMULATOR PLACEMENT --MEDTRONIC performed by Hank Bell MD at [address] 1/21/2019    TONSILLECTOMY performed by Allison Rucker DO at [address] [facility] [address]       Prior to Admission medications    Medication Sig Start Date End Date Taking? Authorizing Provider   promethazine-dextromethorphan (PROMETHAZINE-DM) 6.25-15 MG/5ML syrup Take 5 mLs by mouth 4 times daily as needed 4/22/21  Yes Historical Provider, MD   LYRICA 225 MG capsule Take 1 capsule by mouth 2 times daily for 30 days.  4/12/21 6/2/21 Yes Florian Black MD   metFORMIN (GLUCOPHAGE) 500 MG tablet Take 500 mg by mouth 2 times daily (with meals)  8/29/19  Yes Historical Provider, MD   HYDROcodone-acetaminophen (NORCO) 7.5-325 MG per tablet Take 1 tablet by mouth every 8 hours as needed for Pain for up to 30 days. Intended supply: 30 days 5/25/21 6/24/21  Kassidy Gamez MD   SUMAtriptan (IMITREX) 6 MG/0.5ML SOLN injection Inject 6 mg into the skin once as needed for Migraine    Historical Provider, MD   cyclobenzaprine (FLEXERIL) 10 MG tablet Take 1 tablet by mouth 3 times daily as needed for Muscle spasms 10/31/18   Staci Breaker, DO         Allergies:  Penicillins    Social History:    TOBACCO:   reports that he has quit smoking. His smoking use included cigarettes. He quit after 10.00 years of use. He has never used smokeless tobacco.  ETOH:   reports current alcohol use. Family History:    Reviewed in detail and negative for DM, CAD, Cancer, CVA. Positive as follows\"      Problem Relation Age of Onset    No Known Problems Mother     Prostate Cancer Father     No Known Problems Maternal Grandmother     No Known Problems Maternal Grandfather     No Known Problems Paternal Grandmother     No Known Problems Paternal Grandfather     No Known Problems Daughter        REVIEW OF SYSTEMS:   Pertinent positives as noted in the HPI. All other systems reviewed and negative. PHYSICAL EXAM:  BP (!) 162/100   Pulse 84   Temp 97.8 °F (36.6 °C) (Temporal)   Resp 16   Ht 5' 5\" (1.651 m)   Wt 183 lb (83 kg)   SpO2 94%   BMI 30.45 kg/m²   General appearance: No apparent distress, appears stated age and cooperative. HEENT: Normal cephalic, atraumatic without obvious deformity. Pupils equal, round, and reactive to light. Extra ocular muscles intact. Conjunctivae/corneas clear. Neck: Neck collar in place. Respiratory: Clear to auscultation bilateral, no expiratory wheeze or rhonchi  Cardiovascular: S1, S2, no murmurs  Abdomen: Soft, nontender, nondistended  Musculoskeletal: No clubbing, cyanosis, edema of bilateral lower extremities. Brisk capillary refill. Skin: Normal skin color. No rashes or lesions. Neurologic:  Neurovascularly intact without any focal sensory/motor deficits. Cranial nerves: II-XII intact, grossly non-focal.  Psychiatric: Alert and oriented, thought content appropriate, normal insight    Reviewed EKG and CXR personally      CBC:   No results for input(s): WBC, RBC, HGB, HCT, MCV, RDW, PLT in the last 72 hours. BMP: No results for input(s): NA, K, CL, CO2, BUN, CREATININE, MG, PHOS in the last 72 hours. Invalid input(s): CA  LFT:  No results for input(s): PROT, ALB, ALKPHOS, ALT, AST, BILITOT, AMYLASE, LIPASE in the last 72 hours. CE:  No results for input(s): Birder Hof in the last 72 hours. PT/INR: No results for input(s): INR, APTT in the last 72 hours. BNP: No results for input(s): BNP in the last 72 hours.   ESR: No results found for: SEDRATE  CRP: No results found for: CRP  D Dimer: No results found for: DDIMER   Folate and B12: No results found for: NAVRHUFM03, No results found for: FOLATE  Lactic Acid: No results found for: LACTA  Thyroid Studies: No results found for: TSH, X5LOWPN, U7IIRPX, THYROIDAB    Oupatient labs:  Lab Results   Component Value Date    CHOL 230 (H) 09/24/2018    TRIG 103 09/24/2018    HDL 37 09/24/2018    LDLCALC 172 (H) 09/24/2018    INR 1.0 05/26/2021       Urinalysis:    Lab Results   Component Value Date    NITRU Negative 05/26/2021    BLOODU Negative 05/26/2021    SPECGRAV >=1.030 05/26/2021    GLUCOSEU Negative 05/26/2021       ASSESSMENT & PLAN:    Cervical neck pain  Due to herniated disks  Status post C4-C5, C5-C6, C6-C7  ANTERIOR CERVICAL DISCECTOMY AND FUSION -- NEEDS REGULAR BED WITH HORSE SHOE, PLATES, SCREWS -- GLOBUS on 6/2/2021  Pain regimen, bowel regimen, DVT prophylaxis as per neurosurgery  Cheek catheter for 24 hours    Chronic pains  Continue current pain regimen    Prediabetes  Patient is on Metformin, continue    Migraine  Uses Imitrex in case of persistent severe pain    Diet: ADULT DIET; Dysphagia - Soft and Bite Sized  Code Status: Full Code  Surrogate decision maker confirmed with patient:   Extended Emergency Contact Information  Primary Emergency Contact: [address] Phone: [phone]  Mobile Phone: [phone]  Relation: Child    DVT Prophylaxis: []Lovenox []Heparin []PCD [] [address] [facility]  [x]Encouraged ambulation  Disposition: [x]Med/Surg [] Intermediate [] ICU/CCU  Admit status: [] Observation [x] Inpatient     +++++++++++++++++++++++++++++++++++++++++++++++++  Aamir Louie MD  [address]  +++++++++++++++++++++++++++++++++++++++++++++++++  NOTE: This report was transcribed using voice recognition software. Every effort was made to ensure accuracy; however, inadvertent computerized transcription errors may be present.

## 2021-06-02 NOTE — PROGRESS NOTES
Documents faxed to [facility] for custom cervical collar, verified receipt with Ruma Foil

## 2021-06-02 NOTE — PROGRESS NOTES
Saw pt in PAT, reviewed:    Surgical Procedure-reviewed procedure and post-op events:pre-op/PACU, Unit admission, PT/OT evaluation, Discharge Jaleel 18 Stay expectations-reviewed importance of early mobilization. Instructions of Spine Precautions given-PT to review on evaluation. Surgical Pathway Booklet-reviewed and given  Post-op/Discharge Instructions-reviewed activity allowances/restrictions  Use of Incentive Spirometer-instructed on importance of use post-op and continued use @ home to prevent complications. Instructions on use given  Setting realistic pain goals-reaching goal before discharge. ORTHOTICS: as ordered    **Reviewed Cervical Fusion Discharge Instructions    Discharge Plans- home with self/family care. Verbalized understanding of all instructions and questions answered. Contact number given.     Monnie Schilder RN, Spine Navigator  [phone] Office  [phone] Cell

## 2021-06-02 NOTE — OP NOTE
[address]                  [address] [facility], [address] [place] [address]                                OPERATIVE REPORT    PATIENT NAME: Pam Pratt                :        1970  MED REC NO:   [id number]                            ROOM:       5205  ACCOUNT NO:   [de-identified]                           ADMIT DATE: 2021  PROVIDER:     Jie Brady MD    DATE OF PROCEDURE:  2021    PREOPERATIVE DIAGNOSIS:  Cervical herniated nucleus pulposus at C4-C5,  C5-C6, and C6-C7. POSTOPERATIVE DIAGNOSIS:  Cervical herniated nucleus pulposus at C4-C5,  C5-C6, and C6-C7. OPERATIVE PROCEDURES:  1. Anterior cervical diskectomy and fusion at C4-C5, C5-C6, and C6-C7  with use of Globus Forge structured machine allograft and Globus XTEND  plate and 31-OK screws. 2.  Use of intraoperative fluoroscopy interpreted by myself, the  surgeon. 3.  AS modifier for Lizet [facility], who assisted with primary  exposure, primary closure, and retraction of neural elements. ANESTHESIA:  Generalized endotracheal anesthesia. SURGEON:  Jie Brady MD    ASSISTANT:  Lizet Chang [facility]    COMPLICATIONS:  None. ESTIMATED BLOOD LOSS:  50 mL. SPECIMEN:  Disk. OPERATIVE INDICATIONS:  The patient is a 24-year-old gentleman who  presented to the office complaining of neck pain that radiated into his  arms. He had an MRI that showed that he had herniated disk at C4-C5,  C5-C6, and C6-C7. He had failed conservative therapy and after risks,  benefits, and alternatives were discussed with the patient, it was  determined that he would undergo the above-listed procedure. JOHANNA Huerta's services were required as she was the only qualified  assistant to assist with primary exposure, primary closure, and  retraction of neural elements    DESCRIPTION OF OPERATIVE PROCEDURE:  The patient was brought into the  operating room.   A time-out was performed where he was identified by his  name, medical record number, and the operative procedure which he was  about to undergo. Next, induction of generalized endotracheal  anesthesia was then commenced. Upon completion of induction of  generalized endotracheal anesthesia, he received preoperative  antibiotics. Cheek catheter was placed. He was then positioned on the  operating table with his head in a horseshoe and a shoulder roll in  between his shoulder blades. Next, the anterior aspect of his neck was  prepped and draped in the usual sterile fashion. After this was done,  #10 blade was used to make a skin incision. Monopolar cautery was used  to dissect through the subcutaneous tissue. I then undermined the skin  incision both superiorly, inferiorly, medially, and laterally. A  self-retaining Weitlaner retractor was placed into the wound. I then  proceeded to then open up the platysma sharply in a longitudinal  fashion. I carried the dissection through the superficial, middle, and  deep layers of the anterior cervical fascia staying lateral to trachea  and esophagus and medial to the carotid sheath. Once I arrived along  the anterior aspect of the spine, I placed a spinal needle into the  first identifiable disk space. This was the C4-C5 disk space. I then  used a peanut dissector to dissect the prevertebral space. I then  subsequently used monopolar cautery to elevate the longus colli  bilaterally. I placed a self-retaining  retractor into the  wound. After this was done, I placed a [place] post into the C4  vertebral body and another one to the C5 vertebral body. I distracted  across C4-C5 disk space, performed an annulotomy with an 11-blade. I  removed disk material with pituitary rongeurs and curettes. I prepped  both the superior and inferior endplates.   I took down the posterior  longitudinal ligament going from the right uncovertebral to the left  uncovertebral joint and once this was completed, I then placed #7 Globus  rasp, followed by #7 Globus trial and ultimately #7 Globus piece of  structured machine allograft into the C4-C5 disk space. I removed the  Glennie Malling post that was in the C4 vertebral body and placed into the C6  vertebral body. I distracted across the C5-C6 disk space. I performed  an annulotomy with an 11-blade. I removed disk material with pituitary  rongeurs and curettes. I prepared both the superior and inferior  endplates and after this was done, I took down the posterior  longitudinal ligament going the right uncovertebral joint to the left  uncovertebral joint. I then proceeded to then place #6 Globus rasp,  followed by #6 Globus trial and ultimately #6 Globus piece of Forge  structured machine allograft into the C5-C6 disk space. I removed the  Glennie Malling post that was in the C5 vertebral body, placed into the C7  vertebral body. I distracted across the C6-C7 disk space, performed an  annulotomy with an 11-blade. I removed disk material with pituitary  rongeurs and curettes. I prepared both superior and inferior endplates. I took down the posterior longitudinal ligament going from the right  uncovertebral joint to left uncovertebral joint. After this was done, I  then placed #6 Globus rasp, followed by #6 Globus trial and ultimately  #6 Globus piece of Forge machine allograft into the C6-C7 disk space. After this was done, I then placed a Globus XTEND plate along the  anterior aspect of the spine and used a 42-mm plate. I fixed the plate  to the spine using 16-mm screws. I then used intraoperative fluoroscopy  to inspect the construct, it appeared sound. I then subsequently  proceeded to then lock the locking mechanism in place. I then irrigated  the wound copiously with antibiotic-impregnated saline. I obtained  adequate hemostasis with both monopolar and bipolar cautery.   I  proceeded to then close the wound in layers using 2-0 Vicryl for the  platysma, 3-0 Vicryl for the subcutaneous layer, and 4-0 Monocryl in a  subcuticular fashion for the skin. Dermabond was applied over the skin  surface. A dry sterile dressing was placed over this. The patient was  then subsequently extubated and transported to the postanesthesia care  unit in stable condition. There were no complications. Counts were  correct. I was present for the entire case. Please note, Andrew Santoro,  [facility], was the only qualified assistant. She assisted with primary  exposure, primary closure and retraction of neural elements.         Fatmata Lopez MD    D: 06/02/2021 9:20:00       T: 06/02/2021 9:26:36     LUIS [email or web address]  Job#: [id number]     Doc#: [id number]    CC:

## 2021-06-02 NOTE — ANESTHESIA PRE PROCEDURE
mg IVPB in 250 mL D5W addavial  1,000 mg Intravenous On Call to CAROLINE Farrar 250 mL/hr at 06/02/21 0558 1,000 mg at 06/02/21 0558       Allergies:     Allergies   Allergen Reactions    Penicillins      Family has history to penicillin        Problem List:    Patient Active Problem List   Diagnosis Code    Chronic bilateral low back pain with bilateral sciatica M54.42, M54.41, G89.29    Chronic migraine without aura without status migrainosus, not intractable G43.709    Tear of right rotator cuff M75.101    Shoulder impingement, left M75.42    Shoulder impingement, right M75.41    Neck pain M54.2    Radiculopathy, lumbar region M54.16    At risk for bleeding associated with tonsillectomy and adenoidectomy Z91.89, Z98.890    Post-op pain G89.18    Chronic tonsillitis J35.01    Intractable pain R52    Cervical neuropathic pain M54.12    Cervicogenic headache R51.9    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 G43.709    Chronic daily headache R51.9       Past Medical History:        Diagnosis Date    Cervicogenic headache 6/12/2019    Chronic daily headache 6/13/2019    Reported since 2009    Chronic generalized pain     Dermatitis     Lyme disease 2010    Migraines     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 6/13/2019       Past Surgical History:        Procedure Laterality Date    ABDOMEN SURGERY      nissen fundoplication    CARPAL TUNNEL RELEASE Bilateral     COLONOSCOPY      GASTRIC FUNDOPLICATION      0868     HERNIA REPAIR  45/49/7134    UMBILICAL- [facility]    INSERT/ REPLACE INFUSN PUMP,PROGRAMMABLE N/A 3/2/2019    SPINAL CORD STIMULATOR REMOVAL performed by Yadira Brown MD at [facility] HISTORY  08/15/2018    implantation of medtronic lumbar spinal cord generator    [place] IMPLANT NEUROSTIM/ N/A 8/15/2018    SURGICAL IMPLANTATION OF MEDTRONIC LUMBAR SPINAL CORD  ELECTRODES AND GENERATOR performed by Yanick Redd DO at [address] [facility] [place] Right 5/24/2018    RIGHT SHOULDER ARTHROSCOPY, DEBRIDEMENT, SUBACROMIAL DECOMPRESSION, DISTAL CLAVICLE RESECTION, ACROMIOPLASTY performed by Justine Tirado MD at [address] [facility] Left 9/28/2018    LEFT SHOULDER ARTHROSCOPY SUBACROMIAL DECOMPRESSION, CROMIAL PLASTY, DISTAL CLAVICAL EXCISION  ++ARTHREX, BEACH CHAIR, SPIDER++ performed by Justine Tirado MD at [address] [facility] [place] ARTHROSCOPY Left 1/28/2020    LEFT SHOULDER ARTHROSCOPIC BICEPS TENODESIS, ROTATOR CUFF REPAIR, EXTENSIVE DEBRIDEMENT  +++ARTHREX+++   +++PNB+++ performed by Justine Tirado MD at [address] [facility] [address] N/A 2/28/2019    [address] --MEDTRONIC performed by Sinan Simental MD at [facility] N/A 1/21/2019    TONSILLECTOMY performed by Deanie Boxer, DO at [facility] OR       Social History:    Social History     Tobacco Use    Smoking status: Former Smoker     Years: 10.00     Types: Cigarettes    Smokeless tobacco: Never Used    Tobacco comment: quit 1980's    Substance Use Topics    Alcohol use: Yes     Comment: socially- OCCASIONAL                                Counseling given: Not Answered  Comment: quit 1980's       Vital Signs (Current):   Vitals:    06/02/21 0515 06/02/21 0526   BP:  (!) 157/100   Pulse:  96   Resp:  18   Temp:  96.9 °F (36.1 °C)   TempSrc:  Temporal   SpO2:  96%   Weight: 183 lb (83 kg)    Height: 5' 5\" (1.651 m)                                               BP Readings from Last 3 Encounters:   06/02/21 (!) 157/100   05/26/21 (!) 184/100   02/23/21 (!) 130/94       NPO Status: Time of last liquid consumption: 2200                        Time of last solid consumption: 1900                        Date of last liquid consumption: 06/01/21                        Date of last solid food consumption: 06/01/21    BMI:   Wt Readings from Last 3 Encounters:   06/02/21 183 lb (83 kg)   05/21/21 183 lb (83 kg) 02/23/21 187 lb (84.8 kg)     Body mass index is 30.45 kg/m². CBC:   Lab Results   Component Value Date    WBC 6.5 05/26/2021    RBC 6.05 05/26/2021    HGB 13.9 05/26/2021    HCT 43.6 05/26/2021    MCV 72.1 05/26/2021    RDW 16.8 05/26/2021     05/26/2021       CMP:   Lab Results   Component Value Date     05/26/2021    K 4.3 05/26/2021     05/26/2021    CO2 25 05/26/2021    BUN 13 05/26/2021    CREATININE 1.0 05/26/2021    GFRAA >60 05/26/2021    LABGLOM >60 05/26/2021    GLUCOSE 107 05/26/2021    PROT 8.2 02/21/2019    CALCIUM 9.5 05/26/2021    BILITOT 0.8 02/21/2019    ALKPHOS 92 02/21/2019     02/21/2019     02/21/2019       POC Tests: No results for input(s): POCGLU, POCNA, POCK, POCCL, POCBUN, POCHEMO, POCHCT in the last 72 hours. Coags:   Lab Results   Component Value Date    PROTIME 10.8 05/26/2021    INR 1.0 05/26/2021    APTT 32.9 08/07/2018       HCG (If Applicable): No results found for: PREGTESTUR, PREGSERUM, HCG, HCGQUANT     ABGs: No results found for: PHART, PO2ART, QYY5XWU, ELU1OHH, BEART, E8QQULAL     Type & Screen (If Applicable):  No results found for: [facility]    Anesthesia Evaluation  Patient summary reviewed and Nursing notes reviewed no history of anesthetic complications:   Airway: Mallampati: II  TM distance: >3 FB   Neck ROM: full  Mouth opening: > = 3 FB Dental: normal exam         Pulmonary:Negative Pulmonary ROS breath sounds clear to auscultation      (-) not a current smoker                           Cardiovascular:Negative CV ROS  Exercise tolerance: good (>4 METS),         ECG reviewed  Rhythm: regular  Rate: normal           Beta Blocker:  Not on Beta Blocker         Neuro/Psych:   (+) neuromuscular disease (Muscle pain due to lyme disease.):, headaches: migraine headaches,              ROS comment: Nerve stimulator for back pain, Left side and patient shut it off this morning.   Chronic pain lyme disease GI/Hepatic/Renal: Neg

## 2021-06-02 NOTE — H&P
I have examined the patient and reviewed the H and P and no changes are noted.   Left arm is worse    Skyla Villalta MD

## 2021-06-02 NOTE — PROGRESS NOTES
Message sent to [facility] physicians, Cori Arcos NP, and notified of New consult from Dr. Orion Maya for medical management. patient had C4-C5, C5-C6, C6-C7 ANTERIOR CERVICAL DISCECTOMY AND FUSION. Patient's PCP is Dr. Jesenia Sherman.   Electronically signed by Cristiano Holm RN on 6/2/2021 at 1:02 PM

## 2021-06-02 NOTE — PLAN OF CARE
Problem: Cerebrospinal Fluid Leakage - Risk Of:  Goal: Absence of cerebrospinal fluid drainage at surgical site  Description: Absence of cerebrospinal fluid drainage at surgical site  Outcome: Met This Shift     Problem: Infection - Surgical Site:  Goal: Will show no infection signs and symptoms  Description: Will show no infection signs and symptoms  Outcome: Met This Shift     Problem: Mobility - Impaired:  Goal: Mobility will improve to maximum level  Description: Mobility will improve to maximum level  Outcome: Ongoing     Problem: Pain:  Goal: Control of acute pain  Description: Control of acute pain  Outcome: Met This Shift

## 2021-06-02 NOTE — PROGRESS NOTES
to BUE and BLE. Reports BUE is chronic and improved since surgery but experiencing new numbness/tingling to BLE at this time. Edema:  unremarkable    Patient education  Pt educated on role of PT intervention. Pt educated on safety in room with utilization of call light for assistance with mobility. Pt educated on importance of maximizing OOB time by transferring to bedside chair for meals and ambulating to bathroom/transferring to bedside commode with assistance from nursing and therapy staff to increase functional activity tolerance and overall functional independence. Pt educated on spine precautions and log roll technique. Patient response to education:   Pt verbalized understanding Pt demonstrated skill Pt requires further education in this area   yes yes yes     ASSESSMENT:    Conditions Requiring Skilled Therapeutic Intervention:    [x]Decreased strength     [x]Decreased ROM  [x]Decreased functional mobility  [x]Decreased balance   [x]Decreased endurance   []Decreased posture  [x]Decreased sensation  []Decreased coordination   []Decreased vision  []Decreased safety awareness   [x]Increased pain       Comments:  RN cleared pt for activity prior to session. Pt received supine in bed and agreeable to PT intervention at this time. Pt performed all functional mobility as noted above. Pt post-op cervical fusion. He reports lengthy history with chronic pain secondary to lyme disease. Currently experiencing some significant post-op pain and only tolerated light activity. Pt assisted into standing position but was unable to advance BLE for ambulation/transfers. Increased time and effort to complete bed mobility and scoot towards EOB. Pt returned to supine at end of session and left with all needs met and call light in reach. Pt requires continued skilled PT intervention for the purposes of maximizing functional mobility and independence by addressing deficits described above.      Treatment:  Patient practiced and was instructed in the following treatment:     Therapeutic Activities Completed:  o Functional mobility as noted above:   - Bed mobility: as noted above. Max VC and hand over hand guidance to facilitate efficient use of BUE on bed rail to promote more independent completion of task. Max VC for spine precautions. Educated on log roll, but pt unable to tolerate rolling at this time. - Transfer training: sit<>stand: modA from EOB. Cues for proper use of BUE to promote independent and safe completion of task. Static standing held x 1 minute with occasional attempt to step forward but pt was unable.  o Skilled repositioning in supine with HOB elevated for pt comfort and spine neutral posturing. o Pt education as noted above. Pt's/ family goals   1. Return home independently    Prognosis is good for reaching above PT goals. Patient and or family understand(s) diagnosis, prognosis, and plan of care. yes    PHYSICAL THERAPY PLAN OF CARE:    PT POC is established based on physician order and patient diagnosis     Referring provider/PT Order:    06/02/21 1230  PT eval and treat Start: 06/02/21 1230, End: 06/02/21 1230, ONE TIME, Standing Count: 1 Occurrences, R      Telma Juarez MD     Diagnosis:  Cervical herniated disc [M50.20]  Specific instructions for next treatment:  Increase ambulation tolerance. Pain management strategies. Reinforce log roll technique.      Current Treatment Recommendations:     [x] Strengthening to improve independence with functional mobility   [x] ROM to improve independence with functional mobility   [x] Balance Training to improve static/dynamic balance and to reduce fall risk  [x] Endurance Training to improve activity tolerance during functional mobility   [x] Transfer Training to improve safety and independence with all functional transfers   [x] Gait Training to improve gait mechanics, endurance and assess need for appropriate assistive device  [x] Stair Training in preparation for safe discharge home and/or into the community   [x] Positioning to prevent skin breakdown and contractures  [x] Safety and Education Training   [x] Patient/Caregiver Education   [] HEP  [] Other     PT long term treatment goals are located in above grid    Frequency of treatments: 2-5x/week x 1-2 weeks. Time in  1552  Time out  1605    Total Treatment Time  5 minutes     Evaluation Time includes thorough review of current medical information, gathering information on past medical history/social history and prior level of function, completion of standardized testing/informal observation of tasks, assessment of data and education on plan of care and goals.     CPT codes:  [x] Low Complexity PT evaluation 32649  [] Moderate Complexity PT evaluation 32751  [] High Complexity PT evaluation 52929  [] PT Re-evaluation 30487  [] Gait training 14303 0 minutes  [] Manual therapy 48254 0 minutes  [x] Therapeutic activities 93554 5 minutes  [] Therapeutic exercises 26690 0 minutes  [] Neuromuscular reeducation 94506 0 minutes     Terrance Ma, PT, DPT  [id number]

## 2021-06-02 NOTE — BRIEF OP NOTE
Brief Postoperative Note      Patient: Staci Bar  YOB: 1970  MRN: [id number]    Date of Procedure: 6/2/2021    Pre-Op Diagnosis: HERNIATED DISCS    Post-Op Diagnosis: Same       Procedure(s):  C4-C5, C5-C6, C6-C7  ANTERIOR CERVICAL DISCECTOMY AND FUSION -- NEEDS REGULAR BED WITH HORSE SHOE, PLATES, SCREWS -- GLOBUS    Surgeon(s):  Richard Fabian MD    Assistant:  Physician Assistant: Hartford Goltz, PA-C    Anesthesia: General    Estimated Blood Loss (mL): less than 50     Complications: None    Specimens:   ID Type Source Tests Collected by Time Destination   A : CERVICAL DISC  Tissue Tissue SURGICAL PATHOLOGY Richard Fabian MD 6/2/2021 9105        Implants:  Implant Name Type Inv. Item Serial No.  Lot No. LRB No. Used Action   GRAFT BNE SUB [id number] CANC CORY ANT CERV INTBDY FUS  GRAFT BNE SUB [id number] CANC CORY ANT CERV INTBDY FUS  [employer]  N/A 1 Implanted   SPACER SPNL 0 DEG [id number] MM CERV FORGE  SPACER SPNL 0 DEG [id number] MM CERV FORGE  [employer]  N/A 1 Implanted   SPACER SPNL 0 DEG [id number] MM CERV FORGE  SPACER SPNL 0 DEG [id number] MM CERV FORGE  [employer]  N/A 1 Implanted   PLATE SPNL [id number] ANTR CERV TI ALLOY LEV 3XTEND  PLATE SPNL [id number] ANTR CERV TI ALLOY LEV 3XTEND  [employer]  N/A 1 Implanted   SCREW SPNL L14MM DIA4. 2MM ANT CERV TI ALLY SELF DRL MING ANG  SCREW SPNL L14MM DIA4. 2MM ANT CERV TI ALLY SELF DRL MING ANG  [employer]  N/A 2 Implanted   SCREW SPNL L16MM DIA4. 2MM ANT CERV TI ALLY SELF DRL MING ANG  SCREW SPNL L16MM DIA4. 2MM ANT CERV TI ALLY SELF DRL MING ANG  [employer]  N/A 6 Implanted         Drains:   Urethral Catheter Non-latex 16 fr (Active)       Findings: see dictated op note    Electronically signed by Eder Arriaga MD on 6/2/2021 at 9:11 AM

## 2021-06-02 NOTE — ANESTHESIA POSTPROCEDURE EVALUATION
Department of Anesthesiology  Postprocedure Note    Patient: Erika Ceballos  MRN: [id number]  YOB: 1970  Date of evaluation: 6/2/2021  Time:  12:35 PM     Procedure Summary     Date: 06/02/21 Room / Location: [facility] OR 06 / [facility]    Anesthesia Start: 8056 Anesthesia Stop: 1061    Procedure: C4-C5, C5-C6, C6-C7  ANTERIOR CERVICAL DISCECTOMY AND FUSION -- NEEDS REGULAR BED WITH HORSE SHOE, PLATES, SCREWS -- GLOBUS (N/A Neck) Diagnosis: (HERNIATED DISCS)    Surgeons: Ritu Campos MD Responsible Provider: Starla Henry MD    Anesthesia Type: general ASA Status: 2          Anesthesia Type: general    Philippe Phase I: Philippe Score: 9    Philippe Phase II:      Last vitals: Reviewed and per EMR flowsheets.        Anesthesia Post Evaluation    Patient location during evaluation: PACU  Patient participation: complete - patient participated  Level of consciousness: awake and alert  Airway patency: patent  Nausea & Vomiting: no nausea and no vomiting  Complications: no  Cardiovascular status: hemodynamically stable  Respiratory status: acceptable  Hydration status: euvolemic

## 2021-06-03 LAB
METER GLUCOSE: 114 MG/DL (ref 74–99)
METER GLUCOSE: 132 MG/DL (ref 74–99)
METER GLUCOSE: 62 MG/DL (ref 74–99)
METER GLUCOSE: 95 MG/DL (ref 74–99)

## 2021-06-03 PROCEDURE — 97165 OT EVAL LOW COMPLEX 30 MIN: CPT

## 2021-06-03 PROCEDURE — 1200000000 HC SEMI PRIVATE

## 2021-06-03 PROCEDURE — 97535 SELF CARE MNGMENT TRAINING: CPT

## 2021-06-03 PROCEDURE — 96375 TX/PRO/DX INJ NEW DRUG ADDON: CPT

## 2021-06-03 PROCEDURE — 6360000002 HC RX W HCPCS: Performed by: PHYSICIAN ASSISTANT

## 2021-06-03 PROCEDURE — 6360000002 HC RX W HCPCS: Performed by: NEUROLOGICAL SURGERY

## 2021-06-03 PROCEDURE — 96376 TX/PRO/DX INJ SAME DRUG ADON: CPT

## 2021-06-03 PROCEDURE — 97530 THERAPEUTIC ACTIVITIES: CPT

## 2021-06-03 PROCEDURE — 99253 IP/OBS CNSLTJ NEW/EST LOW 45: CPT | Performed by: PHYSICAL MEDICINE & REHABILITATION

## 2021-06-03 PROCEDURE — 82962 GLUCOSE BLOOD TEST: CPT

## 2021-06-03 PROCEDURE — 2580000003 HC RX 258: Performed by: NEUROLOGICAL SURGERY

## 2021-06-03 PROCEDURE — 6370000000 HC RX 637 (ALT 250 FOR IP): Performed by: NEUROLOGICAL SURGERY

## 2021-06-03 PROCEDURE — 96365 THER/PROPH/DIAG IV INF INIT: CPT

## 2021-06-03 RX ORDER — SODIUM CHLORIDE 9 MG/ML
INJECTION, SOLUTION INTRAVENOUS CONTINUOUS
Status: DISCONTINUED | OUTPATIENT
Start: 2021-06-03 | End: 2021-06-06

## 2021-06-03 RX ORDER — DEXAMETHASONE SODIUM PHOSPHATE 4 MG/ML
4 INJECTION, SOLUTION INTRA-ARTICULAR; INTRALESIONAL; INTRAMUSCULAR; INTRAVENOUS; SOFT TISSUE EVERY 6 HOURS
Status: DISCONTINUED | OUTPATIENT
Start: 2021-06-03 | End: 2021-06-06

## 2021-06-03 RX ADMIN — SENNOSIDES AND DOCUSATE SODIUM 1 TABLET: 8.6; 5 TABLET ORAL at 08:48

## 2021-06-03 RX ADMIN — VANCOMYCIN HYDROCHLORIDE 1000 MG: 1 INJECTION, POWDER, LYOPHILIZED, FOR SOLUTION INTRAVENOUS at 13:03

## 2021-06-03 RX ADMIN — OXYCODONE HYDROCHLORIDE 10 MG: 10 TABLET ORAL at 06:48

## 2021-06-03 RX ADMIN — Medication 10 ML: at 08:54

## 2021-06-03 RX ADMIN — CYCLOBENZAPRINE 10 MG: 10 TABLET, FILM COATED ORAL at 14:51

## 2021-06-03 RX ADMIN — SODIUM CHLORIDE, PRESERVATIVE FREE 10 ML: 5 INJECTION INTRAVENOUS at 22:02

## 2021-06-03 RX ADMIN — DIPHENHYDRAMINE HYDROCHLORIDE 25 MG: 50 INJECTION, SOLUTION INTRAMUSCULAR; INTRAVENOUS at 17:10

## 2021-06-03 RX ADMIN — VANCOMYCIN HYDROCHLORIDE 1000 MG: 1 INJECTION, POWDER, LYOPHILIZED, FOR SOLUTION INTRAVENOUS at 01:33

## 2021-06-03 RX ADMIN — Medication 4 MG: at 10:43

## 2021-06-03 RX ADMIN — OXYCODONE HYDROCHLORIDE 10 MG: 10 TABLET ORAL at 11:53

## 2021-06-03 RX ADMIN — OXYCODONE HYDROCHLORIDE 10 MG: 10 TABLET ORAL at 15:54

## 2021-06-03 RX ADMIN — BISACODYL 5 MG: 5 TABLET, COATED ORAL at 08:47

## 2021-06-03 RX ADMIN — Medication 4 MG: at 12:50

## 2021-06-03 RX ADMIN — DEXAMETHASONE SODIUM PHOSPHATE 4 MG: 4 INJECTION, SOLUTION INTRA-ARTICULAR; INTRALESIONAL; INTRAMUSCULAR; INTRAVENOUS; SOFT TISSUE at 22:03

## 2021-06-03 RX ADMIN — SODIUM CHLORIDE: 9 INJECTION, SOLUTION INTRAVENOUS at 22:04

## 2021-06-03 RX ADMIN — METFORMIN HYDROCHLORIDE 500 MG: 500 TABLET ORAL at 16:52

## 2021-06-03 RX ADMIN — Medication 10 ML: at 21:13

## 2021-06-03 RX ADMIN — Medication 4 MG: at 21:12

## 2021-06-03 RX ADMIN — PREGABALIN 225 MG: 150 CAPSULE ORAL at 08:47

## 2021-06-03 RX ADMIN — Medication 4 MG: at 14:48

## 2021-06-03 RX ADMIN — Medication 4 MG: at 04:05

## 2021-06-03 RX ADMIN — METFORMIN HYDROCHLORIDE 500 MG: 500 TABLET ORAL at 08:56

## 2021-06-03 RX ADMIN — SODIUM CHLORIDE, PRESERVATIVE FREE 10 ML: 5 INJECTION INTRAVENOUS at 04:06

## 2021-06-03 RX ADMIN — POLYETHYLENE GLYCOL 3350 17 G: 17 POWDER, FOR SOLUTION ORAL at 08:53

## 2021-06-03 RX ADMIN — DIPHENHYDRAMINE HYDROCHLORIDE 25 MG: 50 INJECTION, SOLUTION INTRAMUSCULAR; INTRAVENOUS at 10:43

## 2021-06-03 RX ADMIN — Medication 4 MG: at 08:48

## 2021-06-03 RX ADMIN — Medication 4 MG: at 00:03

## 2021-06-03 RX ADMIN — DIPHENHYDRAMINE HYDROCHLORIDE 25 MG: 50 INJECTION, SOLUTION INTRAMUSCULAR; INTRAVENOUS at 04:05

## 2021-06-03 RX ADMIN — Medication 4 MG: at 16:52

## 2021-06-03 RX ADMIN — CYCLOBENZAPRINE 10 MG: 10 TABLET, FILM COATED ORAL at 06:48

## 2021-06-03 ASSESSMENT — PAIN SCALES - GENERAL
PAINLEVEL_OUTOF10: 8
PAINLEVEL_OUTOF10: 8
PAINLEVEL_OUTOF10: 7
PAINLEVEL_OUTOF10: 8
PAINLEVEL_OUTOF10: 10
PAINLEVEL_OUTOF10: 6
PAINLEVEL_OUTOF10: 8
PAINLEVEL_OUTOF10: 8
PAINLEVEL_OUTOF10: 7
PAINLEVEL_OUTOF10: 10
PAINLEVEL_OUTOF10: 8
PAINLEVEL_OUTOF10: 8
PAINLEVEL_OUTOF10: 9
PAINLEVEL_OUTOF10: 7
PAINLEVEL_OUTOF10: 7
PAINLEVEL_OUTOF10: 8
PAINLEVEL_OUTOF10: 0
PAINLEVEL_OUTOF10: 7
PAINLEVEL_OUTOF10: 0

## 2021-06-03 ASSESSMENT — PAIN DESCRIPTION - DIRECTION: RADIATING_TOWARDS: SHOULDERS

## 2021-06-03 ASSESSMENT — PAIN DESCRIPTION - ORIENTATION
ORIENTATION: ANTERIOR;POSTERIOR
ORIENTATION: ANTERIOR;POSTERIOR

## 2021-06-03 ASSESSMENT — PAIN DESCRIPTION - DESCRIPTORS
DESCRIPTORS: ACHING;CONSTANT;SORE
DESCRIPTORS: ACHING;CONSTANT;TENDER

## 2021-06-03 ASSESSMENT — PAIN DESCRIPTION - LOCATION
LOCATION: NECK
LOCATION: NECK

## 2021-06-03 ASSESSMENT — PAIN DESCRIPTION - FREQUENCY: FREQUENCY: CONTINUOUS

## 2021-06-03 ASSESSMENT — PAIN DESCRIPTION - PROGRESSION: CLINICAL_PROGRESSION: NOT CHANGED

## 2021-06-03 ASSESSMENT — PAIN DESCRIPTION - ONSET: ONSET: ON-GOING

## 2021-06-03 ASSESSMENT — PAIN - FUNCTIONAL ASSESSMENT: PAIN_FUNCTIONAL_ASSESSMENT: PREVENTS OR INTERFERES SOME ACTIVE ACTIVITIES AND ADLS

## 2021-06-03 ASSESSMENT — PAIN DESCRIPTION - PAIN TYPE
TYPE: SURGICAL PAIN
TYPE: SURGICAL PAIN

## 2021-06-03 NOTE — PROGRESS NOTES
Occupational Therapy  OCCUPATIONAL THERAPY INITIAL EVALUATION    [facility] [address] [address] [address], [address] [place] [address], [address] [place] [address]                                                Patient Name: Ronni Heck  MRN: [id number]  : 1970  Room: [address] [place] [address]    Evaluating OT: Zen Canales OTR/L #[id number]     Referring Provider: Julio Workman MD  Specific Provider Orders/Date: OT eval and treat 21    Diagnosis: Cervical Herniated Disc   Pt admitted to hospital for surgical consult    Surgery / Procedure: 21: C4-C5, C5-C6, C6-C7  ANTERIOR CERVICAL DISCECTOMY AND FUSION -- NEEDS REGULAR BED WITH HORSE SHOE, PLATES, SCREWS -- GLOBUS (N/A Neck)    Pertinent Medical History: Chronic generalized pain, Lyme Disease, Migraines     Precautions:  Fall Risk, Cervical Spinal Precautions, C-Collar    Assessment of current deficits    [x] Functional mobility  [x]ADLs  [x] Strength               []Cognition    [x] Functional transfers   [x] IADLs         [x] Safety Awareness   [x]Endurance    [] Fine Coordination              [x] Balance      [] Vision/perception   []Sensation     []Gross Motor Coordination  [] ROM  [] Delirium                   [] Motor Control     OT PLAN OF CARE   OT POC based on physician orders, patient diagnosis and results of clinical assessment    Frequency/Duration: 1-3 days/wk for 2 weeks PRN   Specific OT Treatment Interventions to include:   * Instruction/training on adapted ADL techniques and AE recommendations to increase functional independence within        precautions  * Training on energy conservation strategies, correct breathing pattern and techniques to improve independence/tolerance for self-care routine  * Functional transfer/mobility training/DME recommendations for increased independence, safety, and fall prevention  * Patient/Family education to increase follow through with safety techniques and functional independence  * Recommendation of environmental modifications for increased safety with functional transfers/mobility and ADLs  * Therapeutic activities to facilitate/challenge dynamic balance, stand tolerance for increased safety and independence with ADLs  * Therapeutic activities to facilitate gross/fine motor skills for increased independence with ADLs  * Positioning to improve skin integrity, interaction with environment and functional independence      Recommended Adaptive Equipment: TBD     Home Living: Pt lives alone in a 1 story home with 3 ALBERT (1HR) to enter, pt reports HHA 10 hours/day    Bathroom setup: tub/shower combo    Equipment owned: [facility]    Prior Level of Function: Mod I with ADLs , Assistance with IADLs; ambulated without AD   Driving: no   Occupation: musician     Pain Level: 9/10 neck/generalized; Therapist facilitated repositioning to address pain    Cognition: A&O: 4/4; Follows 2 step directions   Memory:  Good   Sequencing:  Fair   Problem solving:  Fair   Judgement/safety:  Fair     Functional Assessment:  AM-PAC Daily Activity Raw Score: 15/24   Initial Eval Status  Date: 6/3/21 Treatment Status  Date: STGs = LTGs  Time frame: 10-14 days   Feeding Independent      Grooming SBA    Seated in chair  Independent    UB Dressing Minimal Assist     Seated in bed, pt c/o pain  Independent    LB Dressing Dependent     D/t spinal precautions  Minimal Assist    Bathing Maximal Assist  Minimal Assist    Toileting Maximal Assist  Minimal Assist    Bed Mobility  Supine to sit: Stand by Assist (HOB elevated)  Sit to supine: NT    Pt in chair at end of session (RN notified)  Supine to sit: Mod I   Sit to supine:  Mod I   Functional Transfers Minimal Assist     Modified [place]    Functional Mobility Moderate Assist     Functional mobility using w/w household distance; in preparation for ambulation to/from bathroom; limited due to c/o pain  Modified [place]    Balance Sitting:     Static:  SBA    Dynamic:Min A  Standing: Min A  Sitting:     Static:  Independent    Dynamic:Independent  Standing: Mod I   Activity Tolerance F-  F+   Visual/  Perceptual Glasses: yes, wfl                  Hand Dominance Left   AROM (PROM) Strength Additional Info:    RUE  WFL NT d/t spinal precautions, grossly wfl good  and wfl FMC/dexterity noted during ADL tasks       LUE WFL NT d/t spinal precautions, grossly wfl good  and wfl FMC/dexterity noted during ADL tasks       Hearing: WFL   Sensation:  No c/o numbness or tingling   Tone: WFL   Edema: WFL    Comments: Nursing clearance obtained prior to session. Upon arrival patient semi-supine in bed and agreeable to OT session. Therapist educated pt on role of OT. At end of session, patient seated in chair (RN notified) with call light and phone within reach, all lines and tubes intact. Overall patient demonstrated decreased independence and safety during completion of ADL/functional transfer/mobility tasks. Pt would benefit from continued skilled OT to increase safety and independence with completion of ADL/IADL tasks for functional independence and quality of life. Treatment: OT treatment provided this date includes: Therapist educated pt on role of OT and cervical spinal precautions. Therapist facilitated bed mobility (supine to sit with HOB elevated, scooting), functional transfers from multiple surfaces (sit<>stands to/from bed/chair), graded functional activities (static/dynamic sitting at EOB/chair, static/dynamic standing, functional reaching) within precautions to address activity tolerance and functional ambulation using w/w (short household distance). Therapist provided moderate verbal/tactile cueing on w/w management, adherence to precautions, sequencing, hand placement, body mechanics, posture, energy conservation, and safety.  Therapist facilitated ADL retraining (UB dressing (don/doff gown), LB dressing (socks - cueing on modified techniques within precautions), grooming task (B wiping face while seated in chair)). Therapist provided moderate verbal/tactile cueing on adherence to precautions, sequencing, hand placement, body mechanics, posture, energy conservation, and safety. Therapist educated pt on modified dressing techniques, energy conservation, cervical spinal precautions, and safety. Skilled monitoring of O2 sats, HR, and pt response throughout treatment. Rehab Potential: Good for established goals     Patient / Family Goal: not stated      Patient and/or family were instructed on functional diagnosis, prognosis/goals and OT plan of care. Demonstrated Fair understanding. Eval Complexity: Low    Time In: 0740  Time Out: 0820  Total Treatment Time: 23minutes    Min Units   OT Eval Low 97165  X  1   OT Eval Medium 07956      OT Eval High 41686      OT Re-Eval [id number]       Therapeutic Ex 07538       Therapeutic Activities 87972  12  1   ADL/Self Care 27201 11  1   Orthotic Management 01945       Manual 69019     Neuro Re-Ed 62304       Non-Billable Time          Evaluation Time additionally includes thorough review of current medical information, gathering information on past medical history/social history and prior level of function, interpretation of standardized testing/informal observation of tasks, assessment of data and development of plan of care and goals.         Everette Perez, S/OT    Titi Jerome OTR/L #[id number]

## 2021-06-03 NOTE — PROGRESS NOTES
Per Dr Barron Norwood, patient stated that he was not able or willing to participate in 3 hours of therapy per day. Had poor tolerance of physical exam. Rec TUR at discharge. Will not follow.

## 2021-06-03 NOTE — PLAN OF CARE
Problem: Cerebrospinal Fluid Leakage - Risk Of:  Goal: Absence of cerebrospinal fluid drainage at surgical site  Description: Absence of cerebrospinal fluid drainage at surgical site  6/2/2021 1646 by Odilon Joaquin RN  Outcome: Met This Shift     Problem: Infection - Surgical Site:  Goal: Will show no infection signs and symptoms  Description: Will show no infection signs and symptoms  6/3/2021 0223 by aDyo Reno RN  Outcome: Met This Shift  6/2/2021 1646 by Odilon Joaquin RN  Outcome: Met This Shift     Problem: Pain:  Goal: Pain level will decrease  Description: Pain level will decrease  Outcome: Met This Shift  Goal: Control of acute pain  Description: Control of acute pain  6/3/2021 0223 by Dayo Reno RN  Outcome: Met This Shift  6/2/2021 1646 by Odilon Joaquin RN  Outcome: Met This Shift     Problem: Sensory Perception - Impaired:  Goal: Sensory function intact, lower extremity  Description: Sensory function intact, lower extremity  Outcome: Met This Shift  Goal: Ability to demonstrate correct body alignment while lying, sitting, and standing will improve  Description: Ability to demonstrate correct body alignment while lying, sitting, and standing will improve  Outcome: Met This Shift     Problem: Nutritional:  Goal: Consumption of food in small portions  Description: Consumption of food in small portions  Outcome: Met This Shift     Problem: Respiratory:  Goal: Will remain free from infection  Description: Will remain free from infection  Outcome: Met This Shift     Problem: Safety:  Goal: Ability to chew and swallow food without choking will improve  Description: Ability to chew and swallow food without choking will improve  Outcome: Met This Shift     Problem: Falls - Risk of:  Goal: Will remain free from falls  Description: Will remain free from falls  Outcome: Met This Shift  Goal: Absence of physical injury  Description: Absence of physical injury  Outcome: Met This Shift

## 2021-06-03 NOTE — PROGRESS NOTES
negotiation: ascended and descended  NT NT 5 steps with 1 rail Rip   ROM BUE:  Per OT note  BLE:  WNL     Strength BUE:  Per OT note  BLE:  WFL     Balance Sitting EOB:  SBA  Dynamic Standing:  NT Sitting EOB:  SBA  Dynamic Standing:  Min<>Mod A using [employer] Sitting EOB:  Independent  Dynamic Standing:  Independent     Pt is A & O x 4  Sensation:  Pt reports numbness/tingling to BUE and BLE. Reports BUE is chronic and improved since surgery but experiencing new numbness/tingling to BLE at this time. Edema:  unremarkable    Patient education  Pt educated on spinal precautions, benefits and importance of increasing OOB activity and being upright. Pt required cues for transfer technique and gait training. Patient response to education:   Pt verbalized understanding Pt demonstrated skill Pt requires further education in this area   yes Requires assist yes     ASSESSMENT:    Comments:  RN cleared pt for activity prior to session. Pt received sitting in bed with HOB fully elevated. Pt was agreeable to PT intervention at this time, but insisted on PT/OT being completed at the same time due \"I'm only doing this once\". Pt required very extended time to transfer to sitting EOB due to perseverating on pain and being very pain dominant. Pt requires cues to use BUE to assist with mobility. Pt required lift and steadying assist once in standing. Cues to keep eyes open during all activity. Pt reported his \"back being off balance and wobbly\" with amb. Pt required assist for [employer] management/advancement and very slow gait speed noted. Pt with minimal BLE clearance and foot flat placement. L knee instability noted with amb and pt reported not being able to amb any further. Chair brought up behind pt and pt declined amb back to bedside following rest.  Pt assisted back to room and was left sitting up with call button within reach. Reviewed benefits of being up and OOB. Neural glides performed with cueing to complete.   RN notified of pt's performance. Pt would benefit from continued therapy services to address deficits. Treatment:  Patient practiced and was instructed in the following treatment:     Bed mobility training - pt given verbal and tactile cues to facilitate proper sequencing and safety during rolling and supine>sit   Sitting Balance EOB for >10 minutes for improved postural awareness, upright tolerance, effective breathing and decreased in light headedness.  Transfer training with VCs for hand placement, sequencing and technique. Physical assist to complete task and correct LOB and unsteadiness upon standing.  Standing balance retraining with VCs for upright posture and forward gaze. Physical assist to correct unsteadiness. Standing weight shifts to R/L and mini marches completed with physical assist.    · Gait training- pt was given verbal and tactile cues to facilitate increased step height and body positioning inside walker during ambulation as well as provided with physical assistance to complete task. · Therapeutic Exercises/Activities as noted above. Neural glides performed with AAROM and cueing.  Patient education provided with focus on upright tolerance, safety, correcting posterior LOB and benefits of participating with therapy. PHYSICAL THERAPY PLAN OF CARE:  Patient is making good progress towards established PT goals. Will continue with current POC.      Time in  0745   Time out  0825    Total Treatment Time  40  minutes     CPT codes:  [] PT Re-evaluation 14689  [] Gait training 29234 0 minutes  [] Manual therapy 68816 0 minutes  [x] Therapeutic activities 44926 40 minutes  [] Therapeutic exercises 93259 0 minutes  [] Neuromuscular reeducation 78222 0 minutes     Maris Funez PT, DPT  License [id number]

## 2021-06-03 NOTE — CONSULTS
PM&R Consult Note  Patient: Imelda Guzman  Age/sex: 44 y.o. male  Medical Record #: [id number]      Referring physician: Hank Bell MD  Consulting physician: Dr. Dale John MD  Primary care provider: Marcie Diamond MD      Chief complaint:   Impairments and acitivities limitations in ADLs and mobility secondary to cervical herniated discs with cervical stenosis, now s/p C4-C7 ACDF     HPI:   Imelda Guzman is a 44 y.o. male with history of chronic neck pain as well as chronic generalized pain since being diagnosed with Lyme disease in 2009. Patient was found to have multilevel cervical stenosis. He presented to [facility] on 6/2/2021 and underwent C4-C7 ACDF. Post op course noted for severe pain. Patient is very fixated on pain, reporting incisional pain as well as \"whole body\" pain. He is taking IV morphine 4mg every 2 hours and reports that his pain is not controlled. He participated in therapy evaluations, PT and OT eval'd together as patient insisted he was Zhane Anjum doing this once\". He reports poor tolerance of therapy today, states he was able to get up and ambulate once but had to go back to bed due to severe pain, stating therapy caused him to cry due to the pain. I attempted to discuss rehab options and patient requesting TR[facility], states he does not feel he could do 3 hours of therapy daily, as his short evaluation today was \"incredibly awful\".        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     Past Surgical History:   Procedure Laterality Date    ABDOMEN SURGERY      nissen fundoplication    CARPAL TUNNEL RELEASE Bilateral     CERVICAL FUSION N/A 6/2/2021    C4-C5, C5-C6, C6-C7  ANTERIOR CERVICAL DISCECTOMY AND FUSION -- NEEDS REGULAR BED WITH HORSE SHOE, PLATES, SCREWS -- GLOBUS performed by Telma Juarez MD at [facility] GASTRIC FUNDOPLICATION      1546     HERNIA REPAIR  99/27/1326    UMBILICAL- [facility]    INSERT/ REPLACE INFUSN PUMP,PROGRAMMABLE N/A 3/2/2019    SPINAL CORD STIMULATOR REMOVAL performed by Telma Juarez MD at [address] [facility]  08/15/2018    implantation of medtronic lumbar spinal cord generator    DC IMPLANT NEUROSTIM/ N/A 8/15/2018    SURGICAL IMPLANTATION OF MEDTRONIC LUMBAR SPINAL CORD  ELECTRODES AND GENERATOR performed by Shameka Tomlin DO at [address] Right 5/24/2018    RIGHT SHOULDER ARTHROSCOPY, DEBRIDEMENT, SUBACROMIAL DECOMPRESSION, DISTAL CLAVICLE RESECTION, ACROMIOPLASTY performed by Unique Reyes MD at [address] [facility] Left 9/28/2018    LEFT SHOULDER ARTHROSCOPY SUBACROMIAL DECOMPRESSION, CROMIAL PLASTY, DISTAL CLAVICAL EXCISION  ++ARTHREX, BEACH CHAIR, SPIDER++ performed by Unique Reyes MD at [address] [facility] ARTHROSCOPY Left 1/28/2020    LEFT SHOULDER ARTHROSCOPIC BICEPS TENODESIS, ROTATOR CUFF REPAIR, EXTENSIVE DEBRIDEMENT  +++ARTHREX+++   +++PNB+++ performed by Unique Reyes MD at [address] [facility] N/A 2/28/2019    C2 SPINAL [address] --MEDTRONIC performed by Telma Juarez MD at [facility] N/A 1/21/2019    TONSILLECTOMY performed by Sophia Live DO at [facility] OR       Family History   Problem Relation Age of Onset    No Known Problems Mother     Prostate Cancer Father     No Known Problems Maternal Grandmother     No Known Problems Maternal Grandfather     No Known Problems Paternal Grandmother     No Known Problems Paternal Grandfather     No Known Problems Daughter        Allergies   Allergen Reactions    Penicillins      Family has history to penicillin        Scheduled Meds:  pregabalin, 225 mg, BID  metFORMIN, 500 mg, BID WC  sodium chloride flush, 5-40 mL, 2 times per day  vancomycin (VANCOCIN) IV, 1,000 mg, Q12H  polyethylene glycol, 17 g, Daily  bisacodyl, 5 mg, Daily  sennosides-docusate sodium, 1 tablet, BID  insulin lispro, 0-12 Units, TID WC  insulin lispro, 0-6 Units, Nightly        PRN Meds  cyclobenzaprine, 10 mg, TID PRN  sodium chloride flush, 5-40 mL, PRN  sodium chloride, 25 mL, PRN  ondansetron, 4 mg, Q8H PRN   Or  ondansetron, 4 mg, Q6H PRN  oxyCODONE, 5 mg, Q4H PRN   Or  oxyCODONE, 10 mg, Q4H PRN  morphine, 2 mg, Q2H PRN   Or  morphine, 4 mg, Q2H PRN  benzocaine-menthol, 1 lozenge, Q2H PRN  acetaminophen, 650 mg, Q4H PRN  glucose, 15 g, PRN  dextrose, 12.5 g, PRN  glucagon (rDNA), 1 mg, PRN  dextrose, 100 mL/hr, PRN  diphenhydrAMINE, 25 mg, Q6H PRN        Social History:  Social History     Socioeconomic History    Marital status:      Spouse name: Not on file    Number of children: 1    Years of education: Not on file    Highest education level: Not on file   Occupational History    Not on file   Tobacco Use    Smoking status: Former Smoker     Years: 10.00     Types: Cigarettes    Smokeless tobacco: Never Used    Tobacco comment: quit 1980's    Vaping Use    Vaping Use: Never used   Substance and Sexual Activity    Alcohol use: Yes     Comment: socially- OCCASIONAL    Drug use: No    Sexual activity: Never   Other Topics Concern    Not on file   Social History Narrative    Not on file     Social Determinants of Health     Financial Resource Strain:     Difficulty of Paying Living Expenses:    Food Insecurity:     Worried About Running Out of Food in the Last Year:     Ran Out of Food in the Last Year:    Transportation Needs:     Lack of Transportation (Medical):      Lack of Transportation (Non-Medical):    Physical Activity:     Days of Exercise per Week:     Minutes of Exercise per Session:    Stress:     Feeling of Stress :    Social Connections:     Frequency of Communication with Friends and Family:     Frequency of Social Gatherings with Friends and Family:     Attends Congregational Services:     Active Member of Clubs or Organizations:     Attends Club or Organization Meetings:     Marital Status:    Intimate Partner Violence:     Fear of Current or Ex-Partner:     Emotionally Abused:     Physically Abused:     Sexually Abused:          Home situation: Lives alone in a 1 level set up with 5 steps to enter and 1 rail. Ambulated with no AD, or occasional use of SPC. Mod I for ADLs, assist with iADLs. Has home aids 10 hours per day. Functional History:  Premorbid ADL's: Mod I for ADL, assist with iADL  Premorbid Mobility: SPC or no devide  Present: significant decrease in mobility and function    Physical Therapy:  Bed mobility: Mod A -  SBA with HOB fully elevated, increased time, use of handrails  Transfer: Min A  Ambulation: 20 ft [employer] Mod A    Occupational Therapy:  Grooming: SBA  UB dressing: Min A  LB dressing: Dependent  Bathing: Max A  Toileting:  Max A      Review Of Systems:   Contsitutional: No Fever, chills  HEENT: No HA, blurry vision  Respiratory: No Cough, SOB  Cardiovascular: No CP  Gastrointestinal: No nausea, vomiting, abdominal discomfort  Genitourinary: No Dysuria  Musculoskeletal: per HPI  Neurologic: per HPI  Psychiatric: No Depression, No anxiety      Physical Examination:  Vitals:   Vitals:    06/03/21 0000 06/03/21 0400 06/03/21 0825 06/03/21 1125   BP: (!) 138/92 (!) 160/100 (!) 164/105 (!) 151/80   Pulse: 100 80 91 106   Resp: 16 18 18 16   Temp: 98.3 °F (36.8 °C) 97.5 °F (36.4 °C) 98.5 °F (36.9 °C) 97.6 °F (36.4 °C)   TempSrc: Temporal Temporal Temporal Temporal   SpO2: 94%  96% 91%   Weight:       Height:           GEN: NAD, sitting up in bed, comfortable appearing at rest. Cervical collar donned  HEENT: NC/AT, PERRL, EOMI, No LAD  RESP: CTAB, no R/R/W  CV: +S1 S2, RRR  ABD: soft, NT, ND, normal BS   EXT: No erythema/clubbing/cyanosis, 2+ pulses  Skin: no

## 2021-06-03 NOTE — PROGRESS NOTES
Department of Neurosurgery  Progress Note    CHIEF COMPLAINT: s/p C4-C7 ACDF 6/2    SUBJECTIVE:  C/o severe incision pain. REVIEW OF SYSTEMS :  Constitutional: Negative for chills and fever. Neurological: Negative for dizziness, tremors and speech change.      OBJECTIVE:   VITALS:  BP (!) 160/100 Comment: in pain  Pulse 80   Temp 97.5 °F (36.4 °C) (Temporal)   Resp 18   Ht 5' 5\" (1.651 m)   Wt 183 lb (83 kg)   SpO2 94%   BMI 30.45 kg/m²   PHYSICAL:  Neurologic:  Mental Status Exam:  Level of Alertness:   awake  Orientation:   person, place, time  Motor Exam:  Motor exam is symmetrical 5 out of 5 all extremities bilaterally  Sensory:  Sensory intact  Incision c/d/i  Collar intact     DATA:  CBC:   Lab Results   Component Value Date    WBC 6.5 05/26/2021    RBC 6.05 05/26/2021    HGB 13.9 05/26/2021    HCT 43.6 05/26/2021    MCV 72.1 05/26/2021    MCH 23.0 05/26/2021    MCHC 31.9 05/26/2021    RDW 16.8 05/26/2021     05/26/2021    MPV 10.5 05/26/2021     BMP:    Lab Results   Component Value Date     05/26/2021    K 4.3 05/26/2021     05/26/2021    CO2 25 05/26/2021    BUN 13 05/26/2021    LABALBU 4.7 02/21/2019    CREATININE 1.0 05/26/2021    CALCIUM 9.5 05/26/2021    GFRAA >60 05/26/2021    LABGLOM >60 05/26/2021    GLUCOSE 107 05/26/2021     PT/INR:    Lab Results   Component Value Date    PROTIME 10.8 05/26/2021    INR 1.0 05/26/2021     PTT:    Lab Results   Component Value Date    APTT 32.9 08/07/2018   [APTT}    Current Inpatient Medications  Current Facility-Administered Medications: cyclobenzaprine (FLEXERIL) tablet 10 mg, 10 mg, Oral, TID PRN  pregabalin (LYRICA) capsule 225 mg, 225 mg, Oral, BID  metFORMIN (GLUCOPHAGE) tablet 500 mg, 500 mg, Oral, BID WC  sodium chloride flush 0.9 % injection 5-40 mL, 5-40 mL, Intravenous, 2 times per day  sodium chloride flush 0.9 % injection 5-40 mL, 5-40 mL, Intravenous, PRN  0.9 % sodium chloride infusion, 25 mL, Intravenous, PRN  ondansetron (ZOFRAN-ODT) disintegrating tablet 4 mg, 4 mg, Oral, Q8H PRN **OR** ondansetron (ZOFRAN) injection 4 mg, 4 mg, Intravenous, Q6H PRN  0.9 % sodium chloride infusion, , Intravenous, Continuous  vancomycin 1000 mg IVPB in 250 mL D5W addavial, 1,000 mg, Intravenous, Q12H  oxyCODONE (ROXICODONE) immediate release tablet 5 mg, 5 mg, Oral, Q4H PRN **OR** oxyCODONE HCl (OXY-IR) immediate release tablet 10 mg, 10 mg, Oral, Q4H PRN  morphine (PF) injection 2 mg, 2 mg, Intravenous, Q2H PRN **OR** morphine sulfate (PF) injection 4 mg, 4 mg, Intravenous, Q2H PRN  polyethylene glycol (GLYCOLAX) packet 17 g, 17 g, Oral, Daily  bisacodyl (DULCOLAX) EC tablet 5 mg, 5 mg, Oral, Daily  sennosides-docusate sodium (SENOKOT-S) 8.6-50 MG tablet 1 tablet, 1 tablet, Oral, BID  benzocaine-menthol (CEPACOL SORE THROAT) lozenge 1 lozenge, 1 lozenge, Oral, Q2H PRN  acetaminophen (TYLENOL) tablet 650 mg, 650 mg, Oral, Q4H PRN  insulin lispro (HUMALOG) injection vial 0-12 Units, 0-12 Units, Subcutaneous, TID WC  insulin lispro (HUMALOG) injection vial 0-6 Units, 0-6 Units, Subcutaneous, Nightly  glucose (GLUTOSE) 40 % oral gel 15 g, 15 g, Oral, PRN  dextrose 50 % IV solution, 12.5 g, Intravenous, PRN  glucagon (rDNA) injection 1 mg, 1 mg, Intramuscular, PRN  dextrose 5 % solution, 100 mL/hr, Intravenous, PRN  diphenhydrAMINE (BENADRYL) injection 25 mg, 25 mg, Intravenous, Q6H PRN  sodium chloride flush 0.9 % injection, , ,     ASSESSMENT:   s/p C4-C7 ACDF 6/2    PLAN:  -Pain control  -PT/OT  -PMR consulted  -Custom collar x3 months      Electronically signed by Lauren Camacho PA-C on 6/3/2021 at 8:20 AM

## 2021-06-03 NOTE — PROGRESS NOTES
Hospitalist Progress Note      SYNOPSIS: Patient admitted on 2021 for cervical spine surgery. A 68-year-old male with past medical history significant for chronic generalized pains, headache, migraines who lives by himself with some help by aids. Patient states that he had been having neck issues since , mostly burning/aching and stiffening neck pain, radiating down left side of his back. Patient had topical intra-articular injections with no significant relief. Finally, he decided to go for cervical spine surgery. This surgery was done on 2021: C4-C5, C5-C6, C6-C7  ANTERIOR CERVICAL DISCECTOMY AND FUSION -- NEEDS REGULAR BED WITH HORSE SHOE, PLATES, SCREWS -- GLOBUS. SUBJECTIVE:    Patient seen and examined in his room. Completely upset about his situation, pain control, comfort measures. Denies any chest pain, shortness of breath, nausea, vomiting. Records reviewed. Stable overnight. No other overnight issues reported. Temp (24hrs), Av.7 °F (36.5 °C), Min:97.1 °F (36.2 °C), Max:98.5 °F (36.9 °C)    DIET: ADULT DIET; Dysphagia - Soft and Bite Sized  CODE: Full Code    Intake/Output Summary (Last 24 hours) at 6/3/2021 1037  Last data filed at 6/3/2021 0949  Gross per 24 hour   Intake 1161 ml   Output 4175 ml   Net -3014 ml       OBJECTIVE:    BP (!) 164/105   Pulse 91   Temp 98.5 °F (36.9 °C) (Temporal)   Resp 18   Ht 5' 5\" (1.651 m)   Wt 183 lb (83 kg)   SpO2 96%   BMI 30.45 kg/m²     General appearance: No apparent distress, appears stated age and cooperative. HEENT:  Conjunctivae/corneas clear. Neck: Supple. No jugular venous distention. Respiratory: Clear to auscultation bilaterally, normal respiratory effort  Cardiovascular: Regular rate rhythm, normal S1-S2  Abdomen: Soft, nontender, nondistended  Musculoskeletal: No clubbing, cyanosis, no bilateral lower extremity edema. Brisk capillary refill.    Skin:  No rashes  on visible skin  Neurologic: awake, alert and following commands     ASSESSMENT & PLAN:    Cervical neck pain  Due to herniated disks  Status post C4-C5, C5-C6, C6-C7  ANTERIOR CERVICAL DISCECTOMY AND FUSION -- NEEDS REGULAR BED WITH HORSE SHOE, PLATES, SCREWS -- GLOBUS on 6/2/2021  Pain regimen, bowel regimen, DVT prophylaxis as per neurosurgery  Cheek catheter for 24 hours     Chronic pains  Continue current pain regimen     Prediabetes  Patient is on Metformin, continue     Migraine  Uses Imitrex in case of persistent severe pain       DISPOSITION:   Medically stable for discharge home. Medications:  REVIEWED DAILY    Infusion Medications    sodium chloride      dextrose       Scheduled Medications    pregabalin  225 mg Oral BID    metFORMIN  500 mg Oral BID WC    sodium chloride flush  5-40 mL Intravenous 2 times per day    vancomycin (VANCOCIN) IV  1,000 mg Intravenous Q12H    polyethylene glycol  17 g Oral Daily    bisacodyl  5 mg Oral Daily    sennosides-docusate sodium  1 tablet Oral BID    insulin lispro  0-12 Units Subcutaneous TID WC    insulin lispro  0-6 Units Subcutaneous Nightly     PRN Meds: cyclobenzaprine, sodium chloride flush, sodium chloride, ondansetron **OR** ondansetron, oxyCODONE **OR** oxyCODONE, morphine **OR** morphine, benzocaine-menthol, acetaminophen, glucose, dextrose, glucagon (rDNA), dextrose, diphenhydrAMINE    Labs:     No results for input(s): WBC, HGB, HCT, PLT in the last 72 hours. No results for input(s): NA, K, CL, CO2, BUN, CREATININE, CALCIUM, PHOS in the last 72 hours. Invalid input(s): MAGNES    No results for input(s): PROT, ALB, ALKPHOS, ALT, AST, BILITOT, AMYLASE, LIPASE in the last 72 hours. No results for input(s): INR in the last 72 hours. No results for input(s): Consuelo Mosley in the last 72 hours.     Chronic labs:    Lab Results   Component Value Date    CHOL 230 (H) 09/24/2018    TRIG 103 09/24/2018    HDL 37 09/24/2018    LDLCALC 172 (H) 09/24/2018    INR 1.0 05/26/2021 Radiology: REVIEWED DAILY    +++++++++++++++++++++++++++++++++++++++++++++++++  Giuliana Urban MD  [facility] - [address] [place] [address]  +++++++++++++++++++++++++++++++++++++++++++++++++  NOTE: This report was transcribed using voice recognition software. Every effort was made to ensure accuracy; however, inadvertent computerized transcription errors may be present.

## 2021-06-04 LAB
METER GLUCOSE: 111 MG/DL (ref 74–99)
METER GLUCOSE: 112 MG/DL (ref 74–99)
METER GLUCOSE: 114 MG/DL (ref 74–99)
METER GLUCOSE: 135 MG/DL (ref 74–99)

## 2021-06-04 PROCEDURE — 97535 SELF CARE MNGMENT TRAINING: CPT

## 2021-06-04 PROCEDURE — 6370000000 HC RX 637 (ALT 250 FOR IP): Performed by: NEUROLOGICAL SURGERY

## 2021-06-04 PROCEDURE — 2580000003 HC RX 258: Performed by: NEUROLOGICAL SURGERY

## 2021-06-04 PROCEDURE — 6360000002 HC RX W HCPCS: Performed by: PHYSICIAN ASSISTANT

## 2021-06-04 PROCEDURE — 6360000002 HC RX W HCPCS: Performed by: NEUROLOGICAL SURGERY

## 2021-06-04 PROCEDURE — 97530 THERAPEUTIC ACTIVITIES: CPT

## 2021-06-04 PROCEDURE — 1200000000 HC SEMI PRIVATE

## 2021-06-04 PROCEDURE — 82962 GLUCOSE BLOOD TEST: CPT

## 2021-06-04 RX ADMIN — SODIUM CHLORIDE: 9 INJECTION, SOLUTION INTRAVENOUS at 10:49

## 2021-06-04 RX ADMIN — PREGABALIN 225 MG: 150 CAPSULE ORAL at 20:54

## 2021-06-04 RX ADMIN — ENOXAPARIN SODIUM 40 MG: 40 INJECTION SUBCUTANEOUS at 15:37

## 2021-06-04 RX ADMIN — METFORMIN HYDROCHLORIDE 500 MG: 500 TABLET ORAL at 16:59

## 2021-06-04 RX ADMIN — BISACODYL 5 MG: 5 TABLET, COATED ORAL at 08:14

## 2021-06-04 RX ADMIN — CYCLOBENZAPRINE 10 MG: 10 TABLET, FILM COATED ORAL at 12:33

## 2021-06-04 RX ADMIN — CYCLOBENZAPRINE 10 MG: 10 TABLET, FILM COATED ORAL at 04:45

## 2021-06-04 RX ADMIN — PREGABALIN 225 MG: 150 CAPSULE ORAL at 08:13

## 2021-06-04 RX ADMIN — DIPHENHYDRAMINE HYDROCHLORIDE 25 MG: 50 INJECTION, SOLUTION INTRAMUSCULAR; INTRAVENOUS at 12:51

## 2021-06-04 RX ADMIN — Medication 4 MG: at 04:45

## 2021-06-04 RX ADMIN — DEXAMETHASONE SODIUM PHOSPHATE 4 MG: 4 INJECTION, SOLUTION INTRA-ARTICULAR; INTRALESIONAL; INTRAMUSCULAR; INTRAVENOUS; SOFT TISSUE at 10:48

## 2021-06-04 RX ADMIN — Medication 4 MG: at 20:54

## 2021-06-04 RX ADMIN — Medication 4 MG: at 06:56

## 2021-06-04 RX ADMIN — Medication 4 MG: at 22:59

## 2021-06-04 RX ADMIN — OXYCODONE HYDROCHLORIDE 10 MG: 10 TABLET ORAL at 08:13

## 2021-06-04 RX ADMIN — DIPHENHYDRAMINE HYDROCHLORIDE 25 MG: 50 INJECTION, SOLUTION INTRAMUSCULAR; INTRAVENOUS at 06:56

## 2021-06-04 RX ADMIN — OXYCODONE HYDROCHLORIDE 10 MG: 10 TABLET ORAL at 16:59

## 2021-06-04 RX ADMIN — DIPHENHYDRAMINE HYDROCHLORIDE 25 MG: 50 INJECTION, SOLUTION INTRAMUSCULAR; INTRAVENOUS at 00:13

## 2021-06-04 RX ADMIN — Medication 4 MG: at 09:09

## 2021-06-04 RX ADMIN — VANCOMYCIN HYDROCHLORIDE 1000 MG: 1 INJECTION, POWDER, LYOPHILIZED, FOR SOLUTION INTRAVENOUS at 02:21

## 2021-06-04 RX ADMIN — POLYETHYLENE GLYCOL 3350 17 G: 17 POWDER, FOR SOLUTION ORAL at 08:14

## 2021-06-04 RX ADMIN — Medication 4 MG: at 18:47

## 2021-06-04 RX ADMIN — SENNOSIDES AND DOCUSATE SODIUM 1 TABLET: 8.6; 5 TABLET ORAL at 20:54

## 2021-06-04 RX ADMIN — Medication 4 MG: at 11:10

## 2021-06-04 RX ADMIN — DEXAMETHASONE SODIUM PHOSPHATE 4 MG: 4 INJECTION, SOLUTION INTRA-ARTICULAR; INTRALESIONAL; INTRAMUSCULAR; INTRAVENOUS; SOFT TISSUE at 20:53

## 2021-06-04 RX ADMIN — DIPHENHYDRAMINE HYDROCHLORIDE 25 MG: 50 INJECTION, SOLUTION INTRAMUSCULAR; INTRAVENOUS at 18:47

## 2021-06-04 RX ADMIN — Medication 4 MG: at 15:36

## 2021-06-04 RX ADMIN — Medication 4 MG: at 00:13

## 2021-06-04 RX ADMIN — DEXAMETHASONE SODIUM PHOSPHATE 4 MG: 4 INJECTION, SOLUTION INTRA-ARTICULAR; INTRALESIONAL; INTRAMUSCULAR; INTRAVENOUS; SOFT TISSUE at 15:36

## 2021-06-04 RX ADMIN — DEXAMETHASONE SODIUM PHOSPHATE 4 MG: 4 INJECTION, SOLUTION INTRA-ARTICULAR; INTRALESIONAL; INTRAMUSCULAR; INTRAVENOUS; SOFT TISSUE at 03:39

## 2021-06-04 RX ADMIN — Medication 10 ML: at 20:54

## 2021-06-04 RX ADMIN — OXYCODONE HYDROCHLORIDE 10 MG: 10 TABLET ORAL at 03:39

## 2021-06-04 RX ADMIN — CYCLOBENZAPRINE 10 MG: 10 TABLET, FILM COATED ORAL at 22:02

## 2021-06-04 RX ADMIN — Medication 4 MG: at 13:17

## 2021-06-04 RX ADMIN — OXYCODONE HYDROCHLORIDE 10 MG: 10 TABLET ORAL at 12:33

## 2021-06-04 RX ADMIN — Medication 4 MG: at 02:28

## 2021-06-04 RX ADMIN — OXYCODONE HYDROCHLORIDE 10 MG: 10 TABLET ORAL at 22:02

## 2021-06-04 ASSESSMENT — PAIN - FUNCTIONAL ASSESSMENT
PAIN_FUNCTIONAL_ASSESSMENT: ACTIVITIES ARE NOT PREVENTED
PAIN_FUNCTIONAL_ASSESSMENT: ACTIVITIES ARE NOT PREVENTED
PAIN_FUNCTIONAL_ASSESSMENT: PREVENTS OR INTERFERES SOME ACTIVE ACTIVITIES AND ADLS

## 2021-06-04 ASSESSMENT — PAIN DESCRIPTION - FREQUENCY
FREQUENCY: CONTINUOUS

## 2021-06-04 ASSESSMENT — PAIN DESCRIPTION - LOCATION
LOCATION: NECK
LOCATION: NECK
LOCATION: GENERALIZED
LOCATION: NECK
LOCATION: GENERALIZED;NECK

## 2021-06-04 ASSESSMENT — PAIN DESCRIPTION - ONSET
ONSET: ON-GOING

## 2021-06-04 ASSESSMENT — PAIN SCALES - GENERAL
PAINLEVEL_OUTOF10: 8
PAINLEVEL_OUTOF10: 7
PAINLEVEL_OUTOF10: 10
PAINLEVEL_OUTOF10: 7
PAINLEVEL_OUTOF10: 9
PAINLEVEL_OUTOF10: 8
PAINLEVEL_OUTOF10: 10
PAINLEVEL_OUTOF10: 7
PAINLEVEL_OUTOF10: 7
PAINLEVEL_OUTOF10: 8
PAINLEVEL_OUTOF10: 6
PAINLEVEL_OUTOF10: 9
PAINLEVEL_OUTOF10: 7
PAINLEVEL_OUTOF10: 7
PAINLEVEL_OUTOF10: 9
PAINLEVEL_OUTOF10: 10
PAINLEVEL_OUTOF10: 6
PAINLEVEL_OUTOF10: 7
PAINLEVEL_OUTOF10: 8
PAINLEVEL_OUTOF10: 7
PAINLEVEL_OUTOF10: 8
PAINLEVEL_OUTOF10: 6
PAINLEVEL_OUTOF10: 8
PAINLEVEL_OUTOF10: 6
PAINLEVEL_OUTOF10: 9
PAINLEVEL_OUTOF10: 7

## 2021-06-04 ASSESSMENT — PAIN DESCRIPTION - ORIENTATION
ORIENTATION: ANTERIOR;POSTERIOR
ORIENTATION: ANTERIOR

## 2021-06-04 ASSESSMENT — PAIN DESCRIPTION - PROGRESSION
CLINICAL_PROGRESSION: NOT CHANGED

## 2021-06-04 ASSESSMENT — PAIN DESCRIPTION - DIRECTION
RADIATING_TOWARDS: SHOULDERS

## 2021-06-04 ASSESSMENT — PAIN DESCRIPTION - PAIN TYPE
TYPE: SURGICAL PAIN

## 2021-06-04 ASSESSMENT — PAIN DESCRIPTION - DESCRIPTORS
DESCRIPTORS: SORE;ACHING
DESCRIPTORS: ACHING;SORE;CONSTANT
DESCRIPTORS: SORE;ACHING;DISCOMFORT
DESCRIPTORS: ACHING;SORE;CONSTANT
DESCRIPTORS: ACHING;CONSTANT;SORE
DESCRIPTORS: SORE;ACHING;NAGGING
DESCRIPTORS: ACHING;CONSTANT;DISCOMFORT;NAGGING;SORE

## 2021-06-04 NOTE — PROGRESS NOTES
The patient is complaining of difficulty swallowing. Dr. Shen Redmond was called and notified at [phone]. An order was placed for Decadron 4mg IV every 6 hours.

## 2021-06-04 NOTE — PLAN OF CARE
Problem: Discharge Planning:  Goal: Discharged to appropriate level of care  Description: Discharged to appropriate level of care  Outcome: Ongoing     Problem: Cerebrospinal Fluid Leakage - Risk Of:  Goal: Absence of cerebrospinal fluid drainage at surgical site  Description: Absence of cerebrospinal fluid drainage at surgical site  Outcome: Ongoing     Problem: Infection - Surgical Site:  Goal: Will show no infection signs and symptoms  Description: Will show no infection signs and symptoms  Outcome: Ongoing     Problem: Mobility - Impaired:  Goal: Mobility will improve to maximum level  Description: Mobility will improve to maximum level  Outcome: Ongoing     Problem: Pain:  Goal: Pain level will decrease  Description: Pain level will decrease  Outcome: Ongoing  Goal: Control of acute pain  Description: Control of acute pain  Outcome: Ongoing  Goal: Control of chronic pain  Description: Control of chronic pain  Outcome: Ongoing     Problem: Sensory Perception - Impaired:  Goal: Sensory function intact, lower extremity  Description: Sensory function intact, lower extremity  Outcome: Ongoing  Goal: Ability to demonstrate correct body alignment while lying, sitting, and standing will improve  Description: Ability to demonstrate correct body alignment while lying, sitting, and standing will improve  Outcome: Ongoing  Goal: Circulatory function of lower extremities is within specified parameters  Description: Circulatory function of lower extremities is within specified parameters  Outcome: Ongoing     Problem: Urinary Retention:  Goal: Urinary elimination within specified parameters  Description: Urinary elimination within specified parameters  Outcome: Ongoing     Problem: Venous Thromboembolism:  Goal: Will show no signs or symptoms of venous thromboembolism  Description: Will show no signs or symptoms of venous thromboembolism  Outcome: Ongoing     Problem: Nutritional:  Goal: Consumption of food in small

## 2021-06-04 NOTE — PROGRESS NOTES
Department of Neurosurgery  Progress Note    CHIEF COMPLAINT: s/p C4-C7 ACDF 6/2    SUBJECTIVE:  Continued neck pain and issues swallowing. REVIEW OF SYSTEMS :  Constitutional: Negative for chills and fever. Neurological: Negative for dizziness, tremors and speech change.      OBJECTIVE:   VITALS:  /85   Pulse 106   Temp 98.6 °F (37 °C) (Temporal)   Resp 18   Ht 5' 5\" (1.651 m)   Wt 183 lb (83 kg)   SpO2 94%   BMI 30.45 kg/m²   PHYSICAL:  Neurologic:  Mental Status Exam:  Level of Alertness:   awake  Orientation:   person, place, time  Motor Exam:  Motor exam is symmetrical 5 out of 5 all extremities bilaterally  Sensory:  Sensory intact  Incision c/d/i  Collar intact     DATA:  CBC:   Lab Results   Component Value Date    WBC 6.5 05/26/2021    RBC 6.05 05/26/2021    HGB 13.9 05/26/2021    HCT 43.6 05/26/2021    MCV 72.1 05/26/2021    MCH 23.0 05/26/2021    MCHC 31.9 05/26/2021    RDW 16.8 05/26/2021     05/26/2021    MPV 10.5 05/26/2021     BMP:    Lab Results   Component Value Date     05/26/2021    K 4.3 05/26/2021     05/26/2021    CO2 25 05/26/2021    BUN 13 05/26/2021    LABALBU 4.7 02/21/2019    CREATININE 1.0 05/26/2021    CALCIUM 9.5 05/26/2021    GFRAA >60 05/26/2021    LABGLOM >60 05/26/2021    GLUCOSE 107 05/26/2021     PT/INR:    Lab Results   Component Value Date    PROTIME 10.8 05/26/2021    INR 1.0 05/26/2021     PTT:    Lab Results   Component Value Date    APTT 32.9 08/07/2018   [APTT}    Current Inpatient Medications  Current Facility-Administered Medications: dexamethasone (DECADRON) injection 4 mg, 4 mg, Intravenous, Q6H  0.9 % sodium chloride infusion, , Intravenous, Continuous  cyclobenzaprine (FLEXERIL) tablet 10 mg, 10 mg, Oral, TID PRN  pregabalin (LYRICA) capsule 225 mg, 225 mg, Oral, BID  metFORMIN (GLUCOPHAGE) tablet 500 mg, 500 mg, Oral, BID WC  sodium chloride flush 0.9 % injection 5-40 mL, 5-40 mL, Intravenous, 2 times per day  sodium chloride

## 2021-06-04 NOTE — PROGRESS NOTES
Physical Therapy  Treatment Note     Name: Staci Bar  : 1970  MRN: [id number]      Date of Service: 2021    Evaluating PT:  Gi Bird, PT, DPT [id number]    Room #:  6390/0583-C  Diagnosis:  Cervical herniated disc [M50.20]  PMHx/PSHx:  Migraines, lyme disease, dermatitis, chronic generalized pain, cervicogenic headache, LEFT SHOULDER ARTHROSCOPIC BICEPS TENODESIS, ROTATOR CUFF REPAIR, EXTENSIVE DEBRIDEMENT  2020, spinal cord stimulator placement C2 2019  Procedure/Surgery:  C4-C5, C5-C6, C6-C7  ANTERIOR CERVICAL DISCECTOMY AND FUSION 2021  Precautions:  Falls, c-spine precautions, custom c-collar  Equipment Needs:  TBD    SUBJECTIVE:    Pt lives alone in a 1 story home with 5 stairs to enter and 1 rail. Bed is on first floor and bath is on first floor. Pt ambulated with no AD independently PTA. Occasional use of SPC per pt. Pt has a hospital bed. OBJECTIVE:   Initial Evaluation  Date: 2021 Treatment  21 Short Term/ Long Term   Goals   AM-PAC 6 Clicks 42/54     Was pt agreeable to Eval/treatment? yes Yes     Does pt have pain? 7/10 neck pain combined with generalized full body pain (chronic) 8/10 Neck pain and global chronic pain    Bed Mobility  Rolling: NT  Supine to sit: modA with HOB elevated  Sit to supine: modA with HOB elevated  Scooting: Spenser to EOB Rolling: NT   Supine to sit: Supervision with HOB fully elevated   Sit to supine: NT  Scooting: SBA to EOB Rolling: Independent  Supine to sit: Independent  Sit to supine: Independent  Scooting: Independent   Transfers Sit to stand: modA  Stand to sit: modA  Stand pivot: NT Sit to stand: Min A  Stand to sit: Min A   Stand pivot: Min A using [employer]  Sit to stand: Independent  Stand to sit:  Independent  Stand pivot: Independent   Ambulation    NT d/t pain and difficulty advancing BLE 90 feet using WW with Min A  150 feet with no AD Independent   Stair negotiation: ascended and descended  NT NT 5 steps with 1 rail Rip   ROM BUE:  Per OT note  BLE:  WNL BLE: WNL    Strength BUE:  Per OT note  BLE:  WFL BLE: grossly at least 4/5    Balance Sitting EOB:  SBA  Dynamic Standing:  NT Sitting EOB:  SBA  Dynamic Standing:  Min A using [facility] Sitting EOB:  Independent  Dynamic Standing:  Independent     Pt is A & O x 4  Sensation:  Pt reports numbness/tingling to BUE and BLE. Reports BUE is chronic and improved since surgery but experiencing new numbness/tingling to BLE at this time. Edema:  unremarkable    Patient education  Pt educated on spinal/cervical precautions, benefits and importance of increasing OOB activity and being upright. Pt required cues for transfer technique and gait training. Pt educated on therapy expectations and program at ARU. Patient response to education:   Pt verbalized understanding Pt demonstrated skill Pt requires further education in this area   Yes Requires assist yes     ASSESSMENT:    Comments:  RN cleared pt for activity prior to session. Pt received sitting in bed with HOB fully elevated. Pt reported wanting to participate as much as he can this date. Pt continues to c/o global pain with all mobility, but did demonstrate improved use of BUE and activity tolerance this date. Pt with tremors in all extremities during amb and relies heavily on BUE on [facility] with amb. Pt took multiple standing rest breaks during amb and required cues to keep eyes open. Pt amb with very slow gait speed and VCs for upright posture, forward gaze and [facility] approximation. Pt was assisted to restroom and was left with KIRK. Treatment:  Patient practiced and was instructed in the following treatment:     Bed mobility training - pt given verbal and tactile cues to facilitate proper sequencing and safety during rolling and supine>sit   Sitting Balance EOB for ~5 minutes for improved postural awareness, upright tolerance, effective breathing and decreased in light headedness.      Transfer training with VCs for hand placement, sequencing and technique. Physical assist to complete task and correct LOB and unsteadiness upon standing.  Standing balance retraining with VCs for upright posture and forward gaze. Physical assist to correct unsteadiness. Standing weight shifts to R/L and mini marches completed with physical assist.    · Gait training- pt was given verbal and tactile cues to facilitate increased step height and body positioning inside walker during ambulation as well as provided with physical assistance to complete task. · Therapeutic Exercises/Activities as noted above. Neural glides performed with AAROM and cueing.  Patient education provided with focus on upright tolerance, safety, correcting posterior LOB and benefits of participating with therapy. PHYSICAL THERAPY PLAN OF CARE:  Patient is making good progress towards established PT goals. Will continue with current POC.      Time in  1310  Time out  1340    Total Treatment Time  30  minutes     CPT codes:  [] PT Re-evaluation 94995  [] Gait training 28185 0 minutes  [] Manual therapy 93448 0 minutes  [x] Therapeutic activities 04476 30 minutes  [] Therapeutic exercises 96773 0 minutes  [] Neuromuscular reeducation 94395 0 minutes     Samara Hwang, PT, DPT  License [id number]

## 2021-06-04 NOTE — CARE COORDINATION
6/4, SW met with patient at bedside to discuss PM&R Physician not seeing patient as being appropriate for ARU. Discussed recent PT/OT which has approved and patient going home with [place] [id number]. Patient does not want to go home with [place] [id number] and wants to go to ARU. Patient feels that he is \"better\" and would be able to do the therapy in ARU. Patient would like to speak with Physician at ARU and express his desire to go there. Referred to TRU lists that patient was given list today-patient does not want TRU and instead wants ARU. BECKIE/YAHIR to follow for further discharge planning needs.     KATEY Bucio  [facility] Case Management  [phone]

## 2021-06-04 NOTE — PROGRESS NOTES
Occupational Therapy  OCCUPATIONAL THERAPY INITIAL EVALUATION    [employer] [address] [address]        Date:6/3/2021                                                Patient Name: Gill Lockwood  MRN: [id number]  : 1970  Room: [address] [place] [address]    Per OT Eval:    Evaluating OT: Teresa Lei OTR/L #[id number]      Referring Provider: Lior Mendosa MD  Specific Provider Orders/Date: OT eval and treat 21     Diagnosis: Cervical Herniated Disc   Pt admitted to hospital for surgical consult     Surgery / Procedure: 21: C4-C5, C5-C6, C6-C7  ANTERIOR CERVICAL DISCECTOMY AND FUSION -- NEEDS REGULAR BED WITH HORSE SHOE, PLATES, SCREWS -- GLOBUS (N/A Neck)     Pertinent Medical History: Chronic generalized pain, Lyme Disease, Migraines      Precautions:  Fall Risk, Cervical Spinal Precautions, C-Collar     Assessment of current deficits    [x]? Functional mobility          [x]? ADLs           [x]? Strength                  []?Cognition    [x]? Functional transfers        [x]? IADLs         [x]? Safety Awareness   [x]? Endurance    []? Fine Coordination                        [x]? Balance      []? Vision/perception   []? Sensation      []? Gross Motor Coordination            []? ROM           []?  Delirium                   []? Motor Control      OT PLAN OF CARE   OT POC based on physician orders, patient diagnosis and results of clinical assessment     Frequency/Duration: 1-3 days/wk for 2 weeks PRN   Specific OT Treatment Interventions to include:   * Instruction/training on adapted ADL techniques and AE recommendations to increase functional independence within        precautions  * Training on energy conservation strategies, correct breathing pattern and techniques to improve independence/tolerance for self-care routine  * Functional transfer/mobility training/DME recommendations for increased independence, safety, and fall prevention  * Patient/Family education to increase follow through with safety techniques and functional independence  * Recommendation of environmental modifications for increased safety with functional transfers/mobility and ADLs  * Therapeutic activities to facilitate/challenge dynamic balance, stand tolerance for increased safety and independence with ADLs  * Therapeutic activities to facilitate gross/fine motor skills for increased independence with ADLs  * Positioning to improve skin integrity, interaction with environment and functional independence        Recommended Adaptive Equipment: TBD      Home Living: Pt lives alone in a 1 story home with 3 ALBERT (1HR) to enter, pt reports HHA 10 hours/day    Bathroom setup: tub/shower combo    Equipment owned: [facility]     Prior Level of Function: Mod I with ADLs , Assistance with IADLs; ambulated without AD   Driving: no   Occupation: musician      Pain Level: Pt complained of 8/10 neck pain this session     Cognition: A&O: 4/4; Follows 2 step directions             Memory:  Good             Sequencing:  Fair             Problem solving:  Fair             Judgement/safety:  Fair               Functional Assessment:  AM-PAC Daily Activity Raw Score: 17/24    Initial Eval Status  Date: 6/3/21 Treatment Status  Date: 6/4/21 STGs = LTGs  Time frame: 10-14 days   Feeding Independent   Independent     Grooming SBA     Seated in chair  Setup  Pt washed face, applied deodorant seated upright in chair at sink Independent    UB Dressing Minimal Assist      Seated in bed, pt c/o pain  [facility] gown with assistance to manage of IV line Independent    LB Dressing Dependent      D/t spinal precautions SBA  Pt donned/doffed hospital socks using of cross over technique    DNT pants this date  Minimal Assist    Bathing Maximal Assist Spenser  Pt completed sponge bathing task seated/standing, with pt able to wash of UB, LB using of cross over technique, and stand to wash of buttocks/shavonne area  Minimal mobility, transfers and ADL tasks this session. At end of session, pt seated upright in chair, all lines and tubes intact, call light within reach. · Pt has made fair progress towards set goals.    · Continue with current plan of care focusing on increasing of independency with transfers and ADL tasks      Treatment Time In: 1:05pm            Treatment Time Out: 2:00pm             Treatment Charges: Mins Units   Ther Ex  08298     Manual Therapy 37553     Thera Activities 19435 17 1   ADL/Home Mgt 04966 38 3   Neuro Re-ed 98539     Group Therapy      Orthotic manage/training  78575     Non-Billable Time     Total Timed Treatment 55 4        Millicent Sow KIRK/L 79422

## 2021-06-04 NOTE — PROGRESS NOTES
Evaluated patient yesterday and discussed rehab with him at length, see notes. He is not an appropriate candidate for ARU. Recommend MARIANNE Resendez MD  Physical Medicine and Rehabilitation

## 2021-06-04 NOTE — ACP (ADVANCE CARE PLANNING)
6/4/2021 - S/P C4-C7 ACDF on 6/2. Neurosurgery following. PM&R consult done - determined not a candidate for ARU and recommend TRU. Spoke with pt to discuss TRU. Pt thought there was a TRU in the hospital he could go to until he was strong enough to go to ARU. Explained to pt that there is no TRU here in the hospital and they are located in the nursing home facilities in the community. Gave the patient the [employer] 12 lists for both [facility] and [facility] to review and make choices. He does not think he can make a choice just by looking at the list. Told pt he could look online and call the facilities if he wanted to research but he needs to make a choice. He asked to see the ARU rehab physician again. Notified Sterling at ARU and they will review the pt chart again. Will follow for discharge planning needs.

## 2021-06-04 NOTE — PROGRESS NOTES
Hospitalist Progress Note    Chief complaint:  No chief complaint on file. Admit date:  6/2/2021    Days in hospital:    1    Synopsis :  44year old white male who presents with onset of neck pain in 2009 after being diagnosed with Lyme Disease. Admits to diffuse and \"growing pain\" since 2009.   Has tried injections without relief, s/p C4-C7 ACDF 6/2, physical medicine rehab consulted, hospital medicine consulted for medical management        Patient not physically scene   Chart reviewed       Medications:  Scheduled Meds:   dexamethasone  4 mg Intravenous Q6H    pregabalin  225 mg Oral BID    metFORMIN  500 mg Oral BID WC    sodium chloride flush  5-40 mL Intravenous 2 times per day    polyethylene glycol  17 g Oral Daily    bisacodyl  5 mg Oral Daily    sennosides-docusate sodium  1 tablet Oral BID    insulin lispro  0-12 Units Subcutaneous TID WC    insulin lispro  0-6 Units Subcutaneous Nightly       PRN Meds:  cyclobenzaprine, sodium chloride flush, sodium chloride, ondansetron **OR** ondansetron, oxyCODONE **OR** oxyCODONE, morphine **OR** morphine, benzocaine-menthol, acetaminophen, glucose, dextrose, glucagon (rDNA), dextrose, diphenhydrAMINE    IV:   sodium chloride 75 mL/hr at 06/03/21 2204    sodium chloride      dextrose         LABS:  Recent Results (from the past 24 hour(s))   POCT Glucose    Collection Time: 06/03/21 12:10 PM   Result Value Ref Range    Meter Glucose 114 (H) 74 - 99 mg/dL   POCT Glucose    Collection Time: 06/03/21  4:51 PM   Result Value Ref Range    Meter Glucose 95 74 - 99 mg/dL   POCT Glucose    Collection Time: 06/03/21 10:08 PM   Result Value Ref Range    Meter Glucose 62 (L) 74 - 99 mg/dL   POCT Glucose    Collection Time: 06/04/21  6:44 AM   Result Value Ref Range    Meter Glucose 114 (H) 74 - 99 mg/dL       RADIOLOGY:  XR CHEST (2 VW)    Result Date: 5/26/2021  EXAMINATION: TWO XRAY VIEWS OF THE CHEST 5/26/2021 11:29 am COMPARISON: None. HISTORY: ORDERING SYSTEM PROVIDED HISTORY: Pre-op testing TECHNOLOGIST PROVIDED HISTORY: Reason for exam:->pre op What reading provider will be dictating this exam?->CRC FINDINGS: The lungs are without acute focal process. There is no effusion or pneumothorax. The cardiomediastinal silhouette is without acute process. The osseous structures are without acute process. Epidural leads in the thoracic spine. No acute process. FLUORO FOR SURGICAL PROCEDURES    Result Date: 6/2/2021  EXAMINATION: SPOT FLUOROSCOPIC IMAGES 6/2/2021 3:20 pm TECHNIQUE: Fluoroscopy was provided by the radiology department for procedure. Radiologist was not present during examination. FLUOROSCOPY DOSE AND TYPE OR TIME AND EXPOSURES: Fluoro time: 17.8 seconds. Images: 3 COMPARISON: None HISTORY: ORDERING SYSTEM PROVIDED HISTORY: lumbar laminectomy TECHNOLOGIST PROVIDED HISTORY: Reason for exam:->lumbar laminectomy What reading provider will be dictating this exam?->CRC Intraprocedural imaging. FINDINGS: Intraoperative fluoroscopy shows anterior fusion plate and screws at level of the cervical spine. Grossly normal alignment. Radiographic follow-up could be helpful for further evaluation. Intraprocedural fluoroscopic spot images as above. See separate procedure report for more information. Assessment and plan: Active Problems:    Cervical herniated disc  Resolved Problems:    * No resolved hospital problems.  *    Cervical herniated disc  S/P C4-C5, C5-C6, C6-C7  ANTERIOR CERVICAL DISCECTOMY AND FUSION 6/2/2021     Chronic pains  Continue current pain regimen     Prediabetes  Hold Metformin while inpatient  Continue same sliding scale     Migraine  Uses Imitrex in case of persistent severe pain    DVT prophylaxis: Subcutaneous heparin  CODE STATUS: Patient is a full code  Discharge plan: Patient can be discharged next 3 to 4 days to home with and without home health.,  Pending clinical improvement, pending work-up and clearance by consulting services. Electronically signed by Nadiya Harley MD on 6/4/2021 at 10:46 AM  NOTE: This report was transcribed using voice recognition software. Every effort was made to ensure accuracy; however, inadvertent computerized transcription errors may be present.

## 2021-06-05 ENCOUNTER — APPOINTMENT (OUTPATIENT)
Dept: ULTRASOUND IMAGING | Age: 51
DRG: 321 | End: 2021-06-05
Attending: NEUROLOGICAL SURGERY
Payer: MEDICAID

## 2021-06-05 ENCOUNTER — APPOINTMENT (OUTPATIENT)
Dept: CT IMAGING | Age: 51
DRG: 321 | End: 2021-06-05
Attending: NEUROLOGICAL SURGERY
Payer: MEDICAID

## 2021-06-05 ENCOUNTER — APPOINTMENT (OUTPATIENT)
Dept: GENERAL RADIOLOGY | Age: 51
DRG: 321 | End: 2021-06-05
Attending: NEUROLOGICAL SURGERY
Payer: MEDICAID

## 2021-06-05 LAB
AADO2: 558.3 MMHG
AADO2: 571.8 MMHG
ALBUMIN SERPL-MCNC: 3.6 G/DL (ref 3.5–5.2)
ALBUMIN SERPL-MCNC: 3.9 G/DL (ref 3.5–5.2)
ALP BLD-CCNC: 59 U/L (ref 40–129)
ALP BLD-CCNC: 75 U/L (ref 40–129)
ALT SERPL-CCNC: 42 U/L (ref 0–40)
ALT SERPL-CCNC: 43 U/L (ref 0–40)
AMMONIA: 25 UMOL/L (ref 16–60)
ANION GAP SERPL CALCULATED.3IONS-SCNC: 13 MMOL/L (ref 7–16)
ANION GAP SERPL CALCULATED.3IONS-SCNC: 14 MMOL/L (ref 7–16)
AST SERPL-CCNC: 37 U/L (ref 0–39)
AST SERPL-CCNC: 40 U/L (ref 0–39)
B.E.: 1.9 MMOL/L (ref -3–3)
B.E.: 3.9 MMOL/L (ref -3–3)
BASOPHILS ABSOLUTE: 0.02 E9/L (ref 0–0.2)
BASOPHILS ABSOLUTE: 0.03 E9/L (ref 0–0.2)
BASOPHILS RELATIVE PERCENT: 0.1 % (ref 0–2)
BASOPHILS RELATIVE PERCENT: 0.2 % (ref 0–2)
BILIRUB SERPL-MCNC: 0.4 MG/DL (ref 0–1.2)
BILIRUB SERPL-MCNC: 0.5 MG/DL (ref 0–1.2)
BUN BLDV-MCNC: 15 MG/DL (ref 6–20)
BUN BLDV-MCNC: 16 MG/DL (ref 6–20)
CALCIUM SERPL-MCNC: 10.1 MG/DL (ref 8.6–10.2)
CALCIUM SERPL-MCNC: 8.5 MG/DL (ref 8.6–10.2)
CHLORIDE BLD-SCNC: 100 MMOL/L (ref 98–107)
CHLORIDE BLD-SCNC: 101 MMOL/L (ref 98–107)
CO2: 23 MMOL/L (ref 22–29)
CO2: 23 MMOL/L (ref 22–29)
COHB: 0.2 % (ref 0–1.5)
COHB: 0.3 % (ref 0–1.5)
CREAT SERPL-MCNC: 0.8 MG/DL (ref 0.7–1.2)
CREAT SERPL-MCNC: 0.8 MG/DL (ref 0.7–1.2)
CRITICAL: ABNORMAL
CRITICAL: ABNORMAL
DATE ANALYZED: ABNORMAL
DATE ANALYZED: ABNORMAL
DATE OF COLLECTION: ABNORMAL
DATE OF COLLECTION: ABNORMAL
EKG ATRIAL RATE: 96 BPM
EKG P AXIS: 23 DEGREES
EKG P-R INTERVAL: 134 MS
EKG Q-T INTERVAL: 344 MS
EKG QRS DURATION: 84 MS
EKG QTC CALCULATION (BAZETT): 434 MS
EKG R AXIS: -16 DEGREES
EKG T AXIS: 5 DEGREES
EKG VENTRICULAR RATE: 96 BPM
EOSINOPHILS ABSOLUTE: 0 E9/L (ref 0.05–0.5)
EOSINOPHILS ABSOLUTE: 0 E9/L (ref 0.05–0.5)
EOSINOPHILS RELATIVE PERCENT: 0 % (ref 0–6)
EOSINOPHILS RELATIVE PERCENT: 0 % (ref 0–6)
FIO2: 100 %
FIO2: 100 %
GFR AFRICAN AMERICAN: >60
GFR AFRICAN AMERICAN: >60
GFR NON-AFRICAN AMERICAN: >60 ML/MIN/1.73
GFR NON-AFRICAN AMERICAN: >60 ML/MIN/1.73
GLUCOSE BLD-MCNC: 128 MG/DL (ref 74–99)
GLUCOSE BLD-MCNC: 140 MG/DL (ref 74–99)
HCO3: 24 MMOL/L (ref 22–26)
HCO3: 26.1 MMOL/L (ref 22–26)
HCT VFR BLD CALC: 32.6 % (ref 37–54)
HCT VFR BLD CALC: 37.7 % (ref 37–54)
HEMOGLOBIN: 10.5 G/DL (ref 12.5–16.5)
HEMOGLOBIN: 12.1 G/DL (ref 12.5–16.5)
HHB: 2.5 % (ref 0–5)
HHB: 3.2 % (ref 0–5)
IMMATURE GRANULOCYTES #: 0.18 E9/L
IMMATURE GRANULOCYTES #: 0.2 E9/L
IMMATURE GRANULOCYTES %: 0.9 % (ref 0–5)
IMMATURE GRANULOCYTES %: 1 % (ref 0–5)
LAB: ABNORMAL
LAB: ABNORMAL
LACTIC ACID: 1.7 MMOL/L (ref 0.5–2.2)
LACTIC ACID: 2.1 MMOL/L (ref 0.5–2.2)
LYMPHOCYTES ABSOLUTE: 0.7 E9/L (ref 1.5–4)
LYMPHOCYTES ABSOLUTE: 1.75 E9/L (ref 1.5–4)
LYMPHOCYTES RELATIVE PERCENT: 3.6 % (ref 20–42)
LYMPHOCYTES RELATIVE PERCENT: 8.8 % (ref 20–42)
Lab: ABNORMAL
Lab: ABNORMAL
MAGNESIUM: 2.1 MG/DL (ref 1.6–2.6)
MAGNESIUM: 2.2 MG/DL (ref 1.6–2.6)
MCH RBC QN AUTO: 22.9 PG (ref 26–35)
MCH RBC QN AUTO: 23.2 PG (ref 26–35)
MCHC RBC AUTO-ENTMCNC: 32.1 % (ref 32–34.5)
MCHC RBC AUTO-ENTMCNC: 32.2 % (ref 32–34.5)
MCV RBC AUTO: 71 FL (ref 80–99.9)
MCV RBC AUTO: 72.2 FL (ref 80–99.9)
METER GLUCOSE: 124 MG/DL (ref 74–99)
METER GLUCOSE: 128 MG/DL (ref 74–99)
METER GLUCOSE: 138 MG/DL (ref 74–99)
METER GLUCOSE: 144 MG/DL (ref 74–99)
METHB: 0.4 % (ref 0–1.5)
METHB: 0.4 % (ref 0–1.5)
MODE: AC
MODE: AC
MONOCYTES ABSOLUTE: 0.87 E9/L (ref 0.1–0.95)
MONOCYTES ABSOLUTE: 1.21 E9/L (ref 0.1–0.95)
MONOCYTES RELATIVE PERCENT: 4.4 % (ref 2–12)
MONOCYTES RELATIVE PERCENT: 6.2 % (ref 2–12)
NEUTROPHILS ABSOLUTE: 17.02 E9/L (ref 1.8–7.3)
NEUTROPHILS ABSOLUTE: 17.45 E9/L (ref 1.8–7.3)
NEUTROPHILS RELATIVE PERCENT: 85.7 % (ref 43–80)
NEUTROPHILS RELATIVE PERCENT: 89.1 % (ref 43–80)
O2 CONTENT: 15.2 ML/DL
O2 CONTENT: 15.8 ML/DL
O2 SATURATION: 96.8 % (ref 92–98.5)
O2 SATURATION: 97.5 % (ref 92–98.5)
O2HB: 96.2 % (ref 94–97)
O2HB: 96.8 % (ref 94–97)
OPERATOR ID: 514
OPERATOR ID: 7874
PATIENT TEMP: 37 C
PATIENT TEMP: 37 C
PCO2: 29.6 MMHG (ref 35–45)
PCO2: 31.2 MMHG (ref 35–45)
PDW BLD-RTO: 15.8 FL (ref 11.5–15)
PDW BLD-RTO: 16.3 FL (ref 11.5–15)
PEEP/CPAP: 5 CMH2O
PEEP/CPAP: 5 CMH2O
PFO2: 0.87 MMHG/%
PFO2: 0.99 MMHG/%
PH BLOOD GAS: 7.53 (ref 7.35–7.45)
PH BLOOD GAS: 7.54 (ref 7.35–7.45)
PHOSPHORUS: 2.4 MG/DL (ref 2.5–4.5)
PHOSPHORUS: 3.5 MG/DL (ref 2.5–4.5)
PLATELET # BLD: 233 E9/L (ref 130–450)
PLATELET # BLD: 269 E9/L (ref 130–450)
PMV BLD AUTO: 10.8 FL (ref 7–12)
PMV BLD AUTO: 10.9 FL (ref 7–12)
PO2: 86.6 MMHG (ref 75–100)
PO2: 98.5 MMHG (ref 75–100)
POTASSIUM SERPL-SCNC: 4.17 MMOL/L (ref 3.5–5)
POTASSIUM SERPL-SCNC: 4.2 MMOL/L (ref 3.5–5)
POTASSIUM SERPL-SCNC: 4.3 MMOL/L (ref 3.5–5)
PRO-BNP: 57 PG/ML (ref 0–125)
PROCALCITONIN: 0.26 NG/ML (ref 0–0.08)
RBC # BLD: 4.59 E12/L (ref 3.8–5.8)
RBC # BLD: 5.22 E12/L (ref 3.8–5.8)
RI(T): 5.67
RI(T): 6.6
RR MECHANICAL: 20 B/MIN
RR MECHANICAL: 20 B/MIN
SODIUM BLD-SCNC: 137 MMOL/L (ref 132–146)
SODIUM BLD-SCNC: 137 MMOL/L (ref 132–146)
SOURCE, BLOOD GAS: ABNORMAL
SOURCE, BLOOD GAS: ABNORMAL
THB: 11.1 G/DL (ref 11.5–16.5)
THB: 11.6 G/DL (ref 11.5–16.5)
TIME ANALYZED: 1154
TIME ANALYZED: 1514
TOTAL PROTEIN: 6.4 G/DL (ref 6.4–8.3)
TOTAL PROTEIN: 7.9 G/DL (ref 6.4–8.3)
TROPONIN, HIGH SENSITIVITY: 38 NG/L (ref 0–11)
TROPONIN, HIGH SENSITIVITY: <6 NG/L (ref 0–11)
VT MECHANICAL: 500 ML
VT MECHANICAL: 500 ML
WBC # BLD: 19.6 E9/L (ref 4.5–11.5)
WBC # BLD: 19.9 E9/L (ref 4.5–11.5)

## 2021-06-05 PROCEDURE — 6370000000 HC RX 637 (ALT 250 FOR IP): Performed by: NEUROLOGICAL SURGERY

## 2021-06-05 PROCEDURE — 71045 X-RAY EXAM CHEST 1 VIEW: CPT

## 2021-06-05 PROCEDURE — 06HY33Z INSERTION OF INFUSION DEVICE INTO LOWER VEIN, PERCUTANEOUS APPROACH: ICD-10-PCS | Performed by: INTERNAL MEDICINE

## 2021-06-05 PROCEDURE — 84100 ASSAY OF PHOSPHORUS: CPT

## 2021-06-05 PROCEDURE — 99255 IP/OBS CONSLTJ NEW/EST HI 80: CPT | Performed by: INTERNAL MEDICINE

## 2021-06-05 PROCEDURE — 71275 CT ANGIOGRAPHY CHEST: CPT

## 2021-06-05 PROCEDURE — 85025 COMPLETE CBC W/AUTO DIFF WBC: CPT

## 2021-06-05 PROCEDURE — 6360000002 HC RX W HCPCS: Performed by: PHYSICIAN ASSISTANT

## 2021-06-05 PROCEDURE — 87449 NOS EACH ORGANISM AG IA: CPT

## 2021-06-05 PROCEDURE — 2500000003 HC RX 250 WO HCPCS: Performed by: STUDENT IN AN ORGANIZED HEALTH CARE EDUCATION/TRAINING PROGRAM

## 2021-06-05 PROCEDURE — 6360000002 HC RX W HCPCS: Performed by: NEUROLOGICAL SURGERY

## 2021-06-05 PROCEDURE — 2580000003 HC RX 258: Performed by: STUDENT IN AN ORGANIZED HEALTH CARE EDUCATION/TRAINING PROGRAM

## 2021-06-05 PROCEDURE — 93971 EXTREMITY STUDY: CPT | Performed by: RADIOLOGY

## 2021-06-05 PROCEDURE — 36556 INSERT NON-TUNNEL CV CATH: CPT

## 2021-06-05 PROCEDURE — 31500 INSERT EMERGENCY AIRWAY: CPT

## 2021-06-05 PROCEDURE — 6360000002 HC RX W HCPCS

## 2021-06-05 PROCEDURE — 2000000000 HC ICU R&B

## 2021-06-05 PROCEDURE — 83735 ASSAY OF MAGNESIUM: CPT

## 2021-06-05 PROCEDURE — 6360000002 HC RX W HCPCS: Performed by: STUDENT IN AN ORGANIZED HEALTH CARE EDUCATION/TRAINING PROGRAM

## 2021-06-05 PROCEDURE — 93005 ELECTROCARDIOGRAM TRACING: CPT | Performed by: STUDENT IN AN ORGANIZED HEALTH CARE EDUCATION/TRAINING PROGRAM

## 2021-06-05 PROCEDURE — 87070 CULTURE OTHR SPECIMN AEROBIC: CPT

## 2021-06-05 PROCEDURE — 83605 ASSAY OF LACTIC ACID: CPT

## 2021-06-05 PROCEDURE — 82962 GLUCOSE BLOOD TEST: CPT

## 2021-06-05 PROCEDURE — 87040 BLOOD CULTURE FOR BACTERIA: CPT

## 2021-06-05 PROCEDURE — 99254 IP/OBS CNSLTJ NEW/EST MOD 60: CPT | Performed by: INTERNAL MEDICINE

## 2021-06-05 PROCEDURE — 5A1955Z RESPIRATORY VENTILATION, GREATER THAN 96 CONSECUTIVE HOURS: ICD-10-PCS | Performed by: HOSPITALIST

## 2021-06-05 PROCEDURE — 82805 BLOOD GASES W/O2 SATURATION: CPT

## 2021-06-05 PROCEDURE — 93970 EXTREMITY STUDY: CPT

## 2021-06-05 PROCEDURE — 84132 ASSAY OF SERUM POTASSIUM: CPT

## 2021-06-05 PROCEDURE — 36556 INSERT NON-TUNNEL CV CATH: CPT | Performed by: INTERNAL MEDICINE

## 2021-06-05 PROCEDURE — 82140 ASSAY OF AMMONIA: CPT

## 2021-06-05 PROCEDURE — 6360000004 HC RX CONTRAST MEDICATION: Performed by: RADIOLOGY

## 2021-06-05 PROCEDURE — 6360000002 HC RX W HCPCS: Performed by: INTERNAL MEDICINE

## 2021-06-05 PROCEDURE — 84484 ASSAY OF TROPONIN QUANT: CPT

## 2021-06-05 PROCEDURE — 93010 ELECTROCARDIOGRAM REPORT: CPT | Performed by: INTERNAL MEDICINE

## 2021-06-05 PROCEDURE — 51702 INSERT TEMP BLADDER CATH: CPT

## 2021-06-05 PROCEDURE — 36415 COLL VENOUS BLD VENIPUNCTURE: CPT

## 2021-06-05 PROCEDURE — 6370000000 HC RX 637 (ALT 250 FOR IP): Performed by: INTERNAL MEDICINE

## 2021-06-05 PROCEDURE — 2500000003 HC RX 250 WO HCPCS

## 2021-06-05 PROCEDURE — 80053 COMPREHEN METABOLIC PANEL: CPT

## 2021-06-05 PROCEDURE — 87206 SMEAR FLUORESCENT/ACID STAI: CPT

## 2021-06-05 PROCEDURE — 94640 AIRWAY INHALATION TREATMENT: CPT

## 2021-06-05 PROCEDURE — 94002 VENT MGMT INPAT INIT DAY: CPT

## 2021-06-05 PROCEDURE — 84145 PROCALCITONIN (PCT): CPT

## 2021-06-05 PROCEDURE — 02HV33Z INSERTION OF INFUSION DEVICE INTO SUPERIOR VENA CAVA, PERCUTANEOUS APPROACH: ICD-10-PCS | Performed by: INTERNAL MEDICINE

## 2021-06-05 PROCEDURE — APPSS60 APP SPLIT SHARED TIME 46-60 MINUTES: Performed by: NURSE PRACTITIONER

## 2021-06-05 PROCEDURE — 83880 ASSAY OF NATRIURETIC PEPTIDE: CPT

## 2021-06-05 PROCEDURE — 72125 CT NECK SPINE W/O DYE: CPT

## 2021-06-05 PROCEDURE — 2580000003 HC RX 258: Performed by: NEUROLOGICAL SURGERY

## 2021-06-05 PROCEDURE — C9113 INJ PANTOPRAZOLE SODIUM, VIA: HCPCS | Performed by: STUDENT IN AN ORGANIZED HEALTH CARE EDUCATION/TRAINING PROGRAM

## 2021-06-05 RX ORDER — PROPOFOL 10 MG/ML
INJECTION, EMULSION INTRAVENOUS
Status: COMPLETED
Start: 2021-06-05 | End: 2021-06-05

## 2021-06-05 RX ORDER — ARFORMOTEROL TARTRATE 15 UG/2ML
15 SOLUTION RESPIRATORY (INHALATION) 2 TIMES DAILY
Status: DISCONTINUED | OUTPATIENT
Start: 2021-06-05 | End: 2021-06-14 | Stop reason: HOSPADM

## 2021-06-05 RX ORDER — MIDAZOLAM HYDROCHLORIDE 1 MG/ML
INJECTION INTRAMUSCULAR; INTRAVENOUS
Status: DISPENSED
Start: 2021-06-05 | End: 2021-06-05

## 2021-06-05 RX ORDER — FENTANYL CITRATE 50 UG/ML
INJECTION, SOLUTION INTRAMUSCULAR; INTRAVENOUS
Status: DISPENSED
Start: 2021-06-05 | End: 2021-06-05

## 2021-06-05 RX ORDER — METHYLPREDNISOLONE SODIUM SUCCINATE 125 MG/2ML
125 INJECTION, POWDER, LYOPHILIZED, FOR SOLUTION INTRAMUSCULAR; INTRAVENOUS ONCE
Status: COMPLETED | OUTPATIENT
Start: 2021-06-05 | End: 2021-06-05

## 2021-06-05 RX ORDER — SODIUM CHLORIDE 9 MG/ML
10 INJECTION INTRAVENOUS DAILY
Status: DISCONTINUED | OUTPATIENT
Start: 2021-06-05 | End: 2021-06-10

## 2021-06-05 RX ORDER — 0.9 % SODIUM CHLORIDE 0.9 %
1000 INTRAVENOUS SOLUTION INTRAVENOUS ONCE
Status: COMPLETED | OUTPATIENT
Start: 2021-06-05 | End: 2021-06-05

## 2021-06-05 RX ORDER — PANTOPRAZOLE SODIUM 40 MG/10ML
40 INJECTION, POWDER, LYOPHILIZED, FOR SOLUTION INTRAVENOUS DAILY
Status: DISCONTINUED | OUTPATIENT
Start: 2021-06-05 | End: 2021-06-10

## 2021-06-05 RX ORDER — PANTOPRAZOLE SODIUM 40 MG/10ML
40 INJECTION, POWDER, LYOPHILIZED, FOR SOLUTION INTRAVENOUS DAILY
Status: DISCONTINUED | OUTPATIENT
Start: 2021-06-05 | End: 2021-06-05 | Stop reason: SDUPTHER

## 2021-06-05 RX ORDER — PROPOFOL 10 MG/ML
5-50 INJECTION, EMULSION INTRAVENOUS
Status: DISCONTINUED | OUTPATIENT
Start: 2021-06-05 | End: 2021-06-06

## 2021-06-05 RX ORDER — FENTANYL CITRATE 50 UG/ML
25 INJECTION, SOLUTION INTRAMUSCULAR; INTRAVENOUS ONCE
Status: DISCONTINUED | OUTPATIENT
Start: 2021-06-05 | End: 2021-06-07

## 2021-06-05 RX ORDER — METHYLPREDNISOLONE SODIUM SUCCINATE 125 MG/2ML
INJECTION, POWDER, LYOPHILIZED, FOR SOLUTION INTRAMUSCULAR; INTRAVENOUS
Status: DISPENSED
Start: 2021-06-05 | End: 2021-06-05

## 2021-06-05 RX ORDER — BUDESONIDE 0.25 MG/2ML
250 INHALANT ORAL 2 TIMES DAILY
Status: DISCONTINUED | OUTPATIENT
Start: 2021-06-05 | End: 2021-06-14 | Stop reason: HOSPADM

## 2021-06-05 RX ORDER — IPRATROPIUM BROMIDE AND ALBUTEROL SULFATE 2.5; .5 MG/3ML; MG/3ML
1 SOLUTION RESPIRATORY (INHALATION)
Status: DISCONTINUED | OUTPATIENT
Start: 2021-06-05 | End: 2021-06-14 | Stop reason: HOSPADM

## 2021-06-05 RX ADMIN — DEXAMETHASONE SODIUM PHOSPHATE 4 MG: 4 INJECTION, SOLUTION INTRA-ARTICULAR; INTRALESIONAL; INTRAMUSCULAR; INTRAVENOUS; SOFT TISSUE at 21:10

## 2021-06-05 RX ADMIN — CYCLOBENZAPRINE 10 MG: 10 TABLET, FILM COATED ORAL at 06:15

## 2021-06-05 RX ADMIN — CEFEPIME HYDROCHLORIDE 1000 MG: 1 INJECTION, POWDER, FOR SOLUTION INTRAMUSCULAR; INTRAVENOUS at 22:12

## 2021-06-05 RX ADMIN — IOPAMIDOL 70 ML: 755 INJECTION, SOLUTION INTRAVENOUS at 09:01

## 2021-06-05 RX ADMIN — Medication 100 MCG/HR: at 18:37

## 2021-06-05 RX ADMIN — SODIUM CHLORIDE 1000 ML: 9 INJECTION, SOLUTION INTRAVENOUS at 12:00

## 2021-06-05 RX ADMIN — DEXAMETHASONE SODIUM PHOSPHATE 4 MG: 4 INJECTION, SOLUTION INTRA-ARTICULAR; INTRALESIONAL; INTRAMUSCULAR; INTRAVENOUS; SOFT TISSUE at 03:39

## 2021-06-05 RX ADMIN — Medication 4 MG: at 03:39

## 2021-06-05 RX ADMIN — SODIUM CHLORIDE, PRESERVATIVE FREE 10 ML: 5 INJECTION INTRAVENOUS at 14:45

## 2021-06-05 RX ADMIN — DEXAMETHASONE SODIUM PHOSPHATE 4 MG: 4 INJECTION, SOLUTION INTRA-ARTICULAR; INTRALESIONAL; INTRAMUSCULAR; INTRAVENOUS; SOFT TISSUE at 14:45

## 2021-06-05 RX ADMIN — METHYLPREDNISOLONE SODIUM SUCCINATE 125 MG: 125 INJECTION, POWDER, FOR SOLUTION INTRAMUSCULAR; INTRAVENOUS at 07:15

## 2021-06-05 RX ADMIN — PANTOPRAZOLE SODIUM 40 MG: 40 INJECTION, POWDER, FOR SOLUTION INTRAVENOUS at 14:45

## 2021-06-05 RX ADMIN — OXYCODONE HYDROCHLORIDE 10 MG: 10 TABLET ORAL at 06:15

## 2021-06-05 RX ADMIN — DEXAMETHASONE SODIUM PHOSPHATE 4 MG: 4 INJECTION, SOLUTION INTRA-ARTICULAR; INTRALESIONAL; INTRAMUSCULAR; INTRAVENOUS; SOFT TISSUE at 13:01

## 2021-06-05 RX ADMIN — BUDESONIDE 250 MCG: 0.25 SUSPENSION RESPIRATORY (INHALATION) at 20:52

## 2021-06-05 RX ADMIN — ARFORMOTEROL TARTRATE 15 MCG: 15 SOLUTION RESPIRATORY (INHALATION) at 20:52

## 2021-06-05 RX ADMIN — Medication 50 MCG/HR: at 09:54

## 2021-06-05 RX ADMIN — Medication 10 ML: at 22:12

## 2021-06-05 RX ADMIN — IPRATROPIUM BROMIDE AND ALBUTEROL SULFATE 1 AMPULE: 2.5; .5 SOLUTION RESPIRATORY (INHALATION) at 20:52

## 2021-06-05 RX ADMIN — CEFEPIME HYDROCHLORIDE 1000 MG: 1 INJECTION, POWDER, FOR SOLUTION INTRAMUSCULAR; INTRAVENOUS at 11:58

## 2021-06-05 RX ADMIN — ENOXAPARIN SODIUM 40 MG: 40 INJECTION SUBCUTANEOUS at 13:01

## 2021-06-05 RX ADMIN — SODIUM PHOSPHATE, MONOBASIC, MONOHYDRATE AND SODIUM PHOSPHATE, DIBASIC, ANHYDROUS 10 MMOL: 276; 142 INJECTION, SOLUTION INTRAVENOUS at 14:44

## 2021-06-05 RX ADMIN — Medication 4 MG: at 00:53

## 2021-06-05 RX ADMIN — Medication 10 ML: at 13:09

## 2021-06-05 RX ADMIN — PROPOFOL 20 MCG/KG/MIN: 10 INJECTION, EMULSION INTRAVENOUS at 09:00

## 2021-06-05 RX ADMIN — VANCOMYCIN HYDROCHLORIDE 1000 MG: 1 INJECTION, POWDER, LYOPHILIZED, FOR SOLUTION INTRAVENOUS at 21:09

## 2021-06-05 RX ADMIN — IPRATROPIUM BROMIDE AND ALBUTEROL SULFATE 1 AMPULE: 2.5; .5 SOLUTION RESPIRATORY (INHALATION) at 17:19

## 2021-06-05 RX ADMIN — INSULIN LISPRO 2 UNITS: 100 INJECTION, SOLUTION INTRAVENOUS; SUBCUTANEOUS at 13:03

## 2021-06-05 RX ADMIN — PROPOFOL 25 MCG/KG/MIN: 10 INJECTION, EMULSION INTRAVENOUS at 13:01

## 2021-06-05 RX ADMIN — DIPHENHYDRAMINE HYDROCHLORIDE 25 MG: 50 INJECTION, SOLUTION INTRAMUSCULAR; INTRAVENOUS at 00:54

## 2021-06-05 RX ADMIN — PROPOFOL 25 MCG/KG/MIN: 10 INJECTION, EMULSION INTRAVENOUS at 18:36

## 2021-06-05 ASSESSMENT — PAIN - FUNCTIONAL ASSESSMENT
PAIN_FUNCTIONAL_ASSESSMENT: ACTIVITIES ARE NOT PREVENTED
PAIN_FUNCTIONAL_ASSESSMENT: ACTIVITIES ARE NOT PREVENTED

## 2021-06-05 ASSESSMENT — PAIN DESCRIPTION - ORIENTATION
ORIENTATION: ANTERIOR
ORIENTATION: ANTERIOR

## 2021-06-05 ASSESSMENT — PAIN SCALES - WONG BAKER
WONGBAKER_NUMERICALRESPONSE: 0

## 2021-06-05 ASSESSMENT — PULMONARY FUNCTION TESTS
PIF_VALUE: 21
PIF_VALUE: 22
PIF_VALUE: 27
PIF_VALUE: 28
PIF_VALUE: 21
PIF_VALUE: 21
PIF_VALUE: 25
PIF_VALUE: 26
PIF_VALUE: 25
PIF_VALUE: 23
PIF_VALUE: 23

## 2021-06-05 ASSESSMENT — PAIN SCALES - GENERAL
PAINLEVEL_OUTOF10: 0
PAINLEVEL_OUTOF10: 6
PAINLEVEL_OUTOF10: 0
PAINLEVEL_OUTOF10: 9
PAINLEVEL_OUTOF10: 10
PAINLEVEL_OUTOF10: 5
PAINLEVEL_OUTOF10: 0
PAINLEVEL_OUTOF10: 10

## 2021-06-05 ASSESSMENT — PAIN DESCRIPTION - LOCATION
LOCATION: GENERALIZED;NECK
LOCATION: GENERALIZED;NECK

## 2021-06-05 ASSESSMENT — PAIN DESCRIPTION - FREQUENCY
FREQUENCY: CONTINUOUS
FREQUENCY: CONTINUOUS

## 2021-06-05 ASSESSMENT — PAIN DESCRIPTION - DESCRIPTORS
DESCRIPTORS: ACHING;CONSTANT;DISCOMFORT;SORE
DESCRIPTORS: ACHING;CONSTANT;DISCOMFORT;SORE

## 2021-06-05 ASSESSMENT — PAIN DESCRIPTION - PAIN TYPE
TYPE: SURGICAL PAIN
TYPE: SURGICAL PAIN;CHRONIC PAIN

## 2021-06-05 ASSESSMENT — PAIN DESCRIPTION - ONSET
ONSET: ON-GOING
ONSET: ON-GOING

## 2021-06-05 NOTE — CONSULTS
[facility])  Department of Internal Medicine   MICU Consult Note    Patient:  King Moreno 44 y.o. male   MRN: [id number]       Date of Service: 6/5/2021      Chief Complaint:  S/p respiratory arrest    History of Present Illness     The patient is a 24-year-old male with a past medical history of migraine, Lyme disease, chronic generalized pain who presented to the hospital on 6/2/2021 with neck pain and was admitted under neurosurgery service. Patient was found to have multilevel cervical stenosis consequently he underwent C4-C7 ACDF on 6/2/2021. On 6/5/2020 1 in the morning RRT was called because the patient was hypoxic in respiratory distress. During this time he stated that when he was going to the restroom and started to turn felt an acute shortness of breath, chest and back pain. During the RRT patient was given methylprednisolone and fentanyl. Patient was brought to CT scan to for CTA pulmonary and CT cervical spine. While being in the CT scan patient had worsening of his respiratory distress and was shaking, CODE BLUE was called, patient was found to be in PEA arrest, had CPR for 3 minutes and achieved ROSC. Patient was intubated in the scene for airway protection and transferred to the MICU for further management sedated and intubated.       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 Intended supply: 30 days 5/25/21 6/24/21  David Knott MD   SUMAtriptan (IMITREX) 6 MG/0.5ML SOLN injection Inject 6 mg into the skin once as needed for Migraine    Historical Provider, MD   cyclobenzaprine (FLEXERIL) 10 MG tablet Take 1 tablet by mouth 3 times daily as needed for Muscle spasms 10/31/18   Lay Song DO       Allergies:  Penicillins    Social History:   TOBACCO:   reports that he has quit smoking. His smoking use included cigarettes. He quit after 10.00 years of use. He has never used smokeless tobacco.  ETOH:   reports current alcohol use. OCCUPATION:      Family History:       Problem Relation Age of Onset    No Known Problems Mother     Prostate Cancer Father     No Known Problems Maternal Grandmother     No Known Problems Maternal Grandfather     No Known Problems Paternal Grandmother     No Known Problems Paternal Grandfather     No Known Problems Daughter         REVIEW OF SYSTEMS:  Could not be obtained. Physical Exam       Physical Exam:  · Vitals: /82   Pulse 85   Temp 97 °F (36.1 °C) (Temporal)   Resp 20   Ht 5' 5\" (1.651 m)   Wt 183 lb (83 kg)   SpO2 98%   BMI 30.45 kg/m²     · I & O - 24hr: I/O this shift:  · In: -   · Out: 700 [Urine:700]   · General Appearance: Sedated and intubated. · HEENT:  Head: Normocephalic, no lesions, without obvious abnormality. · Neck: no adenopathy, no carotid bruit, no JVD, supple, symmetrical, trachea midline and thyroid not enlarged, symmetric, no tenderness/mass/nodules  · Lung: Bilateral wheezes. · Heart: Tachycardia, regular, no murmurs. · Abdomen: soft, non-tender; bowel sounds normal; no masses,  no organomegaly  · Extremities:  extremities normal, atraumatic, no cyanosis or edema  · Musculokeletal: No joint swelling, no muscle tenderness. ROM normal in all joints of extremities. · Neurologic: Mental status: Sedated and intubated.     Labs     CBC:   Lab Results   Component Value Date    WBC 19.6 06/05/2021    RBC 4.59 06/05/2021    HGB 10.5 06/05/2021    HCT 32.6 06/05/2021     06/05/2021    MCV 71.0 06/05/2021     BMP:    Lab Results   Component Value Date     06/05/2021    K 4.17 06/05/2021    K 4.3 05/26/2021     06/05/2021    CO2 23 06/05/2021    BUN 15 06/05/2021    CREATININE 0.8 06/05/2021    GLUCOSE 140 06/05/2021    CALCIUM 8.5 06/05/2021     Hepatic Function Panel:    Lab Results   Component Value Date    ALKPHOS 59 06/05/2021    AST 40 06/05/2021    ALT 43 06/05/2021    PROT 6.4 06/05/2021    LABALBU 3.6 06/05/2021    BILITOT 0.4 06/05/2021     Cardiac Enzymes: No results found for: CKTOTAL, CKMB, CKMBINDEX, TROPONINI  PT/INR:    Lab Results   Component Value Date    PROTIME 10.8 05/26/2021    INR 1.0 05/26/2021     ABG:  No results found for: PHART, GPL1UNF, PO2ART, I8PKIRIW, KPT6OQF, BEART  TSH:  No results found for: TSH        EKG: Sinus tachycardia    Resident's Assessment & Plan     Assessment:  1. Acute hypoxic respiratory failure, secondary to non obstructive post-operative atelectasis aspiration/ hospital acquired  pneumonia, versus diaphragmatic dysfunction. Currently intubated. 2.  Status post respiratory arrest, 3 minutes, ROSC achieved, No medications given   3. Aspiration pneumonia. 4.  Status post C4-C7 anterior cervical discectomy and fusion, POD3   5. History of migraine. 6.  Hypophosphatemia  7. Microcytic anemia  8. Bilateral Non-obstructiveatelectasis. 9. Elevated troponins, post Respiratory arrest.    Plan:    · Currently mechanically ventilated, continue supportive ventilation, avoid hyperventilation and lung injury was a protective lung strategy, Daily chest x-ray and ABG. · Start vancomycin (pharmacy to dose and cefepime. · Follow respiratory and blood cultures. · Send Legionella and strep antigen. · MRSA Nares screening   · Sniff testing. · Continue sedation with fentanyl and propofol.   · Continue dexamethasone 4 mg IV every 6.  · Increase PEEP to 8  · Order bilateral lower extremity ultrasound  · Cardiology consulted status post arrest, echo ordered   · Neurosurgery following. DVT/GI prophylaxis: Lovenox/ PPI     Daisy Jacome M.D. , PGY-2  Internal Medicine    Attending Physician: Dr. Castro Degree, [address] [place] [address]  Department of Pulmonary, Critical Care and Sleep Medicine  [address] [facility]  Department of Internal Medicine      During multidisciplinary team rounds Teresa Morales is a 44 y.o. male was seen, examined and discussed. This is confirmation that I have personally seen and examined the patient and that the key elements of the encounter were performed by me (> 85 % time). The medications & laboratory data was discussed and adjusted where necessary. The radiographic images were reviewed or with radiologist or consultant if felt dis-concordant with the exam or history. The above findings were corroborated, plans confirmed and changes made if needed. Family is updated at the bedside as available. Key issues of the case were discussed among consultants. Critical Care time is documented if appropriate.       Sara Kirby, DO, FACP, FCCP, [facility],

## 2021-06-05 NOTE — PROGRESS NOTES
Dr. Reinaldo Salgado instructed to hold on placing NG/OG until further notice due to difficult intubation, bleeding and swelling.

## 2021-06-05 NOTE — PLAN OF CARE
Pt arrived for CAT scan room, and coded right before CAT scan initiated. ASCL protocol started, ROSC returned after 8 minutes    Lab review at 850am . Pt has leukocytosis, rest of the labs available not very remarkable.     OK for cat scan, before transfer to MICU for management

## 2021-06-05 NOTE — FLOWSHEET NOTE
CODE NOTES: 0825- CODE CALLED DUE TO RESP DISTRESS IN CT SCAN     0828-VERSED 4MG HEART RATE 52  0829-PULSELESS CPR STARTED   0830-1 AMP EPI IVP, PULSE CHECK, NO PULSE PEA ASYSTOLE, BICSRB 1 AMP IVP          0832-PULSE CHECK NO PULSE PEA INTUBATED 7.5CM ORAL ETT 22CMS AT THE LIP, END TITAL 110.                             0833- PULSE RETURNED END TITLE 91                                                                   0835-PULSEPRESENT END TITLE-89        0835 MESSAGE LEFT FOR DR BARRIGA                                                      0836- /PALP, ST RATE 170             0841BP-144/100,HEART RATE 159,SINUS TACHY.                                        9263- END TITLE 45                                     0848- BP- 132/88, HEART RATE 150, SINUS TACHY.                                               0852- VERSED 4MG IVP                              0855-PULSE OX- 94%                                  0856- CTCHEST DONE                               0901- /98 HEART RATE 128 SINUS TACHY.                                                 0904- TRANSFERRED TO [facility]

## 2021-06-05 NOTE — CONSULTS
The above documentation has been prepared under my direction and personally reviewed by me in its entirety. I confirm that the note above accurately reflects all work, treatment, procedures, and medical decision making performed by me. The patient's history was independently obtained. The patient was independently examined. Electrocardiogram, prior and present cardiovascular assessment, and laboratory studies were reviewed. The patient is a 58-year-old male with no association to [facility] and no known structural heart disease. He has a reported previous history of Lyme disease as well as that of previous tobacco consumption with reported chronic obstructive lung disease as well as a remote history of substance abuse. He additionally has an extensive history of cervical spine disease and was initially hospitalized for his present admission for elective anterior cervical discectomy and fusion with an unremarkable initial postoperative course. On the day of his acute event, and on a nonmonitored floor, he developed progressive dyspnea with needs of a rapid response team for acute respiratory distress the face of apparent bronchospasm and a low-grade fever. Of consideration was that of a pulmonary embolism with plans for a contrast CT angiogram but while undergoing the study, he apparently developed progressive respiratory difficulties and while in the scan was noted to have reported seizure-like activity with subsequent apnea and apparent loss of pulse with an initial rhythm that of apparent pulseless electrical activity and with cardiopulmonary resuscitation, a return of circulation was achieved.  An initial postevent electrocardiogram was that of sinus rhythm with left axis deviation left ventricular hypertrophy and a nondetectable high-sensitivity troponin and normal proBNP level was noted with a subsequent chest x-ray demonstrating evidence of a normal-sized cardiac silhouette but diffuse pulmonary abnormalities compatible with that of infiltrate and/or atelectasis and a left lower lobe pneumonia subsequently noted on a contrast CT angiogram in the absence of a pulmonary embolism performed earlier on the day of his assessment. He presently remains intubated and mechanically ventilated and otherwise hemodynamically stable. At the time of evaluation, his medications and allergies were reviewed as well as that of his past medical history and review of systems as documented. At time of evaluation, he is intubated and mechanically ventilated and appears in no significant distress. He is hemodynamically stable vital signs as documented. Jugular venous pressure and carotid assessment cannot be performed in the face of her cervical collar. Lung fields are presently clear to auscultation. Cardiac examination still for a regular rate and rhythm no gallop rhythm or cardiac murmur. A benign abdominal examination is present no peripheral edema identified. Diagnostic Assessment and Plan: On a clinical basis, the patient presents following a cardiopulmonary arrest appearing to be that predominately of a respiratory origin and further supported by his initial pulseless electrical activity. Presently no electrocardiographic abnormalities are noted with an initial normal high-sensitivity troponin and repeat level pending. Continue supportive care has been recommended with an echocardiogram to be obtained to assess the presence or absence of structural cardiac abnormalities to provide additional cardiovascular recommendations. Thank you for allowing me to participate in your patient's care. Please feel free to contact me if you have any questions or concerns. Nereida Younger.  Abel Gentile, [address] [place] [address] Cardiology

## 2021-06-05 NOTE — SIGNIFICANT EVENT
Results   Component Value Date    PH 7.527 06/05/2021    PCO2 29.6 06/05/2021    PO2 86.6 06/05/2021    HCO3 24.0 06/05/2021    O2SAT 96.8 06/05/2021     Medication(s) Given:  []  Narcan (Naloxone)   []  Romazicon (Flumazenil)    [x]  Breathing Treatment    [x]  Steroids (Prednisone, Solu-Medrol)  []  Adenosine  [] Cardiac Medicines:     Infusion(s):   [] Normal Saline    Amount: cc/hr      [] Lactate Ringers      [] Other:     Imaging:   [x] CXR:   [] Normal         [] Pneumothorax         [] Pulmonary Edema  [x] Infiltrate  And bilateral atelectasis          [] CT Head  [] Normal          [] ICB          [] [facility]          [] Other:     Laboratory Tests:     CBC:   Lab Results   Component Value Date    WBC 19.6 06/05/2021    RBC 4.59 06/05/2021    HGB 10.5 06/05/2021    HCT 32.6 06/05/2021    MCV 71.0 06/05/2021    MCH 22.9 06/05/2021    MCHC 32.2 06/05/2021    RDW 15.8 06/05/2021     06/05/2021    MPV 10.9 06/05/2021     CMP:    Lab Results   Component Value Date     06/05/2021    K 4.17 06/05/2021    K 4.3 05/26/2021     06/05/2021    CO2 23 06/05/2021    BUN 15 06/05/2021    CREATININE 0.8 06/05/2021    GFRAA >60 06/05/2021    LABGLOM >60 06/05/2021    GLUCOSE 140 06/05/2021    PROT 6.4 06/05/2021    LABALBU 3.6 06/05/2021    CALCIUM 8.5 06/05/2021    BILITOT 0.4 06/05/2021    ALKPHOS 59 06/05/2021    AST 40 06/05/2021    ALT 43 06/05/2021         Teams Assessment and Plan:  1. 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 IV methylprednisolone was given, fentanyl for agitation. Will get pro-BNP, Trop, CBC, BMP, and lactic acid. Will get CTAP and CT cervical spine. ?  ?  ?   Disposition:  [] No transfer   [x] Transfer to monitor floor  [] Transfer to: [] MICU [] NICU [] CCU [] SICU    Patients family updated: [] Yes  [] No   Discussed with:  [x] Critical Care Intensivist: Dr. Demetra Dance       [x] Primary Care Provider: Dr. Carleen Hartmann       [] Other: ?     Geovanna Fleming MD DO PGY-2   6/5/2021 12:41 PM  Attending Bushra Salmon MD

## 2021-06-05 NOTE — PROCEDURES
Central Line Insertion     Procedure: right femoral vein Triple Lumen Catheter placement. Indications: vascular access    Consent: Unable to be obtained due to the emergent nature of this procedure. Number of sticks: 3    Number of Kits used: 2    Procedure: Time Out: Immediately prior to the procedure a \"timeout\" was called to verify the correct patient and procedure. The patient was place in the trendelenburg position and the skin over the right femoral vein was prepped with betadine and draped in a sterile fashion. Local anesthesia was obtained by infiltration using 1% Lidocaine without epinephrine. With Ultrasound guidance a large bore needle was used to identify the vein, dark non pulsatile blood returned. The guide wire was then inserted through the needle with minimal resistance. 2 mm nick was made in the skin beside the guidewire. Then a dilator was inserted and removed. A triple lumen catheter was then inserted into the vessel over the guide wire using the Seldinger technique to the 15 cm violet. All ports showed good, free flowing blood return and were flushed with saline solution. The catheter was then securely fastened to the skin with sutures and covered with a bio patch and sterile dressing. A post procedure X-ray was not indicated. Complications: None   The patient tolerated the procedure well. Estimated blood loss: 1 ml. Elijah Dejesus MD PGY-2  6/5/2021  5:18 PM        [facility]  Department of Pulmonary, Critical Care and Sleep Medicine  [address] [facility]  Department of Internal Medicine  Attending Procedural Note Addendum Statement    This procedure was supervised. The critical elements of this procedure was monitored and, if needed, I was available for the needs of the procedure.      Tiffany Camacho DO, FCCP, Margret Weston

## 2021-06-05 NOTE — PROGRESS NOTES
Pharmacy Consultation Note  (Antibiotic Dosing and Monitoring)    Initial consult date: 6/5/21  Consulting physician: Dr. Christian Sun  Drug(s): vancomycin  Indication: HAP      Age/  Gender Height Weight IBW Dosing weight  Allergy Information   50 y.o./male 5' 5\" (165.1 cm) 183 lb (83 kg)     Ideal body weight: 61.5 kg (135 lb 9.3 oz)  Adjusted ideal body weight: 70.1 kg (154 lb 8.8 oz)  70.1 kg  Penicillins          Other anti-infectives Start date Stop date   Cefepime 1g IV q 12 hr 6/5                     Date  Tmax WBC BUN/CR UOP CrCL  (mL/min) Drug/Dose Time   Given Level(s)   (Time) Comments   6/5 afebrile 19.9  16/0.8 -- 107 Vancomycin 1000 mg IV q 12 hr (0945)                                               Intake/Output Summary (Last 24 hours) at 6/5/2021 1145  Last data filed at 6/5/2021 0949  Gross per 24 hour   Intake 550 ml   Output 1150 ml   Net -600 ml       Average urine output:    Cultures:    Site Date Result                    No results for input(s): Anya Chua in the last 72 hours. Historical Cultures:  No results found for: ORG  No results for input(s): BC in the last 72 hours. Radiology:      Assessment:  · 45 yo male started on empiric antibiotics of vancomycin and cefepime for HAP s/p RRT requiring intubation. Patient is sedated in the MICU. · Goal trough = 15 - 20 mcg/ml;  Goal AUC/JORDAN 400-600  · Scr 0.8    Plan:  · Start vancomycin 1000 mg IV q 12 hr (AUC/JORDAN 468, est trough 16.1)  · A vanco trough level will be obtained when steady-state is reached  · Pharmacist will follow and monitor/adjust dosing as necessary    Joycelyn Steiner, AleksD, BCPS 6/5/2021 11:45 AM

## 2021-06-05 NOTE — PLAN OF CARE
Problem: Non-Violent Restraints  Goal: Removal from restraints as soon as assessed to be safe  6/5/2021 1428 by Uzma Avilez  Outcome: Not Met This Shift  6/5/2021 1428 by Uzma Avilez  Outcome: Not Met This Shift  Goal: No harm/injury to patient while restraints in use  6/5/2021 1428 by Uzma Avilez  Outcome: Met This Shift  6/5/2021 1428 by Uzma Avilez  Outcome: Met This Shift  Goal: Patient's dignity will be maintained  6/5/2021 1428 by Uzma Avilez  Outcome: Met This Shift  6/5/2021 1428 by Uzma Avilez  Outcome: Met This Shift

## 2021-06-05 NOTE — CONSULTS
Inpatient Cardiology Consultation      Reason for Consult:  \"Possible MI\"    Consulting Physician: Dr. Jean-Paul Galarza     Requesting Physician:  Dr. Raul Grullon    Date of Consultation: 6/5/2021    HISTORY OF PRESENT ILLNESS:   Mr. Claudell Neve is a 53-year-old male who is new to [facility] cardiology physicians. PMH: Lyme disease, chronic migraines, neurostimulator implanted 2/2019, hernia repair, cervical fusion, bilateral carpal tunnel release, former cocaine/heroin use and former tobacco abuse. Patient lives by himself and per record has aides that come into his home. Please note the following information was obtained from the patient's medical record and ICU team due to the patient is intubated and sedated. Patient had presented to [facility] for an elective C4-C5, C5-C6, C6-C7 anterior cervical discectomy and fusion on 6/2/2021. Postop patient was admitted to a nontelemetry monitored unit. On 6/5/2021 at approximately 21 , RRT called for acute hypoxia and respiratory distress. Patient stated that he was going to the bathroom and turned his bed and felt short of breath, has reported \"chest pain\" and developed respiratory distress. He reported that his neck was a 10/10 pain with turning his head. Blood pressure 168/93, temperature 99 °F, heart rate 120. He was given methylprednisolone and fentanyl. Per documentation, patient was wheezing and was placed on supplemental oxygen. Patient was noted to have acute hypoxic respiratory distress secondary to aspiration pneumonia, atelectasis    Patient was brought to CT scan for CTA pulmonary and CT cervical spine --however while in CT scan for ~ 5 minutes he became anxious, SOB and appeared to be \"shaking\" with possible seizure like activity. IRMA BRANTLEY was called ~0825 on 6/5/2021 and found to be in PEA arrest, had CPR for 3 minutes and achieved ROSC. Patient was intubated and transferred to MICU for further evaluation and management.   Currently: Blood pressure 126/75, heart rate 84, afebrile, intubated and sedated. Current labs: Sodium 137, potassium 4.2, BUN 15, creatinine 0.8, lactic acid 2.1, glucose 140, phosphorus 2.4, procalcitonin 0.26, ALT 43 AST 40, WBC 19.6, hemoglobin dropped from 12.1-10. 5. hs-cTnT <6. proBNP 57. Blood cultures: Pending. CXR: Presence of bibasilar areas of subsegmental atelectasis more on the left side. Ultrasound Doppler lower extremities: No DVT. CTA chest with contrast: No PE, consolidation of the left lower lobe may represent pneumonia worse prior aspiration, groundglass opacities over the right lung likely represent infectious process. Patient is currently intubated. He is on IV antibiotics. Echocardiogram is pending. Please note: past medical records were reviewed per electronic medical record (EMR) - see detailed reports under Past Medical/ Surgical History. Past Medical History:    1. Lyme disease  2. Chronic migraines  3. Hx of neurostimulator implanted 2/2019 (removed 4/2021)  4. Hx of hernia repair  5. Hx of cervical fusion  6. Hx of bilateral carpal tunnel release  7. Former cocaine/heroin use  8. Former tobacco abuse.       Past Surgical History:    Past Surgical History:   Procedure Laterality Date    ABDOMEN SURGERY      nissen fundoplication    CARPAL TUNNEL RELEASE Bilateral     CERVICAL FUSION N/A 6/2/2021    C4-C5, C5-C6, C6-C7  ANTERIOR CERVICAL DISCECTOMY AND FUSION -- NEEDS REGULAR BED WITH HORSE SHOE, PLATES, SCREWS -- GLOBUS performed by Richard Fabian MD at [address] [facility] GASTRIC FUNDOPLICATION      [id number] [facility]  [id number]/[id number]    [facility]- [facility]    INSERT/ REPLACE INFUSN PUMP,PROGRAMMABLE N/A 3/2/2019    SPINAL CORD STIMULATOR REMOVAL performed by Richard Fabian MD at [address]  08/15/2018    implantation of medtronic lumbar spinal cord generator    IA IMPLANT NEUROSTIM/ N/A 8/15/2018    SURGICAL IMPLANTATION OF MEDTRONIC LUMBAR SPINAL CORD  ELECTRODES AND Current Medications:    Current Facility-Administered Medications: methylPREDNISolone sodium (SOLU-MEDROL) 125 MG injection, , ,   ipratropium-albuterol (DUONEB) nebulizer solution 1 ampule, 1 ampule, Inhalation, Q4H WA  budesonide (PULMICORT) nebulizer suspension 250 mcg, 250 mcg, Nebulization, BID  Arformoterol Tartrate (BROVANA) nebulizer solution 15 mcg, 15 mcg, Nebulization, BID  fentaNYL (SUBLIMAZE) injection 25 mcg, 25 mcg, Intravenous, Once  fentaNYL (SUBLIMAZE) 100 MCG/2ML injection, , ,   midazolam (VERSED) 2 MG/2ML injection, , ,   fentaNYL 5 mcg/ml in 0.9%  ml infusion, 12.5-200 mcg/hr, Intravenous, Continuous  propofol injection, 5-50 mcg/kg/min, Intravenous, Titrated  vancomycin 1000 mg IVPB in 250 mL D5W addavial, 1,000 mg, Intravenous, Q12H  cefepime NS flush, , Intravenous, Q12H  cefepime (MAXIPIME) 1000 mg IVPB minibag, 1,000 mg, Intravenous, Q12H  sodium phosphate 10 mmol in dextrose 5 % 250 mL IVPB, 10 mmol, Intravenous, Once  pantoprazole (PROTONIX) injection 40 mg, 40 mg, Intravenous, Daily **AND** sodium chloride (PF) 0.9 % injection 10 mL, 10 mL, Intravenous, Daily  enoxaparin (LOVENOX) injection 40 mg, 40 mg, Subcutaneous, Daily  dexamethasone (DECADRON) injection 4 mg, 4 mg, Intravenous, Q6H  0.9 % sodium chloride infusion, , Intravenous, Continuous  cyclobenzaprine (FLEXERIL) tablet 10 mg, 10 mg, Oral, TID PRN  pregabalin (LYRICA) capsule 225 mg, 225 mg, Oral, BID  sodium chloride flush 0.9 % injection 5-40 mL, 5-40 mL, Intravenous, 2 times per day  sodium chloride flush 0.9 % injection 5-40 mL, 5-40 mL, Intravenous, PRN  0.9 % sodium chloride infusion, 25 mL, Intravenous, PRN  ondansetron (ZOFRAN-ODT) disintegrating tablet 4 mg, 4 mg, Oral, Q8H PRN **OR** ondansetron (ZOFRAN) injection 4 mg, 4 mg, Intravenous, Q6H PRN  polyethylene glycol (GLYCOLAX) packet 17 g, 17 g, Oral, Daily  bisacodyl (DULCOLAX) EC tablet 5 mg, 5 mg, Oral, Daily  sennosides-docusate sodium (SENOKOT-S) 8.6-50 MG tablet 1 tablet, 1 tablet, Oral, BID  benzocaine-menthol (CEPACOL SORE THROAT) lozenge 1 lozenge, 1 lozenge, Oral, Q2H PRN  acetaminophen (TYLENOL) tablet 650 mg, 650 mg, Oral, Q4H PRN  insulin lispro (HUMALOG) injection vial 0-12 Units, 0-12 Units, Subcutaneous, TID WC  insulin lispro (HUMALOG) injection vial 0-6 Units, 0-6 Units, Subcutaneous, Nightly  glucose (GLUTOSE) 40 % oral gel 15 g, 15 g, Oral, PRN  dextrose 50 % IV solution, 12.5 g, Intravenous, PRN  glucagon (rDNA) injection 1 mg, 1 mg, Intramuscular, PRN  dextrose 5 % solution, 100 mL/hr, Intravenous, PRN  diphenhydrAMINE (BENADRYL) injection 25 mg, 25 mg, Intravenous, Q6H PRN    Allergies:  Penicillins    Social History: Please note the following examination was obtained from the patient's medical record due to the patient is intubated and sedated. Former smoker   Former cocaine/heroin use    Family History: The following information was unable to be obtained due to the patient is intubated and sedated. Family History   Problem Relation Age of Onset    No Known Problems Mother     Prostate Cancer Father     No Known Problems Maternal Grandmother     No Known Problems Maternal Grandfather     No Known Problems Paternal Grandmother     No Known Problems Paternal Grandfather     No Known Problems Daughter        REVIEW OF SYSTEMS:   Unable to assess due to patient is intubated and sedated. PHYSICAL EXAM:   /75   Pulse 84   Temp 97 °F (36.1 °C) (Temporal)   Resp 20   Ht 5' 5\" (1.651 m)   Wt 183 lb (83 kg)   SpO2 98%   BMI 30.45 kg/m²   CONST:  Well developed, well nourished middle-aged male who appears of stated age. Intubated on mechanical ventilation appears to be in no acute distress. HEENT:   Head- Normocephalic, atraumatic   Eyes- Conjunctivae pink, anicteric  Throat- + orally intubated, + OG. Neck- Unable to fully assess due to the patient has a cervical collar in place.   CHEST: Chest symmetrical and non-tender to palpation. No accessory muscle use or intercostal retractions  RESPIRATORY: Lung sounds -diminished breath sounds. CARDIOVASCULAR:     Heart Inspection- shows no noted pulsations  Heart Palpation- no heaves or thrills; PMI is non-displaced   Heart Ausculation- Regular rate and rhythm, no murmur. No s3, s4 or rub   PV: No lower extremity edema. No varicosities. Pedal pulses palpable, no clubbing or cyanosis   ABDOMEN: Soft, non-tender to light palpation. Bowel sounds present. No palpable masses no organomegaly; no abdominal bruit  MS: Good muscle tone. Unable to assess for strength. No atrophy or abnormal movements. : Cheek catheter draining clear yellow urine. SKIN: Warm and dry no statis dermatitis or ulcers   NEURO / PSYCH: Intubated and sedated. Appears to be in no acute distress. DATA:    ECG: As per Dr. Thuan Watt below. Tele strips: Sinus rhythm.   Diagnostic:      Intake/Output Summary (Last 24 hours) at 6/5/2021 1459  Last data filed at 6/5/2021 1400  Gross per 24 hour   Intake 1562 ml   Output 810 ml   Net 752 ml       Labs:   CBC:   Recent Labs     06/05/21  0745 06/05/21  1111   WBC 19.9* 19.6*   HGB 12.1* 10.5*   HCT 37.7 32.6*    233     BMP:   Recent Labs     06/05/21  0745 06/05/21  1111 06/05/21  1154    137  --    K 4.3 4.2 4.17   CO2 23 23  --    BUN 16 15  --    CREATININE 0.8 0.8  --    LABGLOM >60 >60  --    CALCIUM 10.1 8.5*  --      Mag:   Recent Labs     06/05/21  0745 06/05/21  1111   MG 2.1 2.2     Phos:   Recent Labs     06/05/21  0745 06/05/21  1111   PHOS 3.5 2.4*     TFT: No results found for: TSH, G2FWZBV, L2OEZMX, THYROIDAB, FT3, T4FREE   HgA1c: No results found for: LABA1C  No results found for: EAG  proBNP:   Recent Labs     06/05/21  0745   PROBNP 57     FASTING LIPID PANEL:  Lab Results   Component Value Date    CHOL 230 09/24/2018    HDL 37 09/24/2018    LDLCALC 172 09/24/2018    TRIG 103 09/24/2018     LIVER PROFILE:  Recent Labs     06/05/21  0745 06/05/21  1111   AST 37 40*   ALT 42* 43*   LABALBU 3.9 3.6     Chest x-ray: 6/5/2021:  There is shallow inspiration, with bibasilar atelectasis or infiltrates           CT cervical spine: 6/5/2021: Anterior fusion of C4-C7.  Fixation screw of C7 appears extend into the   epidural space       CTA chest with contrast: 6/5/2021:  No pulmonary embolism.       Consolidation of left lower lobe may represent pneumonia versus prior   aspiration.       Ground-glass opacities of right lung likely represents infectious process     Assessment/plan as per Dr. Fabricio Soto. Electronically signed by JACQUELYN Webb CNP on 6/5/21 at 5:04 PM EDT    The above documentation has been prepared under my direction and personally reviewed by me in its entirety. I confirm that the note above accurately reflects all work, treatment, procedures, and medical decision making performed by me.     The patient's history was independently obtained. The patient was independently examined. Electrocardiogram, prior and present cardiovascular assessment, and laboratory studies were reviewed.     The patient is a 49-year-old male with no association to [employer] and no known structural heart disease. He has a reported previous history of Lyme disease as well as that of previous tobacco consumption with reported chronic obstructive lung disease as well as a remote history of substance abuse. He additionally has an extensive history of cervical spine disease and was initially hospitalized for his present admission for elective anterior cervical discectomy and fusion with an unremarkable initial postoperative course. On the day of his acute event, and on a nonmonitored floor, he developed progressive dyspnea with needs of a rapid response team for acute respiratory distress the face of apparent bronchospasm and a low-grade fever.  Of consideration was that of a pulmonary embolism with plans for a contrast CT angiogram but while undergoing the study, he apparently developed progressive respiratory difficulties and while in the scan was noted to have reported seizure-like activity with subsequent apnea and apparent loss of pulse with an initial rhythm that of apparent pulseless electrical activity and with cardiopulmonary resuscitation, a return of circulation was achieved. An initial postevent electrocardiogram was that of sinus rhythm with left axis deviation left ventricular hypertrophy and a nondetectable high-sensitivity troponin and normal proBNP level was noted with a subsequent chest x-ray demonstrating evidence of a normal-sized cardiac silhouette but diffuse pulmonary abnormalities compatible with that of infiltrate and/or atelectasis and a left lower lobe pneumonia subsequently noted on a contrast CT angiogram in the absence of a pulmonary embolism performed earlier on the day of his assessment. He presently remains intubated and mechanically ventilated and otherwise hemodynamically stable.     At the time of evaluation, his medications and allergies were reviewed as well as that of his past medical history and review of systems as documented.     At time of evaluation, he is intubated and mechanically ventilated and appears in no significant distress. He is hemodynamically stable vital signs as documented. Jugular venous pressure and carotid assessment cannot be performed in the face of her cervical collar. Lung fields are presently clear to auscultation. Cardiac examination still for a regular rate and rhythm no gallop rhythm or cardiac murmur. A benign abdominal examination is present no peripheral edema identified.     Diagnostic Assessment and Plan: On a clinical basis, the patient presents following a cardiopulmonary arrest appearing to be that predominately of a respiratory origin and further supported by his initial pulseless electrical activity.  Presently no electrocardiographic abnormalities are noted with an initial normal high-sensitivity troponin and repeat level pending. Continue supportive care has been recommended with an echocardiogram to be obtained to assess the presence or absence of structural cardiac abnormalities to provide additional cardiovascular recommendations.     Thank you for allowing me to participate in your patient's care. Please feel free to contact me if you have any questions or concerns.     Jamie Cortes, [id number] [facility] Cardiology

## 2021-06-05 NOTE — PLAN OF CARE
Problem: Infection - Surgical Site:  Goal: Will show no infection signs and symptoms  Description: Will show no infection signs and symptoms  Outcome: Met This Shift     Problem: Falls - Risk of:  Goal: Will remain free from falls  Description: Will remain free from falls  Outcome: Met This Shift  Goal: Absence of physical injury  Description: Absence of physical injury  Outcome: Met This Shift     Problem: Skin Integrity:  Goal: Will show no infection signs and symptoms  Description: Will show no infection signs and symptoms  Outcome: Met This Shift  Goal: Absence of new skin breakdown  Description: Absence of new skin breakdown  Outcome: Met This Shift

## 2021-06-05 NOTE — SIGNIFICANT EVENT
Code blue note    Code blue was called at 8:25 AM     On arrival, pt was PEA     ACLS protocol was initiated and the following was given:  Adrenaline: None   Atropine:  None   Calcium gluconate: None   Sodium bicarbonate: None     Patient intubated and placed on ventilator. ABG obtained revealed: We continued CPR for 3, and pt was revived and transferred to the ICU. PCP, Neurosurgery and Intensives were updated.     Attempted to call family, no answer and mailbox is full      Salbador Ellis MD, MD  Internal Medicine resident  6/5/2021  1:50 PM

## 2021-06-05 NOTE — PROGRESS NOTES
Dr Sheryle Rocker notified of RRT being called. Tests, scans, and labs are what he would have ordered. Patient on way down to CAT scan and then to 45.

## 2021-06-05 NOTE — PROGRESS NOTES
Hospitalist Progress Note    Chief complaint:  No chief complaint on file. Admit date:  6/2/2021    Days in hospital:    2    Synopsis :  Patient is a very pleasant 44years old male who was admitted for neck pain due to cervical herniated disc, patient had C4-C5 C5-C6 C6-C7 anterior cervical discectomy and fusion done on 6/2/2021, postoperatively patient was doing fine besides some swallowing difficulty which is expected after cervical surgery, clinically improving until the morning of 6/5/2021 when he complained about sudden feeling of swelling in the throat and sudden onset shortness of breath. Rapid response was called I evaluated the patient personally at bedside, decision was made to send patient to CT scan neck and chest with PE protocol, during the CT scan patient had a PEA arrest, he was resuscitated and intubated and then transferred to medical ICU. Subjective  This morning patient went to the bathroom and apparently he was turning around to get back to the room when suddenly he started feeling like a swelling in his throat and made his swallowing more difficult, patient then describes sudden onset shortness of breath, rapid response was called, I evaluated the patient at bedside myself.   He is requiring nonrebreather with 15 L, decision was made to send patient for CT neck and CTA chest to rule out any postoperative complication airway complication and pulmonary embolism, patient had a PEA arrest during CT scan, patient transferred to medical ICU    Objective  General Appearance: Patient evaluated this morning, mild to moderate distress due to clinical condition  HEENT: Patient wearing neck collar  Cardiovascular: Tachycardia, no murmurs  Pulmonary/Chest: Diminished breath sounds, bilateral wheezing  Abdomen: soft, non-tender, non-distended, normal bowel sounds, no masses   Extremities: no cyanosis, clubbing or edema, pulse   Neurological: alert, oriented, normal speech, no focal findings         Medications:  Scheduled Meds:   methylPREDNISolone sodium        ipratropium-albuterol  1 ampule Inhalation Q4H WA    budesonide  250 mcg Nebulization BID    Arformoterol Tartrate  15 mcg Nebulization BID    fentanNYL  25 mcg Intravenous Once    fentaNYL        midazolam        propofol        fentaNYL 5 mcg/ml in 0.9%  ml infusion        enoxaparin  40 mg Subcutaneous Daily    dexamethasone  4 mg Intravenous Q6H    pregabalin  225 mg Oral BID    sodium chloride flush  5-40 mL Intravenous 2 times per day    polyethylene glycol  17 g Oral Daily    bisacodyl  5 mg Oral Daily    sennosides-docusate sodium  1 tablet Oral BID    insulin lispro  0-12 Units Subcutaneous TID WC    insulin lispro  0-6 Units Subcutaneous Nightly       PRN Meds:  cyclobenzaprine, sodium chloride flush, sodium chloride, ondansetron **OR** ondansetron, oxyCODONE **OR** oxyCODONE, morphine **OR** morphine, benzocaine-menthol, acetaminophen, glucose, dextrose, glucagon (rDNA), dextrose, diphenhydrAMINE    IV:   fentaNYL 5 mcg/ml in 0.9%  ml infusion      propofol      sodium chloride 75 mL/hr at 06/04/21 2020    sodium chloride      dextrose         LABS:  Recent Results (from the past 24 hour(s))   POCT Glucose    Collection Time: 06/04/21 12:36 PM   Result Value Ref Range    Meter Glucose 112 (H) 74 - 99 mg/dL   POCT Glucose    Collection Time: 06/04/21  5:02 PM   Result Value Ref Range    Meter Glucose 135 (H) 74 - 99 mg/dL   POCT Glucose    Collection Time: 06/04/21  8:52 PM   Result Value Ref Range    Meter Glucose 111 (H) 74 - 99 mg/dL   POCT Glucose    Collection Time: 06/05/21  7:25 AM   Result Value Ref Range    Meter Glucose 128 (H) 74 - 99 mg/dL   CBC WITH AUTO DIFFERENTIAL    Collection Time: 06/05/21  7:45 AM   Result Value Ref Range    WBC 19.9 (H) 4.5 - 11.5 E9/L    RBC 5.22 3.80 - 5.80 E12/L    Hemoglobin 12.1 (L) 12.5 - 16.5 g/dL    Hematocrit 37.7 37.0 - 54.0 %    MCV 72.2 (L) 80.0 - 99.9 fL    MCH 23.2 (L) 26.0 - 35.0 pg    MCHC 32.1 32.0 - 34.5 %    RDW 16.3 (H) 11.5 - 15.0 fL    Platelets 840 193 - 372 E9/L    MPV 10.8 7.0 - 12.0 fL    Neutrophils % 85.7 (H) 43.0 - 80.0 %    Immature Granulocytes % 1.0 0.0 - 5.0 %    Lymphocytes % 8.8 (L) 20.0 - 42.0 %    Monocytes % 4.4 2.0 - 12.0 %    Eosinophils % 0.0 0.0 - 6.0 %    Basophils % 0.1 0.0 - 2.0 %    Neutrophils Absolute 17.02 (H) 1.80 - 7.30 E9/L    Immature Granulocytes # 0.20 E9/L    Lymphocytes Absolute 1.75 1.50 - 4.00 E9/L    Monocytes Absolute 0.87 0.10 - 0.95 E9/L    Eosinophils Absolute 0.00 (L) 0.05 - 0.50 E9/L    Basophils Absolute 0.02 0.00 - 0.20 E9/L   COMPREHENSIVE METABOLIC PANEL    Collection Time: 06/05/21  7:45 AM   Result Value Ref Range    Sodium 137 132 - 146 mmol/L    Potassium 4.3 3.5 - 5.0 mmol/L    Chloride 100 98 - 107 mmol/L    CO2 23 22 - 29 mmol/L    Anion Gap 14 7 - 16 mmol/L    Glucose 128 (H) 74 - 99 mg/dL    BUN 16 6 - 20 mg/dL    CREATININE 0.8 0.7 - 1.2 mg/dL    GFR Non-African American >60 >=60 mL/min/1.73    GFR African American >60     Calcium 10.1 8.6 - 10.2 mg/dL    Total Protein 7.9 6.4 - 8.3 g/dL    Albumin 3.9 3.5 - 5.2 g/dL    Total Bilirubin 0.5 0.0 - 1.2 mg/dL    Alkaline Phosphatase 75 40 - 129 U/L    ALT 42 (H) 0 - 40 U/L    AST 37 0 - 39 U/L   Magnesium    Collection Time: 06/05/21  7:45 AM   Result Value Ref Range    Magnesium 2.1 1.6 - 2.6 mg/dL   Phosphorus    Collection Time: 06/05/21  7:45 AM   Result Value Ref Range    Phosphorus 3.5 2.5 - 4.5 mg/dL   Lactic acid, plasma    Collection Time: 06/05/21  7:45 AM   Result Value Ref Range    Lactic Acid 1.7 0.5 - 2.2 mmol/L   Ammonia    Collection Time: 06/05/21  7:45 AM   Result Value Ref Range    Ammonia 25.0 16.0 - 60.0 umol/L   Troponin    Collection Time: 06/05/21  7:45 AM   Result Value Ref Range    Troponin, High Sensitivity <6 0 - 11 ng/L   Brain Natriuretic Peptide    Collection Time: 06/05/21 empirically anticoagulate  Final reads of CT scan pending, will request echocardiogram  We will consult cardiology for evaluation  We will consult critical care for ongoing care in the ICU. PEA cardiac arrest  Continue management as above, will request echocardiogram  We will consult cardiology    Hospital-acquired pneumonia  Prelim CT scan showing pneumonia bilateral lower lobe left greater than right  We will start patient on broad-spectrum antibiotic discussed with ICU resident  Critical care will be consulted    Cervical herniated disc  S/P C4-C5, C5-C6, C6-C7  ANTERIOR CERVICAL DISCECTOMY AND FUSION 6/2/2021  Postoperative care per neurosurgery    Diabetes mellitus  Discontinue Metformin hold oral hypoglycemics  Continue same sliding scale     Migraine  Imitrex as needed    DVT prophylaxis: Heparin  CODE STATUS: Patient is a full code  Discharge plan: Pending clinical improvement, expect 5-6 more day stay in the hospital, will need clearance from neurosurgery prior to that, probably will benefit from rehab        Electronically signed by Estevan Phillips MD on 6/5/2021 at 9:18 AM  NOTE: This report was transcribed using voice recognition software. Every effort was made to ensure accuracy; however, inadvertent computerized transcription errors may be present.

## 2021-06-06 ENCOUNTER — APPOINTMENT (OUTPATIENT)
Dept: GENERAL RADIOLOGY | Age: 51
DRG: 321 | End: 2021-06-06
Attending: NEUROLOGICAL SURGERY
Payer: MEDICAID

## 2021-06-06 LAB
ADENOVIRUS BY PCR: NOT DETECTED
ANION GAP SERPL CALCULATED.3IONS-SCNC: 8 MMOL/L (ref 7–16)
B.E.: 2.9 MMOL/L (ref -3–0)
BASOPHILS ABSOLUTE: 0.01 E9/L (ref 0–0.2)
BASOPHILS RELATIVE PERCENT: 0.1 % (ref 0–2)
BORDETELLA PARAPERTUSSIS BY PCR: NOT DETECTED
BORDETELLA PERTUSSIS BY PCR: NOT DETECTED
BUN BLDV-MCNC: 14 MG/DL (ref 6–20)
C-REACTIVE PROTEIN: 12 MG/DL (ref 0–0.4)
CALCIUM SERPL-MCNC: 8.4 MG/DL (ref 8.6–10.2)
CHLAMYDOPHILIA PNEUMONIAE BY PCR: NOT DETECTED
CHLORIDE BLD-SCNC: 103 MMOL/L (ref 98–107)
CHOLESTEROL, TOTAL: 190 MG/DL (ref 0–199)
CK MB: 2.4 NG/ML (ref 0–7.7)
CK MB: 2.4 NG/ML (ref 0–7.7)
CO2: 26 MMOL/L (ref 22–29)
CORONAVIRUS 229E BY PCR: NOT DETECTED
CORONAVIRUS HKU1 BY PCR: NOT DETECTED
CORONAVIRUS NL63 BY PCR: NOT DETECTED
CORONAVIRUS OC43 BY PCR: NOT DETECTED
CREAT SERPL-MCNC: 0.8 MG/DL (ref 0.7–1.2)
DEVICE: ABNORMAL
EOSINOPHILS ABSOLUTE: 0 E9/L (ref 0.05–0.5)
EOSINOPHILS RELATIVE PERCENT: 0 % (ref 0–6)
FERRITIN: 173 NG/ML
FIO2 ARTERIAL: 80
GFR AFRICAN AMERICAN: >60
GFR NON-AFRICAN AMERICAN: >60 ML/MIN/1.73
GLUCOSE BLD-MCNC: 120 MG/DL (ref 74–99)
HBA1C MFR BLD: 5.5 % (ref 4–5.6)
HCO3 ARTERIAL: 26.2 MMOL/L (ref 22–26)
HCT VFR BLD CALC: 27.4 % (ref 37–54)
HDLC SERPL-MCNC: 46 MG/DL
HEMOGLOBIN: 8.8 G/DL (ref 12.5–16.5)
HUMAN METAPNEUMOVIRUS BY PCR: NOT DETECTED
HUMAN RHINOVIRUS/ENTEROVIRUS BY PCR: NOT DETECTED
IMMATURE GRANULOCYTES #: 0.09 E9/L
IMMATURE GRANULOCYTES %: 0.7 % (ref 0–5)
INFLUENZA A BY PCR: NOT DETECTED
INFLUENZA B BY PCR: NOT DETECTED
IRON SATURATION: 11 % (ref 20–55)
IRON: 27 MCG/DL (ref 59–158)
L. PNEUMOPHILA SEROGP 1 UR AG: NORMAL
LDL CHOLESTEROL CALCULATED: 107 MG/DL (ref 0–99)
LV EF: 65 %
LVEF MODALITY: NORMAL
LYMPHOCYTES ABSOLUTE: 1.16 E9/L (ref 1.5–4)
LYMPHOCYTES RELATIVE PERCENT: 9.2 % (ref 20–42)
MAGNESIUM: 2.2 MG/DL (ref 1.6–2.6)
MCH RBC QN AUTO: 23 PG (ref 26–35)
MCHC RBC AUTO-ENTMCNC: 32.1 % (ref 32–34.5)
MCV RBC AUTO: 71.7 FL (ref 80–99.9)
METER GLUCOSE: 120 MG/DL (ref 74–99)
METER GLUCOSE: 154 MG/DL (ref 74–99)
METER GLUCOSE: 99 MG/DL (ref 74–99)
MODE: AC
MONOCYTES ABSOLUTE: 1.07 E9/L (ref 0.1–0.95)
MONOCYTES RELATIVE PERCENT: 8.5 % (ref 2–12)
MYCOPLASMA PNEUMONIAE BY PCR: NOT DETECTED
NEUTROPHILS ABSOLUTE: 10.32 E9/L (ref 1.8–7.3)
NEUTROPHILS RELATIVE PERCENT: 81.5 % (ref 43–80)
O2 SATURATION: 98.9 % (ref 92–98.5)
OPERATOR ID: 2593
PARAINFLUENZA VIRUS 1 BY PCR: NOT DETECTED
PARAINFLUENZA VIRUS 2 BY PCR: NOT DETECTED
PARAINFLUENZA VIRUS 3 BY PCR: NOT DETECTED
PARAINFLUENZA VIRUS 4 BY PCR: NOT DETECTED
PATIENT TEMP: 37
PCO2 (TEMP CORRECTED): 34 MMHG (ref 35–45)
PDW BLD-RTO: 15.7 FL (ref 11.5–15)
PH (TEMPERATURE CORRECTED): 7.49 (ref 7.35–7.45)
PHOSPHORUS: 4.2 MG/DL (ref 2.5–4.5)
PLATELET # BLD: 216 E9/L (ref 130–450)
PMV BLD AUTO: 10.7 FL (ref 7–12)
PO2 (TEMP CORRECTED): 113.8 MMHG (ref 60–100)
POSITIVE END EXP PRESS: 5 CMH2O
POTASSIUM SERPL-SCNC: 3.9 MMOL/L (ref 3.5–5)
PRO-BNP: 154 PG/ML (ref 0–125)
RBC # BLD: 3.82 E12/L (ref 3.8–5.8)
RESPIRATORY RATE: 20 B/MIN
RESPIRATORY SYNCYTIAL VIRUS BY PCR: NOT DETECTED
SARS-COV-2, PCR: NOT DETECTED
SODIUM BLD-SCNC: 137 MMOL/L (ref 132–146)
SOURCE, BLOOD GAS: ABNORMAL
STREP PNEUMONIAE ANTIGEN, URINE: NORMAL
TIDAL VOLUME: 400 ML
TOTAL CK: 1261 U/L (ref 20–200)
TOTAL CK: 1582 U/L (ref 20–200)
TOTAL IRON BINDING CAPACITY: 254 MCG/DL (ref 250–450)
TRIGL SERPL-MCNC: 184 MG/DL (ref 0–149)
TROPONIN, HIGH SENSITIVITY: 18 NG/L (ref 0–11)
TROPONIN, HIGH SENSITIVITY: 21 NG/L (ref 0–11)
VLDLC SERPL CALC-MCNC: 37 MG/DL
WBC # BLD: 12.7 E9/L (ref 4.5–11.5)

## 2021-06-06 PROCEDURE — 2580000003 HC RX 258: Performed by: STUDENT IN AN ORGANIZED HEALTH CARE EDUCATION/TRAINING PROGRAM

## 2021-06-06 PROCEDURE — 2580000003 HC RX 258: Performed by: NEUROLOGICAL SURGERY

## 2021-06-06 PROCEDURE — 82803 BLOOD GASES ANY COMBINATION: CPT

## 2021-06-06 PROCEDURE — 2000000000 HC ICU R&B

## 2021-06-06 PROCEDURE — 82553 CREATINE MB FRACTION: CPT

## 2021-06-06 PROCEDURE — 83540 ASSAY OF IRON: CPT

## 2021-06-06 PROCEDURE — 6360000002 HC RX W HCPCS: Performed by: INTERNAL MEDICINE

## 2021-06-06 PROCEDURE — 94003 VENT MGMT INPAT SUBQ DAY: CPT

## 2021-06-06 PROCEDURE — 36415 COLL VENOUS BLD VENIPUNCTURE: CPT

## 2021-06-06 PROCEDURE — 82550 ASSAY OF CK (CPK): CPT

## 2021-06-06 PROCEDURE — 99291 CRITICAL CARE FIRST HOUR: CPT | Performed by: INTERNAL MEDICINE

## 2021-06-06 PROCEDURE — 0202U NFCT DS 22 TRGT SARS-COV-2: CPT

## 2021-06-06 PROCEDURE — 6360000002 HC RX W HCPCS: Performed by: PHYSICIAN ASSISTANT

## 2021-06-06 PROCEDURE — 85025 COMPLETE CBC W/AUTO DIFF WBC: CPT

## 2021-06-06 PROCEDURE — 87081 CULTURE SCREEN ONLY: CPT

## 2021-06-06 PROCEDURE — 6360000002 HC RX W HCPCS: Performed by: NEUROLOGICAL SURGERY

## 2021-06-06 PROCEDURE — 6370000000 HC RX 637 (ALT 250 FOR IP): Performed by: INTERNAL MEDICINE

## 2021-06-06 PROCEDURE — 80061 LIPID PANEL: CPT

## 2021-06-06 PROCEDURE — 84100 ASSAY OF PHOSPHORUS: CPT

## 2021-06-06 PROCEDURE — 83550 IRON BINDING TEST: CPT

## 2021-06-06 PROCEDURE — 94640 AIRWAY INHALATION TREATMENT: CPT

## 2021-06-06 PROCEDURE — 71045 X-RAY EXAM CHEST 1 VIEW: CPT

## 2021-06-06 PROCEDURE — 83880 ASSAY OF NATRIURETIC PEPTIDE: CPT

## 2021-06-06 PROCEDURE — 36556 INSERT NON-TUNNEL CV CATH: CPT

## 2021-06-06 PROCEDURE — 82962 GLUCOSE BLOOD TEST: CPT

## 2021-06-06 PROCEDURE — 84484 ASSAY OF TROPONIN QUANT: CPT

## 2021-06-06 PROCEDURE — 2500000003 HC RX 250 WO HCPCS: Performed by: STUDENT IN AN ORGANIZED HEALTH CARE EDUCATION/TRAINING PROGRAM

## 2021-06-06 PROCEDURE — 99232 SBSQ HOSP IP/OBS MODERATE 35: CPT | Performed by: INTERNAL MEDICINE

## 2021-06-06 PROCEDURE — 83735 ASSAY OF MAGNESIUM: CPT

## 2021-06-06 PROCEDURE — 83036 HEMOGLOBIN GLYCOSYLATED A1C: CPT

## 2021-06-06 PROCEDURE — 6370000000 HC RX 637 (ALT 250 FOR IP): Performed by: NEUROLOGICAL SURGERY

## 2021-06-06 PROCEDURE — 86140 C-REACTIVE PROTEIN: CPT

## 2021-06-06 PROCEDURE — 93306 TTE W/DOPPLER COMPLETE: CPT

## 2021-06-06 PROCEDURE — 82728 ASSAY OF FERRITIN: CPT

## 2021-06-06 PROCEDURE — 80048 BASIC METABOLIC PNL TOTAL CA: CPT

## 2021-06-06 PROCEDURE — 6360000002 HC RX W HCPCS: Performed by: STUDENT IN AN ORGANIZED HEALTH CARE EDUCATION/TRAINING PROGRAM

## 2021-06-06 PROCEDURE — C9113 INJ PANTOPRAZOLE SODIUM, VIA: HCPCS | Performed by: STUDENT IN AN ORGANIZED HEALTH CARE EDUCATION/TRAINING PROGRAM

## 2021-06-06 RX ORDER — POTASSIUM CHLORIDE 29.8 MG/ML
20 INJECTION INTRAVENOUS ONCE
Status: COMPLETED | OUTPATIENT
Start: 2021-06-06 | End: 2021-06-06

## 2021-06-06 RX ORDER — PROPOFOL 10 MG/ML
5-50 INJECTION, EMULSION INTRAVENOUS
Status: DISCONTINUED | OUTPATIENT
Start: 2021-06-06 | End: 2021-06-07

## 2021-06-06 RX ORDER — PROPOFOL 10 MG/ML
INJECTION, EMULSION INTRAVENOUS
Status: DISPENSED
Start: 2021-06-06 | End: 2021-06-07

## 2021-06-06 RX ORDER — FUROSEMIDE 10 MG/ML
40 INJECTION INTRAMUSCULAR; INTRAVENOUS ONCE
Status: COMPLETED | OUTPATIENT
Start: 2021-06-06 | End: 2021-06-06

## 2021-06-06 RX ORDER — ACETYLCYSTEINE 200 MG/ML
600 SOLUTION ORAL; RESPIRATORY (INHALATION) 2 TIMES DAILY
Status: DISCONTINUED | OUTPATIENT
Start: 2021-06-06 | End: 2021-06-14 | Stop reason: ALTCHOICE

## 2021-06-06 RX ADMIN — IPRATROPIUM BROMIDE AND ALBUTEROL SULFATE 1 AMPULE: 2.5; .5 SOLUTION RESPIRATORY (INHALATION) at 21:06

## 2021-06-06 RX ADMIN — DEXAMETHASONE SODIUM PHOSPHATE 4 MG: 4 INJECTION, SOLUTION INTRA-ARTICULAR; INTRALESIONAL; INTRAMUSCULAR; INTRAVENOUS; SOFT TISSUE at 05:00

## 2021-06-06 RX ADMIN — CEFEPIME HYDROCHLORIDE 1000 MG: 1 INJECTION, POWDER, FOR SOLUTION INTRAMUSCULAR; INTRAVENOUS at 21:32

## 2021-06-06 RX ADMIN — Medication 10 ML: at 21:00

## 2021-06-06 RX ADMIN — FUROSEMIDE 40 MG: 10 INJECTION, SOLUTION INTRAMUSCULAR; INTRAVENOUS at 11:24

## 2021-06-06 RX ADMIN — SODIUM CHLORIDE, PRESERVATIVE FREE 10 ML: 5 INJECTION INTRAVENOUS at 10:08

## 2021-06-06 RX ADMIN — PROPOFOL 25 MCG/KG/MIN: 10 INJECTION, EMULSION INTRAVENOUS at 05:42

## 2021-06-06 RX ADMIN — PROPOFOL 50 MCG/KG/MIN: 10 INJECTION, EMULSION INTRAVENOUS at 21:06

## 2021-06-06 RX ADMIN — ENOXAPARIN SODIUM 40 MG: 40 INJECTION SUBCUTANEOUS at 10:08

## 2021-06-06 RX ADMIN — IPRATROPIUM BROMIDE AND ALBUTEROL SULFATE 1 AMPULE: 2.5; .5 SOLUTION RESPIRATORY (INHALATION) at 09:37

## 2021-06-06 RX ADMIN — ACETYLCYSTEINE 600 MG: 200 SOLUTION ORAL; RESPIRATORY (INHALATION) at 21:06

## 2021-06-06 RX ADMIN — CEFEPIME HYDROCHLORIDE 1000 MG: 1 INJECTION, POWDER, FOR SOLUTION INTRAMUSCULAR; INTRAVENOUS at 11:24

## 2021-06-06 RX ADMIN — ACETYLCYSTEINE 600 MG: 200 SOLUTION ORAL; RESPIRATORY (INHALATION) at 14:25

## 2021-06-06 RX ADMIN — PROPOFOL 50 MCG/KG/MIN: 10 INJECTION, EMULSION INTRAVENOUS at 17:22

## 2021-06-06 RX ADMIN — IPRATROPIUM BROMIDE AND ALBUTEROL SULFATE 1 AMPULE: 2.5; .5 SOLUTION RESPIRATORY (INHALATION) at 13:37

## 2021-06-06 RX ADMIN — VANCOMYCIN HYDROCHLORIDE 1000 MG: 1 INJECTION, POWDER, LYOPHILIZED, FOR SOLUTION INTRAVENOUS at 23:27

## 2021-06-06 RX ADMIN — Medication 100 MCG/HR: at 05:43

## 2021-06-06 RX ADMIN — ARFORMOTEROL TARTRATE 15 MCG: 15 SOLUTION RESPIRATORY (INHALATION) at 09:37

## 2021-06-06 RX ADMIN — BUDESONIDE 250 MCG: 0.25 SUSPENSION RESPIRATORY (INHALATION) at 21:06

## 2021-06-06 RX ADMIN — ARFORMOTEROL TARTRATE 15 MCG: 15 SOLUTION RESPIRATORY (INHALATION) at 21:06

## 2021-06-06 RX ADMIN — PANTOPRAZOLE SODIUM 40 MG: 40 INJECTION, POWDER, FOR SOLUTION INTRAVENOUS at 11:25

## 2021-06-06 RX ADMIN — POTASSIUM CHLORIDE 20 MEQ: 400 INJECTION, SOLUTION INTRAVENOUS at 10:02

## 2021-06-06 RX ADMIN — SODIUM CHLORIDE: 9 INJECTION, SOLUTION INTRAVENOUS at 02:00

## 2021-06-06 RX ADMIN — Medication 175 MCG/HR: at 17:23

## 2021-06-06 RX ADMIN — VANCOMYCIN HYDROCHLORIDE 1000 MG: 1 INJECTION, POWDER, LYOPHILIZED, FOR SOLUTION INTRAVENOUS at 11:25

## 2021-06-06 RX ADMIN — BUDESONIDE 250 MCG: 0.25 SUSPENSION RESPIRATORY (INHALATION) at 09:37

## 2021-06-06 RX ADMIN — INSULIN LISPRO 2 UNITS: 100 INJECTION, SOLUTION INTRAVENOUS; SUBCUTANEOUS at 17:15

## 2021-06-06 RX ADMIN — IPRATROPIUM BROMIDE AND ALBUTEROL SULFATE 1 AMPULE: 2.5; .5 SOLUTION RESPIRATORY (INHALATION) at 17:11

## 2021-06-06 RX ADMIN — Medication 10 ML: at 10:08

## 2021-06-06 ASSESSMENT — PAIN SCALES - WONG BAKER
WONGBAKER_NUMERICALRESPONSE: 0

## 2021-06-06 ASSESSMENT — PULMONARY FUNCTION TESTS
PIF_VALUE: 20
PIF_VALUE: 19
PIF_VALUE: 21
PIF_VALUE: 19
PIF_VALUE: 21
PIF_VALUE: 21
PIF_VALUE: 23
PIF_VALUE: 20
PIF_VALUE: 21
PIF_VALUE: 18
PIF_VALUE: 19
PIF_VALUE: 19
PIF_VALUE: 16
PIF_VALUE: 19
PIF_VALUE: 14
PIF_VALUE: 21

## 2021-06-06 ASSESSMENT — PAIN SCALES - GENERAL
PAINLEVEL_OUTOF10: 0
PAINLEVEL_OUTOF10: 5
PAINLEVEL_OUTOF10: 0
PAINLEVEL_OUTOF10: 5
PAINLEVEL_OUTOF10: 5
PAINLEVEL_OUTOF10: 0

## 2021-06-06 NOTE — PROGRESS NOTES
Wean parameter done    VT= 298 mls  F= 40 B/M  V= 20 l/m  NIF= -30 cmH2O  VC= 200 L on PS +5    Positive leak with cuff deflated.

## 2021-06-06 NOTE — PROGRESS NOTES
Pharmacy Consultation Note  (Antibiotic Dosing and Monitoring)    Initial consult date: 6/5/21  Consulting physician: Dr. Puma Aviles  Drug(s): vancomycin  Indication: HAP      Age/  Gender Height Weight IBW Dosing weight  Allergy Information   50 y.o./male 5' 5\" (165.1 cm) 183 lb (83 kg)     Ideal body weight: 61.5 kg (135 lb 9.3 oz)  Adjusted ideal body weight: 70.1 kg (154 lb 8.8 oz)  70.1 kg  Penicillins          Other anti-infectives Start date Stop date   Cefepime 1g IV q 12 hr 6/5                     Date  Tmax WBC BUN/CR UOP CrCL  (mL/min) Drug/Dose Time   Given Level(s)   (Time) Comments   6/5 afebrile 19.9  16/0.8 -- 107 Vancomycin 1000 mg IV q 12 hr (0945)--> given 2109     6/6 99 12.7 14/0.8 -- 107 Vancomycin 1000 mg IV q 12 hr 1125  (2330)     6/7       (1130) Vanco trough at (1100)                      Intake/Output Summary (Last 24 hours) at 6/6/2021 1100  Last data filed at 6/6/2021 0800  Gross per 24 hour   Intake 1602 ml   Output 1550 ml   Net 52 ml       Average urine output:    Cultures:    Site Date Result                    No results for input(s): Chong Bennetts in the last 72 hours. Historical Cultures:  No results found for: ORG  No results for input(s): BC in the last 72 hours. Radiology:      Assessment:  · 43 yo male started on empiric antibiotics of vancomycin and cefepime for HAP s/p RRT requiring intubation. Patient is sedated in the MICU. · Goal trough = 15 - 20 mcg/ml;  Goal AUC/JORDAN 400-600  · Scr 0.8    Plan:  · Cont vancomycin 1000 mg IV q 12 hr (AUC/JORDAN 468, est trough 16.1)  · A vanco trough level will be obtained on 6/7 before 1130 dose  · Pharmacist will follow and monitor/adjust dosing as necessary    Donna Mae, PharmD, BCPS 6/6/2021 11:00 AM

## 2021-06-06 NOTE — PROGRESS NOTES
Hospitalist Progress Note    Chief complaint:  No chief complaint on file. Admit date:  6/2/2021    Days in hospital:    3    Synopsis :  Patient is a very pleasant 44years old male who was admitted for neck pain due to cervical herniated disc, patient had C4-C5 C5-C6 C6-C7 anterior cervical discectomy and fusion done on 6/2/2021, postoperatively patient was doing fine besides some swallowing difficulty which is expected after cervical surgery, clinically improving until the morning of 6/5/2021 when he complained about sudden feeling of swelling in the throat and sudden onset shortness of breath. Rapid response was called I evaluated the patient personally at bedside, decision was made to send patient to CT scan neck and chest with PE protocol, during the CT scan patient had a PEA arrest, he was resuscitated and intubated and then transferred to medical ICU.     Subjective: Patient was seen in medical ICU, no acute distress he is still intubated not sedated, discussed with respiratory therapist plan is to wean him off ventilator today    Objective  General Appearance: Currently intubated but is not sedated on mechanical ventilator   HEENT: Patient wearing neck collar  Neurological: alert, oriented, oral exam grossly nonfocal        Medications:  Scheduled Meds:   acetylcysteine  600 mg Inhalation BID    ipratropium-albuterol  1 ampule Inhalation Q4H WA    budesonide  250 mcg Nebulization BID    Arformoterol Tartrate  15 mcg Nebulization BID    fentanNYL  25 mcg Intravenous Once    vancomycin  1,000 mg Intravenous Q12H    sodium chloride   Intravenous Q12H    cefepime  1,000 mg Intravenous Q12H    pantoprazole  40 mg Intravenous Daily    And    sodium chloride (PF)  10 mL Intravenous Daily    enoxaparin  40 mg Subcutaneous Daily    pregabalin  225 mg Oral BID    sodium chloride flush  5-40 mL Intravenous 2 times per day    polyethylene glycol  17 g Oral Daily    bisacodyl  5 mg Oral Daily    sennosides-docusate sodium  1 tablet Oral BID    insulin lispro  0-12 Units Subcutaneous TID WC    insulin lispro  0-6 Units Subcutaneous Nightly       PRN Meds:  cyclobenzaprine, sodium chloride flush, sodium chloride, ondansetron **OR** ondansetron, benzocaine-menthol, acetaminophen, glucose, dextrose, glucagon (rDNA), dextrose, diphenhydrAMINE    IV:   propofol 50 mcg/kg/min (06/06/21 1722)    fentaNYL 5 mcg/ml in 0.9%  ml infusion 200 mcg/hr (06/06/21 1910)    sodium chloride      dextrose         LABS:  Recent Results (from the past 24 hour(s))   POCT Glucose    Collection Time: 06/05/21  8:50 PM   Result Value Ref Range    Meter Glucose 124 (H) 74 - 99 mg/dL   Basic Metabolic Panel    Collection Time: 06/06/21  4:44 AM   Result Value Ref Range    Sodium 137 132 - 146 mmol/L    Potassium 3.9 3.5 - 5.0 mmol/L    Chloride 103 98 - 107 mmol/L    CO2 26 22 - 29 mmol/L    Anion Gap 8 7 - 16 mmol/L    Glucose 120 (H) 74 - 99 mg/dL    BUN 14 6 - 20 mg/dL    CREATININE 0.8 0.7 - 1.2 mg/dL    GFR Non-African American >60 >=60 mL/min/1.73    GFR African American >60     Calcium 8.4 (L) 8.6 - 10.2 mg/dL   CBC Auto Differential    Collection Time: 06/06/21  4:44 AM   Result Value Ref Range    WBC 12.7 (H) 4.5 - 11.5 E9/L    RBC 3.82 3.80 - 5.80 E12/L    Hemoglobin 8.8 (L) 12.5 - 16.5 g/dL    Hematocrit 27.4 (L) 37.0 - 54.0 %    MCV 71.7 (L) 80.0 - 99.9 fL    MCH 23.0 (L) 26.0 - 35.0 pg    MCHC 32.1 32.0 - 34.5 %    RDW 15.7 (H) 11.5 - 15.0 fL    Platelets 342 304 - 602 E9/L    MPV 10.7 7.0 - 12.0 fL    Neutrophils % 81.5 (H) 43.0 - 80.0 %    Immature Granulocytes % 0.7 0.0 - 5.0 %    Lymphocytes % 9.2 (L) 20.0 - 42.0 %    Monocytes % 8.5 2.0 - 12.0 %    Eosinophils % 0.0 0.0 - 6.0 %    Basophils % 0.1 0.0 - 2.0 %    Neutrophils Absolute 10.32 (H) 1.80 - 7.30 E9/L    Immature Granulocytes # 0.09 E9/L    Lymphocytes Absolute 1.16 (L) 1.50 - 4.00 E9/L    Monocytes Absolute 1.07 (H) 0.10 - 0.95 E9/L    Eosinophils Absolute 0.00 (L) 0.05 - 0.50 E9/L    Basophils Absolute 0.01 0.00 - 0.20 E9/L   Magnesium    Collection Time: 06/06/21  4:44 AM   Result Value Ref Range    Magnesium 2.2 1.6 - 2.6 mg/dL   Phosphorus    Collection Time: 06/06/21  4:44 AM   Result Value Ref Range    Phosphorus 4.2 2.5 - 4.5 mg/dL   CK    Collection Time: 06/06/21  4:44 AM   Result Value Ref Range    Total CK 1,261 (H) 20 - 200 U/L   CK MB    Collection Time: 06/06/21  4:44 AM   Result Value Ref Range    CK-MB 2.4 0.0 - 7.7 ng/mL   Arterial Blood Gas, Respiratory Only    Collection Time: 06/06/21  5:53 AM   Result Value Ref Range    Source: Arterial     FIO2 Arterial 80.0     Pt Temp 37.0     PH (TEMPERATURE CORRECTED) 7.494 (H) 7.350 - 7.450    PCO2 (TEMP CORRECTED) 34.0 (L) 35.0 - 45.0 mmHg    PO2 (TEMP CORRECTED) 113.8 (H) 60.0 - 100.0 mmHg    HCO3, Arterial 26.2 (H) 22.0 - 26.0 mmol/L    B.E. 2.9 (H) -3.0 - 0.0 mmol/L    O2 Sat 98.9 (H) 92.0 - 98.5 %     ID 2,593     DEVICE 20,[id number]     Mode AC     Tidal Volume 400 mL    Positive End EXP Press 5 cmH2O    Respiratory Rate 20 b/min   Lipid panel    Collection Time: 06/06/21  8:25 AM   Result Value Ref Range    Cholesterol, Total 190 0 - 199 mg/dL    Triglycerides 184 (H) 0 - 149 mg/dL    HDL 46 >40 mg/dL    LDL Calculated 107 (H) 0 - 99 mg/dL    VLDL Cholesterol Calculated 37 mg/dL   Hemoglobin A1c    Collection Time: 06/06/21  8:25 AM   Result Value Ref Range    Hemoglobin A1C 5.5 4.0 - 5.6 %   C-reactive protein    Collection Time: 06/06/21  8:25 AM   Result Value Ref Range    CRP 12.0 (H) 0.0 - 0.4 mg/dL   Respiratory Panel, Molecular, with COVID-19 (Restricted: peds pts or suitable admitted adults)    Collection Time: 06/06/21  8:25 AM    Specimen: Nasopharyngeal   Result Value Ref Range    Adenovirus by PCR Not Detected Not Detected    Bordetella parapertussis by PCR Not Detected Not Detected    Bordetella pertussis by PCR Not Detected Not Detected    Chlamydophilia pneumoniae by PCR Not Detected Not Detected    Coronavirus 229E by PCR Not Detected Not Detected    Coronavirus HKU1 by PCR Not Detected Not Detected    Coronavirus NL63 by PCR Not Detected Not Detected    Coronavirus OC43 by PCR Not Detected Not Detected    SARS-CoV-2, PCR Not Detected Not Detected    Human Metapneumovirus by PCR Not Detected Not Detected    Human Rhinovirus/Enterovirus by PCR Not Detected Not Detected    Influenza A by PCR Not Detected Not Detected    Influenza B by PCR Not Detected Not Detected    Mycoplasma pneumoniae by PCR Not Detected Not Detected    Parainfluenza Virus 1 by PCR Not Detected Not Detected    Parainfluenza Virus 2 by PCR Not Detected Not Detected    Parainfluenza Virus 3 by PCR Not Detected Not Detected    Parainfluenza Virus 4 by PCR Not Detected Not Detected    Respiratory Syncytial Virus by PCR Not Detected Not Detected   Troponin    Collection Time: 06/06/21  8:25 AM   Result Value Ref Range    Troponin, High Sensitivity 21 (H) 0 - 11 ng/L   Brain Natriuretic Peptide    Collection Time: 06/06/21  8:25 AM   Result Value Ref Range    Pro- (H) 0 - 125 pg/mL   POCT Glucose    Collection Time: 06/06/21  9:56 AM   Result Value Ref Range    Meter Glucose 120 (H) 74 - 99 mg/dL   Iron and TIBC    Collection Time: 06/06/21 11:37 AM   Result Value Ref Range    Iron 27 (L) 59 - 158 mcg/dL    TIBC 254 250 - 450 mcg/dL    Iron Saturation 11 (L) 20 - 55 %   Ferritin    Collection Time: 06/06/21 11:37 AM   Result Value Ref Range    Ferritin 173 ng/mL   CK    Collection Time: 06/06/21 11:38 AM   Result Value Ref Range    Total CK 1,582 (H) 20 - 200 U/L   CK-MB    Collection Time: 06/06/21 11:38 AM   Result Value Ref Range    CK-MB 2.4 0.0 - 7.7 ng/mL   Troponin    Collection Time: 06/06/21  4:17 PM   Result Value Ref Range    Troponin, High Sensitivity 18 (H) 0 - 11 ng/L   POCT Glucose    Collection Time: 06/06/21  4:26 PM   Result Value Ref Range    Meter Glucose 154 (H) 74 - 99 mg/dL       RADIOLOGY:  XR CHEST (2 VW)    Result Date: 5/26/2021  EXAMINATION: TWO XRAY VIEWS OF THE CHEST 5/26/2021 11:29 am COMPARISON: None. HISTORY: ORDERING SYSTEM PROVIDED HISTORY: Pre-op testing TECHNOLOGIST PROVIDED HISTORY: Reason for exam:->pre op What reading provider will be dictating this exam?->CRC FINDINGS: The lungs are without acute focal process. There is no effusion or pneumothorax. The cardiomediastinal silhouette is without acute process. The osseous structures are without acute process. Epidural leads in the thoracic spine. No acute process. FLUORO FOR SURGICAL PROCEDURES    Result Date: 6/2/2021  EXAMINATION: SPOT FLUOROSCOPIC IMAGES 6/2/2021 3:20 pm TECHNIQUE: Fluoroscopy was provided by the radiology department for procedure. Radiologist was not present during examination. FLUOROSCOPY DOSE AND TYPE OR TIME AND EXPOSURES: Fluoro time: 17.8 seconds. Images: 3 COMPARISON: None HISTORY: ORDERING SYSTEM PROVIDED HISTORY: lumbar laminectomy TECHNOLOGIST PROVIDED HISTORY: Reason for exam:->lumbar laminectomy What reading provider will be dictating this exam?->CRC Intraprocedural imaging. FINDINGS: Intraoperative fluoroscopy shows anterior fusion plate and screws at level of the cervical spine. Grossly normal alignment. Radiographic follow-up could be helpful for further evaluation. Intraprocedural fluoroscopic spot images as above. See separate procedure report for more information. Assessment and plan: Active Problems:    Cervical herniated disc    Post-op pneumonia  Resolved Problems:    * No resolved hospital problems.  *      Acute hypoxic respiratory failure  Due to aspiration pneumonia, patient status post intubation currently on mechanical ventilator  Is not sedated plan is to wean her off the ventilator today or tomorrow  Pulmonary critical care following    PEA cardiac arrest  Continue management as above, will request

## 2021-06-06 NOTE — PLAN OF CARE
Problem: Non-Violent Restraints  Goal: Removal from restraints as soon as assessed to be safe  6/6/2021 1407 by Kelsy Seat  Outcome: Not Met This Shift  6/6/2021 1407 by Kelsy Seat  Outcome: Not Met This Shift  Goal: No harm/injury to patient while restraints in use  6/6/2021 1407 by Kelsy Seat  Outcome: Met This Shift  6/6/2021 1407 by Kelsy Seat  Outcome: Met This Shift  Goal: Patient's dignity will be maintained  6/6/2021 1407 by Kelsy Seat  Outcome: Met This Shift  6/6/2021 1407 by Kelsy Seat  Outcome: Met This Shift

## 2021-06-06 NOTE — PROGRESS NOTES
fentaNYL 5 mcg/ml in 0.9%  ml infusion  12.5-200 mcg/hr Intravenous Continuous Marianne Piper MD 20 mL/hr at 06/06/21 0543 100 mcg/hr at 06/06/21 0543    propofol injection  5-50 mcg/kg/min Intravenous Titrated Marianne Piper MD 12.5 mL/hr at 06/06/21 0542 25 mcg/kg/min at 06/06/21 0542    vancomycin 1000 mg IVPB in 250 mL D5W addavial  1,000 mg Intravenous Q12H Marianne Piper MD   Stopped at 06/05/21 2210    cefepime NS flush   Intravenous Q12H Marianne Piper MD   Stopped at 06/06/21 0400    cefepime (MAXIPIME) 1000 mg IVPB minibag  1,000 mg Intravenous Q12H Marianne Piper MD   Stopped at 06/06/21 0215    pantoprazole (PROTONIX) injection 40 mg  40 mg Intravenous Daily Marianne Piper MD   40 mg at 06/05/21 1445    And    sodium chloride (PF) 0.9 % injection 10 mL  10 mL Intravenous Daily Marianne Piper MD   10 mL at 06/05/21 1445    enoxaparin (LOVENOX) injection 40 mg  40 mg Subcutaneous Daily Mundo Lloyd PA-C   40 mg at 06/05/21 1301    dexamethasone (DECADRON) injection 4 mg  4 mg Intravenous Q6H Flori Mitchell MD   4 mg at 06/06/21 0500    0.9 % sodium chloride infusion   Intravenous Continuous Flori Mitchell MD 75 mL/hr at 06/04/21 2020 Rate Verify at 06/04/21 2020    cyclobenzaprine (FLEXERIL) tablet 10 mg  10 mg Oral TID PRN Flori Mitchell MD   10 mg at 06/05/21 0615    pregabalin (LYRICA) capsule 225 mg  225 mg Oral BID Flori Mitchell MD   225 mg at 06/04/21 2054    sodium chloride flush 0.9 % injection 5-40 mL  5-40 mL Intravenous 2 times per day Flori Mitchell MD   10 mL at 06/05/21 2212    sodium chloride flush 0.9 % injection 5-40 mL  5-40 mL Intravenous PRN Flori Mitchell MD   10 mL at 06/03/21 2202    0.9 % sodium chloride infusion  25 mL Intravenous PRN Flori Mitchell MD        ondansetron (ZOFRAN-ODT) disintegrating tablet 4 mg  4 mg Oral Q8H PRN Flori Mitchell MD        Or    ondansetron (ZOFRAN) injection 4 mg  4 mg Intravenous Q6H PRN Flori Mitchell MD        polyethylene glycol (GLYCOLAX) packet 17 g  17 g Oral Daily Ritu Campos MD   17 g at 06/04/21 0814    bisacodyl (DULCOLAX) EC tablet 5 mg  5 mg Oral Daily Ritu Campos MD   5 mg at 06/04/21 0814    sennosides-docusate sodium (SENOKOT-S) 8.6-50 MG tablet 1 tablet  1 tablet Oral BID Ritu Campos MD   1 tablet at 06/04/21 2054    benzocaine-menthol (CEPACOL SORE THROAT) lozenge 1 lozenge  1 lozenge Oral Q2H PRN Ritu Campos MD   1 lozenge at 06/02/21 1916    acetaminophen (TYLENOL) tablet 650 mg  650 mg Oral Q4H PRN Ritu Campos MD   650 mg at 06/02/21 1241    insulin lispro (HUMALOG) injection vial 0-12 Units  0-12 Units Subcutaneous TID WC Ritu Campos MD   2 Units at 06/05/21 1303    insulin lispro (HUMALOG) injection vial 0-6 Units  0-6 Units Subcutaneous Nightly Ritu Campos MD        glucose (GLUTOSE) 40 % oral gel 15 g  15 g Oral PRN Ritu Campos MD        dextrose 50 % IV solution  12.5 g Intravenous PRN Ritu Campos MD        glucagon (rDNA) injection 1 mg  1 mg Intramuscular PRN Ritu Campos MD        dextrose 5 % solution  100 mL/hr Intravenous PRN Ritu Campos MD        diphenhydrAMINE (BENADRYL) injection 25 mg  25 mg Intravenous Q6H PRN Karime Sutherland PA-C   25 mg at 06/05/21 0054      fentaNYL 5 mcg/ml in 0.9%  ml infusion 100 mcg/hr (06/06/21 0543)    propofol 25 mcg/kg/min (06/06/21 0542)    sodium chloride 75 mL/hr at 06/04/21 2020    sodium chloride      dextrose         Physical Exam:  BP (!) 99/59   Pulse 73   Temp 99 °F (37.2 °C) (Temporal)   Resp 20   Ht 5' 5\" (1.651 m)   Wt 183 lb (83 kg)   SpO2 97%   BMI 30.45 kg/m²   Weight change: Wt Readings from Last 3 Encounters:   06/02/21 183 lb (83 kg)   05/21/21 183 lb (83 kg)   02/23/21 187 lb (84.8 kg)     The patient is intubated and sedated and in no discomfort or distress. No gross musculoskeletal deformity is present. No significant skin or nail changes are present.  Gross examination of head, eyes, nose and throat are negative. Jugular venous pressure is normal and no carotid bruits are present. Normal respiratory effort is noted with no accessory muscle usage present. Lung fields are clear to ascultation. Cardiac examination is notable for a regular rate and rhythm with no palpable thrill. No gallop rhythm or cardiac murmur are identified. A benign abdominal examination is present with no masses or organomegaly. Intact pulses are present throughout all extremities and no peripheral edema is present. No focal neurologic deficits are present.     Intake/Output:    Intake/Output Summary (Last 24 hours) at 6/6/2021 0643  Last data filed at 6/6/2021 0000  Gross per 24 hour   Intake 1252 ml   Output 1415 ml   Net -163 ml     I/O this shift:  In: -   Out: 75 [Urine:75]    Laboratory Tests:  Lab Results   Component Value Date    CREATININE 0.8 06/06/2021    BUN 14 06/06/2021     06/06/2021    K 3.9 06/06/2021     06/06/2021    CO2 26 06/06/2021     Recent Labs     06/06/21  0444   CKTOTAL 1,261*   CKMB 2.4     No results found for: BNP  Lab Results   Component Value Date    WBC 12.7 06/06/2021    RBC 3.82 06/06/2021    HGB 8.8 06/06/2021    HCT 27.4 06/06/2021    MCV 71.7 06/06/2021    MCH 23.0 06/06/2021    MCHC 32.1 06/06/2021    RDW 15.7 06/06/2021     06/06/2021    MPV 10.7 06/06/2021     Recent Labs     06/05/21  0745 06/05/21  1111   ALKPHOS 75 59   ALT 42* 43*   AST 37 40*   PROT 7.9 6.4   BILITOT 0.5 0.4   LABALBU 3.9 3.6     Lab Results   Component Value Date    MG 2.2 06/06/2021     Lab Results   Component Value Date    PROTIME 10.8 05/26/2021    INR 1.0 05/26/2021     No results found for: TSH  No components found for: CHLPL  Lab Results   Component Value Date    TRIG 103 09/24/2018     Lab Results   Component Value Date    HDL 37 09/24/2018     Lab Results   Component Value Date    LDLCALC 172 (H) 09/24/2018       Cardiac Tests:  Telemetry findings reviewed: sinus rhythm, no new tachy/bradyarrhythmias overnight  Chest X-ray: pending      ASSESSMENT / PLAN: On a clinical basis, the patient remains compensated from a cardiovascular standpoint with no interim arrhythmias and persistent hypoxic respiratory failure. Additional cardiac biomarkers demonstrated a increase of repeat high-sensitivity troponin in the face of significant elevation of total CPK levels and a normal myocardial index not suggestive of an acute ischemic event. Continued stabilization will predominately be deferred to the critical care service as well as input of the neurosurgical service in the face of his recent surgical procedure with an echocardiogram pending for assessment of any additional structural heart disease. Note: This report was completed utilizing computer voice recognition software. Every effort has been made to ensure accuracy, however; inadvertent computerized transcription errors may be present. Delbert Mcnally.  Judie Cortes, [address] [facility]

## 2021-06-06 NOTE — PROGRESS NOTES
[facility]  Department of Internal Medicine   MICU Progress note    Patient:  Nora Ocampo 44 y.o. male   MRN: [id number]       Date of Service: 6/6/2021      Chief Complaint:  S/p respiratory arrest    Subjective     Patient seen and examined. No acute events overnight. Intubated and sedated on Fentanyl 50 and propofol 25.   AC/VC 20/400/5/40 (decreased FiO2 to 40%)  Awake and alert. Following commands and writing on paper to communicate. Pleural effusion and fluid overloaded on chest Xray. Stopped fluids. No new complaints or concerns. Family reached and updated. All questions answered. Physical Exam       Physical Exam:  · Vitals: /66   Pulse 73   Temp 99 °F (37.2 °C) (Temporal)   Resp 20   Ht 5' 5\" (1.651 m)   Wt 183 lb (83 kg)   SpO2 91%   BMI 30.45 kg/m²     I & O - 24hr: I/O this shift:  In: -   · Out: 125 [Urine:125]   · General Appearance: Sedated and intubated. · HEENT:  Head: Normocephalic, no lesions, without obvious abnormality. · Neck: no adenopathy, no carotid bruit, no JVD, supple, symmetrical, trachea midline and thyroid not enlarged, symmetric, no tenderness/mass/nodules  · Lung: Bilateral wheezes. · Heart: Tachycardia, regular, no murmurs. · Abdomen: soft, non-tender; bowel sounds normal; no masses,  no organomegaly  · Extremities:  extremities normal, atraumatic, no cyanosis or edema  · Musculokeletal: No joint swelling, no muscle tenderness. ROM normal in all joints of extremities. · Neurologic: Mental status: Sedated and intubated. Awake and following commands.      Labs     CBC:   Lab Results   Component Value Date    WBC 12.7 06/06/2021    RBC 3.82 06/06/2021    HGB 8.8 06/06/2021    HCT 27.4 06/06/2021     06/06/2021    MCV 71.7 06/06/2021     BMP:    Lab Results   Component Value Date     06/06/2021    K 3.9 06/06/2021    K 4.3 05/26/2021     06/06/2021    CO2 26 06/06/2021    BUN 14 06/06/2021    CREATININE 0.8 06/06/2021    GLUCOSE Critical Care and Sleep Medicine  [address] [facility]  Department of Internal Medicine      During multidisciplinary team rounds Nora Ocampo is a 44 y.o. male was seen, examined and discussed. This is confirmation that I have personally seen and examined the patient and that the key elements of the encounter were performed by me (> 85 % time). The medications & laboratory data was discussed and adjusted where necessary. The radiographic images were reviewed or with radiologist or consultant if felt dis-concordant with the exam or history. The above findings were corroborated, plans confirmed and changes made if needed. Family is updated at the bedside as available. Key issues of the case were discussed among consultants. Critical Care time is documented if appropriate. While likely could be extubated, still on high oxygen and on intubate the airways was edematous and he got intro trouble not talking fantasma breaths because of pain and that lead to pneumonia.  Keep sedated till infection improves and oxygen       Lavvicente George DO, HALIE, [place], Lisbeth borden,

## 2021-06-06 NOTE — PROGRESS NOTES
mcg/hr, Intravenous, Continuous  vancomycin 1000 mg IVPB in 250 mL D5W addavial, 1,000 mg, Intravenous, Q12H  cefepime NS flush, , Intravenous, Q12H  cefepime (MAXIPIME) 1000 mg IVPB minibag, 1,000 mg, Intravenous, Q12H  pantoprazole (PROTONIX) injection 40 mg, 40 mg, Intravenous, Daily **AND** sodium chloride (PF) 0.9 % injection 10 mL, 10 mL, Intravenous, Daily  enoxaparin (LOVENOX) injection 40 mg, 40 mg, Subcutaneous, Daily  cyclobenzaprine (FLEXERIL) tablet 10 mg, 10 mg, Oral, TID PRN  pregabalin (LYRICA) capsule 225 mg, 225 mg, Oral, BID  sodium chloride flush 0.9 % injection 5-40 mL, 5-40 mL, Intravenous, 2 times per day  sodium chloride flush 0.9 % injection 5-40 mL, 5-40 mL, Intravenous, PRN  0.9 % sodium chloride infusion, 25 mL, Intravenous, PRN  ondansetron (ZOFRAN-ODT) disintegrating tablet 4 mg, 4 mg, Oral, Q8H PRN **OR** ondansetron (ZOFRAN) injection 4 mg, 4 mg, Intravenous, Q6H PRN  polyethylene glycol (GLYCOLAX) packet 17 g, 17 g, Oral, Daily  bisacodyl (DULCOLAX) EC tablet 5 mg, 5 mg, Oral, Daily  sennosides-docusate sodium (SENOKOT-S) 8.6-50 MG tablet 1 tablet, 1 tablet, Oral, BID  benzocaine-menthol (CEPACOL SORE THROAT) lozenge 1 lozenge, 1 lozenge, Oral, Q2H PRN  acetaminophen (TYLENOL) tablet 650 mg, 650 mg, Oral, Q4H PRN  insulin lispro (HUMALOG) injection vial 0-12 Units, 0-12 Units, Subcutaneous, TID WC  insulin lispro (HUMALOG) injection vial 0-6 Units, 0-6 Units, Subcutaneous, Nightly  glucose (GLUTOSE) 40 % oral gel 15 g, 15 g, Oral, PRN  dextrose 50 % IV solution, 12.5 g, Intravenous, PRN  glucagon (rDNA) injection 1 mg, 1 mg, Intramuscular, PRN  dextrose 5 % solution, 100 mL/hr, Intravenous, PRN  diphenhydrAMINE (BENADRYL) injection 25 mg, 25 mg, Intravenous, Q6H PRN    ASSESSMENT:   s/p C4-C7 ACDF 6/2  RRT 6/5, possible aspiration pneumonia     PLAN:  -Continue ICU care  -Custom collar x3 months  -Follow up in neurosurgery in 4 weeks with x-rays      Electronically signed by Samuel JEREZ Mohan Jones PA-C on 6/6/2021 at 10:38 AM

## 2021-06-06 NOTE — PROGRESS NOTES
Nurse receives notification from Dr. Brendan Perez that he attempted to reach pt's daughter several times regarding the events that lead up to pt's admission to MICU. Nurse calls number listed for pt's daughter, Mark Minaya.  No answer noted and unable to leave message; \"mailbox full\"

## 2021-06-06 NOTE — PROGRESS NOTES
Entered patients room at 1500 and patient was intubated and sedated and live streaming on his phone. Patient was advised on hospital policy regarding cell phone use.

## 2021-06-07 ENCOUNTER — APPOINTMENT (OUTPATIENT)
Dept: GENERAL RADIOLOGY | Age: 51
DRG: 321 | End: 2021-06-07
Attending: NEUROLOGICAL SURGERY
Payer: MEDICAID

## 2021-06-07 LAB
AADO2: 243.3 MMHG
ANION GAP SERPL CALCULATED.3IONS-SCNC: 10 MMOL/L (ref 7–16)
B.E.: 0.6 MMOL/L (ref -3–3)
BASOPHILS ABSOLUTE: 0.01 E9/L (ref 0–0.2)
BASOPHILS RELATIVE PERCENT: 0.1 % (ref 0–2)
BUN BLDV-MCNC: 14 MG/DL (ref 6–20)
CALCIUM SERPL-MCNC: 8.4 MG/DL (ref 8.6–10.2)
CHLORIDE BLD-SCNC: 104 MMOL/L (ref 98–107)
CO2: 25 MMOL/L (ref 22–29)
COHB: 0.3 % (ref 0–1.5)
CREAT SERPL-MCNC: 0.8 MG/DL (ref 0.7–1.2)
CRITICAL: ABNORMAL
CULTURE, RESPIRATORY: NORMAL
DATE ANALYZED: ABNORMAL
DATE OF COLLECTION: ABNORMAL
EOSINOPHILS ABSOLUTE: 0.02 E9/L (ref 0.05–0.5)
EOSINOPHILS RELATIVE PERCENT: 0.2 % (ref 0–6)
FIO2: 50 %
GFR AFRICAN AMERICAN: >60
GFR NON-AFRICAN AMERICAN: >60 ML/MIN/1.73
GLUCOSE BLD-MCNC: 92 MG/DL (ref 74–99)
HCO3: 23.3 MMOL/L (ref 22–26)
HCT VFR BLD CALC: 27.3 % (ref 37–54)
HEMOGLOBIN: 8.8 G/DL (ref 12.5–16.5)
HHB: 7.5 % (ref 0–5)
IMMATURE GRANULOCYTES #: 0.16 E9/L
IMMATURE GRANULOCYTES %: 1.5 % (ref 0–5)
LAB: ABNORMAL
LYMPHOCYTES ABSOLUTE: 1.74 E9/L (ref 1.5–4)
LYMPHOCYTES RELATIVE PERCENT: 16.6 % (ref 20–42)
Lab: ABNORMAL
MAGNESIUM: 2.4 MG/DL (ref 1.6–2.6)
MCH RBC QN AUTO: 23.2 PG (ref 26–35)
MCHC RBC AUTO-ENTMCNC: 32.2 % (ref 32–34.5)
MCV RBC AUTO: 72 FL (ref 80–99.9)
METER GLUCOSE: 113 MG/DL (ref 74–99)
METER GLUCOSE: 48 MG/DL (ref 74–99)
METER GLUCOSE: 51 MG/DL (ref 74–99)
METER GLUCOSE: 76 MG/DL (ref 74–99)
METHB: 0.5 % (ref 0–1.5)
MODE: AC
MONOCYTES ABSOLUTE: 1.09 E9/L (ref 0.1–0.95)
MONOCYTES RELATIVE PERCENT: 10.4 % (ref 2–12)
MRSA CULTURE ONLY: NORMAL
NEUTROPHILS ABSOLUTE: 7.47 E9/L (ref 1.8–7.3)
NEUTROPHILS RELATIVE PERCENT: 71.2 % (ref 43–80)
O2 CONTENT: 13 ML/DL
O2 SATURATION: 92.4 % (ref 92–98.5)
O2HB: 91.7 % (ref 94–97)
OPERATOR ID: 2593
PATIENT TEMP: 37 C
PCO2: 30.3 MMHG (ref 35–45)
PDW BLD-RTO: 15.7 FL (ref 11.5–15)
PEEP/CPAP: 5 CMH2O
PFO2: 1.33 MMHG/%
PH BLOOD GAS: 7.5 (ref 7.35–7.45)
PHOSPHORUS: 3.6 MG/DL (ref 2.5–4.5)
PLATELET # BLD: 218 E9/L (ref 130–450)
PMV BLD AUTO: 10.6 FL (ref 7–12)
PO2: 66.6 MMHG (ref 75–100)
POTASSIUM SERPL-SCNC: 3.5 MMOL/L (ref 3.5–5)
RBC # BLD: 3.79 E12/L (ref 3.8–5.8)
RI(T): 3.65
RR MECHANICAL: 20 B/MIN
SMEAR, RESPIRATORY: NORMAL
SODIUM BLD-SCNC: 139 MMOL/L (ref 132–146)
SOURCE, BLOOD GAS: ABNORMAL
THB: 10 G/DL (ref 11.5–16.5)
TIME ANALYZED: 457
VT MECHANICAL: 400 ML
WBC # BLD: 10.5 E9/L (ref 4.5–11.5)

## 2021-06-07 PROCEDURE — 71045 X-RAY EXAM CHEST 1 VIEW: CPT

## 2021-06-07 PROCEDURE — 84100 ASSAY OF PHOSPHORUS: CPT

## 2021-06-07 PROCEDURE — 2580000003 HC RX 258: Performed by: STUDENT IN AN ORGANIZED HEALTH CARE EDUCATION/TRAINING PROGRAM

## 2021-06-07 PROCEDURE — 6360000002 HC RX W HCPCS: Performed by: INTERNAL MEDICINE

## 2021-06-07 PROCEDURE — 6360000002 HC RX W HCPCS: Performed by: PHYSICIAN ASSISTANT

## 2021-06-07 PROCEDURE — 2580000003 HC RX 258: Performed by: INTERNAL MEDICINE

## 2021-06-07 PROCEDURE — 94640 AIRWAY INHALATION TREATMENT: CPT

## 2021-06-07 PROCEDURE — 6370000000 HC RX 637 (ALT 250 FOR IP): Performed by: NEUROLOGICAL SURGERY

## 2021-06-07 PROCEDURE — 82805 BLOOD GASES W/O2 SATURATION: CPT

## 2021-06-07 PROCEDURE — 2500000003 HC RX 250 WO HCPCS: Performed by: INTERNAL MEDICINE

## 2021-06-07 PROCEDURE — 6370000000 HC RX 637 (ALT 250 FOR IP): Performed by: INTERNAL MEDICINE

## 2021-06-07 PROCEDURE — 80048 BASIC METABOLIC PNL TOTAL CA: CPT

## 2021-06-07 PROCEDURE — 85025 COMPLETE CBC W/AUTO DIFF WBC: CPT

## 2021-06-07 PROCEDURE — 6360000002 HC RX W HCPCS: Performed by: STUDENT IN AN ORGANIZED HEALTH CARE EDUCATION/TRAINING PROGRAM

## 2021-06-07 PROCEDURE — 2580000003 HC RX 258: Performed by: NEUROLOGICAL SURGERY

## 2021-06-07 PROCEDURE — 2000000000 HC ICU R&B

## 2021-06-07 PROCEDURE — 2500000003 HC RX 250 WO HCPCS: Performed by: STUDENT IN AN ORGANIZED HEALTH CARE EDUCATION/TRAINING PROGRAM

## 2021-06-07 PROCEDURE — 2500000003 HC RX 250 WO HCPCS: Performed by: NEUROLOGICAL SURGERY

## 2021-06-07 PROCEDURE — 94003 VENT MGMT INPAT SUBQ DAY: CPT

## 2021-06-07 PROCEDURE — 83735 ASSAY OF MAGNESIUM: CPT

## 2021-06-07 PROCEDURE — C9113 INJ PANTOPRAZOLE SODIUM, VIA: HCPCS | Performed by: STUDENT IN AN ORGANIZED HEALTH CARE EDUCATION/TRAINING PROGRAM

## 2021-06-07 PROCEDURE — 82962 GLUCOSE BLOOD TEST: CPT

## 2021-06-07 RX ORDER — PREGABALIN 75 MG/1
225 CAPSULE ORAL 2 TIMES DAILY
Status: DISCONTINUED | OUTPATIENT
Start: 2021-06-07 | End: 2021-06-14 | Stop reason: HOSPADM

## 2021-06-07 RX ORDER — DEXAMETHASONE SODIUM PHOSPHATE 4 MG/ML
4 INJECTION, SOLUTION INTRA-ARTICULAR; INTRALESIONAL; INTRAMUSCULAR; INTRAVENOUS; SOFT TISSUE ONCE
Status: COMPLETED | OUTPATIENT
Start: 2021-06-07 | End: 2021-06-07

## 2021-06-07 RX ORDER — DIVALPROEX SODIUM 125 MG/1
500 CAPSULE, COATED PELLETS ORAL EVERY 12 HOURS SCHEDULED
Status: DISCONTINUED | OUTPATIENT
Start: 2021-06-07 | End: 2021-06-07

## 2021-06-07 RX ORDER — MIDAZOLAM HYDROCHLORIDE 2 MG/2ML
2 INJECTION, SOLUTION INTRAMUSCULAR; INTRAVENOUS EVERY 6 HOURS PRN
Status: DISCONTINUED | OUTPATIENT
Start: 2021-06-07 | End: 2021-06-07

## 2021-06-07 RX ORDER — MIDAZOLAM HYDROCHLORIDE 2 MG/2ML
2 INJECTION, SOLUTION INTRAMUSCULAR; INTRAVENOUS EVERY 4 HOURS PRN
Status: DISCONTINUED | OUTPATIENT
Start: 2021-06-07 | End: 2021-06-08

## 2021-06-07 RX ORDER — SODIUM CHLORIDE, SODIUM LACTATE, POTASSIUM CHLORIDE, CALCIUM CHLORIDE 600; 310; 30; 20 MG/100ML; MG/100ML; MG/100ML; MG/100ML
INJECTION, SOLUTION INTRAVENOUS CONTINUOUS
Status: ACTIVE | OUTPATIENT
Start: 2021-06-07 | End: 2021-06-07

## 2021-06-07 RX ORDER — CHLORHEXIDINE GLUCONATE 0.12 MG/ML
15 RINSE ORAL 2 TIMES DAILY
Status: DISCONTINUED | OUTPATIENT
Start: 2021-06-07 | End: 2021-06-10

## 2021-06-07 RX ADMIN — ACETYLCYSTEINE 600 MG: 200 SOLUTION ORAL; RESPIRATORY (INHALATION) at 23:42

## 2021-06-07 RX ADMIN — ARFORMOTEROL TARTRATE 15 MCG: 15 SOLUTION RESPIRATORY (INHALATION) at 09:43

## 2021-06-07 RX ADMIN — Medication 200 MCG/HR: at 21:02

## 2021-06-07 RX ADMIN — Medication 10 ML: at 11:47

## 2021-06-07 RX ADMIN — 0.12% CHLORHEXIDINE GLUCONATE 15 ML: 1.2 RINSE ORAL at 11:47

## 2021-06-07 RX ADMIN — PROPOFOL 50 MCG/KG/MIN: 10 INJECTION, EMULSION INTRAVENOUS at 00:31

## 2021-06-07 RX ADMIN — BUDESONIDE 250 MCG: 0.25 SUSPENSION RESPIRATORY (INHALATION) at 09:42

## 2021-06-07 RX ADMIN — IPRATROPIUM BROMIDE AND ALBUTEROL SULFATE 1 AMPULE: 2.5; .5 SOLUTION RESPIRATORY (INHALATION) at 23:43

## 2021-06-07 RX ADMIN — SENNOSIDES AND DOCUSATE SODIUM 1 TABLET: 8.6; 5 TABLET ORAL at 21:50

## 2021-06-07 RX ADMIN — SODIUM CHLORIDE: 9 INJECTION, SOLUTION INTRAVENOUS at 02:00

## 2021-06-07 RX ADMIN — ACETYLCYSTEINE 600 MG: 200 SOLUTION ORAL; RESPIRATORY (INHALATION) at 09:43

## 2021-06-07 RX ADMIN — ENOXAPARIN SODIUM 40 MG: 40 INJECTION SUBCUTANEOUS at 11:48

## 2021-06-07 RX ADMIN — CEFEPIME HYDROCHLORIDE 1000 MG: 1 INJECTION, POWDER, FOR SOLUTION INTRAMUSCULAR; INTRAVENOUS at 21:51

## 2021-06-07 RX ADMIN — PREGABALIN 225 MG: 75 CAPSULE ORAL at 23:04

## 2021-06-07 RX ADMIN — DEXTROSE MONOHYDRATE 12.5 G: 25 INJECTION, SOLUTION INTRAVENOUS at 08:42

## 2021-06-07 RX ADMIN — Medication 150 MCG/HR: at 08:32

## 2021-06-07 RX ADMIN — DEXAMETHASONE SODIUM PHOSPHATE 4 MG: 4 INJECTION, SOLUTION INTRAMUSCULAR; INTRAVENOUS at 11:46

## 2021-06-07 RX ADMIN — Medication 175 MCG/HR: at 15:18

## 2021-06-07 RX ADMIN — VALPROATE SODIUM 500 MG: 100 INJECTION, SOLUTION INTRAVENOUS at 11:47

## 2021-06-07 RX ADMIN — PROPOFOL 50 MCG/KG/MIN: 10 INJECTION, EMULSION INTRAVENOUS at 04:08

## 2021-06-07 RX ADMIN — IPRATROPIUM BROMIDE AND ALBUTEROL SULFATE 1 AMPULE: 2.5; .5 SOLUTION RESPIRATORY (INHALATION) at 12:52

## 2021-06-07 RX ADMIN — MIDAZOLAM 2 MG: 1 INJECTION INTRAMUSCULAR; INTRAVENOUS at 17:01

## 2021-06-07 RX ADMIN — SODIUM CHLORIDE, POTASSIUM CHLORIDE, SODIUM LACTATE AND CALCIUM CHLORIDE: 600; 310; 30; 20 INJECTION, SOLUTION INTRAVENOUS at 14:10

## 2021-06-07 RX ADMIN — SODIUM CHLORIDE, PRESERVATIVE FREE 10 ML: 5 INJECTION INTRAVENOUS at 11:45

## 2021-06-07 RX ADMIN — MIDAZOLAM 2 MG: 1 INJECTION INTRAMUSCULAR; INTRAVENOUS at 11:03

## 2021-06-07 RX ADMIN — Medication 150 MCG/HR: at 00:32

## 2021-06-07 RX ADMIN — VANCOMYCIN HYDROCHLORIDE 1000 MG: 1 INJECTION, POWDER, LYOPHILIZED, FOR SOLUTION INTRAVENOUS at 11:46

## 2021-06-07 RX ADMIN — DEXMEDETOMIDINE HYDROCHLORIDE 0.2 MCG/KG/HR: 100 INJECTION, SOLUTION INTRAVENOUS at 10:45

## 2021-06-07 RX ADMIN — ARFORMOTEROL TARTRATE 15 MCG: 15 SOLUTION RESPIRATORY (INHALATION) at 23:42

## 2021-06-07 RX ADMIN — DEXMEDETOMIDINE HYDROCHLORIDE 1.4 MCG/HR: 100 INJECTION, SOLUTION INTRAVENOUS at 23:25

## 2021-06-07 RX ADMIN — MIDAZOLAM 2 MG: 1 INJECTION INTRAMUSCULAR; INTRAVENOUS at 21:47

## 2021-06-07 RX ADMIN — IPRATROPIUM BROMIDE AND ALBUTEROL SULFATE 1 AMPULE: 2.5; .5 SOLUTION RESPIRATORY (INHALATION) at 09:43

## 2021-06-07 RX ADMIN — CEFEPIME HYDROCHLORIDE 1000 MG: 1 INJECTION, POWDER, FOR SOLUTION INTRAMUSCULAR; INTRAVENOUS at 11:46

## 2021-06-07 RX ADMIN — PROPOFOL 30 MCG/KG/MIN: 10 INJECTION, EMULSION INTRAVENOUS at 08:31

## 2021-06-07 RX ADMIN — 0.12% CHLORHEXIDINE GLUCONATE 15 ML: 1.2 RINSE ORAL at 21:02

## 2021-06-07 RX ADMIN — VALPROATE SODIUM 500 MG: 100 INJECTION, SOLUTION INTRAVENOUS at 21:02

## 2021-06-07 RX ADMIN — PANTOPRAZOLE SODIUM 40 MG: 40 INJECTION, POWDER, FOR SOLUTION INTRAVENOUS at 11:46

## 2021-06-07 RX ADMIN — IPRATROPIUM BROMIDE AND ALBUTEROL SULFATE 1 AMPULE: 2.5; .5 SOLUTION RESPIRATORY (INHALATION) at 17:16

## 2021-06-07 RX ADMIN — BUDESONIDE 250 MCG: 0.25 SUSPENSION RESPIRATORY (INHALATION) at 23:42

## 2021-06-07 RX ADMIN — DEXTROSE MONOHYDRATE 12.5 G: 25 INJECTION, SOLUTION INTRAVENOUS at 09:06

## 2021-06-07 RX ADMIN — Medication 10 ML: at 21:50

## 2021-06-07 ASSESSMENT — PAIN SCALES - WONG BAKER
WONGBAKER_NUMERICALRESPONSE: 0

## 2021-06-07 ASSESSMENT — PULMONARY FUNCTION TESTS
PIF_VALUE: 25
PIF_VALUE: 27
PIF_VALUE: 22
PIF_VALUE: 20
PIF_VALUE: 22
PIF_VALUE: 20
PIF_VALUE: 21
PIF_VALUE: 21
PIF_VALUE: 20
PIF_VALUE: 18
PIF_VALUE: 21

## 2021-06-07 ASSESSMENT — PAIN SCALES - GENERAL
PAINLEVEL_OUTOF10: 4
PAINLEVEL_OUTOF10: 0
PAINLEVEL_OUTOF10: 4
PAINLEVEL_OUTOF10: 3
PAINLEVEL_OUTOF10: 0

## 2021-06-07 NOTE — PROGRESS NOTES
Department of Neurosurgery  Progress Note    CHIEF COMPLAINT: s/p C4-C7 ACDF 6/2    SUBJECTIVE:  Awake and alert.  Intubated    REVIEW OF SYSTEMS :  Intubated    OBJECTIVE:   VITALS:  /75   Pulse 96   Temp 97.5 °F (36.4 °C) (Axillary)   Resp 20   Ht 5' 5\" (1.651 m)   Wt 183 lb (83 kg)   SpO2 (!) 89%   BMI 30.45 kg/m²     PHYSICAL:  Neurologic:  Intubated, eyes open  Motor Exam: Following commands in all extremities   Incision c/d/i  Collar intact     DATA:  CBC:   Lab Results   Component Value Date    WBC 10.5 06/07/2021    RBC 3.79 06/07/2021    HGB 8.8 06/07/2021    HCT 27.3 06/07/2021    MCV 72.0 06/07/2021    MCH 23.2 06/07/2021    MCHC 32.2 06/07/2021    RDW 15.7 06/07/2021     06/07/2021    MPV 10.6 06/07/2021     BMP:    Lab Results   Component Value Date     06/07/2021    K 3.5 06/07/2021    K 4.3 05/26/2021     06/07/2021    CO2 25 06/07/2021    BUN 14 06/07/2021    LABALBU 3.6 06/05/2021    CREATININE 0.8 06/07/2021    CALCIUM 8.4 06/07/2021    GFRAA >60 06/07/2021    LABGLOM >60 06/07/2021    GLUCOSE 92 06/07/2021     PT/INR:    Lab Results   Component Value Date    PROTIME 10.8 05/26/2021    INR 1.0 05/26/2021     PTT:    Lab Results   Component Value Date    APTT 32.9 08/07/2018   [APTT}    Current Inpatient Medications  Current Facility-Administered Medications: dexmedetomidine (PRECEDEX) 1,000 mcg in sodium chloride 0.9 % 250 mL infusion, 0.2-1.4 mcg/kg/hr, Intravenous, Continuous  chlorhexidine (PERIDEX) 0.12 % solution 15 mL, 15 mL, Mouth/Throat, BID  lactated ringers infusion, , Intravenous, Continuous  midazolam PF (VERSED) injection 2 mg, 2 mg, Intravenous, Q6H PRN  valproate (DEPACON) 500 mg in dextrose 5 % 100 mL IVPB, 500 mg, Intravenous, BID  acetylcysteine (MUCOMYST) 20 % solution FOR INHALATION 600 mg, 600 mg, Inhalation, BID  ipratropium-albuterol (DUONEB) nebulizer solution 1 ampule, 1 ampule, Inhalation, Q4H WA  budesonide (PULMICORT) nebulizer suspension 250 mcg, 250 mcg, Nebulization, BID  Arformoterol Tartrate (BROVANA) nebulizer solution 15 mcg, 15 mcg, Nebulization, BID  fentaNYL 5 mcg/ml in 0.9%  ml infusion, 12.5-200 mcg/hr, Intravenous, Continuous  vancomycin 1000 mg IVPB in 250 mL D5W addavial, 1,000 mg, Intravenous, Q12H  cefepime NS flush, , Intravenous, Q12H  cefepime (MAXIPIME) 1000 mg IVPB minibag, 1,000 mg, Intravenous, Q12H  pantoprazole (PROTONIX) injection 40 mg, 40 mg, Intravenous, Daily **AND** sodium chloride (PF) 0.9 % injection 10 mL, 10 mL, Intravenous, Daily  enoxaparin (LOVENOX) injection 40 mg, 40 mg, Subcutaneous, Daily  cyclobenzaprine (FLEXERIL) tablet 10 mg, 10 mg, Oral, TID PRN  pregabalin (LYRICA) capsule 225 mg, 225 mg, Oral, BID  sodium chloride flush 0.9 % injection 5-40 mL, 5-40 mL, Intravenous, 2 times per day  sodium chloride flush 0.9 % injection 5-40 mL, 5-40 mL, Intravenous, PRN  0.9 % sodium chloride infusion, 25 mL, Intravenous, PRN  polyethylene glycol (GLYCOLAX) packet 17 g, 17 g, Oral, Daily  bisacodyl (DULCOLAX) EC tablet 5 mg, 5 mg, Oral, Daily  sennosides-docusate sodium (SENOKOT-S) 8.6-50 MG tablet 1 tablet, 1 tablet, Oral, BID  benzocaine-menthol (CEPACOL SORE THROAT) lozenge 1 lozenge, 1 lozenge, Oral, Q2H PRN  acetaminophen (TYLENOL) tablet 650 mg, 650 mg, Oral, Q4H PRN  glucose (GLUTOSE) 40 % oral gel 15 g, 15 g, Oral, PRN  dextrose 50 % IV solution, 12.5 g, Intravenous, PRN  glucagon (rDNA) injection 1 mg, 1 mg, Intramuscular, PRN  dextrose 5 % solution, 100 mL/hr, Intravenous, PRN  diphenhydrAMINE (BENADRYL) injection 25 mg, 25 mg, Intravenous, Q6H PRN    ASSESSMENT:   s/p C4-C7 ACDF 6/2  RRT 6/5, possible aspiration pneumonia     PLAN:  -Continue ICU care  -Custom collar x3 months  -Follow up in neurosurgery in 4 weeks with x-rays      Electronically signed by CAROLINE Whipple on 6/7/2021 at 1:02 PM

## 2021-06-07 NOTE — CARE COORDINATION
6/7/2021 - ICU CM update - S/P C40C7 ACDF on 6/2. Pt  currently in MICU - was RRT and code blue on 6/5/2021. Currently intubated and on ventilator. IV fentanyl, IV propofol, IV precedex infusions. IIV decadron x1 dose. IV LR at 75 cc/hr, IV valproate bid. Wearing custom collar. To have orogastric tube inserted today. Pt was form home. PM&R evaluation was done and they had recommended TRU. PT/OT on hold. Will rely on evals and recommendations to determine appropriate discharge planning. SW/CM will follow.

## 2021-06-07 NOTE — PROGRESS NOTES
HOSPITALIST PROGRESS NOTE  Date: 6/7/2021   Name: Chato Nathan   MRN: [id number]   YOB: 1970        Hospital Course:   Mr Bernadette Hoff 44 YM admitted for neck pain due to cervical herniated disc, patient had C4-C5 C5-C6 C6-C7 anterior cervical discectomy and fusion done on 6/2/2021, postoperatively patient was doing fine besides some swallowing difficulty which is expected after cervical surgery, clinically improving until the morning of 6/5/2021 when he complained about sudden feeling of swelling in the throat and sudden onset shortness of breath. Rapid response was called I evaluated the patient personally at bedside, decision was made to send patient to CT scan neck and chest with PE protocol, during the CT scan patient had a PEA arrest, he was resuscitated and intubated and then transferred to medical ICU.     Subjective/Interval Hx:   Patient remains with a, room denies any pain at this time very weak and lethargic in the intensive care unit    Objective:   Physical Exam:   /75   Pulse 96   Temp 97.5 °F (36.4 °C) (Axillary)   Resp 20   Ht 5' 5\" (1.651 m)   Wt 183 lb (83 kg)   SpO2 (!) 89%   BMI 30.45 kg/m²   General: no acute distress, well nourished and well hydrated  HEENT: NCAT  Heart: S1S2 RRR  Lungs: Clear to ascultation bilaterally, respiratory effort normal  Abdomen: soft, NT/ND, positive bowel sounds  Extremities: no pitting edema, nontender   Neuro: patient is awake, alert and orientated times 3, no gross deficits  Skin: no rashes or ecchymosis        Meds:   Meds:    chlorhexidine  15 mL Mouth/Throat BID    valproate sodium (DEPACON) IVPB  500 mg Intravenous BID    acetylcysteine  600 mg Inhalation BID    ipratropium-albuterol  1 ampule Inhalation Q4H WA    budesonide  250 mcg Nebulization BID    Arformoterol Tartrate  15 mcg Nebulization BID    vancomycin  1,000 mg Intravenous Q12H    sodium chloride   Intravenous Q12H    cefepime  1,000 mg Intravenous Q12H status post intubation-patient continues to be on the ventilator pulmonary critical care is following  2. PEA/cardiac arrest-continue management as above patient will stay rapid cardiology is following echo is pending  3. Hospital-acquired pneumonia-CT shows bilateral infiltrates left greater than right continue IV antibiotics critical care pulmonary is following monitor lab studies  4. Cervical herniated disc status post anterior cervical discectomy and fusion on 6/02/21 per neurosurgery-patient is to have collar on the neck remain x3 months pain is controlled with medication  5. Mild rhabdo myelolysis-CK is elevated, maintain hydration, monitor lab studies  6. Seizure precautions-valproic acid IV monitor    DVT Prophylaxis: Lovenox  Diet: Diet NPO  Code Status: Full Code    Dispo:  When stable    Electronically signed by Austen Escobedo MD on 6/7/2021 at 2:12 PM  [facility]

## 2021-06-07 NOTE — PLAN OF CARE
Problem: Discharge Planning:  Goal: Discharged to appropriate level of care  Description: Discharged to appropriate level of care  Outcome: Not Met This Shift     Problem: Pain:  Goal: Pain level will decrease  Description: Pain level will decrease  Outcome: Met This Shift  Goal: Control of acute pain  Description: Control of acute pain  Outcome: Met This Shift     Problem: Urinary Retention:  Goal: Urinary elimination within specified parameters  Description: Urinary elimination within specified parameters  Outcome: Met This Shift     Problem: Respiratory:  Goal: Will remain free from infection  Description: Will remain free from infection  Outcome: Met This Shift     Problem: Non-Violent Restraints  Goal: Removal from restraints as soon as assessed to be safe  6/7/2021 1655 by Marlon Michaels  Outcome: Not Met This Shift  6/7/2021 1653 by Marlon Michaels  Outcome: Not Met This Shift  6/7/2021 0520 by Cameron Guo RN  Outcome: Not Met This Shift  Goal: No harm/injury to patient while restraints in use  6/7/2021 1655 by Marlon Michaels  Outcome: Met This Shift  6/7/2021 1653 by Marlon Michaels  Outcome: Met This Shift  6/7/2021 0520 by Cameron Guo RN  Outcome: Met This Shift  Goal: Patient's dignity will be maintained  6/7/2021 1655 by Marlon Michaels  Outcome: Met This Shift  6/7/2021 1653 by Marlon Michaels  Outcome: Met This Shift  6/7/2021 0520 by Cameron Guo RN  Outcome: Met This Shift     Problem: Skin Integrity:  Goal: Will show no infection signs and symptoms  Description: Will show no infection signs and symptoms  Outcome: Not Met This Shift

## 2021-06-07 NOTE — PROGRESS NOTES
Pharmacy Consultation Note  (Antibiotic Dosing and Monitoring)    Initial consult date: 6/5/21  Consulting physician: Dr. Puma Aviles  Drug(s): vancomycin  Indication: HAP      Vancomycin has been discontinued. Clinical pharmacy will sign off, please reconsult if further assistance is needed.      Evelyn Nguyen, PharmD, BCPS 6/7/2021 3:09 PM

## 2021-06-07 NOTE — PROGRESS NOTES
06/07/21 1313   Vent Information   Vent Mode AC/VC   Vt Ordered 500 mL   Rate Set 12 bmp   Peak Flow 0 L/min   Pressure Support 0 cmH20   FiO2  50 %   SpO2 (!) 89 %   SpO2/FiO2 ratio 178   Sensitivity 2   PEEP/CPAP 5   I Time/ I Time % 0 s   Vent Patient Data   High Peep/I Pressure 0   Peak Inspiratory Pressure 22 cmH2O   Mean Airway Pressure 8.4 cmH20   Rate Measured 13 br/min   Vt Exhaled 500 mL   Minute Volume 6.31 Liters   I:E Ratio 1:4.80   Spontaneous Breathing Trial (SBT) RT Doc   Pulse 95   Additional Respiratory  Assessments   Resp 16   Alarm Settings   High Pressure Alarm 50 cmH2O   pt was desating all the time and respirations were only 6-9/ minute so I switched pt back to A/C

## 2021-06-07 NOTE — PROGRESS NOTES
[facility]  Department of Internal Medicine   MICU Progress note    Patient:  Blair Hernandez 44 y.o. male   MRN: [id number]       Date of Service: 6/7/2021      Chief Complaint:  S/p respiratory arrest    Subjective     Pt seen and examined this AM at bedside. He is anxious. Attempting to communicate through texting on phone and writing on paper. Sitting up in bed, trying to pull at ETT. +Secretions. Unable to suction secretions this AM. Started on Precedex for agitation. Mucomyst for secretions. Pt denies fevers, chills. Pt had questions regarding his Echo which was done yesterday. Despite reassuring patients of results, he continued to be fixated on them and asking about the results. Unable to complete full ROS as patient easily distracted and focused on a single train of thought. Physical Exam       Physical Exam:  · Vitals: /75   Pulse 96   Temp 97.5 °F (36.4 °C) (Axillary)   Resp 20   Ht 5' 5\" (1.651 m)   Wt 183 lb (83 kg)   SpO2 (!) 89%   BMI 30.45 kg/m²     I & O - 24hr: I/O this shift:  In: -   · Out: 90 [Urine:90]   · General Appearance: Intubated. Anxious. · HEENT:  Head: Normocephalic, no lesions, without obvious abnormality. · Neck: no adenopathy, no carotid bruit, no JVD, supple, symmetrical, trachea midline and thyroid not enlarged, symmetric, no tenderness/mass/nodules  · Lung: decreased lung sounds bl. · Heart: Tachycardia, regular, no murmurs. · Abdomen: soft, non-tender; bowel sounds normal; no masses,  no organomegaly  · Extremities:  extremities normal, atraumatic, no cyanosis or edema  · Musculokeletal: No joint swelling, no muscle tenderness. ROM normal in all joints of extremities. · Neurologic: Mental status: Intubated. Follows commands, however fixated on a single train of thought.      Lines     site day    Art line   None    TLC R Fem 6/5/2021   PICC None    Hemoaccess None    Oxygen:    Mechanical Ventilation:   Mode: A/C (20/400/18/5)    ABG: consequently he underwent C4-C7 ACDF on 6/2/2021. On 6/5/2020 1 in the morning RRT was called because the patient was hypoxic in respiratory distress. During this time he stated that when he was going to the restroom and started to turn felt an acute shortness of breath, chest and back pain. During the RRT patient was given methylprednisolone and fentanyl. Patient was brought to CT scan to for CTA pulmonary and CT cervical spine. While being in the CT scan patient had worsening of his respiratory distress and was shaking, CODE BLUE was called, patient was found to be in PEA arrest, had CPR for 3 minutes and achieved ROSC. Patient was intubated in the scene for airway protection and transferred to the MICU for further management sedated and intubated. Currently being managed for:     Hospital Day: 6  MICU Day: 3  Ventilation Day: 3    Assessment:  1. Acute hypoxic respiratory failure, suspect 2/2 to diphragmatic dysfx vs non-obstructive post-op atelectasis vs HCAP vs aspiration pna   2. Status post respiratory arrest, 3 minutes, ROSC achieved, No medications given   3. Aspiration pneumonia. 4.  Status post C4-C7 anterior cervical discectomy and fusion, POD3   5. History of migraine. 6.  Hypophosphatemia  7. Microcytic anemia  8. Bilateral Non-obstructive atelectasis. 9. Elevated troponins, post Respiratory arrest.  10. Elevated CK    Plan:    · Agitated and anxious this AM. Pulling at tube. Start on Precedex and Depakon for agitation. Versed prn. D/c propofol and fentanyl. · Weaning parameters once calmer. · Increased secretions, unable to suction. · LR 75 cc/hr for 12 hours. CK trending up. · Continue vancomycin and cefepime. · Resp Cx and Blood Cx NGTD. · Legionella, strep NGTD. MRSA nares pending. · Follow up Sniff testing.   · No ECG abnormalities are noted per cardiology: Echo reviewed by Dr. Charise Aase shows \"normal sized LV chamber an nl LV systolic fx and no evidence of regional wall motion abnormalities. Shari Linger Shari Burkett CPA does not appear to be of CV origin\"   · Neurosurgery following. DVT/GI prophylaxis: Lovenox/ PPI   Disposition: continue current management     Irwin Saldaña DO PGY-1   Attending physician: Dr. Debora Lobo Attending Addendum:    Patient seen and examined with the house staff. X-rays personally reviewed through the PACS. Family is updated at the bedside as available. Additional findings listed below as necessary. Additional comments:  1. Acute hypoxic respiratory failure  2. LLL pneumonia on vanco and cefepime with Gram positives on Gram' stain  3. Small right effusion  4. Agitation is big issue and have added Precedex.   5. Once secretions lighten/ thin should wean but agitation a major issue/    >30 min CCT

## 2021-06-07 NOTE — PLAN OF CARE
Problem: Non-Violent Restraints  Goal: Removal from restraints as soon as assessed to be safe  6/7/2021 1653 by Digna Rick  Outcome: Not Met This Shift  6/7/2021 0520 by Mandeep Alex RN  Outcome: Not Met This Shift  Goal: No harm/injury to patient while restraints in use  6/7/2021 1653 by Digna Rick  Outcome: Met This Shift  6/7/2021 0520 by Mandeep Alex RN  Outcome: Met This Shift  Goal: Patient's dignity will be maintained  6/7/2021 1653 by Digna Rick  Outcome: Met This Shift  6/7/2021 0520 by Mandeep Alex RN  Outcome: Met This Shift

## 2021-06-08 ENCOUNTER — APPOINTMENT (OUTPATIENT)
Dept: GENERAL RADIOLOGY | Age: 51
DRG: 321 | End: 2021-06-08
Attending: NEUROLOGICAL SURGERY
Payer: MEDICAID

## 2021-06-08 LAB
ANION GAP SERPL CALCULATED.3IONS-SCNC: 10 MMOL/L (ref 7–16)
BASOPHILS ABSOLUTE: 0.01 E9/L (ref 0–0.2)
BASOPHILS RELATIVE PERCENT: 0.1 % (ref 0–2)
BUN BLDV-MCNC: 13 MG/DL (ref 6–20)
CALCIUM SERPL-MCNC: 8.6 MG/DL (ref 8.6–10.2)
CHLORIDE BLD-SCNC: 103 MMOL/L (ref 98–107)
CO2: 27 MMOL/L (ref 22–29)
CREAT SERPL-MCNC: 0.8 MG/DL (ref 0.7–1.2)
EOSINOPHILS ABSOLUTE: 0.02 E9/L (ref 0.05–0.5)
EOSINOPHILS RELATIVE PERCENT: 0.2 % (ref 0–6)
GFR AFRICAN AMERICAN: >60
GFR NON-AFRICAN AMERICAN: >60 ML/MIN/1.73
GLUCOSE BLD-MCNC: 94 MG/DL (ref 74–99)
HCT VFR BLD CALC: 27.6 % (ref 37–54)
HEMOGLOBIN: 8.8 G/DL (ref 12.5–16.5)
IMMATURE GRANULOCYTES #: 0.15 E9/L
IMMATURE GRANULOCYTES %: 1.3 % (ref 0–5)
LYMPHOCYTES ABSOLUTE: 1.75 E9/L (ref 1.5–4)
LYMPHOCYTES RELATIVE PERCENT: 15.3 % (ref 20–42)
MAGNESIUM: 2.4 MG/DL (ref 1.6–2.6)
MCH RBC QN AUTO: 23.3 PG (ref 26–35)
MCHC RBC AUTO-ENTMCNC: 31.9 % (ref 32–34.5)
MCV RBC AUTO: 73.2 FL (ref 80–99.9)
METER GLUCOSE: 117 MG/DL (ref 74–99)
METER GLUCOSE: 85 MG/DL (ref 74–99)
MONOCYTES ABSOLUTE: 0.96 E9/L (ref 0.1–0.95)
MONOCYTES RELATIVE PERCENT: 8.4 % (ref 2–12)
NEUTROPHILS ABSOLUTE: 8.53 E9/L (ref 1.8–7.3)
NEUTROPHILS RELATIVE PERCENT: 74.7 % (ref 43–80)
PDW BLD-RTO: 15.7 FL (ref 11.5–15)
PHOSPHORUS: 4 MG/DL (ref 2.5–4.5)
PLATELET # BLD: 228 E9/L (ref 130–450)
PMV BLD AUTO: 10.6 FL (ref 7–12)
POTASSIUM SERPL-SCNC: 4 MMOL/L (ref 3.5–5)
RBC # BLD: 3.77 E12/L (ref 3.8–5.8)
SODIUM BLD-SCNC: 140 MMOL/L (ref 132–146)
TOTAL CK: 1598 U/L (ref 20–200)
WBC # BLD: 11.4 E9/L (ref 4.5–11.5)

## 2021-06-08 PROCEDURE — 71045 X-RAY EXAM CHEST 1 VIEW: CPT

## 2021-06-08 PROCEDURE — 6370000000 HC RX 637 (ALT 250 FOR IP): Performed by: NEUROLOGICAL SURGERY

## 2021-06-08 PROCEDURE — 6360000002 HC RX W HCPCS: Performed by: STUDENT IN AN ORGANIZED HEALTH CARE EDUCATION/TRAINING PROGRAM

## 2021-06-08 PROCEDURE — 2580000003 HC RX 258: Performed by: NEUROLOGICAL SURGERY

## 2021-06-08 PROCEDURE — 2500000003 HC RX 250 WO HCPCS: Performed by: INTERNAL MEDICINE

## 2021-06-08 PROCEDURE — 94003 VENT MGMT INPAT SUBQ DAY: CPT

## 2021-06-08 PROCEDURE — 82962 GLUCOSE BLOOD TEST: CPT

## 2021-06-08 PROCEDURE — 2580000003 HC RX 258: Performed by: STUDENT IN AN ORGANIZED HEALTH CARE EDUCATION/TRAINING PROGRAM

## 2021-06-08 PROCEDURE — 84100 ASSAY OF PHOSPHORUS: CPT

## 2021-06-08 PROCEDURE — 36415 COLL VENOUS BLD VENIPUNCTURE: CPT

## 2021-06-08 PROCEDURE — 82550 ASSAY OF CK (CPK): CPT

## 2021-06-08 PROCEDURE — 6370000000 HC RX 637 (ALT 250 FOR IP): Performed by: INTERNAL MEDICINE

## 2021-06-08 PROCEDURE — 83735 ASSAY OF MAGNESIUM: CPT

## 2021-06-08 PROCEDURE — 6360000002 HC RX W HCPCS: Performed by: INTERNAL MEDICINE

## 2021-06-08 PROCEDURE — 6360000002 HC RX W HCPCS: Performed by: PHYSICIAN ASSISTANT

## 2021-06-08 PROCEDURE — C9113 INJ PANTOPRAZOLE SODIUM, VIA: HCPCS | Performed by: STUDENT IN AN ORGANIZED HEALTH CARE EDUCATION/TRAINING PROGRAM

## 2021-06-08 PROCEDURE — 2500000003 HC RX 250 WO HCPCS: Performed by: STUDENT IN AN ORGANIZED HEALTH CARE EDUCATION/TRAINING PROGRAM

## 2021-06-08 PROCEDURE — 80048 BASIC METABOLIC PNL TOTAL CA: CPT

## 2021-06-08 PROCEDURE — 2580000003 HC RX 258: Performed by: INTERNAL MEDICINE

## 2021-06-08 PROCEDURE — 94640 AIRWAY INHALATION TREATMENT: CPT

## 2021-06-08 PROCEDURE — 85025 COMPLETE CBC W/AUTO DIFF WBC: CPT

## 2021-06-08 PROCEDURE — 2000000000 HC ICU R&B

## 2021-06-08 RX ORDER — PROPOFOL 10 MG/ML
5-50 INJECTION, EMULSION INTRAVENOUS
Status: DISCONTINUED | OUTPATIENT
Start: 2021-06-08 | End: 2021-06-10

## 2021-06-08 RX ORDER — MIDAZOLAM HYDROCHLORIDE 2 MG/2ML
2 INJECTION, SOLUTION INTRAMUSCULAR; INTRAVENOUS
Status: DISCONTINUED | OUTPATIENT
Start: 2021-06-08 | End: 2021-06-10

## 2021-06-08 RX ORDER — FUROSEMIDE 10 MG/ML
20 INJECTION INTRAMUSCULAR; INTRAVENOUS ONCE
Status: COMPLETED | OUTPATIENT
Start: 2021-06-08 | End: 2021-06-08

## 2021-06-08 RX ADMIN — CEFEPIME HYDROCHLORIDE 1000 MG: 1 INJECTION, POWDER, FOR SOLUTION INTRAMUSCULAR; INTRAVENOUS at 22:28

## 2021-06-08 RX ADMIN — PROPOFOL 50 MCG/KG/MIN: 10 INJECTION, EMULSION INTRAVENOUS at 20:18

## 2021-06-08 RX ADMIN — ARFORMOTEROL TARTRATE 15 MCG: 15 SOLUTION RESPIRATORY (INHALATION) at 09:31

## 2021-06-08 RX ADMIN — IPRATROPIUM BROMIDE AND ALBUTEROL SULFATE 1 AMPULE: 2.5; .5 SOLUTION RESPIRATORY (INHALATION) at 09:31

## 2021-06-08 RX ADMIN — ACETYLCYSTEINE 600 MG: 200 SOLUTION ORAL; RESPIRATORY (INHALATION) at 09:31

## 2021-06-08 RX ADMIN — SODIUM CHLORIDE, PRESERVATIVE FREE 10 ML: 5 INJECTION INTRAVENOUS at 08:56

## 2021-06-08 RX ADMIN — PANTOPRAZOLE SODIUM 40 MG: 40 INJECTION, POWDER, FOR SOLUTION INTRAVENOUS at 08:55

## 2021-06-08 RX ADMIN — ARFORMOTEROL TARTRATE 15 MCG: 15 SOLUTION RESPIRATORY (INHALATION) at 20:24

## 2021-06-08 RX ADMIN — PREGABALIN 225 MG: 75 CAPSULE ORAL at 08:56

## 2021-06-08 RX ADMIN — ACETYLCYSTEINE 600 MG: 200 SOLUTION ORAL; RESPIRATORY (INHALATION) at 20:26

## 2021-06-08 RX ADMIN — MIDAZOLAM 2 MG: 1 INJECTION INTRAMUSCULAR; INTRAVENOUS at 19:37

## 2021-06-08 RX ADMIN — PROPOFOL 30 MCG/KG/MIN: 10 INJECTION, EMULSION INTRAVENOUS at 16:34

## 2021-06-08 RX ADMIN — IPRATROPIUM BROMIDE AND ALBUTEROL SULFATE 1 AMPULE: 2.5; .5 SOLUTION RESPIRATORY (INHALATION) at 13:30

## 2021-06-08 RX ADMIN — MIDAZOLAM 2 MG: 1 INJECTION INTRAMUSCULAR; INTRAVENOUS at 13:17

## 2021-06-08 RX ADMIN — MIDAZOLAM 2 MG: 1 INJECTION INTRAMUSCULAR; INTRAVENOUS at 11:56

## 2021-06-08 RX ADMIN — Medication 150 MCG/HR: at 10:47

## 2021-06-08 RX ADMIN — PROPOFOL 30 MCG/KG/MIN: 10 INJECTION, EMULSION INTRAVENOUS at 09:38

## 2021-06-08 RX ADMIN — PROPOFOL 30 MCG/KG/MIN: 10 INJECTION, EMULSION INTRAVENOUS at 04:25

## 2021-06-08 RX ADMIN — PREGABALIN 225 MG: 75 CAPSULE ORAL at 21:00

## 2021-06-08 RX ADMIN — SODIUM CHLORIDE: 9 INJECTION, SOLUTION INTRAVENOUS at 02:00

## 2021-06-08 RX ADMIN — IPRATROPIUM BROMIDE AND ALBUTEROL SULFATE 1 AMPULE: 2.5; .5 SOLUTION RESPIRATORY (INHALATION) at 20:24

## 2021-06-08 RX ADMIN — SENNOSIDES AND DOCUSATE SODIUM 1 TABLET: 8.6; 5 TABLET ORAL at 08:56

## 2021-06-08 RX ADMIN — PROPOFOL 10 MCG/KG/MIN: 10 INJECTION, EMULSION INTRAVENOUS at 00:28

## 2021-06-08 RX ADMIN — 0.12% CHLORHEXIDINE GLUCONATE 15 ML: 1.2 RINSE ORAL at 21:00

## 2021-06-08 RX ADMIN — BUDESONIDE 250 MCG: 0.25 SUSPENSION RESPIRATORY (INHALATION) at 20:24

## 2021-06-08 RX ADMIN — CEFEPIME HYDROCHLORIDE 1000 MG: 1 INJECTION, POWDER, FOR SOLUTION INTRAMUSCULAR; INTRAVENOUS at 10:28

## 2021-06-08 RX ADMIN — SENNOSIDES AND DOCUSATE SODIUM 1 TABLET: 8.6; 5 TABLET ORAL at 21:04

## 2021-06-08 RX ADMIN — BUDESONIDE 250 MCG: 0.25 SUSPENSION RESPIRATORY (INHALATION) at 09:31

## 2021-06-08 RX ADMIN — IPRATROPIUM BROMIDE AND ALBUTEROL SULFATE 1 AMPULE: 2.5; .5 SOLUTION RESPIRATORY (INHALATION) at 17:30

## 2021-06-08 RX ADMIN — VALPROATE SODIUM 500 MG: 100 INJECTION, SOLUTION INTRAVENOUS at 08:57

## 2021-06-08 RX ADMIN — Medication 200 MCG/HR: at 04:09

## 2021-06-08 RX ADMIN — Medication 10 ML: at 08:57

## 2021-06-08 RX ADMIN — 0.12% CHLORHEXIDINE GLUCONATE 15 ML: 1.2 RINSE ORAL at 08:54

## 2021-06-08 RX ADMIN — ENOXAPARIN SODIUM 40 MG: 40 INJECTION SUBCUTANEOUS at 08:54

## 2021-06-08 RX ADMIN — VALPROATE SODIUM 500 MG: 100 INJECTION, SOLUTION INTRAVENOUS at 21:00

## 2021-06-08 RX ADMIN — Medication 200 MG/HR: at 16:34

## 2021-06-08 RX ADMIN — FUROSEMIDE 20 MG: 10 INJECTION, SOLUTION INTRAMUSCULAR; INTRAVENOUS at 09:06

## 2021-06-08 ASSESSMENT — PULMONARY FUNCTION TESTS
PIF_VALUE: 20
PIF_VALUE: 19
PIF_VALUE: 22
PIF_VALUE: 20
PIF_VALUE: 22
PIF_VALUE: 20
PIF_VALUE: 19
PIF_VALUE: 18
PIF_VALUE: 31
PIF_VALUE: 19
PIF_VALUE: 20
PIF_VALUE: 21
PIF_VALUE: 20
PIF_VALUE: 22
PIF_VALUE: 20
PIF_VALUE: 21
PIF_VALUE: 19
PIF_VALUE: 21
PIF_VALUE: 22
PIF_VALUE: 20
PIF_VALUE: 22
PIF_VALUE: 20
PIF_VALUE: 19
PIF_VALUE: 20
PIF_VALUE: 21
PIF_VALUE: 21
PIF_VALUE: 20
PIF_VALUE: 20

## 2021-06-08 ASSESSMENT — PAIN SCALES - GENERAL
PAINLEVEL_OUTOF10: 0
PAINLEVEL_OUTOF10: 5

## 2021-06-08 NOTE — PLAN OF CARE
Problem: Non-Violent Restraints  Goal: No harm/injury to patient while restraints in use  6/8/2021 0620 by Chalino Jon RN  Outcome: Met This Shift     Problem: Non-Violent Restraints  Goal: Patient's dignity will be maintained  6/8/2021 0620 by Chalino Jon, RN  Outcome: Met This Shift     Problem: Non-Violent Restraints  Goal: Removal from restraints as soon as assessed to be safe  6/8/2021 0620 by Chalino Jon RN  Outcome: Not Met This Shift

## 2021-06-08 NOTE — PROGRESS NOTES
HOSPITALIST PROGRESS NOTE  Date: 6/8/2021   Name: Gill Lockwood   MRN: [id number]   YOB: 1970        Hospital Course:   Mr Trina Malone 44 YM admitted for neck pain due to cervical herniated disc, patient had C4-C5 C5-C6 C6-C7 anterior cervical discectomy and fusion done on 6/2/2021, postoperatively patient was doing fine besides some swallowing difficulty which is expected after cervical surgery, clinically improving until the morning of 6/5/2021 when he complained about sudden feeling of swelling in the throat and sudden onset shortness of breath. Rapid response was called I evaluated the patient personally at bedside, decision was made to send patient to CT scan neck and chest with PE protocol, during the CT scan patient had a PEA arrest, he was resuscitated and intubated and then transferred to medical ICU.     Subjective/Interval Hx:   Patient remains with a, room denies any pain at this time very weak and lethargic in the intensive care unit    Objective:   Physical Exam:   /72   Pulse 91   Temp 98 °F (36.7 °C) (Temporal)   Resp 16   Ht 5' 5\" (1.651 m)   Wt 183 lb (83 kg)   SpO2 96%   BMI 30.45 kg/m²   General: no acute distress, well nourished and well hydrated  HEENT: NCAT  Heart: S1S2 RRR  Lungs: Clear to ascultation bilaterally, respiratory effort normal  Abdomen: soft, NT/ND, positive bowel sounds  Extremities: no pitting edema, nontender   Neuro: patient is awake, alert and orientated times 3, no gross deficits  Skin: no rashes or ecchymosis        Meds:   Meds:    chlorhexidine  15 mL Mouth/Throat BID    valproate sodium (DEPACON) IVPB  500 mg Intravenous BID    pregabalin  225 mg Oral BID    acetylcysteine  600 mg Inhalation BID    ipratropium-albuterol  1 ampule Inhalation Q4H WA    budesonide  250 mcg Nebulization BID    Arformoterol Tartrate  15 mcg Nebulization BID    sodium chloride   Intravenous Q12H    cefepime  1,000 mg Intravenous Q12H    pantoprazole following  06/05/2021-patient remains awake while intubated in ICU is able to write and communicate; appears anxious  2. PEA/cardiac arrest-continue management as above patient will stay rapid cardiology is following echo is pending  3. Hospital-acquired pneumonia-CT shows bilateral infiltrates left greater than right continue IV antibiotics critical care pulmonary is following monitor lab studies  4. Cervical herniated disc status post anterior cervical discectomy and fusion on 6/02/21 per neurosurgery-patient is to have collar on the neck remain x3 months pain is controlled with medication  5. Mild rhabdo myelolysis-CK is elevated, maintain hydration, monitor lab studies  6. Seizure precautions-valproic acid IV monitor    DVT Prophylaxis: Lovenox  Diet: Diet NPO  Code Status: Full Code    Dispo:  When stable    Electronically signed by Princess Gagnon MD on 6/8/2021 at 1:21 PM  [facility] Hospitalist

## 2021-06-08 NOTE — PROGRESS NOTES
Comprehensive Nutrition Assessment    Type and Reason for Visit:  Initial (LOS ICU)    Nutrition Recommendations/Plan: Continue NPO. Please consult if EN support initiated. Note propofol providing 393 lipid kcals     Nutrition Assessment:  Pt admit w/ herniated disk s/p cervical fusion complicated by cardiac arrest now intubated transferred to MICU. Noted hx lyme disease. NPO status x 2 days. Will monitor nutrition progression/provide recs as needed. Malnutrition Assessment:  Malnutrition Status: At risk for malnutrition  Context:  Acute Illness     Findings of the 6 clinical characteristics of malnutrition:  Energy Intake:  50% or less of estimated energy requirements for 5 or more days  Weight Loss:  Unable to assess (no wt hx on file)     Body Fat Loss:  No significant body fat loss     Muscle Mass Loss:  No significant muscle mass loss    Fluid Accumulation:  No significant fluid accumulation     Strength:  Not Performed    Estimated Daily Nutrient Needs:  Energy (kcal):  PS3B 1743; 5283-5758; Weight Used for Energy Requirements:  Current     Protein (g):  80-95 (1.3-1.5 g/kg); Weight Used for Protein Requirements:  Ideal        Fluid (ml/day):  per critical care    Nutrition Related Findings:  Pt intubated/sedated, agitation, MAP WNL, -I/O's, no edema, active BS, OGT clamped      Wounds:  None       Current Nutrition Therapies:    Diet NPO    Anthropometric Measures:  · Height: 5' 5\" (165.1 cm)  · Current Body Weight: 183 lb (83 kg) (no method)   · Usual Body Weight:  (UTO no EMR hx on file)     · Ideal Body Weight: 136 lbs; % Ideal Body Weight 134.6 %   · BMI: 30.5  · BMI Categories: Obese Class 1 (BMI 30.0-34. 9)       Nutrition Diagnosis:   · Inadequate oral intake related to impaired respiratory function as evidenced by NPO or clear liquid status due to medical condition, intubation    Nutrition Interventions:   Nutrition Education/Counseling:  Education not indicated   Coordination of

## 2021-06-08 NOTE — CARE COORDINATION
6/8/2021 - S/P C4-C7 ACDF on 6/2, S/P respiratory arrest 6/5. Remains in MICU, intubated and on ventilator. Wearin custom collar. IV fentanyl infusion,  IV propofol infusion and Iv precedex infusion continued. Weaning parameters ordered. Will need PT/OT evals updated to determine appropriate discharge plan. Will follow for discharge planning. Received call from Ruth Ann at [address] [place] [address], [phone] ([phone]) and updated her on patient. She will call for updates to follow along for discharge planning for pt.

## 2021-06-08 NOTE — PROGRESS NOTES
[facility]  Department of Internal Medicine   MICU Progress note    Patient:  Win Gamble 44 y.o. male   MRN: [id number]       Date of Service: 2021      Chief Complaint:  S/p respiratory arrest    Overnight: Pt continued to be agitated on Precedex. Started on Propofol and Fentanyl. Subjective     Pt seen and examined this AM at bedside. He is intubated and sedated with propofol and fentanyl. Attempted to place patient on PS and off sedation. Unable to complete. Pt agitated/restless. Sitting up on edge of bed, writing frantically. Unable to calm down and complete PS trial. Will attempt again later today. Placed back on propofol and fentanyl. Physical Exam       Physical Exam:  · Vitals: /72   Pulse 91   Temp 98 °F (36.7 °C) (Temporal)   Resp 16   Ht 5' 5\" (1.651 m)   Wt 183 lb (83 kg)   SpO2 96%   BMI 30.45 kg/m²     I & O - 24hr: I/O this shift:  In: -   · Out: 8275 [Urine:1325]   · General Appearance: Intubated. Sedated. · HEENT:  Head: Normocephalic, no lesions, without obvious abnormality. · Neck: no adenopathy, no carotid bruit, no JVD, supple, symmetrical, trachea midline and thyroid not enlarged, symmetric, no tenderness/mass/nodules  · Lung: decreased lung sounds bl. Increased secretions   · Heart: Tachycardia, regular, no murmurs. RRR this pm  · Abdomen: soft, non-tender; bowel sounds normal; no masses,  no organomegaly  · Extremities:  extremities normal, atraumatic, no cyanosis or edema  · Musculokeletal: No joint swelling, no muscle tenderness. ROM normal in all joints of extremities. · Neurologic: Mental status: Intubated.  Sedated RASS -2     Lines     site day    Art line   None    TLC R Fem 2021   PICC None    Hemoaccess None    Oxygen:    Mechanical Ventilation:   Mode: A/C (20/400/50%/5)    ABG:     Lab Results   Component Value Date    PH 7.503 2021    PCO2 30.3 2021    PO2 66.6 2021    BUK2SOK 26.2 2021    HCO3 23.3 2021 BE 0.6 06/07/2021    THB 10.0 06/07/2021    O2SAT 92.4 06/07/2021        Medications     Infusions: (Fluid, Sedation, Vasopressors)  IVF:    none  Vasopressors   none  Sedation   Propofol and Fentanyl    Nutrition:   No OG in place. ATB:   Antibiotics  Days   Cefepime    Vancomycin  D/c          Patient currently has   Urinary cath  Restraints  DVT prophylaxis/ GI prophylaxis,      Labs     CBC:   Lab Results   Component Value Date    WBC 11.4 06/08/2021    RBC 3.77 06/08/2021    HGB 8.8 06/08/2021    HCT 27.6 06/08/2021     06/08/2021    MCV 73.2 06/08/2021     BMP:    Lab Results   Component Value Date     06/08/2021    K 4.0 06/08/2021    K 4.3 05/26/2021     06/08/2021    CO2 27 06/08/2021    BUN 13 06/08/2021    CREATININE 0.8 06/08/2021    GLUCOSE 94 06/08/2021    CALCIUM 8.6 06/08/2021     Hepatic Function Panel:    Lab Results   Component Value Date    ALKPHOS 59 06/05/2021    AST 40 06/05/2021    ALT 43 06/05/2021    PROT 6.4 06/05/2021    LABALBU 3.6 06/05/2021    BILITOT 0.4 06/05/2021     Cardiac Enzymes:   Lab Results   Component Value Date    CKTOTAL 1,598 (H) 06/08/2021    CKTOTAL 1,582 (H) 06/06/2021    CKTOTAL 1,261 (H) 06/06/2021    CKMB 2.4 06/06/2021    CKMB 2.4 06/06/2021     PT/INR:    Lab Results   Component Value Date    PROTIME 10.8 05/26/2021    INR 1.0 05/26/2021     ABG:    Lab Results   Component Value Date    MGM9EJA 26.2 06/06/2021     TSH:  No results found for: TSH    EKG: Sinus tachycardia    CXR 6/7/2021        Resident's Assessment & Plan   Briefly, Ryann Bolaños is 44 y.o. male with a past medical history of migraine, Lyme disease, chronic generalized pain who presented to the hospital on 6/2/2021 with neck pain and was admitted under neurosurgery service. Patient was found to have multilevel cervical stenosis consequently he underwent C4-C7 ACDF on 6/2/2021.   On 6/5/2020 1 in the morning RRT was called because the patient was hypoxic in respiratory distress. During this time he stated that when he was going to the restroom and started to turn felt an acute shortness of breath, chest and back pain. During the RRT patient was given methylprednisolone and fentanyl. Patient was brought to CT scan to for CTA pulmonary and CT cervical spine. While being in the CT scan patient had worsening of his respiratory distress and was shaking, CODE BLUE was called, patient was found to be in PEA arrest, had CPR for 3 minutes and achieved ROSC. Patient was intubated in the scene for airway protection and transferred to the MICU for further management sedated and intubated. Currently being managed for:     Hospital Day: 7  MICU Day: 4  Ventilation Day: 4    Assessment:  1. Acute hypoxic respiratory failure, suspect 2/2 r/o diphragmatic dysfx vs non-obstructive post-op atelectasis vs HCAP vs aspiration pna  2. Status post respiratory arrest, 3 minutes, ROSC achieved, No medications given   3. Aspiration pneumonia likely; worsening LLL PNA CXR + r sided pleural effusion   4. Status post C4-C7 anterior cervical discectomy and fusion, POD3   5. History of migraine. 6.  Hypophosphatemia  7. Microcytic anemia  8. Bilateral Non-obstructive atelectasis. 9. Elevated troponins, post Respiratory arrest.  10. Elevated CK    Plan:    · Attempted to place patient on PS and off sedation. Unable to complete. Pt agitated/restless. Sitting up on edge of bed, writing frantically. Unable to calm down and complete PS trial. Will attempt again later today. Placed back on propofol and fentanyl. · Weaning parameters once calmer. · Increased secretions. Worsening CXR slightly LLL. Continue breathing treatments. Lasix 20 mg x1 dose. · D/c LR. CK still elevated. But Renal function statble. · D/c vancomycin. · Continue cefepime. · Resp Cx rare/few GPC s/p 6 doses of Vanc. D/c vanc. Blood Cx NGTD. · Legionella, strep NGTD. MRSA nares  Negative.    · Follow up Sniff

## 2021-06-08 NOTE — PLAN OF CARE
Problem: Cerebrospinal Fluid Leakage - Risk Of:  Goal: Absence of cerebrospinal fluid drainage at surgical site  Description: Absence of cerebrospinal fluid drainage at surgical site  Outcome: Met This Shift     Problem: Infection - Surgical Site:  Goal: Will show no infection signs and symptoms  Description: Will show no infection signs and symptoms  Outcome: Met This Shift     Problem: Pain:  Goal: Pain level will decrease  Description: Pain level will decrease    Problem: Pain:  Goal: Control of acute pain  Description: Control of acute pain  Outcome: Met This Shift     Problem: Sensory Perception - Impaired:  Goal: Sensory function intact, lower extremity  Description: Sensory function intact, lower extremity  Outcome: Met This Shift     Problem: Sensory Perception - Impaired:  Goal: Ability to demonstrate correct body alignment while lying, sitting, and standing will improve  Description: Ability to demonstrate correct body alignment while lying, sitting, and standing will improve  Outcome: Met This Shift     Problem: Sensory Perception - Impaired:  Goal: Circulatory function of lower extremities is within specified parameters  Description: Circulatory function of lower extremities is within specified parameters  Outcome: Met This Shift     Problem: Urinary Retention:  Goal: Urinary elimination within specified parameters  Description: Urinary elimination within specified parameters  Outcome: Met This Shift     Problem: Venous Thromboembolism:  Goal: Will show no signs or symptoms of venous thromboembolism  Description: Will show no signs or symptoms of venous thromboembolism  Outcome: Met This Shift     Problem: Respiratory:  Goal: Will remain free from infection  Description: Will remain free from infection  Outcome: Met This Shift     Problem: Non-Violent Restraints  Goal: No harm/injury to patient while restraints in use  6/8/2021 0149 by Melany Elmore RN  Outcome: Met This Shift     Problem:

## 2021-06-08 NOTE — PROGRESS NOTES
Department of Neurosurgery  Progress Note    CHIEF COMPLAINT: s/p C4-C7 ACDF 6/2    SUBJECTIVE:   Intubated    REVIEW OF SYSTEMS :  Intubated    OBJECTIVE:   VITALS:  /67   Pulse 63   Temp 97.6 °F (36.4 °C) (Temporal)   Resp 12   Ht 5' 5\" (1.651 m)   Wt 183 lb (83 kg)   SpO2 99%   BMI 30.45 kg/m²     PHYSICAL:  Neurologic:  Intubated, sedated  Incision c/d/i  Collar intact     DATA:  CBC:   Lab Results   Component Value Date    WBC 11.4 06/08/2021    RBC 3.77 06/08/2021    HGB 8.8 06/08/2021    HCT 27.6 06/08/2021    MCV 73.2 06/08/2021    MCH 23.3 06/08/2021    MCHC 31.9 06/08/2021    RDW 15.7 06/08/2021     06/08/2021    MPV 10.6 06/08/2021     BMP:    Lab Results   Component Value Date     06/08/2021    K 4.0 06/08/2021    K 4.3 05/26/2021     06/08/2021    CO2 27 06/08/2021    BUN 13 06/08/2021    LABALBU 3.6 06/05/2021    CREATININE 0.8 06/08/2021    CALCIUM 8.6 06/08/2021    GFRAA >60 06/08/2021    LABGLOM >60 06/08/2021    GLUCOSE 94 06/08/2021     PT/INR:    Lab Results   Component Value Date    PROTIME 10.8 05/26/2021    INR 1.0 05/26/2021     PTT:    Lab Results   Component Value Date    APTT 32.9 08/07/2018   [APTT}    Current Inpatient Medications  Current Facility-Administered Medications: propofol injection, 5-50 mcg/kg/min, Intravenous, Titrated  dexmedetomidine (PRECEDEX) 1,000 mcg in sodium chloride 0.9 % 250 mL infusion, 0.2-1.4 mcg/kg/hr, Intravenous, Continuous  chlorhexidine (PERIDEX) 0.12 % solution 15 mL, 15 mL, Mouth/Throat, BID  valproate (DEPACON) 500 mg in dextrose 5 % 100 mL IVPB, 500 mg, Intravenous, BID  midazolam PF (VERSED) injection 2 mg, 2 mg, Intravenous, Q4H PRN  pregabalin (LYRICA) capsule 225 mg, 225 mg, Oral, BID  acetylcysteine (MUCOMYST) 20 % solution FOR INHALATION 600 mg, 600 mg, Inhalation, BID  ipratropium-albuterol (DUONEB) nebulizer solution 1 ampule, 1 ampule, Inhalation, Q4H WA  budesonide (PULMICORT) nebulizer suspension 250 mcg, 250 mcg, Nebulization, BID  Arformoterol Tartrate (BROVANA) nebulizer solution 15 mcg, 15 mcg, Nebulization, BID  fentaNYL 5 mcg/ml in 0.9%  ml infusion, 12.5-200 mcg/hr, Intravenous, Continuous  cefepime NS flush, , Intravenous, Q12H  cefepime (MAXIPIME) 1000 mg IVPB minibag, 1,000 mg, Intravenous, Q12H  pantoprazole (PROTONIX) injection 40 mg, 40 mg, Intravenous, Daily **AND** sodium chloride (PF) 0.9 % injection 10 mL, 10 mL, Intravenous, Daily  enoxaparin (LOVENOX) injection 40 mg, 40 mg, Subcutaneous, Daily  cyclobenzaprine (FLEXERIL) tablet 10 mg, 10 mg, Oral, TID PRN  sodium chloride flush 0.9 % injection 5-40 mL, 5-40 mL, Intravenous, 2 times per day  sodium chloride flush 0.9 % injection 5-40 mL, 5-40 mL, Intravenous, PRN  0.9 % sodium chloride infusion, 25 mL, Intravenous, PRN  polyethylene glycol (GLYCOLAX) packet 17 g, 17 g, Oral, Daily  bisacodyl (DULCOLAX) EC tablet 5 mg, 5 mg, Oral, Daily  sennosides-docusate sodium (SENOKOT-S) 8.6-50 MG tablet 1 tablet, 1 tablet, Oral, BID  benzocaine-menthol (CEPACOL SORE THROAT) lozenge 1 lozenge, 1 lozenge, Oral, Q2H PRN  acetaminophen (TYLENOL) tablet 650 mg, 650 mg, Oral, Q4H PRN  glucose (GLUTOSE) 40 % oral gel 15 g, 15 g, Oral, PRN  dextrose 50 % IV solution, 12.5 g, Intravenous, PRN  glucagon (rDNA) injection 1 mg, 1 mg, Intramuscular, PRN  dextrose 5 % solution, 100 mL/hr, Intravenous, PRN  diphenhydrAMINE (BENADRYL) injection 25 mg, 25 mg, Intravenous, Q6H PRN    ASSESSMENT:   s/p C4-C7 ACDF 6/2  RRT 6/5, possible aspiration pneumonia     PLAN:  -Continue ICU care  -Custom collar x3 months  -Follow up in neurosurgery in 4 weeks with x-rays      Electronically signed by CAROLINE Brooks on 6/8/2021 at 8:55 AM

## 2021-06-08 NOTE — PLAN OF CARE
Problem: Infection - Surgical Site:  Goal: Will show no infection signs and symptoms  Description: Will show no infection signs and symptoms  6/8/2021 0819 by Daysi Patel RN  Outcome: Met This Shift     Problem: Pain:  Goal: Control of acute pain  Description: Control of acute pain  6/8/2021 0819 by Daysi Patel RN  Outcome: Met This Shift     Problem: Respiratory:  Goal: Will remain free from infection  Description: Will remain free from infection  6/8/2021 0819 by Daysi Patel RN  Outcome: Met This Shift     Problem: Falls - Risk of:  Goal: Absence of physical injury  Description: Absence of physical injury  Outcome: Met This Shift     Problem: Skin Integrity:  Goal: Will show no infection signs and symptoms  Description: Will show no infection signs and symptoms  6/8/2021 0819 by Daysi Patel RN  Outcome: Met This Shift   Plan of care discussed with patient / family.

## 2021-06-09 ENCOUNTER — APPOINTMENT (OUTPATIENT)
Dept: GENERAL RADIOLOGY | Age: 51
DRG: 321 | End: 2021-06-09
Attending: NEUROLOGICAL SURGERY
Payer: MEDICAID

## 2021-06-09 DIAGNOSIS — Z98.1 STATUS POST CERVICAL SPINAL FUSION: ICD-10-CM

## 2021-06-09 DIAGNOSIS — M50.20 HERNIATED NUCLEUS PULPOSUS, CERVICAL: Primary | ICD-10-CM

## 2021-06-09 LAB
AADO2: 166.5 MMHG
ANION GAP SERPL CALCULATED.3IONS-SCNC: 10 MMOL/L (ref 7–16)
B.E.: 4.9 MMOL/L (ref -3–3)
BASOPHILS ABSOLUTE: 0.02 E9/L (ref 0–0.2)
BASOPHILS RELATIVE PERCENT: 0.1 % (ref 0–2)
BUN BLDV-MCNC: 14 MG/DL (ref 6–20)
CALCIUM SERPL-MCNC: 8.6 MG/DL (ref 8.6–10.2)
CHLORIDE BLD-SCNC: 101 MMOL/L (ref 98–107)
CO2: 29 MMOL/L (ref 22–29)
COHB: 0.1 % (ref 0–1.5)
CREAT SERPL-MCNC: 0.8 MG/DL (ref 0.7–1.2)
CRITICAL: ABNORMAL
DATE ANALYZED: ABNORMAL
DATE OF COLLECTION: ABNORMAL
EOSINOPHILS ABSOLUTE: 0.3 E9/L (ref 0.05–0.5)
EOSINOPHILS RELATIVE PERCENT: 2.1 % (ref 0–6)
FIO2: 40 %
GFR AFRICAN AMERICAN: >60
GFR NON-AFRICAN AMERICAN: >60 ML/MIN/1.73
GLUCOSE BLD-MCNC: 73 MG/DL (ref 74–99)
HCO3: 29.7 MMOL/L (ref 22–26)
HCT VFR BLD CALC: 30.1 % (ref 37–54)
HEMOGLOBIN: 9.2 G/DL (ref 12.5–16.5)
HHB: 11.9 % (ref 0–5)
IMMATURE GRANULOCYTES #: 0.35 E9/L
IMMATURE GRANULOCYTES %: 2.5 % (ref 0–5)
LAB: ABNORMAL
LYMPHOCYTES ABSOLUTE: 2.14 E9/L (ref 1.5–4)
LYMPHOCYTES RELATIVE PERCENT: 15.1 % (ref 20–42)
Lab: ABNORMAL
MAGNESIUM: 2.2 MG/DL (ref 1.6–2.6)
MCH RBC QN AUTO: 22.8 PG (ref 26–35)
MCHC RBC AUTO-ENTMCNC: 30.6 % (ref 32–34.5)
MCV RBC AUTO: 74.5 FL (ref 80–99.9)
METER GLUCOSE: 86 MG/DL (ref 74–99)
METHB: 0.4 % (ref 0–1.5)
MODE: AC
MONOCYTES ABSOLUTE: 1.05 E9/L (ref 0.1–0.95)
MONOCYTES RELATIVE PERCENT: 7.4 % (ref 2–12)
NEUTROPHILS ABSOLUTE: 10.33 E9/L (ref 1.8–7.3)
NEUTROPHILS RELATIVE PERCENT: 72.8 % (ref 43–80)
O2 CONTENT: 13.1 ML/DL
O2 SATURATION: 88 % (ref 92–98.5)
O2HB: 87.6 % (ref 94–97)
OPERATOR ID: ABNORMAL
PATIENT TEMP: 37 C
PCO2: 45.1 MMHG (ref 35–45)
PDW BLD-RTO: 15.9 FL (ref 11.5–15)
PEEP/CPAP: 5 CMH2O
PFO2: 1.42 MMHG/%
PH BLOOD GAS: 7.44 (ref 7.35–7.45)
PHOSPHORUS: 3.2 MG/DL (ref 2.5–4.5)
PLATELET # BLD: 255 E9/L (ref 130–450)
PMV BLD AUTO: 10.6 FL (ref 7–12)
PO2: 56.8 MMHG (ref 75–100)
POTASSIUM SERPL-SCNC: 3.7 MMOL/L (ref 3.5–5)
RBC # BLD: 4.04 E12/L (ref 3.8–5.8)
RI(T): 2.93
RR MECHANICAL: 12 B/MIN
SODIUM BLD-SCNC: 140 MMOL/L (ref 132–146)
SOURCE, BLOOD GAS: ABNORMAL
THB: 10.6 G/DL (ref 11.5–16.5)
TIME ANALYZED: 616
VT MECHANICAL: 500 ML
WBC # BLD: 14.2 E9/L (ref 4.5–11.5)

## 2021-06-09 PROCEDURE — 2500000003 HC RX 250 WO HCPCS: Performed by: INTERNAL MEDICINE

## 2021-06-09 PROCEDURE — 94640 AIRWAY INHALATION TREATMENT: CPT

## 2021-06-09 PROCEDURE — 6360000002 HC RX W HCPCS: Performed by: STUDENT IN AN ORGANIZED HEALTH CARE EDUCATION/TRAINING PROGRAM

## 2021-06-09 PROCEDURE — 83735 ASSAY OF MAGNESIUM: CPT

## 2021-06-09 PROCEDURE — 94003 VENT MGMT INPAT SUBQ DAY: CPT

## 2021-06-09 PROCEDURE — 80048 BASIC METABOLIC PNL TOTAL CA: CPT

## 2021-06-09 PROCEDURE — 6370000000 HC RX 637 (ALT 250 FOR IP): Performed by: NEUROLOGICAL SURGERY

## 2021-06-09 PROCEDURE — 6360000002 HC RX W HCPCS: Performed by: INTERNAL MEDICINE

## 2021-06-09 PROCEDURE — 2500000003 HC RX 250 WO HCPCS: Performed by: STUDENT IN AN ORGANIZED HEALTH CARE EDUCATION/TRAINING PROGRAM

## 2021-06-09 PROCEDURE — 71045 X-RAY EXAM CHEST 1 VIEW: CPT

## 2021-06-09 PROCEDURE — 6370000000 HC RX 637 (ALT 250 FOR IP): Performed by: INTERNAL MEDICINE

## 2021-06-09 PROCEDURE — 2580000003 HC RX 258: Performed by: NEUROLOGICAL SURGERY

## 2021-06-09 PROCEDURE — L0172 CERV COL SR FOAM 2PC PRE OTS: HCPCS

## 2021-06-09 PROCEDURE — 2000000000 HC ICU R&B

## 2021-06-09 PROCEDURE — 84100 ASSAY OF PHOSPHORUS: CPT

## 2021-06-09 PROCEDURE — 2580000003 HC RX 258: Performed by: INTERNAL MEDICINE

## 2021-06-09 PROCEDURE — 2700000000 HC OXYGEN THERAPY PER DAY

## 2021-06-09 PROCEDURE — 82962 GLUCOSE BLOOD TEST: CPT

## 2021-06-09 PROCEDURE — 82805 BLOOD GASES W/O2 SATURATION: CPT

## 2021-06-09 PROCEDURE — 85025 COMPLETE CBC W/AUTO DIFF WBC: CPT

## 2021-06-09 PROCEDURE — C9113 INJ PANTOPRAZOLE SODIUM, VIA: HCPCS | Performed by: STUDENT IN AN ORGANIZED HEALTH CARE EDUCATION/TRAINING PROGRAM

## 2021-06-09 PROCEDURE — 2580000003 HC RX 258: Performed by: STUDENT IN AN ORGANIZED HEALTH CARE EDUCATION/TRAINING PROGRAM

## 2021-06-09 RX ADMIN — PROPOFOL 50 MCG/KG/MIN: 10 INJECTION, EMULSION INTRAVENOUS at 05:07

## 2021-06-09 RX ADMIN — Medication 10 ML: at 12:03

## 2021-06-09 RX ADMIN — BUDESONIDE 250 MCG: 0.25 SUSPENSION RESPIRATORY (INHALATION) at 20:30

## 2021-06-09 RX ADMIN — SODIUM CHLORIDE, PRESERVATIVE FREE 10 ML: 5 INJECTION INTRAVENOUS at 13:13

## 2021-06-09 RX ADMIN — IPRATROPIUM BROMIDE AND ALBUTEROL SULFATE 1 AMPULE: 2.5; .5 SOLUTION RESPIRATORY (INHALATION) at 11:13

## 2021-06-09 RX ADMIN — BUDESONIDE 250 MCG: 0.25 SUSPENSION RESPIRATORY (INHALATION) at 08:18

## 2021-06-09 RX ADMIN — Medication 20 ML: at 21:30

## 2021-06-09 RX ADMIN — Medication 200 MCG/HR: at 00:33

## 2021-06-09 RX ADMIN — ARFORMOTEROL TARTRATE 15 MCG: 15 SOLUTION RESPIRATORY (INHALATION) at 20:30

## 2021-06-09 RX ADMIN — CEFEPIME HYDROCHLORIDE 1000 MG: 1 INJECTION, POWDER, FOR SOLUTION INTRAMUSCULAR; INTRAVENOUS at 11:55

## 2021-06-09 RX ADMIN — PROPOFOL 50 MCG/KG/MIN: 10 INJECTION, EMULSION INTRAVENOUS at 00:32

## 2021-06-09 RX ADMIN — ARFORMOTEROL TARTRATE 15 MCG: 15 SOLUTION RESPIRATORY (INHALATION) at 08:18

## 2021-06-09 RX ADMIN — IPRATROPIUM BROMIDE AND ALBUTEROL SULFATE 1 AMPULE: 2.5; .5 SOLUTION RESPIRATORY (INHALATION) at 17:10

## 2021-06-09 RX ADMIN — VALPROATE SODIUM 500 MG: 100 INJECTION, SOLUTION INTRAVENOUS at 21:30

## 2021-06-09 RX ADMIN — VALPROATE SODIUM 500 MG: 100 INJECTION, SOLUTION INTRAVENOUS at 13:13

## 2021-06-09 RX ADMIN — IPRATROPIUM BROMIDE AND ALBUTEROL SULFATE 1 AMPULE: 2.5; .5 SOLUTION RESPIRATORY (INHALATION) at 20:30

## 2021-06-09 RX ADMIN — Medication 200 MCG/HR: at 07:11

## 2021-06-09 RX ADMIN — PANTOPRAZOLE SODIUM 40 MG: 40 INJECTION, POWDER, FOR SOLUTION INTRAVENOUS at 13:13

## 2021-06-09 RX ADMIN — ACETYLCYSTEINE 600 MG: 200 SOLUTION ORAL; RESPIRATORY (INHALATION) at 08:26

## 2021-06-09 RX ADMIN — IPRATROPIUM BROMIDE AND ALBUTEROL SULFATE 1 AMPULE: 2.5; .5 SOLUTION RESPIRATORY (INHALATION) at 08:18

## 2021-06-09 ASSESSMENT — PULMONARY FUNCTION TESTS
PIF_VALUE: 20
PIF_VALUE: 21
PIF_VALUE: 30
PIF_VALUE: 27
PIF_VALUE: 20
PIF_VALUE: 24
PIF_VALUE: 20
PIF_VALUE: 13
PIF_VALUE: 13
PIF_VALUE: 16
PIF_VALUE: 14
PIF_VALUE: 32
PIF_VALUE: 20
PIF_VALUE: 16
PIF_VALUE: 20

## 2021-06-09 ASSESSMENT — PAIN SCALES - GENERAL
PAINLEVEL_OUTOF10: 0

## 2021-06-09 NOTE — PROGRESS NOTES
06/09/21 0901   Vent Information   $Ventilation Off Vent   Vt Ordered 0 mL   Rate Set 0 bmp   Peak Flow 0 L/min   Pressure Support 7 cmH20   FiO2  40 %   SpO2 (!) 85 %   SpO2/FiO2 ratio 212.5   Sensitivity 3   PEEP/CPAP 5   I Time/ I Time % 0 s   Vent Patient Data   High Peep/I Pressure 0   Peak Inspiratory Pressure 13 cmH2O   Mean Airway Pressure 8.4 cmH20   Rate Measured 25 br/min   Vt Exhaled 385 mL   Minute Volume 7.56 Liters   I:E Ratio 1.00:1   Cough/Sputum   Sputum How Obtained Endotracheal;Suctioned;Oral;Other (Comment)   Cough Productive   Sputum Amount Small   Sputum Color Cloudy; White   Spontaneous Breathing Trial (SBT) RT Doc   Pulse (!) 0   Breath Sounds   Right Upper Lobe Clear;Diminished   Right Middle Lobe Clear;Diminished   Right Lower Lobe Diminished   Left Upper Lobe Clear;Diminished   Left Lower Lobe Diminished   Alarm Settings   High Pressure Alarm 40 cmH2O   Patient Observation   Observations verbal order extubate Dr Nan Chairez to Rt. pt sxd orally , evac and oet .  [id number] deflated oet removed . pt verbalized name and words clearly. bilateral equal clear breath sounds faint exp wheeze when pt forces words ,he is yelling and moving about  the bed . piulse ox changed to rt hand with extensive explaination and acceptance on pt end. spo2 90 on 12 lpm oxygen via HFNC. resp rate 16-20 once pt calming down. bilateral clear breath sounds slightly diminshed overall. encouraged and reassured pt at this time.

## 2021-06-09 NOTE — PROGRESS NOTES
Department of Neurosurgery  Progress Note    CHIEF COMPLAINT: s/p C4-C7 ACDF 6/2    SUBJECTIVE: extubated. C/o sore throat and op site pain     REVIEW OF SYSTEMS :  No CP.   Some SOB    OBJECTIVE:   VITALS:  BP (!) 130/105   Pulse (!) 0   Temp 98.8 °F (37.1 °C) (Temporal)   Resp 23   Ht 5' 5\" (1.651 m)   Wt 183 lb (83 kg)   SpO2 92%   BMI 30.45 kg/m²     PHYSICAL:  Neurologic: awake and alert  Incision c/d/i  Collar intact     DATA:  CBC:   Lab Results   Component Value Date    WBC 14.2 06/09/2021    RBC 4.04 06/09/2021    HGB 9.2 06/09/2021    HCT 30.1 06/09/2021    MCV 74.5 06/09/2021    MCH 22.8 06/09/2021    MCHC 30.6 06/09/2021    RDW 15.9 06/09/2021     06/09/2021    MPV 10.6 06/09/2021     BMP:    Lab Results   Component Value Date     06/09/2021    K 3.7 06/09/2021    K 4.3 05/26/2021     06/09/2021    CO2 29 06/09/2021    BUN 14 06/09/2021    LABALBU 3.6 06/05/2021    CREATININE 0.8 06/09/2021    CALCIUM 8.6 06/09/2021    GFRAA >60 06/09/2021    LABGLOM >60 06/09/2021    GLUCOSE 73 06/09/2021     PT/INR:    Lab Results   Component Value Date    PROTIME 10.8 05/26/2021    INR 1.0 05/26/2021     PTT:    Lab Results   Component Value Date    APTT 32.9 08/07/2018   [APTT}    Current Inpatient Medications  Current Facility-Administered Medications: propofol injection, 5-50 mcg/kg/min, Intravenous, Titrated  midazolam PF (VERSED) injection 2 mg, 2 mg, Intravenous, Q2H PRN  chlorhexidine (PERIDEX) 0.12 % solution 15 mL, 15 mL, Mouth/Throat, BID  valproate (DEPACON) 500 mg in dextrose 5 % 100 mL IVPB, 500 mg, Intravenous, BID  pregabalin (LYRICA) capsule 225 mg, 225 mg, Oral, BID  acetylcysteine (MUCOMYST) 20 % solution FOR INHALATION 600 mg, 600 mg, Inhalation, BID  ipratropium-albuterol (DUONEB) nebulizer solution 1 ampule, 1 ampule, Inhalation, Q4H WA  budesonide (PULMICORT) nebulizer suspension 250 mcg, 250 mcg, Nebulization, BID  Arformoterol Tartrate (BROVANA) nebulizer solution 15

## 2021-06-09 NOTE — PROGRESS NOTES
HOSPITALIST PROGRESS NOTE  Date: 6/9/2021   Name: Anthony Faye   MRN: [id number]   YOB: 1970        Hospital Course:   Mr Jeff Geller 44 YM admitted for neck pain due to cervical herniated disc, patient had C4-C5 C5-C6 C6-C7 anterior cervical discectomy and fusion done on 6/2/2021, postoperatively patient was doing fine besides some swallowing difficulty which is expected after cervical surgery, clinically improving until the morning of 6/5/2021 when he complained about sudden feeling of swelling in the throat and sudden onset shortness of breath. Rapid response was called I evaluated the patient personally at bedside, decision was made to send patient to CT scan neck and chest with PE protocol, during the CT scan patient had a PEA arrest, he was resuscitated and intubated and then transferred to medical ICU.     Subjective/Interval Hx:   Patient has been extubated successfully this morning after a failed attempt yesterday throat is slightly sore worse but stable remains in intensive care unit with neck brace intact  Objective:   Physical Exam:   BP (!) 130/105   Pulse 140   Temp 98.8 °F (37.1 °C) (Temporal)   Resp 22   Ht 5' 5\" (1.651 m)   Wt 183 lb (83 kg)   SpO2 90%   BMI 30.45 kg/m²   General: no acute distress, well nourished and well hydrated  HEENT: NCAT  Heart: S1S2 RRR  Lungs: Clear to ascultation bilaterally, respiratory effort normal  Abdomen: soft, NT/ND, positive bowel sounds  Extremities: no pitting edema, nontender   Neuro: patient is awake, alert and orientated times 3, no gross deficits  Skin: no rashes or ecchymosis        Meds:   Meds:    chlorhexidine  15 mL Mouth/Throat BID    valproate sodium (DEPACON) IVPB  500 mg Intravenous BID    pregabalin  225 mg Oral BID    acetylcysteine  600 mg Inhalation BID    ipratropium-albuterol  1 ampule Inhalation Q4H WA    budesonide  250 mcg Nebulization BID    Arformoterol Tartrate  15 mcg Nebulization BID    sodium chloride Intravenous Q12H    cefepime  1,000 mg Intravenous Q12H    pantoprazole  40 mg Intravenous Daily    And    sodium chloride (PF)  10 mL Intravenous Daily    enoxaparin  40 mg Subcutaneous Daily    sodium chloride flush  5-40 mL Intravenous 2 times per day    polyethylene glycol  17 g Oral Daily    bisacodyl  5 mg Oral Daily    sennosides-docusate sodium  1 tablet Oral BID      Infusions:    propofol Stopped (06/09/21 0900)    fentaNYL 5 mcg/ml in 0.9%  ml infusion Stopped (06/09/21 0900)    sodium chloride      dextrose       PRN Meds: midazolam, 2 mg, Q2H PRN  cyclobenzaprine, 10 mg, TID PRN  sodium chloride flush, 5-40 mL, PRN  sodium chloride, 25 mL, PRN  benzocaine-menthol, 1 lozenge, Q2H PRN  acetaminophen, 650 mg, Q4H PRN  glucose, 15 g, PRN  dextrose, 12.5 g, PRN  glucagon (rDNA), 1 mg, PRN  dextrose, 100 mL/hr, PRN  diphenhydrAMINE, 25 mg, Q6H PRN        Data/Labs:     Recent Labs     06/07/21  0404 06/08/21  0530 06/09/21  0515   WBC 10.5 11.4 14.2*   HGB 8.8* 8.8* 9.2*   HCT 27.3* 27.6* 30.1*    228 255      Recent Labs     06/07/21  0404 06/08/21  0530 06/09/21  0515    140 140   K 3.5 4.0 3.7    103 101   CO2 25 27 29   PHOS 3.6 4.0 3.2   BUN 14 13 14   CREATININE 0.8 0.8 0.8     No results for input(s): AST, ALT, ALB, BILIDIR, BILITOT, ALKPHOS in the last 72 hours. No results for input(s): INR in the last 72 hours. Recent Labs     06/08/21  0530   CKTOTAL 1,598*       I/O last 3 completed shifts: In: 1828 [I.V.:1678; IV Piggyback:150]  Out: 2198 [Urine:2198]    Intake/Output Summary (Last 24 hours) at 6/9/2021 1226  Last data filed at 6/9/2021 0800  Gross per 24 hour   Intake 1828 ml   Output 998 ml   Net 830 ml        Assessment/Plan:   1.  Acute hypoxic respiratory failure probably due to aspiration pneumonia status post intubation-patient continues to be on the ventilator pulmonary critical care is following  06/05/2021-patient remains awake while intubated in

## 2021-06-09 NOTE — PROGRESS NOTES
[address] [facility]   Department of Internal Medicine   Internal Medicine Residency  MICU Progress Note    Patient:  Rosalind Morales 44 y.o. male   MRN: [id number]       Date of Service: 2021    Allergy: Penicillins    Subjective     Patient was seen and examined in AM. Patient states feels better. Very agitated, extubated today, doing well on high flow, he was very agitated that we had to call in     24 hour change: Stable overnight. No acute overnight issues reported. Objective     TEMPERATURE:  Current - Temp: 98.8 °F (37.1 °C); Max - Temp  Av.3 °F (36.8 °C)  Min: 97.9 °F (36.6 °C)  Max: 98.8 °F (37.1 °C)  RESPIRATIONS RANGE: Resp  Avg: 15.9  Min: 12  Max: 31  PULSE RANGE: Pulse  Av.3  Min: 0  Max: 127  BLOOD PRESSURE RANGE:  Systolic (33RZD), YZT:179 , Min:100 , FHX:222   ; Diastolic (04FBK), WMV:83, Min:59, Max:105    PULSE OXIMETRY RANGE: SpO2  Av.6 %  Min: 81 %  Max: 100 %    I & O - 24hr:    Intake/Output Summary (Last 24 hours) at 2021 0920  Last data filed at 2021 0800  Gross per 24 hour   Intake 1828 ml   Output 2173 ml   Net -345 ml     I/O last 3 completed shifts: In: 1828 [I.V.:1678; IV Piggyback:150]  Out: 2198 [Urine:2198] I/O this shift:  In: -   Out: 125 [Urine:125]   Weight change:     Physical Exam:  General Appearance:    Alert, noncooperative, severe acute distress. HEENT:    NC/AT, mucous membranes are moist, c-collar   Neck:   Supple, no jugular venous distention. Resp:     CTAB, No wheezes, No rhonchi, no use of accessory muscles   Heart:    RRR, S1 and S2 normal, no murmur, rub or gallop.     Abdomen:     Soft, non-tender, non-distended with normal bowel sounds   Extremities:   Atraumatic, no cyanosis or edema   Pulses:  Radial and pedal pulses are intact bilaterally   Neurologic:   AAOx3, No focal motor deficit       Medications     Continuous Infusions:   propofol Stopped (21 0900)    fentaNYL 5 mcg/ml in 0.9%  ml infusion Stopped (06/09/21 0900)    sodium chloride      dextrose       Scheduled Meds:   chlorhexidine  15 mL Mouth/Throat BID    valproate sodium (DEPACON) IVPB  500 mg Intravenous BID    pregabalin  225 mg Oral BID    acetylcysteine  600 mg Inhalation BID    ipratropium-albuterol  1 ampule Inhalation Q4H WA    budesonide  250 mcg Nebulization BID    Arformoterol Tartrate  15 mcg Nebulization BID    sodium chloride   Intravenous Q12H    cefepime  1,000 mg Intravenous Q12H    pantoprazole  40 mg Intravenous Daily    And    sodium chloride (PF)  10 mL Intravenous Daily    enoxaparin  40 mg Subcutaneous Daily    sodium chloride flush  5-40 mL Intravenous 2 times per day    polyethylene glycol  17 g Oral Daily    bisacodyl  5 mg Oral Daily    sennosides-docusate sodium  1 tablet Oral BID     PRN Meds: midazolam, cyclobenzaprine, sodium chloride flush, sodium chloride, benzocaine-menthol, acetaminophen, glucose, dextrose, glucagon (rDNA), dextrose, diphenhydrAMINE  Nutrition:   NG/OG tube TF type: Pulmocare/Nephro/Glucerna/Jevity        At rate: ml/h    Labs and Imaging Studies     CBC:   Recent Labs     06/07/21  0404 06/08/21  0530 06/09/21  0515   WBC 10.5 11.4 14.2*   HGB 8.8* 8.8* 9.2*   HCT 27.3* 27.6* 30.1*   MCV 72.0* 73.2* 74.5*    228 255       BMP:    Recent Labs     06/07/21  0404 06/08/21  0530 06/09/21  0515    140 140   K 3.5 4.0 3.7    103 101   CO2 25 27 29   BUN 14 13 14   CREATININE 0.8 0.8 0.8   GLUCOSE 92 94 73*       LIVER PROFILE:   No results for input(s): AST, ALT, LIPASE, BILIDIR, BILITOT, ALKPHOS in the last 72 hours. Invalid input(s): AMYLASE,  ALB    PT/INR:   No results for input(s): PROTIME, INR in the last 72 hours. APTT:   No results for input(s): APTT in the last 72 hours.     Fasting Lipid Panel:    Lab Results   Component Value Date    CHOL 190 06/06/2021    TRIG 184 06/06/2021    HDL 46 06/06/2021       Cardiac Enzymes:    Lab Results   Component Value Date    CKTOTAL 1,598 (H) 06/08/2021    CKTOTAL 1,582 (H) 06/06/2021    CKTOTAL 1,261 (H) 06/06/2021    CKMB 2.4 06/06/2021    CKMB 2.4 06/06/2021       Notable Cultures:      Blood cultures   Blood Culture, Routine   Date Value Ref Range Status   06/05/2021 24 Hours no growth  Preliminary     Respiratory cultures No results found for: RESPCULTURE No results found for: LABGRAM  Urine   Urine Culture, Routine   Date Value Ref Range Status   05/26/2021 Growth not present  Final     Legionella No results found for: LABLEGI  C Diff PCR No results found for: CDIFPCR  Wound culture/abscess: No results for input(s): WNDABS in the last 72 hours. Tip culture:No results for input(s): CXCATHTIP in the last 72 hours.      Antibiotic  Days  Day started                                Oxygen:     Vent Information  $Ventilation: Off Vent  Skin Assessment: Clean, dry, & intact  Equipment ID: [id number]  Equipment Changed: (S) Suction catheter  Vent Type: 980  Vent Mode: PS  Vt Ordered: 0 mL  Rate Set: 0 bmp  Peak Flow: 0 L/min  Pressure Support: 7 cmH20  FiO2 : 40 %  SpO2: (!) 85 %  SpO2/FiO2 ratio: (P) 212.5  Sensitivity: 3  PEEP/CPAP: 5  I Time/ I Time %: 0 s  Humidification Source: Heated wire  Humidification Temp: 37  Humidification Temp Measured: 37  Circuit Condensation: Drained  Additional Respiratory  Assessments  Pulse: (!) 0  Resp: 26  SpO2: (!) 85 %  Position: Semi-Jerry's  Humidification Source: Heated wire  Humidification Temp: 37  Circuit Condensation: Drained  Oral Care Completed?: Yes  Oral Care: Mouth swabbed, Mouth suctioned  Subglottic Suction Done?: Yes  Airway Type: ET  Airway Size: 7.5  Cuff Pressure (cm H2O): 29 cm H2O  Skin barrier applied: Yes     Nasal cannula L/min     Face mask %     Reservoirs mask %       ABG   Vent Settings     PH   Mode      PCO2   TV      PO2   RR      HCO3   PS      Sat%   PEEP      FIO2   FIO2        P/F          Lines:  Site  Day  Date inserted     TLC              PICC Arterial line              Peripheral line              HD cath            [REMOVED] Urethral Catheter Non-latex 16 fr-Output (mL): 875 mL  Urethral Catheter-Output (mL): 125 mL    Imaging Studies:    EKG:   Resident's Assessment and Plan      Ryann Rock is 44 y.o. male with a PMH of     Assessment   Acute hypoxic respiratory failure 2/2 aspiration pneumonia  S/p respiratory arrest, 3 minutes CPR, ROSC achieved  Aspiration pneumonia, on antibiotics  S/p C4-C7 anterior cervical discectomy and fusion  History of migraine  Microcytic anemia  Elevated troponins, post respiratory arrest    Plan  Extubated today, very agitated and restless,   very paranoid about the nurses  Chest x-ray same  Continue cefepime  Completed Vanco treatment  Follow-up on sniff testing  Echo normal after cardiac arrest   neurosurgery following    # Peptic ulcer prophylaxis: PPI  # DVT Prophylaxis: Lovenox  # Disposition: Cont current care    Ahmet Estes MD ., PGY-2    Attending Physician: Dr. Dari Wilkerson Attending Addendum:    Patient seen and examined with the house staff. X-rays personally reviewed through the PACS. Family is updated at the bedside as available. Additional findings listed below as necessary. Additional comments:  1. He extubated but remains in profound acute hypoxic respiratory failure  2. BLL pneumonia with some degree of pleural effusions are present and for which he remains on cefepime  3. S/P C spine surgery, collar in place  4. Anemic but hg better  5. Mild rhabdomyolysis  6. Big issue is his agitation which is transiently under control.     >30 min CCT

## 2021-06-09 NOTE — PROGRESS NOTES
06/09/21 0831   Patient Observation   Observations PT SXD OET ORALLY AND EVAC .  BALLOON DEFLATED , insp vt 700 given and exh vt  measdured in return with audible airflow heard orally.   balloon reinflated results to Dr Kevin Schaffer

## 2021-06-09 NOTE — PROGRESS NOTES
Occupational Therapy       Chart reviewed. Pt transferred to ICU on 6/5 d/t change in medical status requiring intubation. Will need re-evaluation. Will hold evaluation this date d/t pt recently extubated and not appropriate for OT services at this time per RN. Will evaluate at a later time as able/appropriate. Thank you.  Miguel Armas, OTR/L #[id number]

## 2021-06-09 NOTE — PLAN OF CARE
Problem: Pain:  Goal: Control of acute pain  Description: Control of acute pain  6/9/2021 0815 by Rojas Cerna RN  Outcome: Met This Shift     Problem: Falls - Risk of:  Goal: Will remain free from falls  Description: Will remain free from falls  6/9/2021 0815 by Rojas Cerna RN  Outcome: Met This Shift     Problem: Falls - Risk of:  Goal: Absence of physical injury  Description: Absence of physical injury  6/9/2021 0815 by Rojas Cerna RN  Outcome: Met This Shift     Problem: Skin Integrity:  Goal: Absence of new skin breakdown  Description: Absence of new skin breakdown  Outcome: Met This Shift   Plan of care discussed with patient / family.

## 2021-06-09 NOTE — PROGRESS NOTES
Upon being called to the room by the second RN assigned to this patient as he previously complained about his nursing care, patient was observed with tachypnea and tachycardia. He was irritable, stating that his care is not acceptable, but unable or unwilling to provide examples to support this statement. In attempt to calm, both myself and ANM Mireya inquired what he needed to be done. 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 Patient settled with talking down technique with focus and pursed lip breathing. Patient then received instructions and policy details as witnessed by [facility] present that he would agree to both stop recording/posting on social media or risk his cell phone being confiscated by [facility] until his discharge from the hospital (pt expressed understanding)   Patient also agreed to acknowledge that the staff is delivering all necessary care and instruction per the nursing staff and is to both speak and exhibit respectful behaviors when interacting with staff.

## 2021-06-09 NOTE — CARE COORDINATION
6/9/2021 - updated CM note - pt remains in MICU. Pt successfully extubated today and now on optiflow with FiO2 85% with 60 flow rate. Orally suctioning self. Pt is agitated and complaining about nursing care. [employer] called in and nurse manager and assistant nurse manager met pt with [employer] present in pt room. Wearing custom cervical collar. Neurosurgery following. PT/OT on hold for agitation/anxiety. Pt will need updated PT/OT evals to help determine appropriate discharge plan. SW/CM will continue to follow.

## 2021-06-10 ENCOUNTER — TELEPHONE (OUTPATIENT)
Dept: NEUROSURGERY | Age: 51
End: 2021-06-10

## 2021-06-10 ENCOUNTER — APPOINTMENT (OUTPATIENT)
Dept: GENERAL RADIOLOGY | Age: 51
DRG: 321 | End: 2021-06-10
Attending: NEUROLOGICAL SURGERY
Payer: MEDICAID

## 2021-06-10 LAB
ANION GAP SERPL CALCULATED.3IONS-SCNC: 13 MMOL/L (ref 7–16)
ANISOCYTOSIS: ABNORMAL
BASOPHILS ABSOLUTE: 0 E9/L (ref 0–0.2)
BASOPHILS RELATIVE PERCENT: 0.4 % (ref 0–2)
BLOOD CULTURE, ROUTINE: NORMAL
BUN BLDV-MCNC: 12 MG/DL (ref 6–20)
CALCIUM SERPL-MCNC: 9.5 MG/DL (ref 8.6–10.2)
CHLORIDE BLD-SCNC: 103 MMOL/L (ref 98–107)
CO2: 26 MMOL/L (ref 22–29)
CREAT SERPL-MCNC: 0.7 MG/DL (ref 0.7–1.2)
CULTURE, BLOOD 2: NORMAL
EOSINOPHILS ABSOLUTE: 0.21 E9/L (ref 0.05–0.5)
EOSINOPHILS RELATIVE PERCENT: 0.9 % (ref 0–6)
GFR AFRICAN AMERICAN: >60
GFR NON-AFRICAN AMERICAN: >60 ML/MIN/1.73
GLUCOSE BLD-MCNC: 89 MG/DL (ref 74–99)
HCT VFR BLD CALC: 31.8 % (ref 37–54)
HEMOGLOBIN: 10 G/DL (ref 12.5–16.5)
HYPOCHROMIA: ABNORMAL
LYMPHOCYTES ABSOLUTE: 2.3 E9/L (ref 1.5–4)
LYMPHOCYTES RELATIVE PERCENT: 10.4 % (ref 20–42)
MAGNESIUM: 2.3 MG/DL (ref 1.6–2.6)
MCH RBC QN AUTO: 23 PG (ref 26–35)
MCHC RBC AUTO-ENTMCNC: 31.4 % (ref 32–34.5)
MCV RBC AUTO: 73.1 FL (ref 80–99.9)
MONOCYTES ABSOLUTE: 0.69 E9/L (ref 0.1–0.95)
MONOCYTES RELATIVE PERCENT: 2.6 % (ref 2–12)
NEUTROPHILS ABSOLUTE: 19.78 E9/L (ref 1.8–7.3)
NEUTROPHILS RELATIVE PERCENT: 86.1 % (ref 43–80)
PDW BLD-RTO: 15.5 FL (ref 11.5–15)
PHOSPHORUS: 3.1 MG/DL (ref 2.5–4.5)
PLATELET # BLD: 296 E9/L (ref 130–450)
PMV BLD AUTO: 10.3 FL (ref 7–12)
POLYCHROMASIA: ABNORMAL
POTASSIUM SERPL-SCNC: 3.8 MMOL/L (ref 3.5–5)
RBC # BLD: 4.35 E12/L (ref 3.8–5.8)
SODIUM BLD-SCNC: 142 MMOL/L (ref 132–146)
WBC # BLD: 23 E9/L (ref 4.5–11.5)

## 2021-06-10 PROCEDURE — 6360000002 HC RX W HCPCS: Performed by: INTERNAL MEDICINE

## 2021-06-10 PROCEDURE — 6360000002 HC RX W HCPCS: Performed by: STUDENT IN AN ORGANIZED HEALTH CARE EDUCATION/TRAINING PROGRAM

## 2021-06-10 PROCEDURE — 84100 ASSAY OF PHOSPHORUS: CPT

## 2021-06-10 PROCEDURE — 2700000000 HC OXYGEN THERAPY PER DAY

## 2021-06-10 PROCEDURE — 2580000003 HC RX 258: Performed by: INTERNAL MEDICINE

## 2021-06-10 PROCEDURE — 83735 ASSAY OF MAGNESIUM: CPT

## 2021-06-10 PROCEDURE — 85025 COMPLETE CBC W/AUTO DIFF WBC: CPT

## 2021-06-10 PROCEDURE — 80048 BASIC METABOLIC PNL TOTAL CA: CPT

## 2021-06-10 PROCEDURE — 2580000003 HC RX 258: Performed by: STUDENT IN AN ORGANIZED HEALTH CARE EDUCATION/TRAINING PROGRAM

## 2021-06-10 PROCEDURE — 6370000000 HC RX 637 (ALT 250 FOR IP): Performed by: INTERNAL MEDICINE

## 2021-06-10 PROCEDURE — 2500000003 HC RX 250 WO HCPCS: Performed by: INTERNAL MEDICINE

## 2021-06-10 PROCEDURE — 2500000003 HC RX 250 WO HCPCS: Performed by: FAMILY MEDICINE

## 2021-06-10 PROCEDURE — 94640 AIRWAY INHALATION TREATMENT: CPT

## 2021-06-10 PROCEDURE — 2060000000 HC ICU INTERMEDIATE R&B

## 2021-06-10 RX ORDER — LABETALOL HYDROCHLORIDE 5 MG/ML
10 INJECTION, SOLUTION INTRAVENOUS EVERY 4 HOURS PRN
Status: DISCONTINUED | OUTPATIENT
Start: 2021-06-10 | End: 2021-06-14 | Stop reason: HOSPADM

## 2021-06-10 RX ORDER — POTASSIUM CHLORIDE 7.45 MG/ML
10 INJECTION INTRAVENOUS
Status: ACTIVE | OUTPATIENT
Start: 2021-06-10 | End: 2021-06-10

## 2021-06-10 RX ADMIN — ARFORMOTEROL TARTRATE 15 MCG: 15 SOLUTION RESPIRATORY (INHALATION) at 09:39

## 2021-06-10 RX ADMIN — Medication 10 ML: at 22:12

## 2021-06-10 RX ADMIN — CEFEPIME HYDROCHLORIDE 1000 MG: 1 INJECTION, POWDER, FOR SOLUTION INTRAMUSCULAR; INTRAVENOUS at 12:16

## 2021-06-10 RX ADMIN — CEFEPIME HYDROCHLORIDE 1000 MG: 1 INJECTION, POWDER, FOR SOLUTION INTRAMUSCULAR; INTRAVENOUS at 23:18

## 2021-06-10 RX ADMIN — ACETYLCYSTEINE 600 MG: 200 SOLUTION ORAL; RESPIRATORY (INHALATION) at 20:59

## 2021-06-10 RX ADMIN — BUDESONIDE 250 MCG: 0.25 SUSPENSION RESPIRATORY (INHALATION) at 09:39

## 2021-06-10 RX ADMIN — VALPROATE SODIUM 500 MG: 100 INJECTION, SOLUTION INTRAVENOUS at 22:09

## 2021-06-10 RX ADMIN — ARFORMOTEROL TARTRATE 15 MCG: 15 SOLUTION RESPIRATORY (INHALATION) at 20:59

## 2021-06-10 RX ADMIN — IPRATROPIUM BROMIDE AND ALBUTEROL SULFATE 1 AMPULE: 2.5; .5 SOLUTION RESPIRATORY (INHALATION) at 17:42

## 2021-06-10 RX ADMIN — SODIUM CHLORIDE: 9 INJECTION, SOLUTION INTRAVENOUS at 16:19

## 2021-06-10 RX ADMIN — IPRATROPIUM BROMIDE AND ALBUTEROL SULFATE 1 AMPULE: 2.5; .5 SOLUTION RESPIRATORY (INHALATION) at 09:39

## 2021-06-10 RX ADMIN — IPRATROPIUM BROMIDE AND ALBUTEROL SULFATE 1 AMPULE: 2.5; .5 SOLUTION RESPIRATORY (INHALATION) at 20:59

## 2021-06-10 RX ADMIN — BUDESONIDE 250 MCG: 0.25 SUSPENSION RESPIRATORY (INHALATION) at 20:59

## 2021-06-10 RX ADMIN — LABETALOL HYDROCHLORIDE 10 MG: 5 INJECTION INTRAVENOUS at 23:19

## 2021-06-10 ASSESSMENT — PAIN DESCRIPTION - DESCRIPTORS: DESCRIPTORS: ACHING;CONSTANT;DISCOMFORT;SORE

## 2021-06-10 ASSESSMENT — PAIN DESCRIPTION - FREQUENCY: FREQUENCY: CONTINUOUS

## 2021-06-10 ASSESSMENT — PAIN SCALES - GENERAL
PAINLEVEL_OUTOF10: 0
PAINLEVEL_OUTOF10: 0
PAINLEVEL_OUTOF10: 5
PAINLEVEL_OUTOF10: 0

## 2021-06-10 ASSESSMENT — PAIN DESCRIPTION - PAIN TYPE: TYPE: CHRONIC PAIN

## 2021-06-10 ASSESSMENT — PAIN DESCRIPTION - PROGRESSION: CLINICAL_PROGRESSION: NOT CHANGED

## 2021-06-10 ASSESSMENT — PAIN - FUNCTIONAL ASSESSMENT: PAIN_FUNCTIONAL_ASSESSMENT: PREVENTS OR INTERFERES SOME ACTIVE ACTIVITIES AND ADLS

## 2021-06-10 ASSESSMENT — PAIN DESCRIPTION - LOCATION: LOCATION: BACK;NECK

## 2021-06-10 ASSESSMENT — PAIN DESCRIPTION - ONSET: ONSET: ON-GOING

## 2021-06-10 NOTE — PROGRESS NOTES
This RN greeted patient this morning and reviewed with the patient each morning medication that is ordered for him. The patient stated, \"I do not want to take any medications right now. I talked it over with my mom and the plan is to not take anything for now\". Patient refused any additional care.

## 2021-06-10 NOTE — PROGRESS NOTES
250 mcg, Nebulization, BID  Arformoterol Tartrate (BROVANA) nebulizer solution 15 mcg, 15 mcg, Nebulization, BID  fentaNYL 5 mcg/ml in 0.9%  ml infusion, 12.5-200 mcg/hr, Intravenous, Continuous  cefepime NS flush, , Intravenous, Q12H  cefepime (MAXIPIME) 1000 mg IVPB minibag, 1,000 mg, Intravenous, Q12H  pantoprazole (PROTONIX) injection 40 mg, 40 mg, Intravenous, Daily **AND** sodium chloride (PF) 0.9 % injection 10 mL, 10 mL, Intravenous, Daily  enoxaparin (LOVENOX) injection 40 mg, 40 mg, Subcutaneous, Daily  cyclobenzaprine (FLEXERIL) tablet 10 mg, 10 mg, Oral, TID PRN  sodium chloride flush 0.9 % injection 5-40 mL, 5-40 mL, Intravenous, 2 times per day  sodium chloride flush 0.9 % injection 5-40 mL, 5-40 mL, Intravenous, PRN  0.9 % sodium chloride infusion, 25 mL, Intravenous, PRN  polyethylene glycol (GLYCOLAX) packet 17 g, 17 g, Oral, Daily  bisacodyl (DULCOLAX) EC tablet 5 mg, 5 mg, Oral, Daily  sennosides-docusate sodium (SENOKOT-S) 8.6-50 MG tablet 1 tablet, 1 tablet, Oral, BID  benzocaine-menthol (CEPACOL SORE THROAT) lozenge 1 lozenge, 1 lozenge, Oral, Q2H PRN  acetaminophen (TYLENOL) tablet 650 mg, 650 mg, Oral, Q4H PRN  glucose (GLUTOSE) 40 % oral gel 15 g, 15 g, Oral, PRN  dextrose 50 % IV solution, 12.5 g, Intravenous, PRN  glucagon (rDNA) injection 1 mg, 1 mg, Intramuscular, PRN  dextrose 5 % solution, 100 mL/hr, Intravenous, PRN  diphenhydrAMINE (BENADRYL) injection 25 mg, 25 mg, Intravenous, Q6H PRN    ASSESSMENT:   s/p C4-C7 ACDF 6/2  RRT 6/5, possible aspiration pneumonia     PLAN:  -WBAT  -PT/OT  -Custom collar x3 months  -Follow up in neurosurgery in 4 weeks with x-rays      Electronically signed by CAROLINE Whipple on 6/10/2021 at 10:22 AM

## 2021-06-10 NOTE — PROGRESS NOTES
[address] [facility]  Department of Internal Medicine   Internal Medicine Residency   MICU Progress Note    Patient:  Amy Hicks 44 y.o. male  MRN: [id number]     Date of Service: 6/10/2021    Allergy: Penicillins    Subjective     Overnight Events: NAEO     Pt seen and examined at bedside. Appears in NAD. Denies any chest pain, dyspnea, pain with respiration, abdominal pain, nausea, vomiting. Case discussed with ICU nursing and attending physician. Objective     VS: BP (!) 145/87   Pulse 101   Temp 98.7 °F (37.1 °C) (Oral)   Resp 17   Ht 5' 5\" (1.651 m)   Wt 183 lb (83 kg)   SpO2 92%   BMI 30.45 kg/m²           I & O - 24hr:     Intake/Output Summary (Last 24 hours) at 6/10/2021 1525  Last data filed at 6/10/2021 1200  Gross per 24 hour   Intake 100 ml   Output 850 ml   Net -750 ml     Physical Exam:  Vitals: BP (!) 145/87   Pulse 101   Temp 98.7 °F (37.1 °C) (Oral)   Resp 17   Ht 5' 5\" (1.651 m)   Wt 183 lb (83 kg)   SpO2 92%   BMI 30.45 kg/m²       Constitutional: Alert, conversational. Tyesha Shaun commands. In no apparent distress. Head: Normocephalic and atraumatic. Eyes: Conjunctiva normal, (-) scleral icterus. Mucus membranes moist.  Mouth: Mucus membranes moist. Oropharynx clear. No deviation of the tongue or uvula. Neck: C-collar in place. Respiratory: Lungs clear to auscultation bilaterally. (-)  wheezes, (-)  rales, (-)  rhonchi. No increased work of breathing or respiratory distress. Cardiovascular: RRR. (-)  murmurs, (-) gallops,  (-) rubs. S1 and S2 were normal.   GI:  Abdomen soft, non-tender, non-distended. (+) BS. (-) guarding, (-) rigidity. Extremities: Warm and well perfused. (-) clubbing, (-) cyanosis. (-) peripheral edema. (-) sacral edema. Neurologic:  No focal neurological deficits. No speech slurring or tremor.     Lines     site date    Art line   None    TLC None    PICC None    Hemoaccess None      ABG:     Lab Results   Component Value Date PH 7.437 06/09/2021    PCO2 45.1 06/09/2021    PO2 56.8 06/09/2021    DGZ1KWY 26.2 06/06/2021    HCO3 29.7 06/09/2021    BE 4.9 06/09/2021    THB 10.6 06/09/2021    O2SAT 88.0 06/09/2021        Medications     Current Facility-Administered Medications   Medication Dose Route Frequency Provider Last Rate Last Admin    propofol injection  5-50 mcg/kg/min Intravenous Titrated Chantelle Dennis MD   Stopped at 06/09/21 0900    midazolam PF (VERSED) injection 2 mg  2 mg Intravenous Q2H PRN Uma Shepherd MD   2 mg at 06/08/21 1937    chlorhexidine (PERIDEX) 0.12 % solution 15 mL  15 mL Mouth/Throat BID Ena Everett MD   15 mL at 06/08/21 2100    valproate (DEPACON) 500 mg in dextrose 5 % 100 mL IVPB  500 mg Intravenous BID Steven Moya DO   Stopped at 06/09/21 2230    pregabalin (LYRICA) capsule 225 mg  225 mg Oral BID Valentin Ryder MD   225 mg at 06/08/21 2100    acetylcysteine (MUCOMYST) 20 % solution FOR INHALATION 600 mg  600 mg Inhalation BID Samantha Urbano MD   600 mg at 06/09/21 0826    ipratropium-albuterol (DUONEB) nebulizer solution 1 ampule  1 ampule Inhalation Q4H WA Geri L Norton, DO   1 ampule at 06/10/21 0939    budesonide (PULMICORT) nebulizer suspension 250 mcg  250 mcg Nebulization BID Geri L Norton, DO   250 mcg at 06/10/21 2109    Arformoterol Tartrate (BROVANA) nebulizer solution 15 mcg  15 mcg Nebulization BID Geri L Norton, DO   15 mcg at 06/10/21 0939    fentaNYL 5 mcg/ml in 0.9%  ml infusion  12.5-200 mcg/hr Intravenous Continuous Juliane Matamoros MD   Stopped at 06/09/21 0900    cefepime NS flush   Intravenous Q12H Juliane Matamoros MD   Stopped at 06/08/21 0400    cefepime (MAXIPIME) 1000 mg IVPB minibag  1,000 mg Intravenous Q12H Juliane Matamoros MD 12.5 mL/hr at 06/10/21 1216 1,000 mg at 06/10/21 1216    pantoprazole (PROTONIX) injection 40 mg  40 mg Intravenous Daily Juliane Matamoros MD   40 mg at 06/09/21 1313    And    sodium chloride (PF) 0.9 % injection 10 mL  10 mL Intravenous Daily Lakeisha Delgado MD   10 mL at 06/09/21 1313    enoxaparin (LOVENOX) injection 40 mg  40 mg Subcutaneous Daily Mariposa Snare, PA-C   40 mg at 06/08/21 0854    cyclobenzaprine (FLEXERIL) tablet 10 mg  10 mg Oral TID PRN Susie Gates MD   10 mg at 06/05/21 0615    sodium chloride flush 0.9 % injection 5-40 mL  5-40 mL Intravenous 2 times per day Susie Gtaes MD   20 mL at 06/09/21 2130    sodium chloride flush 0.9 % injection 5-40 mL  5-40 mL Intravenous PRN Susie Gates MD   10 mL at 06/03/21 2202    0.9 % sodium chloride infusion  25 mL Intravenous PRN Ssuie Gates MD        polyethylene glycol (GLYCOLAX) packet 17 g  17 g Oral Daily Susie Gates MD   17 g at 06/04/21 0814    bisacodyl (DULCOLAX) EC tablet 5 mg  5 mg Oral Daily Susie Gates MD   5 mg at 06/04/21 0814    sennosides-docusate sodium (SENOKOT-S) 8.6-50 MG tablet 1 tablet  1 tablet Oral BID Susie Gates MD   1 tablet at 06/08/21 2104    benzocaine-menthol (CEPACOL SORE THROAT) lozenge 1 lozenge  1 lozenge Oral Q2H PRN Susie Gates MD   1 lozenge at 06/02/21 1916    acetaminophen (TYLENOL) tablet 650 mg  650 mg Oral Q4H PRJAMA Gates MD   650 mg at 06/02/21 1241    glucose (GLUTOSE) 40 % oral gel 15 g  15 g Oral PRN Susie Gates MD        dextrose 50 % IV solution  12.5 g Intravenous PRN Susie Gates MD   12.5 g at 06/07/21 0906    glucagon (rDNA) injection 1 mg  1 mg Intramuscular PRN Susie Gates MD        dextrose 5 % solution  100 mL/hr Intravenous PRJAMA Gates MD        diphenhydrAMINE (BENADRYL) injection 25 mg  25 mg Intravenous Q6H PRN Mariposa Snare, PA-C   25 mg at 06/05/21 0054        Labs     CBC with Differential:    Lab Results   Component Value Date    WBC 23.0 06/10/2021    RBC 4.35 06/10/2021    HGB 10.0 06/10/2021    HCT 31.8 06/10/2021     06/10/2021    MCV 73.1 06/10/2021    MCH 23.0 06/10/2021    MCHC 31.4 06/10/2021    RDW 15.5 06/10/2021    LYMPHOPCT 10.4 06/10/2021    MONOPCT 2.6 06/10/2021    BASOPCT 0.4 06/10/2021    MONOSABS 0.69 06/10/2021    LYMPHSABS 2.30 06/10/2021    EOSABS 0.21 06/10/2021    BASOSABS 0.00 06/10/2021     BMP:    Lab Results   Component Value Date     06/10/2021    K 3.8 06/10/2021    K 4.3 05/26/2021     06/10/2021    CO2 26 06/10/2021    BUN 12 06/10/2021    LABALBU 3.6 06/05/2021    CREATININE 0.7 06/10/2021    CALCIUM 9.5 06/10/2021    GFRAA >60 06/10/2021    LABGLOM >60 06/10/2021    GLUCOSE 89 06/10/2021     Magnesium:    Lab Results   Component Value Date    MG 2.3 06/10/2021     Phosphorus:    Lab Results   Component Value Date    PHOS 3.1 06/10/2021     Imaging Studies:    CXR 6/9/2021  FINDINGS:   Endotracheal tube and enteric feeding tube are unchanged in positions. The evaluation is limited due to low lung volumes. Cardiac mediastinal silhouette is stable in size. Persistent bilateral   pleural effusions with compressive atelectasis. No pneumothorax post     Resident's Assessment and Plan     Assessment   Acute hypoxic respiratory failure 2/2 aspiration pneumonia  S/p respiratory arrest, 3 minutes CPR, ROSC achieved  Aspiration pneumonia, on antibiotics  S/p C4-C7 anterior cervical discectomy and fusion  History of migraine  Microcytic anemia  Elevated troponins, post respiratory arrest     Plan  S/p extubation, now on room air    very paranoid about the nurses - asked several time for patient advocate and for nurses to be chaperoned while giving him medications   Continue cefepime - pt refused ovn   Follow-up on sniff testing  Continue breathing treatments   Echo normal after cardiac arrest   neurosurgery following - Dr. Jj Amezcua    PT/OT: Consulted   DVT ppx: Lovenox   GI ppx: Protonix   Disposition: Transfer out of MICU today     Rocky Moura DO, PGY-1    Attending physician: Dr. Taveras Or Attending Addendum:    Patient seen and examined with the house staff. X-rays personally reviewed through the PACS. Family is updated at the bedside as available. Additional findings listed below as necessary. Additional comments:  Improved acute hypoxic respiratory failure  Aspiration pneumonia doing much better down on O2 and would continue cefepime if patient allows. Okay to transfer.

## 2021-06-10 NOTE — PROGRESS NOTES
HOSPITALIST PROGRESS NOTE  Date: 6/10/2021   Name: Breonna Go   MRN: [id number]   YOB: 1970        Hospital Course:   Mr Israel Finely 44 YM admitted for neck pain due to cervical herniated disc, patient had C4-C5 C5-C6 C6-C7 anterior cervical discectomy and fusion done on 6/2/2021, postoperatively patient was doing fine besides some swallowing difficulty which is expected after cervical surgery, clinically improving until the morning of 6/5/2021 when he complained about sudden feeling of swelling in the throat and sudden onset shortness of breath. Rapid response was called I evaluated the patient personally at bedside, decision was made to send patient to CT scan neck and chest with PE protocol, during the CT scan patient had a PEA arrest, he was resuscitated and intubated and then transferred to medical ICU.     Subjective/Interval Hx:   Patient sitting up on the bedside commode next to his bed in intensive care unit he is extubated talking more clearly this morning and with family plans are to transfer him out of intensive care unit  Objective:   Physical Exam:   /87   Pulse 98   Temp 98.7 °F (37.1 °C) (Oral)   Resp 18   Ht 5' 5\" (1.651 m)   Wt 183 lb (83 kg)   SpO2 (!) 89%   BMI 30.45 kg/m²   General: no acute distress, well nourished and well hydrated  HEENT: NCAT  Heart: S1S2 RRR  Lungs: Clear to ascultation bilaterally, respiratory effort normal  Abdomen: soft, NT/ND, positive bowel sounds  Extremities: no pitting edema, nontender   Neuro: patient is awake, alert and orientated times 3, no gross deficits  Skin: no rashes or ecchymosis        Meds:   Meds:    chlorhexidine  15 mL Mouth/Throat BID    valproate sodium (DEPACON) IVPB  500 mg Intravenous BID    pregabalin  225 mg Oral BID    acetylcysteine  600 mg Inhalation BID    ipratropium-albuterol  1 ampule Inhalation Q4H WA    budesonide  250 mcg Nebulization BID    Arformoterol Tartrate  15 mcg Nebulization BID    sodium chloride   Intravenous Q12H    cefepime  1,000 mg Intravenous Q12H    pantoprazole  40 mg Intravenous Daily    And    sodium chloride (PF)  10 mL Intravenous Daily    enoxaparin  40 mg Subcutaneous Daily    sodium chloride flush  5-40 mL Intravenous 2 times per day    polyethylene glycol  17 g Oral Daily    bisacodyl  5 mg Oral Daily    sennosides-docusate sodium  1 tablet Oral BID      Infusions:    propofol Stopped (06/09/21 0900)    fentaNYL 5 mcg/ml in 0.9%  ml infusion Stopped (06/09/21 0900)    sodium chloride      dextrose       PRN Meds: midazolam, 2 mg, Q2H PRN  cyclobenzaprine, 10 mg, TID PRN  sodium chloride flush, 5-40 mL, PRN  sodium chloride, 25 mL, PRN  benzocaine-menthol, 1 lozenge, Q2H PRN  acetaminophen, 650 mg, Q4H PRN  glucose, 15 g, PRN  dextrose, 12.5 g, PRN  glucagon (rDNA), 1 mg, PRN  dextrose, 100 mL/hr, PRN  diphenhydrAMINE, 25 mg, Q6H PRN        Data/Labs:     Recent Labs     06/08/21  0530 06/09/21  0515 06/10/21  0447   WBC 11.4 14.2* 23.0*   HGB 8.8* 9.2* 10.0*   HCT 27.6* 30.1* 31.8*    255 296      Recent Labs     06/08/21  0530 06/09/21  0515 06/10/21  0447    140 142   K 4.0 3.7 3.8    101 103   CO2 27 29 26   PHOS 4.0 3.2 3.1   BUN 13 14 12   CREATININE 0.8 0.8 0.7     No results for input(s): AST, ALT, ALB, BILIDIR, BILITOT, ALKPHOS in the last 72 hours. No results for input(s): INR in the last 72 hours. Recent Labs     06/08/21  0530   CKTOTAL 1,598*       I/O last 3 completed shifts:  In: -   Out: 475 [Urine:475]    Intake/Output Summary (Last 24 hours) at 6/10/2021 1347  Last data filed at 6/10/2021 1200  Gross per 24 hour   Intake 100 ml   Output 950 ml   Net -850 ml        Assessment/Plan:   1.  Acute hypoxic respiratory failure probably due to aspiration pneumonia status post intubation-patient continues to be on the ventilator pulmonary critical care is following  06/05/2021-patient remains awake while intubated in ICU is able to write and communicate; appears anxious  06/09/2021-patient was extubated this morning successfully he is talking maintaining his airway voice is slightly hoarse remains a little anxious we will continue to monitor  2. PEA/cardiac arrest-continue management as above patient will stay rapid cardiology is following echo is pending  3. Hospital-acquired pneumonia-CT shows bilateral infiltrates left greater than right continue IV antibiotics critical care pulmonary is following monitor lab studies  4. Cervical herniated disc status post anterior cervical discectomy and fusion on 6/02/21 per neurosurgery-patient is to have collar on the neck remain x3 months pain is controlled with medication  5. Mild rhabdo myelolysis-CK is elevated, maintain hydration, monitor lab studies  06/10/2021-continue to monitor CK is decreasing  6. Seizure precautions-valproic acid IV monitor    DVT Prophylaxis: Lovenox  Diet: Diet NPO  Code Status: Full Code    Dispo:  When stable    Electronically signed by Herbert Avalos MD on 6/10/2021 at 1:47 PM  [facility]

## 2021-06-10 NOTE — PROGRESS NOTES
Approximately at 20:00 writer was assessing the patient. Writer attempted to flush patient's triple lumen femoral line. Pt became hysterical stating he would not allow it unless a charge nurse and resident were present due to a mistrust in staff. Writer acquired the patient's request and continued to assess the patients central line in the presence of the charge nurse and resident MD    Writer approached patient regarding taking his oral medication Lyrica 225 mg and sonokot-s 8.5-50 mg tablet. Pt stated he would like to wait until his IV Depacon was finished to \"avoid any adverse reactions between the medications\". Writer accepted the patients wishes and waited to administer the oral medications. At approximately [id number] after the Depacon had finished writer again approached patient about taking medication. Pt stated he did not feel comfortable with taking them with water. Writer asked if patient would prefer to take the medication with applesauce or pudding. Pt became hysterical in claiming that writer was attempting to force him to take orals. Writer tried to explain that writer was just trying to offer the patient options to attempt to take his medications and that if the patient does not want to take them he does not have to. Pt continued making false claims that writer was attempting to force him into care that he was not comfortable with. Many false accusations were made by the patient who was on the phone with his mother at the time. Among these accusations were forced care and verbalizations by the writer which also never occurred. Patient then refused care from writer and requested that no medications be administered the rest of the evening until day shift, due to a mistrust in staff. Writer removed themselves from the situation and made the charge nurse aware.

## 2021-06-10 NOTE — TELEPHONE ENCOUNTER
Pt called from his hospital room in ICU stating that Dr Jennie Burton has not come to see him post op. I did inform the patient that Luisito Krishnamurthy, Dr Mercedes Mancuso PA did round on him yesterday. Pt stated that he needed the Dr to come see him today, he needed to know what was going on with him. Pt was very insistent that he needed to be seen as soon as possible, as \"no one here knows what is wrong with me\". Pt was informed that a message would be passed along to Dr Jennie Burton and CAROLINE Stewart today. Pt acknowledged.     Electronically signed by Ricardo Weeks on 6/10/21 at 9:23 AM EDT

## 2021-06-10 NOTE — CARE COORDINATION
Pt extubated 6/9 after being coded for PEA while at CT. Currently on 12L HF. Post extubation pt has been noted to be agitated, restless and paranoid. He is refusing meds, treatments and therapy. Prior to transfer to ICU pt was requesting ARU he was seen at the time by PM&R and deemed not a candidate for ARU. Lists were left for TRU and HHC at that time. Therapy will be needed to assist in discharge planning, should pt agree.  Wendie Ortiz [phone] is the pt's  with [facility], under the waiver program.

## 2021-06-11 ENCOUNTER — APPOINTMENT (OUTPATIENT)
Dept: GENERAL RADIOLOGY | Age: 51
DRG: 321 | End: 2021-06-11
Attending: NEUROLOGICAL SURGERY
Payer: MEDICAID

## 2021-06-11 LAB
ANION GAP SERPL CALCULATED.3IONS-SCNC: 10 MMOL/L (ref 7–16)
BASOPHILS ABSOLUTE: 0 E9/L (ref 0–0.2)
BASOPHILS RELATIVE PERCENT: 0.3 % (ref 0–2)
BUN BLDV-MCNC: 9 MG/DL (ref 6–20)
CALCIUM SERPL-MCNC: 9.3 MG/DL (ref 8.6–10.2)
CHLORIDE BLD-SCNC: 97 MMOL/L (ref 98–107)
CO2: 31 MMOL/L (ref 22–29)
CREAT SERPL-MCNC: 0.6 MG/DL (ref 0.7–1.2)
EOSINOPHILS ABSOLUTE: 0.92 E9/L (ref 0.05–0.5)
EOSINOPHILS RELATIVE PERCENT: 4.3 % (ref 0–6)
GFR AFRICAN AMERICAN: >60
GFR NON-AFRICAN AMERICAN: >60 ML/MIN/1.73
GLUCOSE BLD-MCNC: 119 MG/DL (ref 74–99)
HCT VFR BLD CALC: 31.3 % (ref 37–54)
HEMOGLOBIN: 9.7 G/DL (ref 12.5–16.5)
HYPOCHROMIA: ABNORMAL
LYMPHOCYTES ABSOLUTE: 1.94 E9/L (ref 1.5–4)
LYMPHOCYTES RELATIVE PERCENT: 8.7 % (ref 20–42)
MAGNESIUM: 2.1 MG/DL (ref 1.6–2.6)
MCH RBC QN AUTO: 22.7 PG (ref 26–35)
MCHC RBC AUTO-ENTMCNC: 31 % (ref 32–34.5)
MCV RBC AUTO: 73.1 FL (ref 80–99.9)
MONOCYTES ABSOLUTE: 2.8 E9/L (ref 0.1–0.95)
MONOCYTES RELATIVE PERCENT: 13 % (ref 2–12)
NEUTROPHILS ABSOLUTE: 15.91 E9/L (ref 1.8–7.3)
NEUTROPHILS RELATIVE PERCENT: 73.9 % (ref 43–80)
NUCLEATED RED BLOOD CELLS: 0.9 /100 WBC
PDW BLD-RTO: 15.5 FL (ref 11.5–15)
PHOSPHORUS: 3.4 MG/DL (ref 2.5–4.5)
PLATELET # BLD: 291 E9/L (ref 130–450)
PMV BLD AUTO: 10.2 FL (ref 7–12)
POLYCHROMASIA: ABNORMAL
POTASSIUM SERPL-SCNC: 3.4 MMOL/L (ref 3.5–5)
RBC # BLD: 4.28 E12/L (ref 3.8–5.8)
SODIUM BLD-SCNC: 138 MMOL/L (ref 132–146)
WBC # BLD: 21.5 E9/L (ref 4.5–11.5)

## 2021-06-11 PROCEDURE — 97168 OT RE-EVAL EST PLAN CARE: CPT

## 2021-06-11 PROCEDURE — 6360000002 HC RX W HCPCS: Performed by: INTERNAL MEDICINE

## 2021-06-11 PROCEDURE — 71045 X-RAY EXAM CHEST 1 VIEW: CPT

## 2021-06-11 PROCEDURE — 36415 COLL VENOUS BLD VENIPUNCTURE: CPT

## 2021-06-11 PROCEDURE — 2500000003 HC RX 250 WO HCPCS: Performed by: INTERNAL MEDICINE

## 2021-06-11 PROCEDURE — 2700000000 HC OXYGEN THERAPY PER DAY

## 2021-06-11 PROCEDURE — 94640 AIRWAY INHALATION TREATMENT: CPT

## 2021-06-11 PROCEDURE — 6370000000 HC RX 637 (ALT 250 FOR IP): Performed by: INTERNAL MEDICINE

## 2021-06-11 PROCEDURE — 2580000003 HC RX 258: Performed by: INTERNAL MEDICINE

## 2021-06-11 PROCEDURE — 97530 THERAPEUTIC ACTIVITIES: CPT

## 2021-06-11 PROCEDURE — 97164 PT RE-EVAL EST PLAN CARE: CPT

## 2021-06-11 PROCEDURE — 84100 ASSAY OF PHOSPHORUS: CPT

## 2021-06-11 PROCEDURE — 85025 COMPLETE CBC W/AUTO DIFF WBC: CPT

## 2021-06-11 PROCEDURE — 2060000000 HC ICU INTERMEDIATE R&B

## 2021-06-11 PROCEDURE — 6370000000 HC RX 637 (ALT 250 FOR IP): Performed by: NEUROLOGICAL SURGERY

## 2021-06-11 PROCEDURE — 80048 BASIC METABOLIC PNL TOTAL CA: CPT

## 2021-06-11 PROCEDURE — 97535 SELF CARE MNGMENT TRAINING: CPT

## 2021-06-11 PROCEDURE — 99233 SBSQ HOSP IP/OBS HIGH 50: CPT | Performed by: INTERNAL MEDICINE

## 2021-06-11 PROCEDURE — 83735 ASSAY OF MAGNESIUM: CPT

## 2021-06-11 RX ORDER — HYDROCODONE BITARTRATE AND ACETAMINOPHEN 10; 325 MG/1; MG/1
1 TABLET ORAL EVERY 4 HOURS PRN
Status: DISCONTINUED | OUTPATIENT
Start: 2021-06-11 | End: 2021-06-14 | Stop reason: HOSPADM

## 2021-06-11 RX ORDER — CYCLOBENZAPRINE HCL 10 MG
10 TABLET ORAL 3 TIMES DAILY PRN
Qty: 40 TABLET | Refills: 0 | Status: SHIPPED | OUTPATIENT
Start: 2021-06-11 | End: 2021-07-01 | Stop reason: SDUPTHER

## 2021-06-11 RX ORDER — HYDROCODONE BITARTRATE AND ACETAMINOPHEN 10; 325 MG/1; MG/1
1 TABLET ORAL EVERY 4 HOURS PRN
Qty: 42 TABLET | Refills: 0 | Status: SHIPPED | OUTPATIENT
Start: 2021-06-11 | End: 2021-06-21 | Stop reason: SDUPTHER

## 2021-06-11 RX ORDER — HYDROCODONE BITARTRATE AND ACETAMINOPHEN 7.5; 325 MG/1; MG/1
1 TABLET ORAL EVERY 4 HOURS PRN
Status: DISCONTINUED | OUTPATIENT
Start: 2021-06-11 | End: 2021-06-11

## 2021-06-11 RX ADMIN — ACETAMINOPHEN 650 MG: 325 TABLET ORAL at 05:34

## 2021-06-11 RX ADMIN — IPRATROPIUM BROMIDE AND ALBUTEROL SULFATE 1 AMPULE: 2.5; .5 SOLUTION RESPIRATORY (INHALATION) at 08:18

## 2021-06-11 RX ADMIN — SODIUM CHLORIDE, PRESERVATIVE FREE 10 ML: 5 INJECTION INTRAVENOUS at 13:04

## 2021-06-11 RX ADMIN — Medication 10 ML: at 21:59

## 2021-06-11 RX ADMIN — HYDROCODONE BITARTRATE AND ACETAMINOPHEN 1 TABLET: 7.5; 325 TABLET ORAL at 13:03

## 2021-06-11 RX ADMIN — HYDROCODONE BITARTRATE AND ACETAMINOPHEN 1 TABLET: 10; 325 TABLET ORAL at 18:14

## 2021-06-11 RX ADMIN — HYDROCODONE BITARTRATE AND ACETAMINOPHEN 1 TABLET: 7.5; 325 TABLET ORAL at 07:30

## 2021-06-11 RX ADMIN — VALPROATE SODIUM 500 MG: 100 INJECTION, SOLUTION INTRAVENOUS at 10:17

## 2021-06-11 RX ADMIN — ARFORMOTEROL TARTRATE 15 MCG: 15 SOLUTION RESPIRATORY (INHALATION) at 20:20

## 2021-06-11 RX ADMIN — ACETYLCYSTEINE 600 MG: 200 SOLUTION ORAL; RESPIRATORY (INHALATION) at 20:20

## 2021-06-11 RX ADMIN — BUDESONIDE 250 MCG: 0.25 SUSPENSION RESPIRATORY (INHALATION) at 08:18

## 2021-06-11 RX ADMIN — ACETYLCYSTEINE 600 MG: 200 SOLUTION ORAL; RESPIRATORY (INHALATION) at 08:18

## 2021-06-11 RX ADMIN — HYDROCODONE BITARTRATE AND ACETAMINOPHEN 1 TABLET: 10; 325 TABLET ORAL at 22:17

## 2021-06-11 RX ADMIN — IPRATROPIUM BROMIDE AND ALBUTEROL SULFATE 1 AMPULE: 2.5; .5 SOLUTION RESPIRATORY (INHALATION) at 12:23

## 2021-06-11 RX ADMIN — Medication 10 ML: at 10:18

## 2021-06-11 RX ADMIN — BUDESONIDE 250 MCG: 0.25 SUSPENSION RESPIRATORY (INHALATION) at 20:20

## 2021-06-11 RX ADMIN — VALPROATE SODIUM 500 MG: 100 INJECTION, SOLUTION INTRAVENOUS at 21:48

## 2021-06-11 RX ADMIN — ARFORMOTEROL TARTRATE 15 MCG: 15 SOLUTION RESPIRATORY (INHALATION) at 08:18

## 2021-06-11 RX ADMIN — IPRATROPIUM BROMIDE AND ALBUTEROL SULFATE 1 AMPULE: 2.5; .5 SOLUTION RESPIRATORY (INHALATION) at 20:20

## 2021-06-11 RX ADMIN — CEFEPIME HYDROCHLORIDE 1000 MG: 1 INJECTION, POWDER, FOR SOLUTION INTRAMUSCULAR; INTRAVENOUS at 13:04

## 2021-06-11 RX ADMIN — SODIUM CHLORIDE: 9 INJECTION, SOLUTION INTRAVENOUS at 01:34

## 2021-06-11 RX ADMIN — CYCLOBENZAPRINE 10 MG: 10 TABLET, FILM COATED ORAL at 21:54

## 2021-06-11 RX ADMIN — CYCLOBENZAPRINE 10 MG: 10 TABLET, FILM COATED ORAL at 05:34

## 2021-06-11 ASSESSMENT — PAIN DESCRIPTION - PAIN TYPE
TYPE: ACUTE PAIN;CHRONIC PAIN;SURGICAL PAIN

## 2021-06-11 ASSESSMENT — PAIN DESCRIPTION - FREQUENCY
FREQUENCY: CONTINUOUS

## 2021-06-11 ASSESSMENT — PAIN SCALES - GENERAL
PAINLEVEL_OUTOF10: 0
PAINLEVEL_OUTOF10: 8
PAINLEVEL_OUTOF10: 10
PAINLEVEL_OUTOF10: 8
PAINLEVEL_OUTOF10: 8
PAINLEVEL_OUTOF10: 9
PAINLEVEL_OUTOF10: 8

## 2021-06-11 ASSESSMENT — PAIN DESCRIPTION - DESCRIPTORS
DESCRIPTORS: CONSTANT;DISCOMFORT;SHARP;SORE
DESCRIPTORS: ACHING;DISCOMFORT;SORE;THROBBING
DESCRIPTORS: CONSTANT;DISCOMFORT;SHARP;SORE

## 2021-06-11 ASSESSMENT — PAIN DESCRIPTION - ONSET
ONSET: ON-GOING
ONSET: ON-GOING

## 2021-06-11 ASSESSMENT — PAIN DESCRIPTION - PROGRESSION
CLINICAL_PROGRESSION: NOT CHANGED
CLINICAL_PROGRESSION: NOT CHANGED

## 2021-06-11 ASSESSMENT — PAIN DESCRIPTION - ORIENTATION
ORIENTATION: POSTERIOR
ORIENTATION: POSTERIOR

## 2021-06-11 ASSESSMENT — PAIN DESCRIPTION - LOCATION
LOCATION: HEAD;NECK
LOCATION: BACK;NECK
LOCATION: HEAD;NECK

## 2021-06-11 NOTE — PROGRESS NOTES
[address]      Date:2021                                                Patient Name: Julio Booth  MRN: [id number]  : 1970  Room: [address] [place] [address]A    Evaluating OT: Celso Thompson OTR/L #[id number]  Re-Evaluating OT:  Farooq Anjel, COLIN, OTR/L  # [id number]    Initial Eval completed 6-3-21. Pt experienced Respiratory distress - RRT Called 21 - Pt intubated and Transferred to ICU. Continued Hypoxia and Respiratory Distress. Transferred out of ICU - medically stable for OT Re-Assessment on 21.        Referring Provider: Jose Eisenberg MD  Specific Provider Orders/Date: OT eval and treat 21    Diagnosis: Cervical Herniated Disc   Pt admitted to hospital for surgical consult    Surgery / Procedure: 21: C4-C5, C5-C6, C6-C7  ANTERIOR CERVICAL DISCECTOMY AND FUSION -- NEEDS REGULAR BED WITH HORSE SHOE, PLATES, SCREWS -- GLOBUS (N/A Neck)    Pertinent Medical History: Chronic generalized pain, Lyme Disease, Migraines     Precautions:  Fall Risk, Cervical Spinal Precautions, C-Collar;  12L HFNC    Assessment of current deficits    [x] Functional mobility  [x]ADLs  [x] Strength               []Cognition    [x] Functional transfers         [x] IADLs         [x] Safety Awareness   [x]Endurance    [] Fine Coordination   [x] Balance     [] Vision/perception   []Sensation     []Gross Motor Coordination [] ROM  [] Delirium                   [] Motor Control     OT PLAN OF CARE   OT POC based on physician orders, patient diagnosis and results of clinical assessment    Frequency/Duration: 1-3 days/wk for 2 weeks PRN   Specific OT Treatment Interventions to include:   * Instruction/training on adapted ADL techniques and AE recommendations to increase functional independence within        precautions  * Training on energy conservation strategies, correct breathing pattern and techniques to improve independence/tolerance for self-care routine  * Functional transfer/mobility training/DME recommendations for increased independence, safety, and fall prevention  * Patient/Family education to increase follow through with safety techniques and functional independence  * Recommendation of environmental modifications for increased safety with functional transfers/mobility and ADLs  * Therapeutic activities to facilitate/challenge dynamic balance, stand tolerance for increased safety and independence with ADLs  * Therapeutic activities to facilitate gross/fine motor skills for increased independence with ADLs  * Positioning to improve skin integrity, interaction with environment and functional independence      Recommended Adaptive Equipment: TBD - Adaptive equipment     Home Living: Pt lives alone in a 1 story home with 3 ALBERT (1HR) to enter, pt reports HHA 10 hours/day    Bathroom setup: Claw Tub, Low Commode    Equipment owned: [facility]    Prior Level of Function: Mod I with ADLs , Assistance with IADLs; ambulated without AD   Driving: no   Occupation: musician     Pain Level: 8/10 neck/generalized;  Therapist facilitated repositioning to address pain    Cognition: A&O: 4/4; Follows 2 step directions   Memory:  Good   Sequencing: Good   Problem solving:  Good(-)   Judgement/safety:  Good(-)     Functional Assessment:  AM-PAC Daily Activity Raw Score: 16/24   Re - Eval Status  Date: 6/11/21 Treatment Status  Date: STGs = LTGs  Time frame: 10-14 days   Feeding Independent      Grooming Min A    Simulated in standing, Min A for safety w/ standing ax, pt ed for techs to improve safety/adherene to Spinal prec w/ task    Mod Independent    UB Dressing Minimal Assist     Donning Robe in standing  Mod Independent    LB Dressing Mod A     Able to don/doff socks w/ cross legged tech, thread LEs into pants, Min A to pull pants over hips + Min A for standing balance + Set up, Pt ed for spinal precautions/safety SUP    Bathing Mod Assist  Minimal Assist    Toileting Mod Assist  SUP   Bed Mobility  Supine to sit: NT  Sit to supine: SUP - w/ HOB elevated    Pt in chair start of session  Pt ed for log-rolling tech, pt requested to transfer w/ HOB elevated    Supine to sit: Mod I   Sit to supine: Mod I   Functional Transfers Minimal Assist     Standing from Low chair, SUP to sit on EOB, Min VCs for safety/hand placement    Modified [place]    Functional Mobility Min Assist     Functional mobility using w/w short distances at b/s - in preparation for ambulation to/from bathroom; limited due to c/o pain, O2 requirements    Modified [place]    Balance Sitting:     Static:  IND in chair, SBA EOB    Dynamic:Close SUP  Standing: Min A w/ [employer]  Sitting:     Static:  Independent    Dynamic:Independent  Standing: Mod I   Activity Tolerance F-    O2 sats 96% w 12L HF    F+   Visual/  Perceptual Glasses: yes, wfl                  Hand Dominance Left   AROM (PROM) Strength Additional Info:    RUE  WFL NT d/t spinal precautions, grossly wfl good  and wfl FMC/dexterity noted during ADL tasks       LUE WFL NT d/t spinal precautions, grossly wfl good  and wfl FMC/dexterity noted during ADL tasks       Hearing: WFL   Sensation:  No c/o numbness or tingling   Tone: WFL   Edema: WFL    Comments: Nursing clearance obtained prior to session. Upon arrival patient found in b/s chair. Agreeable to OT session. Therapist educated pt on role of OT. At end of session, patient requested to return to Bed-Level - High dorado position. Call light and phone within reach, all lines and tubes intact. Overall patient demonstrated decreased independence and safety during completion of ADL/functional transfer/mobility tasks. Pt would benefit from continued skilled OT to increase safety and independence with completion of ADL/IADL tasks for functional independence and quality of life.     Treatment: OT treatment provided this date includes: level of function, interpretation of standardized testing/informal observation of tasks, assessment of data and development of plan of care and goals.         Owen Mcconnell, MOT, OTR/L  # [id number]

## 2021-06-11 NOTE — PROGRESS NOTES
Department of Neurosurgery  Progress Note    CHIEF COMPLAINT: s/p C4-C7 ACDF 6/2    SUBJECTIVE: feeling much better. aviva op site pain OK    REVIEW OF SYSTEMS :  No CP. Some SOB    OBJECTIVE:   VITALS:  BP (!) 153/106   Pulse 91   Temp 97.8 °F (36.6 °C) (Oral)   Resp 26   Ht 5' 5\" (1.651 m)   Wt 183 lb (83 kg)   SpO2 97%   BMI 30.45 kg/m²     PHYSICAL:  Neurologic: awake and alert  Incision c/d/i  Collar intact   ARGUETA.       DATA:  CBC:   Lab Results   Component Value Date    WBC 21.5 06/11/2021    RBC 4.28 06/11/2021    HGB 9.7 06/11/2021    HCT 31.3 06/11/2021    MCV 73.1 06/11/2021    MCH 22.7 06/11/2021    MCHC 31.0 06/11/2021    RDW 15.5 06/11/2021     06/11/2021    MPV 10.2 06/11/2021     BMP:    Lab Results   Component Value Date     06/11/2021    K 3.4 06/11/2021    K 4.3 05/26/2021    CL 97 06/11/2021    CO2 31 06/11/2021    BUN 9 06/11/2021    LABALBU 3.6 06/05/2021    CREATININE 0.6 06/11/2021    CALCIUM 9.3 06/11/2021    GFRAA >60 06/11/2021    LABGLOM >60 06/11/2021    GLUCOSE 119 06/11/2021     PT/INR:    Lab Results   Component Value Date    PROTIME 10.8 05/26/2021    INR 1.0 05/26/2021     PTT:    Lab Results   Component Value Date    APTT 32.9 08/07/2018   [APTT}    Current Inpatient Medications  Current Facility-Administered Medications: HYDROcodone-acetaminophen (NORCO)  MG per tablet 1 tablet, 1 tablet, Oral, Q4H PRN  labetalol (NORMODYNE;TRANDATE) injection 10 mg, 10 mg, Intravenous, Q4H PRN  valproate (DEPACON) 500 mg in dextrose 5 % 100 mL IVPB, 500 mg, Intravenous, BID  pregabalin (LYRICA) capsule 225 mg, 225 mg, Oral, BID  acetylcysteine (MUCOMYST) 20 % solution FOR INHALATION 600 mg, 600 mg, Inhalation, BID  ipratropium-albuterol (DUONEB) nebulizer solution 1 ampule, 1 ampule, Inhalation, Q4H WA  budesonide (PULMICORT) nebulizer suspension 250 mcg, 250 mcg, Nebulization, BID  Arformoterol Tartrate (BROVANA) nebulizer solution 15 mcg, 15 mcg, Nebulization,

## 2021-06-11 NOTE — PROGRESS NOTES
Pt is A & O x 4  Sensation:  Pt denies numbness and tingling to extremities  Edema:  Unremarkable    Vitals:  SpO2 93-94% on 12 L HFNC   SPO2 93-94% at EOB on 15 L HFNC   SpO2 100% during ambulation on 15 L HFNC  SpO2 94-95% post ambulation on 15 L HFNC     Therapeutic Exercises:    BLE ROM    Patient education  Pt educated on PT role, safety during functional mobility, cervical precautions     Patient response to education:   Pt verbalized understanding Pt demonstrated skill Pt requires further education in this area   Yes  Yes  No      ASSESSMENT:    Conditions Requiring Skilled Therapeutic Intervention:    [x]Decreased strength     []Decreased ROM  [x]Decreased functional mobility  [x]Decreased balance   [x]Decreased endurance   []Decreased posture  []Decreased sensation  [x]Decreased coordination   []Decreased vision  []Decreased safety awareness   [x]Increased pain       Comments:  Pt received supine and agreeable to PT re evaluation. Vitals monitored during session. Pt required no physical assistance OOB. Utilized [employer] for ambulation. Pt placed on 15 L HFNC d/t constraints of portable O2 tank. Pt ambulates with slow but mostly steady gait requiring hands on assistance for balance and safety. Pt would abruptly stop at times to avoid obstacles with [employer].  Pt shutting his eyes, cued on upward gaze and eye opening. Pt denied dizziness stating mask was bothering his eyes. Pt able to ambulate around unit and back to room. Pt set up in bedside chair on exit. Pt left with call button in reach, lines attached, and needs met.       Treatment:  Patient practiced and was instructed in the following treatment:     Bed mobility training - pt given verbal and tactile cues to facilitate proper sequencing and safety during supine>sit as well as provided with physical assistance to complete task    STS and pivot transfer training - pt educated on proper hand and foot placement, safety and sequencing, and use of WW to safely complete sit<>stand and pivot transfers with hands on assistance to complete task safely     Gait training- pt was given verbal and tactile cues to facilitate safety/balance, eye opening, WW usage during ambulation as well as provided with physical assistance to complete task. Pt's/ family goals   1. Home with dtr     Prognosis is good for reaching above PT goals. Patient and or family understand(s) diagnosis, prognosis, and plan of care. Yes     PHYSICAL THERAPY PLAN OF CARE:    PT POC is established based on physician order and patient diagnosis     Referring provider/PT Order:  6/2/21 PT eval and brannon, Harry Murry MD  Diagnosis:  Cervical herniated disc [M50.20]  Specific instructions for next treatment:  Progress gait, trial AAD vs no AD, stair negotiation     Current Treatment Recommendations:     [x] Strengthening to improve independence with functional mobility   [] ROM to improve independence with functional mobility   [x] Balance Training to improve static/dynamic balance and to reduce fall risk  [x] Endurance Training to improve activity tolerance during functional mobility   [x] Transfer Training to improve safety and independence with all functional transfers   [x] Gait Training to improve gait mechanics, endurance and asses need for appropriate assistive device  [x] Stair Training in preparation for safe discharge home and/or into the community   [] Positioning to prevent skin breakdown and contractures  [x] Safety and Education Training   [x] Patient/Caregiver Education   [] HEP  [] Other     PT long term treatment goals are located in above grid    Frequency of treatments: 2-5x/week x 1-2 weeks.     Time in  1330  Time out  1405    Total Treatment Time  23 minutes     Re Evaluation Time includes thorough review of current medical information, gathering information on past medical history/social history and prior level of function, completion of standardized testing/informal observation of tasks, assessment of data and education on plan of care and goals.     CPT codes:  [] Low Complexity PT evaluation 43510  [] Moderate Complexity PT evaluation 97168  [] High Complexity PT evaluation 56936  [x] PT Re-evaluation 55409  [] Gait training 20906 -- minutes  [] Manual therapy 63658 -- minutes  [x] Therapeutic activities 51918 23 minutes  [] Therapeutic exercises 94390 -- minutes  [] Neuromuscular reeducation 57172 -- minutes     Muriel Sierra, PT, DPT  [id number]

## 2021-06-11 NOTE — CARE COORDINATION
Transition of Care-Spoke with attending physician regarding discharge planning, hopeful patient can return home at discharge, pending PT/OT evaluations. Spoke to OT and requested therapy to please work with patient today to get baseline assessment. Patient had a C4-C7 fusion, fell on 6/5, coded while at CT for PEA, went into resp arrest, intubated, extubated 6/9, remains on 12L HF, will consult Pulmonary today to follow, remains on Cefepime Q12, will clarify if needed. Patient has lists for SNF and HHC, may have criteria for LTACH. Discharge pending oxygen weaning and pulmonary plan of care, determination for antibiotic need and rehab need. CM following.     Mckenzie GASPARN, RN  [facility] [place]   [phone]

## 2021-06-11 NOTE — PROGRESS NOTES
cefepime  1,000 mg Intravenous Q12H    enoxaparin  40 mg Subcutaneous Daily    sodium chloride flush  5-40 mL Intravenous 2 times per day    polyethylene glycol  17 g Oral Daily    bisacodyl  5 mg Oral Daily    sennosides-docusate sodium  1 tablet Oral BID      Infusions:    sodium chloride      dextrose       PRN Meds: HYDROcodone-acetaminophen, 1 tablet, Q4H PRN  labetalol, 10 mg, Q4H PRN  cyclobenzaprine, 10 mg, TID PRN  sodium chloride flush, 5-40 mL, PRN  sodium chloride, 25 mL, PRN  benzocaine-menthol, 1 lozenge, Q2H PRN  acetaminophen, 650 mg, Q4H PRN  glucose, 15 g, PRN  dextrose, 12.5 g, PRN  glucagon (rDNA), 1 mg, PRN  dextrose, 100 mL/hr, PRN  diphenhydrAMINE, 25 mg, Q6H PRN        Data/Labs:     Recent Labs     06/09/21  0515 06/10/21  0447 06/11/21  0455   WBC 14.2* 23.0* 21.5*   HGB 9.2* 10.0* 9.7*   HCT 30.1* 31.8* 31.3*    296 291      Recent Labs     06/09/21 0515 06/10/21  0447 06/11/21  0455    142 138   K 3.7 3.8 3.4*    103 97*   CO2 29 26 31*   PHOS 3.2 3.1 3.4   BUN 14 12 9   CREATININE 0.8 0.7 0.6*     No results for input(s): AST, ALT, ALB, BILIDIR, BILITOT, ALKPHOS in the last 72 hours. No results for input(s): INR in the last 72 hours. No results for input(s): CKTOTAL, CKMB, CKMBINDEX, TROPONINT in the last 72 hours. I/O last 3 completed shifts: In: 460 [P.O.:460]  Out: 850 [Urine:850]    Intake/Output Summary (Last 24 hours) at 6/11/2021 1206  Last data filed at 6/10/2021 1830  Gross per 24 hour   Intake 360 ml   Output --   Net 360 ml        Assessment/Plan:   1.  Acute hypoxic respiratory failure probably due to aspiration pneumonia status post intubation-patient continues to be on the ventilator pulmonary critical care is following  06/05/2021-patient remains awake while intubated in ICU is able to write and communicate; appears anxious  06/09/2021-patient was extubated this morning successfully he is talking maintaining his airway voice is slightly hoarse remains a little anxious we will continue to monitor  2. PEA/cardiac arrest-continue management as above patient will stay rapid cardiology is following echo is pending  3. Hospital-acquired pneumonia-CT shows bilateral infiltrates left greater than right continue IV antibiotics critical care pulmonary is following monitor lab studies  4. Cervical herniated disc status post anterior cervical discectomy and fusion on 6/02/21 per neurosurgery-patient is to have collar on the neck remain x3 months pain is controlled with medication  5. Mild rhabdo myelolysis-CK is elevated, maintain hydration, monitor lab studies  06/10/2021-continue to monitor CK is decreasing  6. Seizure precautions-valproic acid IV monitor    DVT Prophylaxis: Lovenox  Diet: ADULT DIET; Regular  Code Status: Full Code    Dispo:  When stable    Electronically signed by Hill Garcia MD on 6/11/2021 at 12:06 PM  [facility]

## 2021-06-12 LAB
ANION GAP SERPL CALCULATED.3IONS-SCNC: 10 MMOL/L (ref 7–16)
BASOPHILS ABSOLUTE: 0 E9/L (ref 0–0.2)
BASOPHILS RELATIVE PERCENT: 0.5 % (ref 0–2)
BUN BLDV-MCNC: 8 MG/DL (ref 6–20)
C-REACTIVE PROTEIN: 21.1 MG/DL (ref 0–0.4)
CALCIUM SERPL-MCNC: 9.3 MG/DL (ref 8.6–10.2)
CHLORIDE BLD-SCNC: 93 MMOL/L (ref 98–107)
CO2: 32 MMOL/L (ref 22–29)
CREAT SERPL-MCNC: 0.6 MG/DL (ref 0.7–1.2)
EOSINOPHILS ABSOLUTE: 0.29 E9/L (ref 0.05–0.5)
EOSINOPHILS RELATIVE PERCENT: 1.7 % (ref 0–6)
GFR AFRICAN AMERICAN: >60
GFR NON-AFRICAN AMERICAN: >60 ML/MIN/1.73
GLUCOSE BLD-MCNC: 97 MG/DL (ref 74–99)
HCT VFR BLD CALC: 33.9 % (ref 37–54)
HEMOGLOBIN: 10.4 G/DL (ref 12.5–16.5)
LYMPHOCYTES ABSOLUTE: 2.06 E9/L (ref 1.5–4)
LYMPHOCYTES RELATIVE PERCENT: 12.2 % (ref 20–42)
MAGNESIUM: 2 MG/DL (ref 1.6–2.6)
MCH RBC QN AUTO: 22.7 PG (ref 26–35)
MCHC RBC AUTO-ENTMCNC: 30.7 % (ref 32–34.5)
MCV RBC AUTO: 74 FL (ref 80–99.9)
METAMYELOCYTES RELATIVE PERCENT: 1.7 % (ref 0–1)
MONOCYTES ABSOLUTE: 1.2 E9/L (ref 0.1–0.95)
MONOCYTES RELATIVE PERCENT: 7 % (ref 2–12)
MYELOCYTE PERCENT: 1.7 % (ref 0–0)
NEUTROPHILS ABSOLUTE: 13.59 E9/L (ref 1.8–7.3)
NEUTROPHILS RELATIVE PERCENT: 75.7 % (ref 43–80)
NUCLEATED RED BLOOD CELLS: 0.9 /100 WBC
PDW BLD-RTO: 15.6 FL (ref 11.5–15)
PHOSPHORUS: 4.2 MG/DL (ref 2.5–4.5)
PLATELET # BLD: 301 E9/L (ref 130–450)
PMV BLD AUTO: 10.4 FL (ref 7–12)
POIKILOCYTES: ABNORMAL
POLYCHROMASIA: ABNORMAL
POTASSIUM SERPL-SCNC: 3.3 MMOL/L (ref 3.5–5)
PROCALCITONIN: 0.86 NG/ML (ref 0–0.08)
RBC # BLD: 4.58 E12/L (ref 3.8–5.8)
SODIUM BLD-SCNC: 135 MMOL/L (ref 132–146)
TARGET CELLS: ABNORMAL
WBC # BLD: 17.2 E9/L (ref 4.5–11.5)

## 2021-06-12 PROCEDURE — 99233 SBSQ HOSP IP/OBS HIGH 50: CPT | Performed by: INTERNAL MEDICINE

## 2021-06-12 PROCEDURE — 84100 ASSAY OF PHOSPHORUS: CPT

## 2021-06-12 PROCEDURE — 87206 SMEAR FLUORESCENT/ACID STAI: CPT

## 2021-06-12 PROCEDURE — 6370000000 HC RX 637 (ALT 250 FOR IP): Performed by: INTERNAL MEDICINE

## 2021-06-12 PROCEDURE — 94640 AIRWAY INHALATION TREATMENT: CPT

## 2021-06-12 PROCEDURE — 85025 COMPLETE CBC W/AUTO DIFF WBC: CPT

## 2021-06-12 PROCEDURE — 6370000000 HC RX 637 (ALT 250 FOR IP): Performed by: NEUROLOGICAL SURGERY

## 2021-06-12 PROCEDURE — 86140 C-REACTIVE PROTEIN: CPT

## 2021-06-12 PROCEDURE — 2580000003 HC RX 258: Performed by: INTERNAL MEDICINE

## 2021-06-12 PROCEDURE — 2700000000 HC OXYGEN THERAPY PER DAY

## 2021-06-12 PROCEDURE — 6360000002 HC RX W HCPCS: Performed by: INTERNAL MEDICINE

## 2021-06-12 PROCEDURE — 83735 ASSAY OF MAGNESIUM: CPT

## 2021-06-12 PROCEDURE — 80048 BASIC METABOLIC PNL TOTAL CA: CPT

## 2021-06-12 PROCEDURE — 36415 COLL VENOUS BLD VENIPUNCTURE: CPT

## 2021-06-12 PROCEDURE — 84145 PROCALCITONIN (PCT): CPT

## 2021-06-12 PROCEDURE — 87070 CULTURE OTHR SPECIMN AEROBIC: CPT

## 2021-06-12 PROCEDURE — 2060000000 HC ICU INTERMEDIATE R&B

## 2021-06-12 PROCEDURE — 2500000003 HC RX 250 WO HCPCS: Performed by: INTERNAL MEDICINE

## 2021-06-12 RX ORDER — POTASSIUM CHLORIDE 7.45 MG/ML
10 INJECTION INTRAVENOUS PRN
Status: DISCONTINUED | OUTPATIENT
Start: 2021-06-12 | End: 2021-06-14 | Stop reason: HOSPADM

## 2021-06-12 RX ORDER — SODIUM CHLORIDE FOR INHALATION 3 %
4 VIAL, NEBULIZER (ML) INHALATION EVERY 12 HOURS
Status: DISCONTINUED | OUTPATIENT
Start: 2021-06-12 | End: 2021-06-14 | Stop reason: HOSPADM

## 2021-06-12 RX ORDER — POTASSIUM CHLORIDE 20 MEQ/1
40 TABLET, EXTENDED RELEASE ORAL PRN
Status: DISCONTINUED | OUTPATIENT
Start: 2021-06-12 | End: 2021-06-14 | Stop reason: HOSPADM

## 2021-06-12 RX ADMIN — Medication 10 ML: at 21:22

## 2021-06-12 RX ADMIN — BUDESONIDE 250 MCG: 0.25 SUSPENSION RESPIRATORY (INHALATION) at 09:07

## 2021-06-12 RX ADMIN — ACETYLCYSTEINE 600 MG: 200 SOLUTION ORAL; RESPIRATORY (INHALATION) at 09:07

## 2021-06-12 RX ADMIN — CYCLOBENZAPRINE 10 MG: 10 TABLET, FILM COATED ORAL at 13:48

## 2021-06-12 RX ADMIN — HYDROCODONE BITARTRATE AND ACETAMINOPHEN 1 TABLET: 10; 325 TABLET ORAL at 02:50

## 2021-06-12 RX ADMIN — CEFEPIME HYDROCHLORIDE 1000 MG: 1 INJECTION, POWDER, FOR SOLUTION INTRAMUSCULAR; INTRAVENOUS at 00:28

## 2021-06-12 RX ADMIN — HYDROCODONE BITARTRATE AND ACETAMINOPHEN 1 TABLET: 10; 325 TABLET ORAL at 13:48

## 2021-06-12 RX ADMIN — CEFEPIME HYDROCHLORIDE 1000 MG: 1 INJECTION, POWDER, FOR SOLUTION INTRAMUSCULAR; INTRAVENOUS at 22:17

## 2021-06-12 RX ADMIN — SODIUM CHLORIDE: 9 INJECTION, SOLUTION INTRAVENOUS at 05:23

## 2021-06-12 RX ADMIN — CEFEPIME HYDROCHLORIDE 1000 MG: 1 INJECTION, POWDER, FOR SOLUTION INTRAMUSCULAR; INTRAVENOUS at 10:30

## 2021-06-12 RX ADMIN — VALPROATE SODIUM 500 MG: 100 INJECTION, SOLUTION INTRAVENOUS at 09:30

## 2021-06-12 RX ADMIN — SODIUM CHLORIDE: 9 INJECTION, SOLUTION INTRAVENOUS at 14:30

## 2021-06-12 RX ADMIN — VALPROATE SODIUM 500 MG: 100 INJECTION, SOLUTION INTRAVENOUS at 21:10

## 2021-06-12 RX ADMIN — CYCLOBENZAPRINE 10 MG: 10 TABLET, FILM COATED ORAL at 05:46

## 2021-06-12 RX ADMIN — HYDROCODONE BITARTRATE AND ACETAMINOPHEN 1 TABLET: 10; 325 TABLET ORAL at 19:54

## 2021-06-12 RX ADMIN — ARFORMOTEROL TARTRATE 15 MCG: 15 SOLUTION RESPIRATORY (INHALATION) at 09:07

## 2021-06-12 RX ADMIN — HYDROCODONE BITARTRATE AND ACETAMINOPHEN 1 TABLET: 10; 325 TABLET ORAL at 06:58

## 2021-06-12 RX ADMIN — Medication 10 ML: at 08:56

## 2021-06-12 RX ADMIN — IPRATROPIUM BROMIDE AND ALBUTEROL SULFATE 1 AMPULE: 2.5; .5 SOLUTION RESPIRATORY (INHALATION) at 09:06

## 2021-06-12 RX ADMIN — SODIUM CHLORIDE, PRESERVATIVE FREE 10 ML: 5 INJECTION INTRAVENOUS at 00:27

## 2021-06-12 ASSESSMENT — PAIN SCALES - WONG BAKER: WONGBAKER_NUMERICALRESPONSE: 0

## 2021-06-12 ASSESSMENT — PAIN DESCRIPTION - PAIN TYPE
TYPE: ACUTE PAIN;SURGICAL PAIN

## 2021-06-12 ASSESSMENT — PAIN SCALES - GENERAL
PAINLEVEL_OUTOF10: 0
PAINLEVEL_OUTOF10: 6
PAINLEVEL_OUTOF10: 0
PAINLEVEL_OUTOF10: 6
PAINLEVEL_OUTOF10: 0
PAINLEVEL_OUTOF10: 8
PAINLEVEL_OUTOF10: 8
PAINLEVEL_OUTOF10: 0
PAINLEVEL_OUTOF10: 8
PAINLEVEL_OUTOF10: 8
PAINLEVEL_OUTOF10: 0
PAINLEVEL_OUTOF10: 9

## 2021-06-12 ASSESSMENT — PAIN DESCRIPTION - FREQUENCY
FREQUENCY: CONTINUOUS

## 2021-06-12 ASSESSMENT — PAIN DESCRIPTION - DESCRIPTORS
DESCRIPTORS: ACHING;CONSTANT;DISCOMFORT
DESCRIPTORS: ACHING;CONSTANT;DISCOMFORT;SORE
DESCRIPTORS: ACHING;DISCOMFORT;SORE
DESCRIPTORS: ACHING

## 2021-06-12 ASSESSMENT — PAIN DESCRIPTION - LOCATION
LOCATION: BACK;NECK
LOCATION: GENERALIZED;NECK
LOCATION: BACK;NECK
LOCATION: BACK;NECK

## 2021-06-12 ASSESSMENT — PAIN DESCRIPTION - ORIENTATION
ORIENTATION: MID
ORIENTATION: ANTERIOR

## 2021-06-12 ASSESSMENT — PAIN DESCRIPTION - PROGRESSION
CLINICAL_PROGRESSION: NOT CHANGED
CLINICAL_PROGRESSION: GRADUALLY IMPROVING

## 2021-06-12 ASSESSMENT — PAIN DESCRIPTION - ONSET
ONSET: ON-GOING

## 2021-06-12 NOTE — PROGRESS NOTES
Patient adamantly refusing all stool softeners and Lovenox stating \"I don't need those, I haven't been taking them! \"

## 2021-06-12 NOTE — PROGRESS NOTES
Pulmonary [facility]                               Division of Pulmonary, Critical Care            Pulmonary Progress Note           CC :follow up of respiratory failure Hypoxia    SUBJECTIVE:  Patient was transferred from ICU   Still require high flow O2  Has some cough but can not bring much sputum  No chest pain   No in distress          OBJECTIVE:  Vitals:    06/11/21 1615   BP: 128/82   Pulse: 89   Resp: 20   Temp: 98 °F (36.7 °C)   SpO2: 97%     Constitutional: Alert, cooperative. EENT: EOMI HAI. MMM. No icterus. No thrush. Neck: No thyromegaly. No elevated JVP. Trachea was midline. Respiratory: rhonchi bilateral   Cardiovascular: Regular, No murmur. No clicks, gallops or rubs. Pulses:  Equal bilaterally. Abdomen: Soft without organomegaly. No rebound, rigidity. No guarding. Lymphatic: No lymphadenopathy. Musculoskeletal: Without weakness  Extremities:  No lower extremity edema. Reflexes appear adequate. Skin:  Warm and dry. No skin rashes. Neurological/Psychiatric: No acute psychosis. Cranial nerves II-XII are intact.         DATA:  CXR ordered  Sputum ordered    CLINICAL ASSESMENT:  · Acute hypoxic respiratory failure 2/2 aspiration pneumonia  · S/p respiratory arrest, 3 minutes CPR, ROSC achieved  · Aspiration pneumonia, on antibiotics  · S/p C4-C7 anterior cervical discectomy and fusion  · History of migraine  · Microcytic anemia  · Elevated troponins, post respiratory arrest         PLAN:   1-Aggressive pulmonary toilet ,include incentive spirometry   2- CXR   3-sputum culture   4-procal   5- if not better ,need CTA and may consider bronch ,for now will review CXR   Keep DVT px ,  Increase wbc concerning ,will see CXR  Up to chair  Advise to use NIMV at night with his cervical spine issues   Will follow along       Thank you for allowing us to participate in the management of this patient should you   have any questions please do not hesitate to call pulmonary service [phone] , or on call  pulmonary physician      Veverly Lundborg Zoby,MD   Pulmonary/Critical Care

## 2021-06-12 NOTE — PROGRESS NOTES
HOSPITALIST PROGRESS NOTE  Date: 6/12/2021   Name: Jennie Mccormick   MRN: [id number]   YOB: 1970        Hospital Course:   Mr Kajal Julien 44 YM admitted for neck pain due to cervical herniated disc, patient had C4-C5 C5-C6 C6-C7 anterior cervical discectomy and fusion done on 6/2/2021, postoperatively patient was doing fine besides some swallowing difficulty which is expected after cervical surgery, clinically improving until the morning of 6/5/2021 when he complained about sudden feeling of swelling in the throat and sudden onset shortness of breath. Rapid response was called I evaluated the patient personally at bedside, decision was made to send patient to CT scan neck and chest with PE protocol, during the CT scan patient had a PEA arrest, he was resuscitated and intubated and then transferred to medical ICU.     Subjective/Interval Hx:   Patient is sitting up in bed with sunglasses on and cough over his forehead states that he is awake and frequently and unable to sleep denies anxiety we will continue to monitor  Objective:   Physical Exam:   /81   Pulse 98   Temp 97.2 °F (36.2 °C) (Temporal)   Resp 18   Ht 5' 5\" (1.651 m)   Wt 183 lb (83 kg)   SpO2 96%   BMI 30.45 kg/m²   General: no acute distress, well nourished and well hydrated  HEENT: NCAT  Heart: S1S2 RRR  Lungs: Clear to ascultation bilaterally, respiratory effort normal  Abdomen: soft, NT/ND, positive bowel sounds  Extremities: no pitting edema, nontender   Neuro: patient is awake, alert and orientated times 3, no gross deficits  Skin: no rashes or ecchymosis        Meds:   Meds:    valproate sodium (DEPACON) IVPB  500 mg Intravenous BID    pregabalin  225 mg Oral BID    acetylcysteine  600 mg Inhalation BID    ipratropium-albuterol  1 ampule Inhalation Q4H WA    budesonide  250 mcg Nebulization BID    Arformoterol Tartrate  15 mcg Nebulization BID    sodium chloride   Intravenous Q12H    cefepime  1,000 mg Intravenous Q12H    enoxaparin  40 mg Subcutaneous Daily    sodium chloride flush  5-40 mL Intravenous 2 times per day    polyethylene glycol  17 g Oral Daily    bisacodyl  5 mg Oral Daily    sennosides-docusate sodium  1 tablet Oral BID      Infusions:    sodium chloride      dextrose       PRN Meds: HYDROcodone-acetaminophen, 1 tablet, Q4H PRN  labetalol, 10 mg, Q4H PRN  cyclobenzaprine, 10 mg, TID PRN  sodium chloride flush, 5-40 mL, PRN  sodium chloride, 25 mL, PRN  benzocaine-menthol, 1 lozenge, Q2H PRN  acetaminophen, 650 mg, Q4H PRN  glucose, 15 g, PRN  dextrose, 12.5 g, PRN  glucagon (rDNA), 1 mg, PRN  dextrose, 100 mL/hr, PRN  diphenhydrAMINE, 25 mg, Q6H PRN        Data/Labs:     Recent Labs     06/10/21  0447 06/11/21  0455 06/12/21  0453   WBC 23.0* 21.5* 17.2*   HGB 10.0* 9.7* 10.4*   HCT 31.8* 31.3* 33.9*    291 301      Recent Labs     06/10/21  0447 06/11/21  0455 06/12/21  0453    138 135   K 3.8 3.4* 3.3*    97* 93*   CO2 26 31* 32*   PHOS 3.1 3.4 4.2   BUN 12 9 8   CREATININE 0.7 0.6* 0.6*     No results for input(s): AST, ALT, ALB, BILIDIR, BILITOT, ALKPHOS in the last 72 hours. No results for input(s): INR in the last 72 hours. No results for input(s): CKTOTAL, CKMB, CKMBINDEX, TROPONINT in the last 72 hours. I/O last 3 completed shifts: In: 5908 [P.O.:1200; I.V.:25; IV Piggyback:200]  Out: -     Intake/Output Summary (Last 24 hours) at 6/12/2021 1238  Last data filed at 6/12/2021 1030  Gross per 24 hour   Intake 1595 ml   Output --   Net 1595 ml        Assessment/Plan:   1.  Acute hypoxic respiratory failure probably due to aspiration pneumonia status post intubation-patient continues to be on the ventilator pulmonary critical care is following  06/05/2021-patient remains awake while intubated in ICU is able to write and communicate; appears anxious  06/09/2021-patient was extubated this morning successfully he is talking maintaining his airway voice is slightly hoarse remains a little anxious we will continue to monitor  2. PEA/cardiac arrest-continue management as above patient will stay rapid cardiology is following echo is pending  3. Hospital-acquired pneumonia-CT shows bilateral infiltrates left greater than right continue IV antibiotics critical care pulmonary is following monitor lab studies  4. Cervical herniated disc status post anterior cervical discectomy and fusion on 6/02/21 per neurosurgery-patient is to have collar on the neck remain x3 months pain is controlled with medication  5. Mild rhabdo myelolysis-CK is elevated, maintain hydration, monitor lab studies  06/10/2021-continue to monitor CK is decreasing  6. Seizure precautions-valproic acid IV monitor    DVT Prophylaxis: Lovenox  Diet: ADULT DIET; Regular  Code Status: Full Code    Dispo:  When stable    Electronically signed by Quiana Sutton MD on 6/12/2021 at 12:38 PM  [facility]

## 2021-06-12 NOTE — PROGRESS NOTES
flush, , Intravenous, Q12H  cefepime (MAXIPIME) 1000 mg IVPB minibag, 1,000 mg, Intravenous, Q12H  enoxaparin (LOVENOX) injection 40 mg, 40 mg, Subcutaneous, Daily  cyclobenzaprine (FLEXERIL) tablet 10 mg, 10 mg, Oral, TID PRN  sodium chloride flush 0.9 % injection 5-40 mL, 5-40 mL, Intravenous, 2 times per day  sodium chloride flush 0.9 % injection 5-40 mL, 5-40 mL, Intravenous, PRN  0.9 % sodium chloride infusion, 25 mL, Intravenous, PRN  polyethylene glycol (GLYCOLAX) packet 17 g, 17 g, Oral, Daily  bisacodyl (DULCOLAX) EC tablet 5 mg, 5 mg, Oral, Daily  sennosides-docusate sodium (SENOKOT-S) 8.6-50 MG tablet 1 tablet, 1 tablet, Oral, BID  benzocaine-menthol (CEPACOL SORE THROAT) lozenge 1 lozenge, 1 lozenge, Oral, Q2H PRN  acetaminophen (TYLENOL) tablet 650 mg, 650 mg, Oral, Q4H PRN  glucose (GLUTOSE) 40 % oral gel 15 g, 15 g, Oral, PRN  dextrose 50 % IV solution, 12.5 g, Intravenous, PRN  glucagon (rDNA) injection 1 mg, 1 mg, Intramuscular, PRN  dextrose 5 % solution, 100 mL/hr, Intravenous, PRN  diphenhydrAMINE (BENADRYL) injection 25 mg, 25 mg, Intravenous, Q6H PRN    ASSESSMENT:   s/p C4-C7 ACDF 6/2  RRT 6/5, possible aspiration pneumonia     PLAN:  -WBAT  -PT/OT  -Custom collar x3 months  -medical management  -Follow up in neurosurgery in 4 weeks with x-rays  -discharge planning      Electronically signed by CAROLINE Haider on 6/12/2021 at 12:58 AM

## 2021-06-12 NOTE — PROGRESS NOTES
Patient refused potassium chloride supplement for potassium level of 3.3 today. Patient stated \"I don't take that stuff! \"  Any attempts to educate patient were futile, as patient stated \"I know, I know and I know what I will and won't take! \"

## 2021-06-13 LAB
ANION GAP SERPL CALCULATED.3IONS-SCNC: 13 MMOL/L (ref 7–16)
BUN BLDV-MCNC: 8 MG/DL (ref 6–20)
CALCIUM SERPL-MCNC: 9.2 MG/DL (ref 8.6–10.2)
CHLORIDE BLD-SCNC: 92 MMOL/L (ref 98–107)
CO2: 28 MMOL/L (ref 22–29)
CREAT SERPL-MCNC: 0.6 MG/DL (ref 0.7–1.2)
GFR AFRICAN AMERICAN: >60
GFR NON-AFRICAN AMERICAN: >60 ML/MIN/1.73
GLUCOSE BLD-MCNC: 75 MG/DL (ref 74–99)
MAGNESIUM: 2 MG/DL (ref 1.6–2.6)
PHOSPHORUS: 3.7 MG/DL (ref 2.5–4.5)
POTASSIUM SERPL-SCNC: 3.6 MMOL/L (ref 3.5–5)
SODIUM BLD-SCNC: 133 MMOL/L (ref 132–146)

## 2021-06-13 PROCEDURE — 83735 ASSAY OF MAGNESIUM: CPT

## 2021-06-13 PROCEDURE — 2060000000 HC ICU INTERMEDIATE R&B

## 2021-06-13 PROCEDURE — 2580000003 HC RX 258: Performed by: INTERNAL MEDICINE

## 2021-06-13 PROCEDURE — 84100 ASSAY OF PHOSPHORUS: CPT

## 2021-06-13 PROCEDURE — 36415 COLL VENOUS BLD VENIPUNCTURE: CPT

## 2021-06-13 PROCEDURE — 2500000003 HC RX 250 WO HCPCS: Performed by: INTERNAL MEDICINE

## 2021-06-13 PROCEDURE — 6370000000 HC RX 637 (ALT 250 FOR IP): Performed by: INTERNAL MEDICINE

## 2021-06-13 PROCEDURE — 6370000000 HC RX 637 (ALT 250 FOR IP): Performed by: NEUROLOGICAL SURGERY

## 2021-06-13 PROCEDURE — 99233 SBSQ HOSP IP/OBS HIGH 50: CPT | Performed by: INTERNAL MEDICINE

## 2021-06-13 PROCEDURE — 94640 AIRWAY INHALATION TREATMENT: CPT

## 2021-06-13 PROCEDURE — 6360000002 HC RX W HCPCS: Performed by: INTERNAL MEDICINE

## 2021-06-13 PROCEDURE — 6370000000 HC RX 637 (ALT 250 FOR IP): Performed by: HOSPITALIST

## 2021-06-13 PROCEDURE — 2700000000 HC OXYGEN THERAPY PER DAY

## 2021-06-13 PROCEDURE — 80048 BASIC METABOLIC PNL TOTAL CA: CPT

## 2021-06-13 RX ORDER — CYCLOBENZAPRINE HCL 10 MG
10 TABLET ORAL EVERY 6 HOURS PRN
Status: DISCONTINUED | OUTPATIENT
Start: 2021-06-13 | End: 2021-06-14 | Stop reason: HOSPADM

## 2021-06-13 RX ADMIN — CEFEPIME HYDROCHLORIDE 1000 MG: 1 INJECTION, POWDER, FOR SOLUTION INTRAMUSCULAR; INTRAVENOUS at 22:28

## 2021-06-13 RX ADMIN — VALPROATE SODIUM 500 MG: 100 INJECTION, SOLUTION INTRAVENOUS at 09:00

## 2021-06-13 RX ADMIN — VALPROATE SODIUM 500 MG: 100 INJECTION, SOLUTION INTRAVENOUS at 21:00

## 2021-06-13 RX ADMIN — SODIUM CHLORIDE SOLN NEBU 3% 4 ML: 3 NEBU SOLN at 13:10

## 2021-06-13 RX ADMIN — IPRATROPIUM BROMIDE AND ALBUTEROL SULFATE 1 AMPULE: 2.5; .5 SOLUTION RESPIRATORY (INHALATION) at 13:10

## 2021-06-13 RX ADMIN — HYDROCODONE BITARTRATE AND ACETAMINOPHEN 1 TABLET: 10; 325 TABLET ORAL at 16:10

## 2021-06-13 RX ADMIN — HYDROCODONE BITARTRATE AND ACETAMINOPHEN 1 TABLET: 10; 325 TABLET ORAL at 00:09

## 2021-06-13 RX ADMIN — Medication 10 ML: at 21:00

## 2021-06-13 RX ADMIN — CYCLOBENZAPRINE 10 MG: 10 TABLET, FILM COATED ORAL at 00:09

## 2021-06-13 RX ADMIN — HYDROCODONE BITARTRATE AND ACETAMINOPHEN 1 TABLET: 10; 325 TABLET ORAL at 22:30

## 2021-06-13 RX ADMIN — Medication 10 ML: at 09:00

## 2021-06-13 RX ADMIN — HYDROCODONE BITARTRATE AND ACETAMINOPHEN 1 TABLET: 10; 325 TABLET ORAL at 07:31

## 2021-06-13 RX ADMIN — CYCLOBENZAPRINE 10 MG: 10 TABLET, FILM COATED ORAL at 11:50

## 2021-06-13 RX ADMIN — HYDROCODONE BITARTRATE AND ACETAMINOPHEN 1 TABLET: 10; 325 TABLET ORAL at 11:50

## 2021-06-13 RX ADMIN — SODIUM CHLORIDE: 9 INJECTION, SOLUTION INTRAVENOUS at 02:30

## 2021-06-13 RX ADMIN — CYCLOBENZAPRINE 10 MG: 10 TABLET, FILM COATED ORAL at 17:30

## 2021-06-13 ASSESSMENT — PAIN DESCRIPTION - ORIENTATION
ORIENTATION: POSTERIOR

## 2021-06-13 ASSESSMENT — PAIN DESCRIPTION - DESCRIPTORS
DESCRIPTORS: ACHING;CONSTANT;DISCOMFORT
DESCRIPTORS: ACHING;DISCOMFORT;SORE

## 2021-06-13 ASSESSMENT — PAIN SCALES - GENERAL
PAINLEVEL_OUTOF10: 6
PAINLEVEL_OUTOF10: 9
PAINLEVEL_OUTOF10: 0
PAINLEVEL_OUTOF10: 9
PAINLEVEL_OUTOF10: 0
PAINLEVEL_OUTOF10: 6

## 2021-06-13 ASSESSMENT — PAIN DESCRIPTION - ONSET
ONSET: ON-GOING

## 2021-06-13 ASSESSMENT — PAIN DESCRIPTION - PROGRESSION
CLINICAL_PROGRESSION: GRADUALLY IMPROVING
CLINICAL_PROGRESSION: NOT CHANGED
CLINICAL_PROGRESSION: GRADUALLY IMPROVING
CLINICAL_PROGRESSION: GRADUALLY IMPROVING
CLINICAL_PROGRESSION: GRADUALLY WORSENING
CLINICAL_PROGRESSION: GRADUALLY IMPROVING
CLINICAL_PROGRESSION: NOT CHANGED

## 2021-06-13 ASSESSMENT — PAIN DESCRIPTION - PAIN TYPE
TYPE: ACUTE PAIN;SURGICAL PAIN
TYPE: ACUTE PAIN;CHRONIC PAIN

## 2021-06-13 ASSESSMENT — PAIN DESCRIPTION - FREQUENCY
FREQUENCY: INTERMITTENT
FREQUENCY: CONTINUOUS
FREQUENCY: INTERMITTENT

## 2021-06-13 ASSESSMENT — PAIN DESCRIPTION - LOCATION
LOCATION: GENERALIZED

## 2021-06-13 NOTE — PROGRESS NOTES
Pulmonary [address] [facility]                               Division of Pulmonary, Critical Care            Pulmonary Progress Note           CC :follow up of respiratory failure Hypoxia    SUBJECTIVE:  Doing better'=  Down to 7 L    Still require high flow O2  Has some cough,yellow sputum  No chest pain   No in distress  Has generalized pain         OBJECTIVE:  Vitals:    06/12/21 2033   BP: (!) 146/84   Pulse: 85   Resp: 16   Temp: 97.9 °F (36.6 °C)   SpO2:      Constitutional: Alert, cooperative. EENT: EOMI HAI. MMM. No icterus. No thrush. Neck: No thyromegaly. No elevated JVP. Trachea was midline. Respiratory: rhonchi bilateral   Cardiovascular: Regular, No murmur. No clicks, gallops or rubs. Pulses:  Equal bilaterally. Abdomen: Soft without organomegaly. No rebound, rigidity. No guarding. Lymphatic: No lymphadenopathy. Musculoskeletal: Without weakness  Extremities:  No lower extremity edema. Reflexes appear adequate. Skin:  Warm and dry. No skin rashes. Neurological/Psychiatric: No acute psychosis. Cranial nerves II-XII are intact.         DATA:  CXR ordered  Sputum ordered    CLINICAL ASSESMENT:  · Acute hypoxic respiratory failure 2/2 aspiration pneumonia  · S/p respiratory arrest, 3 minutes CPR, ROSC achieved  · Aspiration pneumonia, on antibiotics  · S/p C4-C7 anterior cervical discectomy and fusion  · History of migraine  · Microcytic anemia  · Elevated troponins, post respiratory arrest         PLAN:   1-Aggressive pulmonary toilet ,include incentive spirometry   2- CXR slight worsening   3-sputum culture pending ,he gave sample  4-procal elevated mild '  5- if not better ,need CTA and may consider bronch ,for now will review CXR   Keep DVT px ,  Increase wbc concerning ,will see CXR  Up to chair  Advise to use NIMV at night with his cervical spine issues   Hypertonic saliner  Will follow along             Tiago Martinez MD   Pulmonary/Critical Care

## 2021-06-13 NOTE — PROGRESS NOTES
HOSPITALIST PROGRESS NOTE  Date: 6/13/2021   Name: Anahy Arora   MRN: [id number]   YOB: 1970        Hospital Course:   Mr Darris Simmonds 44 YM admitted for neck pain due to cervical herniated disc, patient had C4-C5 C5-C6 C6-C7 anterior cervical discectomy and fusion done on 6/2/2021, postoperatively patient was doing fine besides some swallowing difficulty which is expected after cervical surgery, clinically improving until the morning of 6/5/2021 when he complained about sudden feeling of swelling in the throat and sudden onset shortness of breath. Rapid response was called I evaluated the patient personally at bedside, decision was made to send patient to CT scan neck and chest with PE protocol, during the CT scan patient had a PEA arrest, he was resuscitated and intubated and then transferred to medical ICU.     Subjective/Interval Hx:   Talk with patient is sitting up in bed appears  and seems to have a desire to control staff persistent particular and how he will lie things done doing well he is down to 4 L of oxygen pulmonary is following hopefully he will be able to be stable for home in the next 1 to 2 days  Objective:   Physical Exam:   /85   Pulse 92   Temp 98.3 °F (36.8 °C) (Oral)   Resp 20   Ht 5' 5\" (1.651 m)   Wt 183 lb (83 kg)   SpO2 92%   BMI 30.45 kg/m²   General: no acute distress, well nourished and well hydrated  HEENT: NCAT  Heart: S1S2 RRR  Lungs: Clear to ascultation bilaterally, respiratory effort normal  Abdomen: soft, NT/ND, positive bowel sounds  Extremities: no pitting edema, nontender   Neuro: patient is awake, alert and orientated times 3, no gross deficits  Skin: no rashes or ecchymosis        Meds:   Meds:    sodium chloride (Inhalant)  4 mL Nebulization Q12H    valproate sodium (DEPACON) IVPB  500 mg Intravenous BID    pregabalin  225 mg Oral BID    acetylcysteine  600 mg Inhalation BID    ipratropium-albuterol  1 ampule Inhalation Q4H WA    budesonide  250 mcg Nebulization BID    Arformoterol Tartrate  15 mcg Nebulization BID    sodium chloride   Intravenous Q12H    cefepime  1,000 mg Intravenous Q12H    enoxaparin  40 mg Subcutaneous Daily    sodium chloride flush  5-40 mL Intravenous 2 times per day    polyethylene glycol  17 g Oral Daily    bisacodyl  5 mg Oral Daily    sennosides-docusate sodium  1 tablet Oral BID      Infusions:    sodium chloride      dextrose       PRN Meds: cyclobenzaprine, 10 mg, Q6H PRN  potassium chloride, 40 mEq, PRN   Or  potassium alternative oral replacement, 40 mEq, PRN   Or  potassium chloride, 10 mEq, PRN  HYDROcodone-acetaminophen, 1 tablet, Q4H PRN  labetalol, 10 mg, Q4H PRN  sodium chloride flush, 5-40 mL, PRN  sodium chloride, 25 mL, PRN  benzocaine-menthol, 1 lozenge, Q2H PRN  acetaminophen, 650 mg, Q4H PRN  glucose, 15 g, PRN  dextrose, 12.5 g, PRN  glucagon (rDNA), 1 mg, PRN  dextrose, 100 mL/hr, PRN  diphenhydrAMINE, 25 mg, Q6H PRN        Data/Labs:     Recent Labs     06/11/21  0455 06/12/21  0453   WBC 21.5* 17.2*   HGB 9.7* 10.4*   HCT 31.3* 33.9*    301      Recent Labs     06/11/21  0455 06/12/21  0453 06/13/21  0823    135 133   K 3.4* 3.3* 3.6   CL 97* 93* 92*   CO2 31* 32* 28   PHOS 3.4 4.2 3.7   BUN 9 8 8   CREATININE 0.6* 0.6* 0.6*     No results for input(s): AST, ALT, ALB, BILIDIR, BILITOT, ALKPHOS in the last 72 hours. No results for input(s): INR in the last 72 hours. No results for input(s): CKTOTAL, CKMB, CKMBINDEX, TROPONINT in the last 72 hours. I/O last 3 completed shifts: In: 1975 [P.O.:1800; I.V.:25; IV Piggyback:150]  Out: 1000 [Urine:1000]    Intake/Output Summary (Last 24 hours) at 6/13/2021 1222  Last data filed at 6/13/2021 0900  Gross per 24 hour   Intake 1445 ml   Output 1000 ml   Net 445 ml        Assessment/Plan:   1.  Acute hypoxic respiratory failure probably due to aspiration pneumonia status post intubation-patient continues to be on the ventilator pulmonary critical care is following  06/05/2021-patient remains awake while intubated in ICU is able to write and communicate; appears anxious  06/09/2021-patient was extubated this morning successfully he is talking maintaining his airway voice is slightly hoarse remains a little anxious we will continue to monitor  06/13/2021-patient is out of intensive care on stepdown is being weaned by pulmonary has O2 is down to 4 L  2. PEA/cardiac arrest-continue management as above patient will stay rapid cardiology is following echo is pending  06/13/2021-echo was done and is normal PEA cardiac arrest has resolved with intensive medical management  3. Hospital-acquired pneumonia-CT shows bilateral infiltrates left greater than right continue IV antibiotics critical care pulmonary is following monitor lab studies  4. Cervical herniated disc status post anterior cervical discectomy and fusion on 6/02/21 per neurosurgery-patient is to have collar on the neck remain x3 months pain is controlled with medication  5. Mild rhabdo myelolysis-CK is elevated, maintain hydration, monitor lab studies  06/10/2021-continue to monitor CK is decreasing  6. Seizure precautions-valproic acid IV monitor    DVT Prophylaxis: Lovenox  Diet: ADULT DIET; Regular  Code Status: Full Code    Dispo:  When stable    Electronically signed by Cheryl Wen MD on 6/13/2021 at [address]

## 2021-06-13 NOTE — PROGRESS NOTES
Physical Therapy    Pt on PT caseload. Upon entering room and introducing self, pt politely requested that he wait to do therapy until his family was present and until after he spoke to the [place] who can help me with my breathing\". Pt requested therapy come back later. Will re-attempt at a later time/date as able.     Dianna To, PT, DPT  [id number]

## 2021-06-13 NOTE — PROGRESS NOTES
Patient stated to this nurse \"I don't want to be bothered by lab. I want lab to schedule an appointment with me, at my convenience! \"

## 2021-06-13 NOTE — PROGRESS NOTES
Pulmonary [address]                               Division of Pulmonary, Critical Care            Pulmonary Progress Note           CC :follow up of respiratory failure Hypoxia    SUBJECTIVE:  Doing better and SOB improved  Down to 4 L    Has some cough,yellow sputum pending culture   No chest pain           OBJECTIVE:  Vitals:    06/13/21 1610   BP:    Pulse:    Resp:    Temp:    SpO2: 96%     Constitutional: Alert, cooperative. EENT: EOMI HAI. MMM. No icterus. No thrush. Neck: No thyromegaly. No elevated JVP. Trachea was midline. Respiratory: rhonchi bilateral   Cardiovascular: Regular, No murmur. No clicks, gallops or rubs. Pulses:  Equal bilaterally. Abdomen: Soft without organomegaly. No rebound, rigidity. No guarding. Lymphatic: No lymphadenopathy. Musculoskeletal: Without weakness  Extremities:  No lower extremity edema. Reflexes appear adequate. Skin:  Warm and dry. No skin rashes. Neurological/Psychiatric: No acute psychosis. Cranial nerves II-XII are intact.         DATA:  CXR ordered  Sputum ordered    CLINICAL ASSESMENT:  · Acute hypoxic respiratory failure 2/2 aspiration pneumonia  · S/p respiratory arrest, 3 minutes CPR, ROSC achieved  · Aspiration pneumonia, on antibiotics  · S/p C4-C7 anterior cervical discectomy and fusion  · History of migraine  · Microcytic anemia  · Elevated troponins, post respiratory arrest         PLAN:   1-Aggressive pulmonary toilet ,include incentive spirometry   2-Pending culture  3- CXR slight worsening ,will repeat before discharge  3-sputum culture pending ,he gave sample  4-procal elevated mild 'on cefepime   5- if not better ,need CTA and may consider bronch ,for now will review CXR   Keep DVT px ,  Increase wbc concerning ,will see CXR  Up to chair  Advise to use NIMV at night with his cervical spine issues   Hypertonic saliner nebs  Will follow along             Tiago Esparza MD [facility]   Pulmonary/Critical Care

## 2021-06-13 NOTE — PROGRESS NOTES
% solution FOR INHALATION 600 mg, 600 mg, Inhalation, BID  ipratropium-albuterol (DUONEB) nebulizer solution 1 ampule, 1 ampule, Inhalation, Q4H WA  budesonide (PULMICORT) nebulizer suspension 250 mcg, 250 mcg, Nebulization, BID  Arformoterol Tartrate (BROVANA) nebulizer solution 15 mcg, 15 mcg, Nebulization, BID  cefepime NS flush, , Intravenous, Q12H  cefepime (MAXIPIME) 1000 mg IVPB minibag, 1,000 mg, Intravenous, Q12H  enoxaparin (LOVENOX) injection 40 mg, 40 mg, Subcutaneous, Daily  cyclobenzaprine (FLEXERIL) tablet 10 mg, 10 mg, Oral, TID PRN  sodium chloride flush 0.9 % injection 5-40 mL, 5-40 mL, Intravenous, 2 times per day  sodium chloride flush 0.9 % injection 5-40 mL, 5-40 mL, Intravenous, PRN  0.9 % sodium chloride infusion, 25 mL, Intravenous, PRN  polyethylene glycol (GLYCOLAX) packet 17 g, 17 g, Oral, Daily  bisacodyl (DULCOLAX) EC tablet 5 mg, 5 mg, Oral, Daily  sennosides-docusate sodium (SENOKOT-S) 8.6-50 MG tablet 1 tablet, 1 tablet, Oral, BID  benzocaine-menthol (CEPACOL SORE THROAT) lozenge 1 lozenge, 1 lozenge, Oral, Q2H PRN  acetaminophen (TYLENOL) tablet 650 mg, 650 mg, Oral, Q4H PRN  glucose (GLUTOSE) 40 % oral gel 15 g, 15 g, Oral, PRN  dextrose 50 % IV solution, 12.5 g, Intravenous, PRN  glucagon (rDNA) injection 1 mg, 1 mg, Intramuscular, PRN  dextrose 5 % solution, 100 mL/hr, Intravenous, PRN  diphenhydrAMINE (BENADRYL) injection 25 mg, 25 mg, Intravenous, Q6H PRN    ASSESSMENT:   s/p C4-C7 ACDF 6/2  RRT 6/5, possible aspiration pneumonia     PLAN:  -WBAT  -PT/OT  -Custom collar x3 months  -medical management  -Follow up in neurosurgery in 4 weeks with x-rays  -discharge planning      Electronically signed by CAROLINE Ash on 6/13/2021 at 9:52 AM

## 2021-06-13 NOTE — PROGRESS NOTES
Patient refusing IV Antibiotic today stating \"I am not ready to take it, I will let you know when I am ready! \"

## 2021-06-14 ENCOUNTER — APPOINTMENT (OUTPATIENT)
Dept: GENERAL RADIOLOGY | Age: 51
DRG: 321 | End: 2021-06-14
Attending: NEUROLOGICAL SURGERY
Payer: MEDICAID

## 2021-06-14 VITALS
WEIGHT: 183 LBS | RESPIRATION RATE: 20 BRPM | BODY MASS INDEX: 30.49 KG/M2 | HEIGHT: 65 IN | HEART RATE: 91 BPM | DIASTOLIC BLOOD PRESSURE: 96 MMHG | SYSTOLIC BLOOD PRESSURE: 136 MMHG | TEMPERATURE: 97.4 F | OXYGEN SATURATION: 100 %

## 2021-06-14 LAB
ANION GAP SERPL CALCULATED.3IONS-SCNC: 10 MMOL/L (ref 7–16)
ANISOCYTOSIS: ABNORMAL
BASOPHILS ABSOLUTE: 0 E9/L (ref 0–0.2)
BASOPHILS RELATIVE PERCENT: 0.2 % (ref 0–2)
BLASTS RELATIVE PERCENT: 0.9 % (ref 0–0)
BUN BLDV-MCNC: 9 MG/DL (ref 6–20)
CALCIUM SERPL-MCNC: 9.6 MG/DL (ref 8.6–10.2)
CHLORIDE BLD-SCNC: 89 MMOL/L (ref 98–107)
CO2: 35 MMOL/L (ref 22–29)
CREAT SERPL-MCNC: 0.7 MG/DL (ref 0.7–1.2)
CULTURE, RESPIRATORY: NORMAL
EOSINOPHILS ABSOLUTE: 0.1 E9/L (ref 0.05–0.5)
EOSINOPHILS RELATIVE PERCENT: 0.9 % (ref 0–6)
GFR AFRICAN AMERICAN: >60
GFR NON-AFRICAN AMERICAN: >60 ML/MIN/1.73
GLUCOSE BLD-MCNC: 107 MG/DL (ref 74–99)
HCT VFR BLD CALC: 32.9 % (ref 37–54)
HEMOGLOBIN: 10.2 G/DL (ref 12.5–16.5)
HYPOCHROMIA: ABNORMAL
LYMPHOCYTES ABSOLUTE: 1.59 E9/L (ref 1.5–4)
LYMPHOCYTES RELATIVE PERCENT: 14.8 % (ref 20–42)
MAGNESIUM: 2 MG/DL (ref 1.6–2.6)
MCH RBC QN AUTO: 22.9 PG (ref 26–35)
MCHC RBC AUTO-ENTMCNC: 31 % (ref 32–34.5)
MCV RBC AUTO: 73.9 FL (ref 80–99.9)
METAMYELOCYTES RELATIVE PERCENT: 1.7 % (ref 0–1)
MONOCYTES ABSOLUTE: 0.64 E9/L (ref 0.1–0.95)
MONOCYTES RELATIVE PERCENT: 6.1 % (ref 2–12)
MYELOCYTE PERCENT: 0.9 % (ref 0–0)
NEUTROPHILS ABSOLUTE: 8.16 E9/L (ref 1.8–7.3)
NEUTROPHILS RELATIVE PERCENT: 74.8 % (ref 43–80)
NUCLEATED RED BLOOD CELLS: 0.9 /100 WBC
OVALOCYTES: ABNORMAL
PDW BLD-RTO: 15.3 FL (ref 11.5–15)
PHOSPHORUS: 3.8 MG/DL (ref 2.5–4.5)
PLATELET # BLD: 348 E9/L (ref 130–450)
PMV BLD AUTO: 9.8 FL (ref 7–12)
POIKILOCYTES: ABNORMAL
POLYCHROMASIA: ABNORMAL
POTASSIUM SERPL-SCNC: 3.4 MMOL/L (ref 3.5–5)
RBC # BLD: 4.45 E12/L (ref 3.8–5.8)
SMEAR, RESPIRATORY: NORMAL
SODIUM BLD-SCNC: 134 MMOL/L (ref 132–146)
TEAR DROP CELLS: ABNORMAL
WBC # BLD: 10.6 E9/L (ref 4.5–11.5)

## 2021-06-14 PROCEDURE — 2700000000 HC OXYGEN THERAPY PER DAY

## 2021-06-14 PROCEDURE — 83735 ASSAY OF MAGNESIUM: CPT

## 2021-06-14 PROCEDURE — 6360000002 HC RX W HCPCS: Performed by: INTERNAL MEDICINE

## 2021-06-14 PROCEDURE — 71045 X-RAY EXAM CHEST 1 VIEW: CPT

## 2021-06-14 PROCEDURE — 97530 THERAPEUTIC ACTIVITIES: CPT

## 2021-06-14 PROCEDURE — 80048 BASIC METABOLIC PNL TOTAL CA: CPT

## 2021-06-14 PROCEDURE — 36415 COLL VENOUS BLD VENIPUNCTURE: CPT

## 2021-06-14 PROCEDURE — 2580000003 HC RX 258: Performed by: INTERNAL MEDICINE

## 2021-06-14 PROCEDURE — 84100 ASSAY OF PHOSPHORUS: CPT

## 2021-06-14 PROCEDURE — 85025 COMPLETE CBC W/AUTO DIFF WBC: CPT

## 2021-06-14 PROCEDURE — 6370000000 HC RX 637 (ALT 250 FOR IP): Performed by: HOSPITALIST

## 2021-06-14 PROCEDURE — 6370000000 HC RX 637 (ALT 250 FOR IP): Performed by: INTERNAL MEDICINE

## 2021-06-14 PROCEDURE — 6370000000 HC RX 637 (ALT 250 FOR IP): Performed by: NEUROLOGICAL SURGERY

## 2021-06-14 PROCEDURE — 99233 SBSQ HOSP IP/OBS HIGH 50: CPT | Performed by: INTERNAL MEDICINE

## 2021-06-14 PROCEDURE — 97535 SELF CARE MNGMENT TRAINING: CPT

## 2021-06-14 RX ORDER — CEFDINIR 300 MG/1
300 CAPSULE ORAL EVERY 12 HOURS SCHEDULED
Status: DISCONTINUED | OUTPATIENT
Start: 2021-06-14 | End: 2021-06-14 | Stop reason: HOSPADM

## 2021-06-14 RX ORDER — CEFDINIR 300 MG/1
300 CAPSULE ORAL EVERY 12 HOURS SCHEDULED
Qty: 20 CAPSULE | Refills: 0 | Status: SHIPPED | OUTPATIENT
Start: 2021-06-14 | End: 2021-06-24

## 2021-06-14 RX ADMIN — HYDROCODONE BITARTRATE AND ACETAMINOPHEN 1 TABLET: 10; 325 TABLET ORAL at 13:29

## 2021-06-14 RX ADMIN — BUDESONIDE 250 MCG: 0.25 SUSPENSION RESPIRATORY (INHALATION) at 07:50

## 2021-06-14 RX ADMIN — CYCLOBENZAPRINE 10 MG: 10 TABLET, FILM COATED ORAL at 03:52

## 2021-06-14 RX ADMIN — ARFORMOTEROL TARTRATE 15 MCG: 15 SOLUTION RESPIRATORY (INHALATION) at 07:50

## 2021-06-14 RX ADMIN — HYDROCODONE BITARTRATE AND ACETAMINOPHEN 1 TABLET: 10; 325 TABLET ORAL at 03:52

## 2021-06-14 RX ADMIN — HYDROCODONE BITARTRATE AND ACETAMINOPHEN 1 TABLET: 10; 325 TABLET ORAL at 08:39

## 2021-06-14 RX ADMIN — CYCLOBENZAPRINE 10 MG: 10 TABLET, FILM COATED ORAL at 13:29

## 2021-06-14 RX ADMIN — SODIUM CHLORIDE SOLN NEBU 3% 4 ML: 3 NEBU SOLN at 07:51

## 2021-06-14 RX ADMIN — IPRATROPIUM BROMIDE AND ALBUTEROL SULFATE 1 AMPULE: 2.5; .5 SOLUTION RESPIRATORY (INHALATION) at 07:50

## 2021-06-14 RX ADMIN — IPRATROPIUM BROMIDE AND ALBUTEROL SULFATE 1 AMPULE: 2.5; .5 SOLUTION RESPIRATORY (INHALATION) at 11:33

## 2021-06-14 RX ADMIN — HYDROCODONE BITARTRATE AND ACETAMINOPHEN 1 TABLET: 10; 325 TABLET ORAL at 17:44

## 2021-06-14 ASSESSMENT — PAIN DESCRIPTION - DESCRIPTORS: DESCRIPTORS: ACHING;DISCOMFORT;SORE

## 2021-06-14 ASSESSMENT — PAIN SCALES - GENERAL
PAINLEVEL_OUTOF10: 9
PAINLEVEL_OUTOF10: 7

## 2021-06-14 ASSESSMENT — PAIN DESCRIPTION - PAIN TYPE: TYPE: CHRONIC PAIN;SURGICAL PAIN

## 2021-06-14 ASSESSMENT — PAIN DESCRIPTION - LOCATION: LOCATION: BACK;NECK

## 2021-06-14 NOTE — CARE COORDINATION
Transition of Care-Met with patient to discuss discharge planning, patient plans on returning home. Patient was ambulating with no assistive devices when I entered the room. Discussed home health care and provided list of company providers, will review, patient is interested, currently on 4L NC, no home oxygen, no preference on DME provider if needed at discharge ( just covered by insurance). Pulse ox testing will be required as well as DME order if oxygen is needed. Spoke with  from [facility] ringer-[phone]), patient has meals delivered, emergency response button and is approved for 7-10 hrs a day of aid service, however currently does not have an aide. Family will be able to transport home. CM following.     Isiah GASPARN, RN  N[place]   [phone]

## 2021-06-14 NOTE — PROGRESS NOTES
Comprehensive Nutrition Assessment    Type and Reason for Visit:  Reassess    Nutrition Recommendations/Plan: Continue Current Diet, Start Oral Nutrition Supplement    Nutrition Assessment:  Pt status improving now s/p extubation transferrd to stepdown unit s/p cervial fusion/ cardiac arrest. Noted hx lyme disease. Noted surgical wound to neck. PO diet advanced & pt consuming % meals- will add Ensure HP BID to aid in recovery. Malnutrition Assessment:  Malnutrition Status:  No Malnutrition  Context:  Acute Illness     Findings of the 6 clinical characteristics of malnutrition:  Energy Intake: Greater than 75%   Weight Loss:  Unable to assess (no wt hx on file)     Body Fat Loss:  No significant body fat loss     Muscle Mass Loss:  No significant muscle mass loss    Fluid Accumulation:  No significant fluid accumulation     Strength:  Not Performed    Estimated Daily Nutrient Needs:  Energy (kcal):  MSJ 1607 x 1.2 SF= 2493-1940; Weight Used for Energy Requirements:  Current     Protein (g):  80-95 (1.3-1.5 g/kg); Weight Used for Protein Requirements:  Ideal        Fluid (ml/day):  per critical care    Nutrition Related Findings:  A&Ox4, s/p extubation, +I/O's, trace edema, active BS      Wounds:  Surgical Incision (to neck SURENDRA)       Current Nutrition Therapies:    ADULT DIET; Regular    Anthropometric Measures:  · Height: 5' 5\" (165.1 cm)  · Current Body Weight: 183 lb (83 kg) (no method)   · Usual Body Weight:  (UTO no EMR hx on file)     · Ideal Body Weight: 136 lbs; % Ideal Body Weight 134.6 %   · BMI: 30.5 BMI Categories: Obese Class 1 (BMI 30.0-34. 9)       Nutrition Diagnosis:   · Increased nutrient needs related to increase demand for energy/nutrients as evidenced by wounds    Nutrition Interventions:   Nutrition Education/Counseling:  Education not indicated   Coordination of Nutrition Care:  Continue to monitor while inpatient    Goals:  Pt to consume >75% meals/ONS       Nutrition Monitoring and Evaluation:   Food/Nutrient Intake Outcomes:  Food and Nutrient Intake, Supplement Intake  Physical Signs/Symptoms Outcomes:  Biochemical Data, Nutrition Focused Physical Findings, Skin, Weight, GI Status, Fluid Status or Edema     Discharge Planning:     Too soon to determine     Electronically signed by Sae Tirado RD, LD on 6/14/21 at 10:55 AM EDT    Contact: Ext [phone]

## 2021-06-14 NOTE — CARE COORDINATION
[facility] will follow, will see Wednesday for first follow up visit. [facility] will deliver oxygen. Spoke to Nelli (liaison with [facility])-will bring tank as soon as able to room. RN updated. Spoke with OT-updated that patient is requesting a stretcher to assist with picking up objects, she will bring one to unit and speak to patient about any other needs. Daughter will transport patient home. [facility] updated on discharge.     Lorry Dandy BSN, RN  [place]   [phone]

## 2021-06-14 NOTE — PROGRESS NOTES
mcg Nebulization BID    sodium chloride   Intravenous Q12H    cefepime  1,000 mg Intravenous Q12H    enoxaparin  40 mg Subcutaneous Daily    sodium chloride flush  5-40 mL Intravenous 2 times per day    polyethylene glycol  17 g Oral Daily    bisacodyl  5 mg Oral Daily    sennosides-docusate sodium  1 tablet Oral BID      Infusions:    sodium chloride      dextrose       PRN Meds: cyclobenzaprine, 10 mg, Q6H PRN  potassium chloride, 40 mEq, PRN   Or  potassium alternative oral replacement, 40 mEq, PRN   Or  potassium chloride, 10 mEq, PRN  HYDROcodone-acetaminophen, 1 tablet, Q4H PRN  labetalol, 10 mg, Q4H PRN  sodium chloride flush, 5-40 mL, PRN  sodium chloride, 25 mL, PRN  benzocaine-menthol, 1 lozenge, Q2H PRN  acetaminophen, 650 mg, Q4H PRN  glucose, 15 g, PRN  dextrose, 12.5 g, PRN  glucagon (rDNA), 1 mg, PRN  dextrose, 100 mL/hr, PRN  diphenhydrAMINE, 25 mg, Q6H PRN        Data/Labs:     Recent Labs     06/12/21  0453 06/14/21  0429   WBC 17.2* 10.6   HGB 10.4* 10.2*   HCT 33.9* 32.9*    348      Recent Labs     06/12/21  0453 06/13/21  0823 06/14/21  0429    133 134   K 3.3* 3.6 3.4*   CL 93* 92* 89*   CO2 32* 28 35*   PHOS 4.2 3.7 3.8   BUN 8 8 9   CREATININE 0.6* 0.6* 0.7     No results for input(s): AST, ALT, ALB, BILIDIR, BILITOT, ALKPHOS in the last 72 hours. No results for input(s): INR in the last 72 hours. No results for input(s): CKTOTAL, CKMB, CKMBINDEX, TROPONINT in the last 72 hours. I/O last 3 completed shifts: In: 2020 [P.O.:1920; IV Piggyback:100]  Out: 1200 [Urine:1200]    Intake/Output Summary (Last 24 hours) at 6/14/2021 1306  Last data filed at 6/13/2021 2230  Gross per 24 hour   Intake 1320 ml   Output 600 ml   Net 720 ml        Assessment/Plan:   1.  Acute hypoxic respiratory failure probably due to aspiration pneumonia status post intubation-patient continues to be on the ventilator pulmonary critical care is following  06/05/2021-patient remains awake while intubated in ICU is able to write and communicate; appears anxious  06/09/2021-patient was extubated this morning successfully he is talking maintaining his airway voice is slightly hoarse remains a little anxious we will continue to monitor  06/13/2021-patient is out of intensive care on stepdown is being weaned by pulmonary has O2 is down to 4 L  2. PEA/cardiac arrest-continue management as above patient will stay rapid cardiology is following echo is pending  06/13/2021-echo was done and is normal PEA cardiac arrest has resolved with intensive medical management  06/14/2021-patient probably had aspirated pneumonia which caused his need for acute respiratory hypoxic failure and intubation instead of PEA cardiac arrest at this time he is stable; no indication for cardiac consult. 3. Hospital-acquired pneumonia-CT shows bilateral infiltrates left greater than right continue IV antibiotics critical care pulmonary is following monitor lab studies  4. Cervical herniated disc status post anterior cervical discectomy and fusion on 6/02/21 per neurosurgery-patient is to have collar on the neck remain x3 months pain is controlled with medication  5. Mild rhabdo myelolysis-CK is elevated, maintain hydration, monitor lab studies  06/10/2021-continue to monitor CK is decreasing  6. Seizure precautions-valproic acid IV monitor    DVT Prophylaxis: Lovenox  Diet: ADULT DIET; Regular  Adult Oral Nutrition Supplement; Low Calorie/High Protein Oral Supplement  Code Status: Full Code    Dispo:  When stable    Electronically signed by Buddy Cabrera MD on 6/14/2021 at 1:06 PM  [facility]

## 2021-06-14 NOTE — PROGRESS NOTES
CLINICAL PHARMACY NOTE: MEDS TO BEDS    Total # of Prescriptions Filled: 3   The following medications were delivered to the patient:   -flexeril 10mg   -norco 10-325mg   -cefdinir 300mg      Additional Documentation:    Delivered to patient 6/14 @4:36pm

## 2021-06-14 NOTE — PROGRESS NOTES
Patient continues to refuse Lyrica, Senokot, Lovenox, Glycolax, Dulcolax and Cefepime stating '\" don't need or take those! \"    Patient stated \"I want to be left alone, tell the night shift that they need to schedule an appointment with me at my convenience! \"

## 2021-06-14 NOTE — PROGRESS NOTES
Department of Neurosurgery  Progress Note    CHIEF COMPLAINT: s/p C4-C7 ACDF 6/2    SUBJECTIVE: No new issues overnight. REVIEW OF SYSTEMS :  No CP. Some SOB    OBJECTIVE:   VITALS:  BP (!) 136/96   Pulse 91   Temp 97.4 °F (36.3 °C) (Oral)   Resp 20   Ht 5' 5\" (1.651 m)   Wt 183 lb (83 kg)   SpO2 100%   BMI 30.45 kg/m²     PHYSICAL:  Neurologic: awake and alert  Incision c/d/i  Collar intact   ARGUETA.       DATA:  CBC:   Lab Results   Component Value Date    WBC 10.6 06/14/2021    RBC 4.45 06/14/2021    HGB 10.2 06/14/2021    HCT 32.9 06/14/2021    MCV 73.9 06/14/2021    MCH 22.9 06/14/2021    MCHC 31.0 06/14/2021    RDW 15.3 06/14/2021     06/14/2021    MPV 9.8 06/14/2021     BMP:    Lab Results   Component Value Date     06/14/2021    K 3.4 06/14/2021    K 4.3 05/26/2021    CL 89 06/14/2021    CO2 35 06/14/2021    BUN 9 06/14/2021    LABALBU 3.6 06/05/2021    CREATININE 0.7 06/14/2021    CALCIUM 9.6 06/14/2021    GFRAA >60 06/14/2021    LABGLOM >60 06/14/2021    GLUCOSE 107 06/14/2021     PT/INR:    Lab Results   Component Value Date    PROTIME 10.8 05/26/2021    INR 1.0 05/26/2021     PTT:    Lab Results   Component Value Date    APTT 32.9 08/07/2018   [APTT}    Current Inpatient Medications  Current Facility-Administered Medications: cyclobenzaprine (FLEXERIL) tablet 10 mg, 10 mg, Oral, Q6H PRN  potassium chloride (KLOR-CON M) extended release tablet 40 mEq, 40 mEq, Oral, PRN **OR** potassium bicarb-citric acid (EFFER-K) effervescent tablet 40 mEq, 40 mEq, Oral, PRN **OR** potassium chloride 10 mEq/100 mL IVPB (Peripheral Line), 10 mEq, Intravenous, PRN  sodium chloride (Inhalant) 3 % nebulizer solution 4 mL, 4 mL, Nebulization, Q12H  HYDROcodone-acetaminophen (NORCO)  MG per tablet 1 tablet, 1 tablet, Oral, Q4H PRN  labetalol (NORMODYNE;TRANDATE) injection 10 mg, 10 mg, Intravenous, Q4H PRN  valproate (DEPACON) 500 mg in dextrose 5 % 100 mL IVPB, 500 mg, Intravenous, BID  pregabalin

## 2021-06-14 NOTE — PROGRESS NOTES
Pulmonary [facility]                               Division of Pulmonary, Critical Care            Pulmonary Progress Note           CC :follow up of respiratory failure Hypoxia    SUBJECTIVE:  Doing better and SOB improved and he states he is feeling much better over the last 3 days with no fever or chills no chest pain  Down to 4 L and actually he was 88 on room air without any oxygen  Has some cough,yellow sputum pending culture   No chest pain   Ambulated and his oxygen dropped to the 87 and came up right away with a 3 L of oxygen to 94        OBJECTIVE:  Vitals:    06/14/21 0800   BP: (!) 136/96   Pulse: 91   Resp: 20   Temp: 97.4 °F (36.3 °C)   SpO2: 100%     Constitutional: Alert, cooperative. EENT: EOMI HAI. MMM. No icterus. No thrush. Neck: No thyromegaly. No elevated JVP. Trachea was midline. He has a c-collar  Respiratory: rhonchi bilateral   Cardiovascular: Regular, No murmur. No clicks, gallops or rubs. Pulses:  Equal bilaterally. Abdomen: Soft without organomegaly. No rebound, rigidity. No guarding. Lymphatic: No lymphadenopathy. Musculoskeletal: Without weakness  Extremities:  No lower extremity edema. Reflexes appear adequate. Skin:  Warm and dry. No skin rashes. Neurological/Psychiatric: No acute psychosis. Cranial nerves II-XII are intact.         DATA:  CXR ordered  Sputum ordered    CLINICAL ASSESMENT:  · Acute hypoxic respiratory failure 2/2 aspiration pneumonia  · S/p respiratory arrest, 3 minutes CPR, ROSC achieved  · Aspiration pneumonia, on antibiotics  · S/p C4-C7 anterior cervical discectomy and fusion    · Elevated troponins, post respiratory arrest         PLAN:   Patient never smoked in the past without history of COPD  1-Aggressive pulmonary toilet ,include incentive spirometry he will be provided with incentive spirometry on discharge and giving instruction to follow-up with his primary care physician  Chest x-ray today showing improvement in his atelectasis and bilateral infiltrate he recommended to continue with incentive spirometry and flutter valve  Need repeat chest x-ray in 1 week    2-Pending culture so far negative we will continue to follow    4-procal elevated mild 'on cefepime and change to Omnicef 300 twice daily for 7 days  Since he is very well with and not tachycardic and shortness of breath improved along with hypoxia we will hold on CTA  Was encouraged to have follow-up with PCP and with me in few weeks with chest  Keep DVT px ,   ambulated very well  Advise to use NIMV at night with his cervical spine issues, he did not want to go on noninvasive mechanical ventilation at home    Dressing was applied to where he had central line    His nurse to go through instruction again and to provide him with incentive spirometry and flutter valve as discussed in route  Patient has a lot of questions in terms of what happened after surgery I tried to answer him to the best of my knowledge since I start following him 6/11 still I explained to the best of my knowledge and from reviewing the records    All questions were answered and discharge instruction given      [facility]

## 2021-06-14 NOTE — PROGRESS NOTES
Pulse ox was 94% on room air at rest.  Ambulated patient on room air. Oxygen saturation was 87% on room air while ambulating. Oxygen applied. Recovery pulse ox was 97% on 3 liters of oxygen while ambulating.     Electronically signed by Doug Carrillo RN on 6/14/2021 at 2:50 PM

## 2021-06-14 NOTE — DISCHARGE SUMMARY
Discharge Summary    Patient ID   Ryann Rock   1970  [id number]    Primary Care:   Mazin Cao MD    Admit date: 6/2/2021   Discharge date: 6/14/2021    Medical Record number: [id number]   Admitting Physician: Eder Crooks MD   Discharge Physician: Ben Campos MD    Consultants: pulmonary/intensive care and neurosurgery    Procedures: none    Discharge Diagnoses:      Patient Active Problem List   Diagnosis Code    Chronic bilateral low back pain with bilateral sciatica M54.42, M54.41, G89.29    Chronic migraine without aura without status migrainosus, not intractable G43.709    Tear of right rotator cuff M75.101    Shoulder impingement, left M75.42    Shoulder impingement, right M75.41    Neck pain M54.2    Radiculopathy, lumbar region M54.16    At risk for bleeding associated with tonsillectomy and adenoidectomy Z91.89, Z98.890    Post-op pain G89.18    Chronic tonsillitis J35.01    Intractable pain R52    Cervical neuropathic pain M54.12    Cervicogenic headache R51.9    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 G43.709    Chronic daily headache R51.9    Cervical herniated disc M50.20    Post-op pneumonia J95.89, J18.9         Hospital Course:  Mr Joselyn Redding is 44 YM whose hospital course is listed below:  1. Acute hypoxic respiratory failure probably due to aspiration pneumonia status post intubation-patient continues to be on the ventilator pulmonary critical care is following  06/05/2021-patient remains awake while intubated in ICU is able to write and communicate; appears anxious  06/09/2021-patient was extubated this morning successfully he is talking maintaining his airway voice is slightly hoarse remains a little anxious we will continue to monitor  06/13/2021-patient is out of intensive care on stepdown is being weaned by pulmonary has O2 is down to 4 L  2.  PEA/cardiac arrest-continue management as above patient will stay rapid cardiology is following echo is pending  06/13/2021-echo was done and is normal PEA cardiac arrest has resolved with intensive medical management  06/14/2021-patient probably had aspirated pneumonia which caused his need for acute respiratory hypoxic failure and intubation instead of PEA cardiac arrest at this time he is stable; no indication for cardiac consult. 3. Hospital-acquired pneumonia-CT shows bilateral infiltrates left greater than right continue IV antibiotics critical care pulmonary is following monitor lab studies  4. Cervical herniated disc status post anterior cervical discectomy and fusion on 6/02/21 per neurosurgery-patient is to have collar on the neck remain x3 months pain is controlled with medication  5. Mild rhabdo myelolysis-CK is elevated, maintain hydration, monitor lab studies  06/10/2021-continue to monitor CK is decreasing  6. Seizure precautions-valproic acid IV monitor    Patient improved with medical management and is stable for discharge to home. Physical Exam:   BP (!) 136/96   Pulse 91   Temp 97.4 °F (36.3 °C) (Oral)   Resp 20   Ht 5' 5\" (1.651 m)   Wt 183 lb (83 kg)   SpO2 100%   BMI 30.45 kg/m²   Neck: no JVD  Lungs: equal BS, clear   CV: RRR, normal S1S2, no significant murmur  Abdomen: soft, nontender, normally active BS, no masses or tenderness  Extremities: no edema or cords  Neurologic: alert, oriented, no focal CN or motor deficit        Medications: see computerized discharge medication list     Medication List      START taking these medications    HYDROcodone-acetaminophen  MG per tablet  Commonly known as: NORCO  Take 1 tablet by mouth every 4 hours as needed for Pain for up to 7 days.   Replaces: HYDROcodone-acetaminophen 7.5-325 MG per tablet        CONTINUE taking these medications    cyclobenzaprine 10 MG tablet  Commonly known as: FLEXERIL  Take 1 tablet by mouth 3 times daily as needed for Muscle spasms     Lyrica 225 MG capsule  Generic drug: pregabalin  Take 1 capsule by mouth 2 times daily for 30 days.      metFORMIN 500 MG tablet  Commonly known as: GLUCOPHAGE     promethazine-dextromethorphan 6.25-15 MG/5ML syrup  Commonly known as: PROMETHAZINE-DM     SUMAtriptan 6 MG/0.5ML Soln injection  Commonly known as: IMITREX        STOP taking these medications    HYDROcodone-acetaminophen 7.5-325 MG per tablet  Commonly known as: Norco  Replaced by: HYDROcodone-acetaminophen  MG per tablet           Where to Get Your Medications      These medications were sent to [facility] [address] [address]    Phone: [phone]   · cyclobenzaprine 10 MG tablet  · HYDROcodone-acetaminophen  MG per tablet       Patient Instructions: resume home medications and any changes while in the hospital.      Discharged Condition: good  Disposition: home  Activity: activity as tolerated  Diet: cardiac diet  Wound Care: none needed    Follow-up: Dr. Lobito Bang in 1-2 weeks         Electronically signed by Jayne Braun MD on 6/14/2021 at 2:13 PM  [facility]   Time spent on discharge 45  minutes

## 2021-06-14 NOTE — PROGRESS NOTES
Physical Therapy    Physical Therapy Daily Treatment Note       Name: Nora Ocampo  : 1970  MRN: [id number]      Date of Service: 2021    Re Evaluating PT:  Nanci Silvia, PT, DPT  [id number]     Room #:  1976/2979-P  Diagnosis:  Cervical herniated disc [M50.20]  PMHx/PSHx:  Migraines, lyme disease, dermatitis, chronic generalized pain, cervicogenic HA, L shoulder arthroscopic biceps tenodesis, rotator cuff repair, extensive debridement, spinal cord simulator placement C2   Procedure/Surgery:  C4-C5, C5-C6, C6-C7 anterior cervical discectomy and fusion ; Intubation ; Extubation   Precautions:  Falls, Cervical Spine precautions, Custom Cervical Collar, O2 via HFNC  Equipment Needs:  FWW    SUBJECTIVE:    Pt lives alone in a 1 story home with 5 stairs to enter and 1 rail. Bed is on first floor and bath is on first floor. Pt ambulated with no AD independently PTA. Occasional use of SPC per pt. Pt has a hospital bed. Pt now states his dtr will be moving back home to assist at discharge. OBJECTIVE:   Re Evaluation  Date: 21 Treatment  Date: 21 Short Term/ Long Term   Goals   AM-PAC 6 Clicks     Was pt agreeable to Eval/treatment? Yes  Yes     Does pt have pain?  Neck pain and chronic generalized pain  Neck pain and chronic generalized pain     Bed Mobility  Rolling: NT  Supine to sit: SBA  Sit to supine: NT  Scooting: SBA  Rolling: NT  Supine to sit: supervision   Sit to supine: NT  Scooting: SBA  Modified Independent     Transfers Sit to stand: SBA  Stand to sit: SBA  Stand pivot: SBA with [employer] Sit to stand: Supervision   Stand to sit: Supervision   Stand pivot: SBA Modified Independent with AAD   Ambulation   200 feet with [employer] Min A 200 feet x2 no AD SBA >400 feet with AAD Modified Independent     Stair negotiation: ascended and descended  NT  >8 steps with 1 rail Modified Independent     ROM BUE:  WFL  BLE:  WFL WFL    Strength BUE:  NT  BLE:  Grossly 4/5  [facility] Balance Sitting EOB:  SBA  Dynamic Standing:  Min A with [employer]  Sitting EOB:  Independent   Dynamic Standing:  Modified Independent       Pt is A & O x 4  Sensation:  Pt denies numbness and tingling to extremities  Edema:  Unremarkable    Vitals:  SpO2 79% on room air on entry (pt up ambulating in/out of bathroom)   SPO2 93-94% at EOB on 4L O2    Therapeutic Exercises:    N/A    Patient education  Pt educated on SpO2 levels     Patient response to education:   Pt verbalized understanding Pt demonstrated skill Pt requires further education in this area   Yes  Yes  No      ASSESSMENT:    Conditions Requiring Skilled Therapeutic Intervention:    [x]Decreased strength     []Decreased ROM  [x]Decreased functional mobility  [x]Decreased balance   [x]Decreased endurance   []Decreased posture  []Decreased sensation  [x]Decreased coordination   []Decreased vision  []Decreased safety awareness   [x]Increased pain       Comments:  Pt received supine and agreeable to PT treatment. Vitals monitored during session. Pt requested to get dressed and use bathroom. Pt removed O2 to change in bathroom. O2 was assessed after pt finished in bathroom and was 79%. Pt was educated to utilize O2 whenever going to the bathroom. Pt put on 4 L 02 and levels took ~5 min to recover to 94%. Pt agreed to ambulating and completing stairs. Pt was able to ascend//descend 4 steps with use of hand rail. Pt showed signs of SOB. O2 levels were within normal ranges. Pt ambulated 200 x2 feet with a seated rest break in between. Pt continues to demonstrated decreased gait speed. Pt able to ambulate back to room. Pt set up in bedside chair on exit. Pt left with call button in reach, lines attached, and needs met. Pt would benefit from continued PT services at discharge.     Treatment:  Patient practiced and was instructed in the following treatment:     STS and pivot transfer training - pt educated on proper hand and foot placement, safety and sequencing, and use of WW to safely complete sit<>stand and pivot transfers with hands on assistance to complete task safely     Gait training- pt was given verbal and tactile cues to facilitate safety/balance, and take breaks if fatigued during ambulation as well as provided with physical assistance to complete task.  Stair training - Pt educated on proper safety and sequencing during stair ascension and descension. Verbal cues were given to facilitate proper hand and foot placement and hands on assistance provided for balance and fall prevention. PHYSICAL THERAPY PLAN OF CARE:  Pt is making good progress towards established goals. Continue PT POC. Specific instructions for next treatment:  Progress gait, continur stair negotiation     Frequency of treatments: 2-5x/week x 1-2 weeks.     Time in  846  Time out  915    Total Treatment Time  29 minutes     CPT codes:  [] Low Complexity PT evaluation 46392  [] Moderate Complexity PT evaluation 65742  [] High Complexity PT evaluation 22588  [] PT Re-evaluation 47294  [] Gait training 94714 -- minutes  [] Manual therapy 13064 -- minutes  [x] Therapeutic activities 01706 29 minutes  [] Therapeutic exercises 69565 -- minutes  [] Neuromuscular reeducation 15029 -- minutes     Ofelia Dial, DIGNA Green, PT, DPT  [id number]

## 2021-06-14 NOTE — PROGRESS NOTES
Discharge instructions given. Patient verbalizes understanding . Patient discharged with belongings , medications and home O2.  Home via private car

## 2021-06-16 ENCOUNTER — TELEPHONE (OUTPATIENT)
Dept: PULMONOLOGY | Age: 51
End: 2021-06-16

## 2021-06-16 DIAGNOSIS — J18.9 POST-OP PNEUMONIA: Primary | ICD-10-CM

## 2021-06-16 DIAGNOSIS — J95.89 POST-OP PNEUMONIA: Primary | ICD-10-CM

## 2021-06-16 NOTE — TELEPHONE ENCOUNTER
Call placed to pt's home and left VM re: CXR needed in 1 week advised to come to [facility] for CXR Monday 6/21 as ordered per Dr. Gordo Teague and that office will be mailing out follow up appt card for 2 weeks. Requested call back into office to confirm pt received message.

## 2021-06-21 DIAGNOSIS — M54.2 NECK PAIN: ICD-10-CM

## 2021-06-21 RX ORDER — HYDROCODONE BITARTRATE AND ACETAMINOPHEN 10; 325 MG/1; MG/1
1 TABLET ORAL EVERY 4 HOURS PRN
Qty: 42 TABLET | Refills: 0 | Status: SHIPPED | OUTPATIENT
Start: 2021-06-21 | End: 2021-07-01 | Stop reason: SDUPTHER

## 2021-06-21 NOTE — TELEPHONE ENCOUNTER
Pt requesting refill of the Norco 10/325mg    Medication pended for review      Electronically signed by Ellen Gunderson on 6/21/21 at 1:36 PM EDT

## 2021-06-22 ENCOUNTER — TELEPHONE (OUTPATIENT)
Dept: NEUROSURGERY | Age: 51
End: 2021-06-22

## 2021-06-22 NOTE — TELEPHONE ENCOUNTER
[facility] called, they went to Mr. Packer home for PT evaluation and determined PT is not neccessary at this time.

## 2021-06-23 NOTE — TELEPHONE ENCOUNTER
Medication Auth requested  Prior auth form faxed to [employer] for review    Pt informed.     Electronically signed by Jimena Kc on 6/23/21 at 12:17 PM EDT

## 2021-06-28 ENCOUNTER — TELEPHONE (OUTPATIENT)
Dept: PULMONOLOGY | Age: 51
End: 2021-06-28

## 2021-06-28 NOTE — TELEPHONE ENCOUNTER
Call returned to pt after receiving VM that pt is unable to do [facility] visit s with Dr. Jazmine Mazariegos due to transportation issues. Call returned again notifying pt that appt is scheduled for Wed 6/30/21 @ 130 pm. This is an afternoon appt. Requested pt also to have CXR done prior to this appointment. Advised in VM for pt to contact office if unable to get CXR or keep scheduled afternoon appt this week so we can arrange to reschedule. CXR still active and incomplete on file.

## 2021-07-01 ENCOUNTER — TELEPHONE (OUTPATIENT)
Dept: NEUROSURGERY | Age: 51
End: 2021-07-01

## 2021-07-01 DIAGNOSIS — M54.2 NECK PAIN: ICD-10-CM

## 2021-07-01 RX ORDER — HYDROCODONE BITARTRATE AND ACETAMINOPHEN 10; 325 MG/1; MG/1
1 TABLET ORAL EVERY 4 HOURS PRN
Qty: 42 TABLET | Refills: 0 | Status: SHIPPED | OUTPATIENT
Start: 2021-07-01 | End: 2021-07-07 | Stop reason: SDUPTHER

## 2021-07-01 RX ORDER — CYCLOBENZAPRINE HCL 10 MG
10 TABLET ORAL 3 TIMES DAILY PRN
Qty: 40 TABLET | Refills: 0 | Status: SHIPPED | OUTPATIENT
Start: 2021-07-01 | End: 2021-07-07 | Stop reason: SDUPTHER

## 2021-07-06 ENCOUNTER — TELEPHONE (OUTPATIENT)
Dept: PULMONOLOGY | Age: 51
End: 2021-07-06

## 2021-07-06 NOTE — TELEPHONE ENCOUNTER
Call from pt to arrange follow up appointment. Pt reports he will be here with transportation services provided for another appt on July 8th if we could accommodate him to see Dr. Hugo Bell. Pt to have Xrays for other doctor and will get CXR for Dr. Sejal Fleming at that time as well. Appt made for afternoon 215pm Thursday July 8th. Advised pt that we will accommodate appt.

## 2021-07-07 ENCOUNTER — TELEPHONE (OUTPATIENT)
Dept: NEUROSURGERY | Age: 51
End: 2021-07-07

## 2021-07-07 DIAGNOSIS — M54.2 NECK PAIN: ICD-10-CM

## 2021-07-07 RX ORDER — HYDROCODONE BITARTRATE AND ACETAMINOPHEN 10; 325 MG/1; MG/1
1 TABLET ORAL EVERY 4 HOURS PRN
Qty: 42 TABLET | Refills: 0 | Status: SHIPPED | OUTPATIENT
Start: 2021-07-07 | End: 2021-07-14 | Stop reason: SDUPTHER

## 2021-07-07 RX ORDER — CYCLOBENZAPRINE HCL 10 MG
10 TABLET ORAL 3 TIMES DAILY PRN
Qty: 40 TABLET | Refills: 0 | Status: SHIPPED | OUTPATIENT
Start: 2021-07-07 | End: 2021-07-20 | Stop reason: SDUPTHER

## 2021-07-08 ENCOUNTER — HOSPITAL ENCOUNTER (OUTPATIENT)
Age: 51
Discharge: HOME | End: 2021-07-10
Payer: MEDICAID

## 2021-07-08 ENCOUNTER — HOSPITAL ENCOUNTER (OUTPATIENT)
Dept: GENERAL RADIOLOGY | Age: 51
Discharge: HOME | End: 2021-07-10
Payer: MEDICAID

## 2021-07-08 ENCOUNTER — TELEPHONE (OUTPATIENT)
Dept: PULMONOLOGY | Age: 51
End: 2021-07-08

## 2021-07-08 ENCOUNTER — OFFICE VISIT (OUTPATIENT)
Dept: NEUROSURGERY | Age: 51
End: 2021-07-08
Payer: MEDICAID

## 2021-07-08 VITALS
TEMPERATURE: 98.2 F | OXYGEN SATURATION: 97 % | DIASTOLIC BLOOD PRESSURE: 101 MMHG | WEIGHT: 178 LBS | SYSTOLIC BLOOD PRESSURE: 140 MMHG | HEART RATE: 98 BPM | HEIGHT: 65 IN | RESPIRATION RATE: 18 BRPM | BODY MASS INDEX: 29.66 KG/M2

## 2021-07-08 DIAGNOSIS — Z98.1 STATUS POST CERVICAL SPINAL FUSION: ICD-10-CM

## 2021-07-08 DIAGNOSIS — J18.9 POST-OP PNEUMONIA: ICD-10-CM

## 2021-07-08 DIAGNOSIS — R06.02 SHORTNESS OF BREATH: Primary | ICD-10-CM

## 2021-07-08 DIAGNOSIS — M54.12 CERVICAL RADICULOPATHY: Primary | ICD-10-CM

## 2021-07-08 DIAGNOSIS — J95.89 POST-OP PNEUMONIA: ICD-10-CM

## 2021-07-08 DIAGNOSIS — M50.20 HERNIATED NUCLEUS PULPOSUS, CERVICAL: ICD-10-CM

## 2021-07-08 PROCEDURE — 99024 POSTOP FOLLOW-UP VISIT: CPT | Performed by: PHYSICIAN ASSISTANT

## 2021-07-08 PROCEDURE — 71046 X-RAY EXAM CHEST 2 VIEWS: CPT

## 2021-07-08 PROCEDURE — 72040 X-RAY EXAM NECK SPINE 2-3 VW: CPT

## 2021-07-08 NOTE — PROGRESS NOTES
Post Operative Follow-up    Patient is status post: ACDF. No arm pain. aviva op site pain ok. Physical Exam  Alert and Oriented X 3  PERRLA, EOMI  ARGUETA 5/5  Wound: C/D/I    A/P: patient is s/p C4-6 ACDF one month ago. X-rays stable. Continue with collar and restrictions.

## 2021-07-08 NOTE — TELEPHONE ENCOUNTER
LMOM informing patient that his CTA with Contrast is scheduled for 7-9-21 at 5:30 pm at the [facility] hospital. He must arrive by 5:00 pm. He is to have NOTHING to eat or drink four (4) hours prior to test. I also stated how important this test was and to please don;t cancel. Patient called back verbalizing that he received the message and I gave him clear directions on where the main entrance is to be dropped off.  I also reiritated that he is NOT to have anything to eat or drink for 4 hours prior to test

## 2021-07-09 ENCOUNTER — HOSPITAL ENCOUNTER (OUTPATIENT)
Dept: CT IMAGING | Age: 51
Discharge: HOME | End: 2021-07-11
Payer: MEDICAID

## 2021-07-09 DIAGNOSIS — R06.02 SHORTNESS OF BREATH: ICD-10-CM

## 2021-07-09 PROCEDURE — 6360000004 HC RX CONTRAST MEDICATION: Performed by: INTERNAL MEDICINE

## 2021-07-09 PROCEDURE — 71275 CT ANGIOGRAPHY CHEST: CPT

## 2021-07-09 RX ADMIN — IOPAMIDOL 75 ML: 755 INJECTION, SOLUTION INTRAVENOUS at 17:34

## 2021-07-14 ENCOUNTER — TELEPHONE (OUTPATIENT)
Dept: PULMONOLOGY | Age: 51
End: 2021-07-14

## 2021-07-14 DIAGNOSIS — M54.2 NECK PAIN: ICD-10-CM

## 2021-07-14 RX ORDER — HYDROCODONE BITARTRATE AND ACETAMINOPHEN 10; 325 MG/1; MG/1
1 TABLET ORAL EVERY 4 HOURS PRN
Qty: 42 TABLET | Refills: 0 | Status: SHIPPED
Start: 2021-07-14 | End: 2021-07-15 | Stop reason: SDUPTHER

## 2021-07-14 NOTE — TELEPHONE ENCOUNTER
Call from pt re: follow up for Thoracentesis. VM recevied and returned call to pt. Advised pt that Dr. Gaetano Kurtz is  Covering ICU unti in hospital this week and RN will need to discuss with him the next steps/orders. PT reports Dr. Lane Horn had called him re: results and indicates that a Thoracentesis is o be done and he \"cannot remember what else he said needed done\". Sergio Cast that RN will follow up with Dr. Lane Horn and will return call to him.

## 2021-07-15 ENCOUNTER — TELEPHONE (OUTPATIENT)
Dept: NEUROSURGERY | Age: 51
End: 2021-07-15

## 2021-07-15 DIAGNOSIS — M54.2 NECK PAIN: ICD-10-CM

## 2021-07-15 RX ORDER — HYDROCODONE BITARTRATE AND ACETAMINOPHEN 10; 325 MG/1; MG/1
1 TABLET ORAL EVERY 4 HOURS PRN
Qty: 42 TABLET | Refills: 0 | Status: SHIPPED | OUTPATIENT
Start: 2021-07-15 | End: 2021-07-20 | Stop reason: SDUPTHER

## 2021-07-16 ENCOUNTER — TELEPHONE (OUTPATIENT)
Dept: PULMONOLOGY | Age: 51
End: 2021-07-16

## 2021-07-16 NOTE — TELEPHONE ENCOUNTER
Call to pt to advise that procedure for Thoracentesis has been arranged for Wed 7/21/21 @ 130pm. Advised that he should arrive at 12pm at [facility] and enter thru [facility] and they will direct him to dept for procedure. Pt not taking any blood thinners/ASA products. Advised that PAT staff will also be calling with instructions. Pt will work on arranging transportation for procedure appt.
Closed fracture of one rib of right side, initial encounter

## 2021-07-19 ENCOUNTER — TELEPHONE (OUTPATIENT)
Dept: NEUROSURGERY | Age: 51
End: 2021-07-19

## 2021-07-19 NOTE — PROGRESS NOTES
Michelle 34 PRE-ADMISSION TESTING GENERAL INSTRUCTIONS- [facility]-phone number:[phone]    GENERAL INSTRUCTIONS  [x] Antibacterial Soap shower Night before and/or AM of Surgery  [] Abimael wipe instruction sheet and wipes given. [x] Nothing by mouth after midnight, including gum, candy, mints, or water. [x] You may brush your teeth, gargle, but do NOT swallow water. []Hibiclens shower  the night before and the morning of surgery. Do not use             Hibiclens on your face or head. [x]No smoking, chewing tobacco, illegal drugs, or alcohol within 24 hours of your surgery. [x] Jewelry, valuables or body piercing's should not be brought to the hospital. All body and/or tongue piercing's must be removed prior to arriving to hospital.  ALL hair pins must be removed. [x] Do not wear makeup, lotions, powders, deodorant. Nail polish as directed by the nurse. [x] Arrange transportation with a responsible adult  to and from the hospital. If you do not have a responsible adult  to transport you, you will need to make arrangements with a medical transportation company (i.e. [employer]. A Uber/taxi/bus is not appropriate unless you are accompanied by a responsible adult ). Arrange for someone to be with you for the remainder of the day and for 24 hours after your procedure due to having had anesthesia. Who will be your  for transportation? Aide   Who will be staying with you for 24 hrs after your procedure? aide  [x] Bring insurance card and photo ID.  [] Transfusion Bracelet: Please bring with you to hospital, day of surgery  [] Bring urine specimen day of surgery. Any small container is acceptable. [] Use inhalers the morning of surgery and bring with you to hospital.  [] Bring copy of living will or healthcare power of  papers to be placed in your electronic record.   [] CPAP/BI-PAP: Please bring your machine if you are to spend the night in the about your blood sugar [phone]. [] Use your inhalers the morning of surgery. Bring your emergency inhaler with you day of surgery. [] Follow physician instructions regarding any blood thinners you may be taking. WHAT TO EXPECT:  [x] The day of surgery you will be greeted and checked in by the [employer].  In addition, you will be registered in the [facility] by a Patient Access Representative. Please bring your photo ID and insurance card. A nurse will greet you in accordance to the time you are needed in the pre-op area to prepare you for surgery. Please do not be discouraged if you are not greeted in the order you arrive as there are many variables that are involved in patient preparation. Your patience is greatly appreciated as you wait for your nurse. Please bring in items such as: books, magazines, newspapers, electronics, or any other items  to occupy your time in the waiting area. [x]  Delays may occur with surgery and staff will make a sincere effort to keep you informed of delays. If any delays occur with your procedure, we apologize ahead of time for your inconvenience as we recognize the value of your time.

## 2021-07-19 NOTE — TELEPHONE ENCOUNTER
Pt would like more information on the Bone growth paperwork. Tali Negron is waiting on some additional paperwork.  Pt can be reached at [phone]

## 2021-07-20 ENCOUNTER — TELEPHONE (OUTPATIENT)
Dept: NEUROSURGERY | Age: 51
End: 2021-07-20

## 2021-07-20 DIAGNOSIS — M54.2 NECK PAIN: ICD-10-CM

## 2021-07-20 RX ORDER — HYDROCODONE BITARTRATE AND ACETAMINOPHEN 10; 325 MG/1; MG/1
1 TABLET ORAL EVERY 4 HOURS PRN
Qty: 42 TABLET | Refills: 0 | Status: SHIPPED | OUTPATIENT
Start: 2021-07-20 | End: 2021-07-26 | Stop reason: SDUPTHER

## 2021-07-20 RX ORDER — CYCLOBENZAPRINE HCL 10 MG
10 TABLET ORAL 3 TIMES DAILY PRN
Qty: 40 TABLET | Refills: 0 | Status: SHIPPED | OUTPATIENT
Start: 2021-07-20 | End: 2021-08-03 | Stop reason: SDUPTHER

## 2021-07-21 ENCOUNTER — APPOINTMENT (OUTPATIENT)
Dept: ULTRASOUND IMAGING | Age: 51
End: 2021-07-21
Attending: INTERNAL MEDICINE
Payer: MEDICAID

## 2021-07-21 ENCOUNTER — HOSPITAL ENCOUNTER (OUTPATIENT)
Age: 51
Setting detail: OUTPATIENT SURGERY
Discharge: HOME | End: 2021-07-21
Attending: INTERNAL MEDICINE | Admitting: INTERNAL MEDICINE
Payer: MEDICAID

## 2021-07-21 VITALS
WEIGHT: 178 LBS | RESPIRATION RATE: 20 BRPM | BODY MASS INDEX: 29.66 KG/M2 | SYSTOLIC BLOOD PRESSURE: 135 MMHG | HEART RATE: 79 BPM | OXYGEN SATURATION: 100 % | HEIGHT: 65 IN | TEMPERATURE: 97 F | DIASTOLIC BLOOD PRESSURE: 94 MMHG

## 2021-07-21 DIAGNOSIS — J18.9 POST-OP PNEUMONIA: Primary | ICD-10-CM

## 2021-07-21 DIAGNOSIS — R06.02 SHORTNESS OF BREATH: ICD-10-CM

## 2021-07-21 DIAGNOSIS — J95.89 POST-OP PNEUMONIA: Primary | ICD-10-CM

## 2021-07-21 PROCEDURE — 7100000011 HC PHASE II RECOVERY - ADDTL 15 MIN: Performed by: INTERNAL MEDICINE

## 2021-07-21 PROCEDURE — 7100000010 HC PHASE II RECOVERY - FIRST 15 MIN: Performed by: INTERNAL MEDICINE

## 2021-07-21 PROCEDURE — 76604 US EXAM CHEST: CPT

## 2021-07-21 RX ORDER — GUAIFENESIN 600 MG/1
600 TABLET, EXTENDED RELEASE ORAL 2 TIMES DAILY
Qty: 30 TABLET | Refills: 0 | Status: SHIPPED | OUTPATIENT
Start: 2021-07-21 | End: 2021-08-05

## 2021-07-21 ASSESSMENT — PAIN - FUNCTIONAL ASSESSMENT: PAIN_FUNCTIONAL_ASSESSMENT: 0-10

## 2021-07-21 ASSESSMENT — PAIN SCALES - GENERAL: PAINLEVEL_OUTOF10: 0

## 2021-07-21 ASSESSMENT — PAIN DESCRIPTION - DESCRIPTORS: DESCRIPTORS: ACHING;CONSTANT;DISCOMFORT

## 2021-07-21 NOTE — PROGRESS NOTES
Patient admitted to [facility]. Placed on appropriate monitors. Assisted with liquids and nutrition. Call light at bedside. Family at bedside.

## 2021-07-21 NOTE — H&P
Pulmonary [facility]                               Division of Pulmonary, Critical Care            Pulmonary Progress Note           CC :follow up of respiratory failure Hypoxia    SUBJECTIVE:  Follow up for pleural effusion     OBJECTIVE:  Vitals:    07/21/21 1229   BP: (!) 178/109   Pulse: 101   Resp: 21   Temp: 97.2 °F (36.2 °C)   SpO2: 97%     Constitutional: Alert, cooperative. EENT: EOMI HAI. MMM. No icterus. No thrush. Neck: No thyromegaly. No elevated JVP. Trachea was midline. He has a c-collar  Respiratory: rhonchi bilateral   Cardiovascular: Regular, No murmur. No clicks, gallops or rubs. Pulses:  Equal bilaterally. Abdomen: Soft without organomegaly. No rebound, rigidity. No guarding. Lymphatic: No lymphadenopathy. Musculoskeletal: Without weakness  Extremities:  No lower extremity edema. Reflexes appear adequate. Skin:  Warm and dry. No skin rashes. Neurological/Psychiatric: No acute psychosis. Cranial nerves II-XII are intact. DATA:  CXR ordered  Sputum ordered    CLINICAL ASSESMENT:  · Acute hypoxic respiratory failure 2/2 aspiration pneumonia  · S/p respiratory arrest, 3 minutes CPR, ROSC achieved  · Aspiration pneumonia, on antibiotics  · S/p C4-C7 anterior cervical discectomy and fusion    · Elevated troponins, post respiratory arrest         PLAN:   For right side thoracentesis  All risks, benefits, alternatives and potential complications explained thoroughly including, but not limited to, bleeding, infection, lung injury, pneumothorax . , heart attack, prolonged ventilation,  and even death, and the patient agrees to proceed.       [address]

## 2021-07-22 NOTE — PROGRESS NOTES
Looking with US  Patient has minimal effusion that can not be drained  Seems he has more atelectasis ,so will give muconex and repeat CXR ,if not better will do bronchoscopy   Also woill send incentive spirometry and flutter valve

## 2021-07-26 ENCOUNTER — TELEPHONE (OUTPATIENT)
Dept: NEUROSURGERY | Age: 51
End: 2021-07-26

## 2021-07-26 DIAGNOSIS — M54.2 NECK PAIN: ICD-10-CM

## 2021-07-26 RX ORDER — HYDROCODONE BITARTRATE AND ACETAMINOPHEN 10; 325 MG/1; MG/1
1 TABLET ORAL EVERY 4 HOURS PRN
Qty: 42 TABLET | Refills: 0 | Status: SHIPPED | OUTPATIENT
Start: 2021-07-26 | End: 2021-08-03 | Stop reason: SDUPTHER

## 2021-08-03 ENCOUNTER — TELEPHONE (OUTPATIENT)
Dept: NEUROSURGERY | Age: 51
End: 2021-08-03

## 2021-08-03 DIAGNOSIS — M54.2 NECK PAIN: ICD-10-CM

## 2021-08-03 RX ORDER — HYDROCODONE BITARTRATE AND ACETAMINOPHEN 10; 325 MG/1; MG/1
1 TABLET ORAL EVERY 4 HOURS PRN
Qty: 42 TABLET | Refills: 0 | Status: SHIPPED
Start: 2021-08-03 | End: 2021-08-10 | Stop reason: SDUPTHER

## 2021-08-03 RX ORDER — CYCLOBENZAPRINE HCL 10 MG
10 TABLET ORAL 3 TIMES DAILY PRN
Qty: 40 TABLET | Refills: 0 | Status: SHIPPED
Start: 2021-08-03 | End: 2021-08-10 | Stop reason: SDUPTHER

## 2021-08-03 NOTE — TELEPHONE ENCOUNTER
Patient called in for a refill on his flexeril and pain medication. Please send into [address].

## 2021-08-10 ENCOUNTER — TELEPHONE (OUTPATIENT)
Dept: NEUROSURGERY | Age: 51
End: 2021-08-10

## 2021-08-10 DIAGNOSIS — M54.2 NECK PAIN: ICD-10-CM

## 2021-08-10 RX ORDER — CYCLOBENZAPRINE HCL 10 MG
10 TABLET ORAL 3 TIMES DAILY PRN
Qty: 40 TABLET | Refills: 0 | Status: SHIPPED
Start: 2021-08-10 | End: 2021-08-20 | Stop reason: SDUPTHER

## 2021-08-10 RX ORDER — HYDROCODONE BITARTRATE AND ACETAMINOPHEN 10; 325 MG/1; MG/1
1 TABLET ORAL EVERY 4 HOURS PRN
Qty: 42 TABLET | Refills: 0 | Status: SHIPPED
Start: 2021-08-10 | End: 2021-08-20 | Stop reason: SDUPTHER

## 2021-08-10 NOTE — TELEPHONE ENCOUNTER
Patient called and wants a refill on his oxycodone sent into [facility] on [address]

## 2021-08-11 DIAGNOSIS — M50.20 HERNIATED DISC, CERVICAL: ICD-10-CM

## 2021-08-11 DIAGNOSIS — Z98.1 S/P CERVICAL SPINAL FUSION: Primary | ICD-10-CM

## 2021-08-20 ENCOUNTER — TELEPHONE (OUTPATIENT)
Dept: NEUROSURGERY | Age: 51
End: 2021-08-20

## 2021-08-20 DIAGNOSIS — M54.2 NECK PAIN: ICD-10-CM

## 2021-08-20 RX ORDER — CYCLOBENZAPRINE HCL 10 MG
10 TABLET ORAL 3 TIMES DAILY PRN
Qty: 40 TABLET | Refills: 0 | Status: SHIPPED
Start: 2021-08-20 | End: 2021-08-30 | Stop reason: SDUPTHER

## 2021-08-20 RX ORDER — HYDROCODONE BITARTRATE AND ACETAMINOPHEN 10; 325 MG/1; MG/1
1 TABLET ORAL EVERY 4 HOURS PRN
Qty: 42 TABLET | Refills: 0 | Status: SHIPPED
Start: 2021-08-20 | End: 2021-08-30 | Stop reason: SDUPTHER

## 2021-08-20 NOTE — TELEPHONE ENCOUNTER
Patient requesting Flexeril and [address] [facility] [address] [address] to [facility] on [facility].

## 2021-08-27 ENCOUNTER — TELEPHONE (OUTPATIENT)
Dept: NEUROSURGERY | Age: 51
End: 2021-08-27

## 2021-08-30 ENCOUNTER — TELEPHONE (OUTPATIENT)
Dept: NEUROSURGERY | Age: 51
End: 2021-08-30

## 2021-08-30 DIAGNOSIS — M54.2 NECK PAIN: ICD-10-CM

## 2021-08-30 RX ORDER — HYDROCODONE BITARTRATE AND ACETAMINOPHEN 10; 325 MG/1; MG/1
1 TABLET ORAL EVERY 4 HOURS PRN
Qty: 42 TABLET | Refills: 0 | Status: SHIPPED | OUTPATIENT
Start: 2021-08-30 | End: 2021-08-30 | Stop reason: SDUPTHER

## 2021-08-30 RX ORDER — CYCLOBENZAPRINE HCL 10 MG
10 TABLET ORAL 3 TIMES DAILY PRN
Qty: 40 TABLET | Refills: 0 | Status: SHIPPED | OUTPATIENT
Start: 2021-08-30 | End: 2022-07-20 | Stop reason: SDUPTHER

## 2021-08-30 RX ORDER — CYCLOBENZAPRINE HCL 10 MG
10 TABLET ORAL 3 TIMES DAILY PRN
Qty: 40 TABLET | Refills: 0 | Status: SHIPPED | OUTPATIENT
Start: 2021-08-30 | End: 2021-08-30 | Stop reason: SDUPTHER

## 2021-08-30 RX ORDER — HYDROCODONE BITARTRATE AND ACETAMINOPHEN 10; 325 MG/1; MG/1
1 TABLET ORAL EVERY 4 HOURS PRN
Qty: 42 TABLET | Refills: 0 | Status: SHIPPED | OUTPATIENT
Start: 2021-08-30 | End: 2021-09-06

## 2021-08-30 NOTE — TELEPHONE ENCOUNTER
Patient called stating his insurance will not pay for his medications in [place]. He's requesting that we cancel them and send them to [facility] on [address]. I called [facility] in [facility] and canceled the flexeril and Norco. Can we now send them to [facility] on [address].

## 2021-08-30 NOTE — TELEPHONE ENCOUNTER
Patient states he is in [place] for his father's  and that is why he missed his appt with Dr. Jemal Belle.  Requesting [address] and Flexeril refills to [facility] in [facility] [address]

## 2021-09-28 ENCOUNTER — OFFICE VISIT (OUTPATIENT)
Dept: NEUROSURGERY | Age: 51
End: 2021-09-28
Payer: MEDICAID

## 2021-09-28 ENCOUNTER — HOSPITAL ENCOUNTER (OUTPATIENT)
Dept: GENERAL RADIOLOGY | Age: 51
Discharge: HOME | End: 2021-09-30
Payer: MEDICAID

## 2021-09-28 ENCOUNTER — HOSPITAL ENCOUNTER (OUTPATIENT)
Age: 51
Discharge: HOME | End: 2021-09-30
Payer: MEDICAID

## 2021-09-28 VITALS
TEMPERATURE: 97.1 F | DIASTOLIC BLOOD PRESSURE: 98 MMHG | OXYGEN SATURATION: 97 % | BODY MASS INDEX: 26.98 KG/M2 | HEART RATE: 89 BPM | HEIGHT: 68 IN | SYSTOLIC BLOOD PRESSURE: 137 MMHG | WEIGHT: 178 LBS

## 2021-09-28 DIAGNOSIS — Z98.1 S/P CERVICAL SPINAL FUSION: ICD-10-CM

## 2021-09-28 DIAGNOSIS — M50.20 HERNIATED DISC, CERVICAL: ICD-10-CM

## 2021-09-28 DIAGNOSIS — M54.12 CERVICAL RADICULOPATHY: Primary | ICD-10-CM

## 2021-09-28 PROCEDURE — 99024 POSTOP FOLLOW-UP VISIT: CPT | Performed by: PHYSICIAN ASSISTANT

## 2021-09-28 PROCEDURE — 72040 X-RAY EXAM NECK SPINE 2-3 VW: CPT

## 2021-09-28 NOTE — PROGRESS NOTES
Post Operative Follow-up    Patient is status post: ACDF.   aviva op site pain ok while in cervical collar. Physical Exam  Alert and Oriented X 3  PERRLA, EOMI  KENDALL 5/5  Wound: C/D/I    A/P: patient is s/p C4-6 ACDF 3 months ago. X-rays stable. D/c collar, no restrictions.   Will begin PT.

## 2021-11-12 ENCOUNTER — HOSPITAL ENCOUNTER (OUTPATIENT)
Dept: PHYSICAL THERAPY | Age: 51
Setting detail: THERAPIES SERIES
Discharge: HOME | End: 2021-11-12
Payer: MEDICAID

## 2021-11-12 PROCEDURE — 97161 PT EVAL LOW COMPLEX 20 MIN: CPT

## 2021-11-12 NOTE — PROGRESS NOTES
[address] [facility]                Phone: [phone]   Fax: [phone]    Physical Therapy Daily Treatment Note  Date:  2021    Patient Name:  Johan Davies    :  1970  MRN: [id number]    Referring Physician:  Arneta Mcburney  Insurance Information:  [address] DEPT OF      Evaluation date:  21   Diagnosis:  Cervical pain s/p C4-C7 ACDF on 21  Precautions:  None at this time   ICD-10 Codes:  M54.12  Evaluating Physical Therapist:  Yosvany Hoff, PT, DPT  Plan of care signed (Y/N):    Visit# / total visits:  -       Subjective:        Exercises:   Exercise/Equipment Reps  During/ after  Other comments    UBE        Pulleys                   AROM Cervical retractions        AROM cervical rotations         AROM cervical side bends               Pec stretch               Rows        Pulldowns                         Home Exercise Program:    Cervical AROM    Rotation    Retraction    sidebends   3 sets daily x 15 reps each      Comments:       Treatment/Activity Tolerance:  [] Patient tolerated treatment well [] Patient limited by fatique  [] Patient limited by pain  [] Patient limited by other medical complications  [] Other:     Prognosis: [x] Good [x] Fair  [] Poor    Patient Requires Follow-up: [x] Yes  [] No    Plan:   [] Continue per plan of care [] Alter current plan (see comments)  [x] Plan of care initiated [] Hold pending MD visit [] Discharge    Plan for Next Session:        See Weekly Progress Note: []  Yes  []  No  Next due:          CPT codes 2020 Units    Low Complexity PT evaluation 77263 69220    Moderate Complexity PT evaluation  30024    High Complexity PT evaluation 66424    PT Re-evaluation  [id number]    Gait training 95376    Manual therapy  [id number]    Therapeutic activities  [id number]    Therapeutic exercises  62626    Neuromuscular reeducation  [id number]        Time In:   Time Out:     Electronically signed by:    Kayleen Campos Ekta Rogel, DPT  [id number]

## 2021-11-12 NOTE — PROGRESS NOTES
[facility]                Phone: [phone]   Fax: [phone]    Physical Therapy Initial Evaluation  Date:  2021    Patient Name:  Sivan English    :  1970  MRN: [id number]  Referring Physician:  Mari Ram  Insurance Information:  [address] DEPT OF     Evaluation date:  21   Diagnosis:  Cervical pain s/p C4-C7 ACDF on 21  Precautions:  None at this time   ICD-10 Codes:  M54.12  Evaluating Physical Therapist:  Curt Ordoñez, PT, DPT      Subjective:  History/Onset of Symptoms:  Pt with chronic history of cervical pain who is s/p C4-C7 ACDF on 21 with Dr. Chow. Pt reports that he has been in pain since  when he first was mis diagnosed with having Lymes Disease. Pt reports chronic body wide pain \"everywhere\". Pt reports pain level as 8/10. He states that he has chronic numbness and tingling throughout BUE which is not new. Pt very vague at times when trying to get a more detailed history from him. Pt does state that he is L hand dominant. He also reports that he continues to have increased pain when reaching for objects throughout the day, especially above shoulder height. Pt reports that he has a spinal stimulator as well which is not new.       Pain:  8/10  bodywide pain including cervical spine        Sensation:  Pt reports numbness and tingling \"everywhere\"     Previous methods of Treatment:  Physical therapy in the past       Objective:    BUE AROM is grossly WFL throughout (increased pain with end range bilateral shoulder flexion)    Cervical AROM is grossly 50% throughout and painful per pt      Strength:  BUE grossly 4+/5 throughout and painful on testing     Sitting posture:  FAIR with rounded shoulders and min forward head posture     HEP:  Hardcopy provided   Cervical retraction  Cervical rotation   Cervical sidebending   All performed 3x daily for 15 reps each   Instructed to perform gently and just until he feels a gentle but tolerable stretch   Pt verbalized understanding       Summary/Assessment:    Pt presents to outpatient physical therapy with cervical pain, decreased cervical AROM, and impaired posture following ACDF of C4-C7 on 6/2/21. Plan:     Below checked are areas for improvement during physical therapy POC:        [x]  Pain reduction  []  Balance Improvement       [x]  Strengthening  [x]  Postural Improvement   [x]  Range of Motion  [x]  Soft Tissue Improvement    [x]  Gait Training   []  Other:      [x]  Home Exercise Program      Pt will be see for 1-2 visits per week for 4-6 weeks for a total of 4-12 visits to accomplish goals set below:        Short Term/ Long Term Goals: (4-6 weeks)      1. Pt will report less than 4/10 cervical pain with daily activities     2. Pt will increase BUE strength to 5/5 throughout     3. Pt will increase cervical AROM to at least 75% of normal in all directions     4. Pt will improve sitting posture from FAIR to GOOD with no rounded shoulder/ forward head posture     5. Pt will be independent with HEP         Pt's potential for reaching Physical Therapy goals: fair/ good. Time In:  1000  Time Out:  [place], [place]  [id number]    Mohan RadarChile  T: [phone]   F: [phone]     If you have any questions or concerns, please don't hesitate to call. Thank you for your referral.    Physician Signature:________________________________Date:__________________  By signing above, therapists plan is approved by physician. All patients under [employer]   must be signed by physician.

## 2021-11-15 ENCOUNTER — HOSPITAL ENCOUNTER (OUTPATIENT)
Dept: PHYSICAL THERAPY | Age: 51
Setting detail: THERAPIES SERIES
Discharge: HOME | End: 2021-11-15
Payer: MEDICAID

## 2021-11-15 NOTE — PROGRESS NOTES
[address] [facility]                Phone: [phone]  Fax: [phone]    Physical Therapy  Cancellation/No-show Note  Patient Name:  Florecita Kerr  :  1970   Date:  11/15/2021    For today's appointment patient:  [x]  Cancelled  []  Rescheduled appointment  []  No-show     Reason given by patient:  []  Patient ill  []  Conflicting appointment  []  No transportation    []  Conflict with work  []  No reason given  []  Other:     Comments:   Message from  that patient cancelled today's PT session.          Electronically signed by:  Mari Dempsey ATC, PTA

## 2021-11-17 ENCOUNTER — HOSPITAL ENCOUNTER (OUTPATIENT)
Dept: PHYSICAL THERAPY | Age: 51
Setting detail: THERAPIES SERIES
Discharge: HOME | End: 2021-11-17
Payer: MEDICAID

## 2021-11-17 NOTE — PROGRESS NOTES
[facility]                Phone: [phone]  Fax: [phone]    Physical Therapy  Cancellation/No-show Note  Patient Name:  Haroon Gaspar  :  1970   Date:  2021    For today's appointment patient:  [x]  Cancelled  []  Rescheduled appointment  []  No-show     Reason given by patient:  []  Patient ill  []  Conflicting appointment  []  No transportation    []  Conflict with work  []  No reason given  []  Other:     Comments:  Called to  to cancel. Reports he is too much pain.         Electronically signed by:  Nii Harmon ATC, PTA

## 2021-11-22 ENCOUNTER — HOSPITAL ENCOUNTER (OUTPATIENT)
Dept: PHYSICAL THERAPY | Age: 51
Setting detail: THERAPIES SERIES
End: 2021-11-22
Payer: MEDICAID

## 2021-12-15 ENCOUNTER — TELEPHONE (OUTPATIENT)
Dept: NEUROSURGERY | Age: 51
End: 2021-12-15

## 2021-12-15 DIAGNOSIS — Z98.1 S/P CERVICAL SPINAL FUSION: ICD-10-CM

## 2021-12-15 DIAGNOSIS — M54.12 CERVICAL RADICULOPATHY: Primary | ICD-10-CM

## 2021-12-15 NOTE — TELEPHONE ENCOUNTER
Called patient back to schedule appt and have him get cervical xrays before appt. He will call us back after he talks to his aid.

## 2021-12-15 NOTE — TELEPHONE ENCOUNTER
Patient  Says he went to pt but after his first visit he has numbness and tingling in his left arm. He would like call back to discuss this.

## 2021-12-20 ENCOUNTER — HOSPITAL ENCOUNTER (OUTPATIENT)
Dept: GENERAL RADIOLOGY | Age: 51
Discharge: HOME | End: 2021-12-22
Payer: MEDICAID

## 2021-12-20 ENCOUNTER — HOSPITAL ENCOUNTER (OUTPATIENT)
Age: 51
Discharge: HOME | End: 2021-12-22
Payer: MEDICAID

## 2021-12-20 ENCOUNTER — OFFICE VISIT (OUTPATIENT)
Dept: NEUROSURGERY | Age: 51
End: 2021-12-20

## 2021-12-20 VITALS
HEART RATE: 79 BPM | BODY MASS INDEX: 26.98 KG/M2 | DIASTOLIC BLOOD PRESSURE: 94 MMHG | TEMPERATURE: 97.2 F | SYSTOLIC BLOOD PRESSURE: 137 MMHG | OXYGEN SATURATION: 97 % | WEIGHT: 178 LBS | HEIGHT: 68 IN

## 2021-12-20 DIAGNOSIS — Z98.1 S/P CERVICAL SPINAL FUSION: ICD-10-CM

## 2021-12-20 DIAGNOSIS — M54.12 CERVICAL RADICULOPATHY: ICD-10-CM

## 2021-12-20 DIAGNOSIS — M54.12 CERVICAL RADICULOPATHY: Primary | ICD-10-CM

## 2021-12-20 PROCEDURE — 99024 POSTOP FOLLOW-UP VISIT: CPT

## 2021-12-20 PROCEDURE — 72040 X-RAY EXAM NECK SPINE 2-3 VW: CPT

## 2021-12-20 RX ORDER — CEFUROXIME AXETIL 250 MG/1
TABLET ORAL
COMMUNITY
Start: 2021-12-15 | End: 2021-12-20

## 2021-12-20 RX ORDER — METHYLPREDNISOLONE 4 MG/1
TABLET ORAL
Qty: 1 KIT | Refills: 0 | Status: SHIPPED | OUTPATIENT
Start: 2021-12-20 | End: 2021-12-26

## 2021-12-21 PROBLEM — M54.30 SCIATICA: Status: ACTIVE | Noted: 2021-12-21

## 2021-12-22 ENCOUNTER — TELEPHONE (OUTPATIENT)
Dept: NEUROSURGERY | Age: 51
End: 2021-12-22

## 2021-12-22 NOTE — TELEPHONE ENCOUNTER
CT/MYELOGRAM - Lumbar  Date:01/04/2022  Facility: [address] [place] [address] Time: 9:30 AM    NPO after [employer]  Will be at facility [employer] ID, insurance cards, covid vaccine card and list of current medications.     NO HOLDS    Labs: PT/INR, Platelets  Covid test 5 days prior if not vaccinated      LVM INFORMING OF APT INFO

## 2021-12-30 ENCOUNTER — HOSPITAL ENCOUNTER (OUTPATIENT)
Dept: PHYSICAL THERAPY | Age: 51
Setting detail: THERAPIES SERIES
Discharge: HOME | End: 2021-12-30

## 2021-12-30 NOTE — PROGRESS NOTES
[facility]                Phone: [phone]   Fax: [phone]    Physical Therapy  Out Patient Discharge Summary       Date:  2021    Patient Name:  Blair Hernandez    :  1970       REFERRING PHYSICIAN:  Betsey XIE  DIAGNOSIS:  Cervical pain s/p C4-C7 ACDF on 21  PHYSICAL THERAPIST:  Janusz Garcia, PT, DPT      This is to inform you that, as per [facility] Physical Therapy department policy, your patient Blair Hernandez is as of todays date being discharged from Physical Therapy secondary to the following reasons:    1. If a Physical Therapy outpatient misses three consecutive scheduled appointments   without any prior notification, he or she will be discharged from physical therapy services. A new prescription will be necessary to resume physical therapy. 2. If a client is absent for 50% of scheduled visits during a one-month period, he or she will be discharged from physical therapy services. A new prescription will be necessary to resume physical therapy. **Patient cancelled both scheduled treatment sessions; reporting he was in too much pain. Patient did not schedule further sessions. If you have any questions, please feel free to call at [phone]    Thank you,    MANDA OCHOA ATC, PTA      The information contained in this Fax the designated recipients intend message only for the personal and confidential use named above. This information is to be handled as privileged and confidential.  If the reader of this message is not the intended recipient, you are hereby notified that you have received this document in error and that any review, dissemination, distribution or copying of this message is strictly prohibited. If you receive this communication in error, please notify us immediately at the above number and return the original to us by mail.   Thank you  [address] [facility]  [address] [facility] [address] [phone]

## 2022-01-13 ENCOUNTER — HOSPITAL ENCOUNTER (OUTPATIENT)
Dept: CT IMAGING | Age: 52
Discharge: HOME | End: 2022-01-13
Payer: MEDICAID

## 2022-01-13 ENCOUNTER — HOSPITAL ENCOUNTER (OUTPATIENT)
Dept: INTERVENTIONAL RADIOLOGY/VASCULAR | Age: 52
Discharge: HOME | End: 2022-01-13
Payer: MEDICAID

## 2022-01-13 VITALS
WEIGHT: 199 LBS | TEMPERATURE: 96.5 F | SYSTOLIC BLOOD PRESSURE: 131 MMHG | RESPIRATION RATE: 20 BRPM | OXYGEN SATURATION: 96 % | BODY MASS INDEX: 33.15 KG/M2 | HEART RATE: 81 BPM | HEIGHT: 65 IN | DIASTOLIC BLOOD PRESSURE: 77 MMHG

## 2022-01-13 DIAGNOSIS — M54.12 CERVICAL RADICULOPATHY: ICD-10-CM

## 2022-01-13 LAB
HCT VFR BLD CALC: 43.3 % (ref 37–54)
HEMOGLOBIN: 13.5 G/DL (ref 12.5–16.5)
INR BLD: 1
METER GLUCOSE: 92 MG/DL (ref 74–99)
PLATELET # BLD: 247 E9/L (ref 130–450)
PROTHROMBIN TIME: 12 SEC (ref 9.3–12.4)

## 2022-01-13 PROCEDURE — 7100000011 HC PHASE II RECOVERY - ADDTL 15 MIN

## 2022-01-13 PROCEDURE — 72126 CT NECK SPINE W/DYE: CPT

## 2022-01-13 PROCEDURE — 36415 COLL VENOUS BLD VENIPUNCTURE: CPT

## 2022-01-13 PROCEDURE — 85027 COMPLETE CBC AUTOMATED: CPT

## 2022-01-13 PROCEDURE — 82962 GLUCOSE BLOOD TEST: CPT

## 2022-01-13 PROCEDURE — 85610 PROTHROMBIN TIME: CPT

## 2022-01-13 PROCEDURE — 7100000010 HC PHASE II RECOVERY - FIRST 15 MIN

## 2022-01-13 PROCEDURE — 6370000000 HC RX 637 (ALT 250 FOR IP): Performed by: RADIOLOGY

## 2022-01-13 PROCEDURE — 6360000004 HC RX CONTRAST MEDICATION: Performed by: RADIOLOGY

## 2022-01-13 PROCEDURE — 62302 MYELOGRAPHY LUMBAR INJECTION: CPT

## 2022-01-13 RX ORDER — SODIUM CHLORIDE 0.9 % (FLUSH) 0.9 %
5-40 SYRINGE (ML) INJECTION PRN
Status: DISCONTINUED | OUTPATIENT
Start: 2022-01-13 | End: 2022-01-14 | Stop reason: HOSPADM

## 2022-01-13 RX ORDER — ACETAMINOPHEN 500 MG
500 TABLET ORAL ONCE
Status: COMPLETED | OUTPATIENT
Start: 2022-01-13 | End: 2022-01-13

## 2022-01-13 RX ORDER — HYDROCODONE BITARTRATE AND ACETAMINOPHEN 5; 325 MG/1; MG/1
1.5 TABLET ORAL ONCE
Status: COMPLETED | OUTPATIENT
Start: 2022-01-13 | End: 2022-01-13

## 2022-01-13 RX ADMIN — IOPAMIDOL 10 ML: 612 INJECTION, SOLUTION INTRATHECAL at 09:42

## 2022-01-13 RX ADMIN — ACETAMINOPHEN 500 MG: 500 TABLET ORAL at 10:57

## 2022-01-13 RX ADMIN — HYDROCODONE BITARTRATE AND ACETAMINOPHEN 1.5 TABLET: 5; 325 TABLET ORAL at 08:12

## 2022-01-13 ASSESSMENT — PAIN SCALES - GENERAL
PAINLEVEL_OUTOF10: 10
PAINLEVEL_OUTOF10: 8

## 2022-01-13 NOTE — PROGRESS NOTES
Patient came to specials procedures for cervical myelogram.    Patient positioned prone on table, secured and prepped for procedure. Emotional support given. 0933 start procedure /101 83 18 98% on room air prone    0935 End procedure /104 76 18 100% on room air prone    VS post procedure 155/96 75 18 100% on room air prone    Patient tolerated procedure. bandaid place on back. Patient transferred back on cart supine.     Nurse to nurse called to [facility] spoke with Shreya Snowden RN    Patient taken to CT scan

## 2022-01-28 ENCOUNTER — TELEPHONE (OUTPATIENT)
Dept: NEUROSURGERY | Age: 52
End: 2022-01-28

## 2022-01-28 NOTE — TELEPHONE ENCOUNTER
Spoke to patient and gave results. He will continue with pain management and PT at this time. If symptoms do not improve, he will come back in and see the doctor to discuss further surgery.

## 2022-01-28 NOTE — TELEPHONE ENCOUNTER
Patient had his ct myelogram.  He is calling for his next steps. Does he have to come back for an appt or can you call him for results?

## 2022-01-28 NOTE — TELEPHONE ENCOUNTER
Faxed info to both drs per request from [facility] and Dr. Ainsley Davis

## 2022-01-31 ENCOUNTER — TELEPHONE (OUTPATIENT)
Dept: NEUROSURGERY | Age: 52
End: 2022-01-31

## 2022-01-31 DIAGNOSIS — M54.12 CERVICAL RADICULOPATHY: ICD-10-CM

## 2022-01-31 DIAGNOSIS — M54.2 NECK PAIN: Primary | ICD-10-CM

## 2022-01-31 NOTE — TELEPHONE ENCOUNTER
Referral sent to [facility] in [place], please let patient know. They should be reaching out to him soon.

## 2022-02-01 ENCOUNTER — TELEPHONE (OUTPATIENT)
Dept: NEUROSURGERY | Age: 52
End: 2022-02-01

## 2022-02-01 NOTE — TELEPHONE ENCOUNTER
Please call him back and let him know that he has a referral to [facility] pain clinic in [place] that [place] placed yesterday and give him their number. Thank you.

## 2022-02-02 ENCOUNTER — TELEPHONE (OUTPATIENT)
Dept: NEUROSURGERY | Age: 52
End: 2022-02-02

## 2022-03-08 ENCOUNTER — PREP FOR PROCEDURE (OUTPATIENT)
Dept: PAIN MANAGEMENT | Age: 52
End: 2022-03-08

## 2022-03-08 ENCOUNTER — OFFICE VISIT (OUTPATIENT)
Dept: PAIN MANAGEMENT | Age: 52
End: 2022-03-08
Payer: MEDICAID

## 2022-03-08 VITALS
BODY MASS INDEX: 31.65 KG/M2 | DIASTOLIC BLOOD PRESSURE: 90 MMHG | RESPIRATION RATE: 16 BRPM | SYSTOLIC BLOOD PRESSURE: 148 MMHG | HEIGHT: 65 IN | OXYGEN SATURATION: 95 % | HEART RATE: 83 BPM | TEMPERATURE: 97.7 F | WEIGHT: 190 LBS

## 2022-03-08 DIAGNOSIS — M54.12 CERVICAL RADICULAR PAIN: Primary | ICD-10-CM

## 2022-03-08 DIAGNOSIS — M54.12 CERVICAL RADICULAR PAIN: ICD-10-CM

## 2022-03-08 DIAGNOSIS — Z98.1 S/P CERVICAL SPINAL FUSION: ICD-10-CM

## 2022-03-08 DIAGNOSIS — M79.2 NEURALGIA AND NEURITIS: ICD-10-CM

## 2022-03-08 DIAGNOSIS — M50.30 DDD (DEGENERATIVE DISC DISEASE), CERVICAL: Primary | ICD-10-CM

## 2022-03-08 DIAGNOSIS — M47.812 CERVICAL SPONDYLOSIS: ICD-10-CM

## 2022-03-08 PROCEDURE — 99213 OFFICE O/P EST LOW 20 MIN: CPT | Performed by: ANESTHESIOLOGY

## 2022-03-08 PROCEDURE — 99215 OFFICE O/P EST HI 40 MIN: CPT | Performed by: ANESTHESIOLOGY

## 2022-03-08 NOTE — PROGRESS NOTES
Do you currently have any of the following:    Fever: No  Headache:  Yes  Cough: No  Shortness of breath: No  Exposed to anyone with these symptoms: Nikia                                                                                                                Cass Ruiz presents to the [facility] on 3/8/2022. Azael Hill is complaining of pain whole upper body. The pain is constant. The pain is described as aching, throbbing, shooting, stabbing and variation of whole list. Pain is rated on his best day at a 6, on his worst day at a 10, and on average at a 8 on the VAS scale. He took his last dose of Norco, Lyrica and lyrica . Azael Hill does not have issues with constipation. Any procedures since your last visit: No,     He is  on NSAIDS and  is not on anticoagulation medications to include none . Pacemaker or defibrillator: No.    Medication Contract and Consent for Opioid Use Documents Filed     Patient Documents     Type of Document Status Date Received Received By Description    Medication Contract Received 11/19/2018  1:33 PM Emiliana MARTÍNEZ 11/14/18 [facility]     Medication Contract Received 11/4/2019  1:15 PM GARY [address] [place] [address] pain management pt agressment    Medication Contract Received 2/1/2021 11:22 AM Kenton Anderson PA[facility]MT CONTRACT DR. POWERS                    BP (!) 148/90   Pulse 83   Temp 97.7 °F (36.5 °C) (Infrared)   Resp 16   Ht 5' 5\" (1.651 m)   Wt 190 lb (86.2 kg)   SpO2 95%   BMI 31.62 kg/m²      No LMP for male patient.

## 2022-03-08 NOTE — PROGRESS NOTES
[address]        [address] [facility], [address] [facility]      [phone]                  Consult Note      Patient:  Tricia Cooper,  1970    Date of Service:  22     Requesting Physician:  Dr. Keeley Guajardo    Reason for Consult:      Patient presents with complaints of neck pain    HISTORY OF PRESENT ILLNESS:      Last see in 2020. Mr. Tricia Cooper is a 46 y.o. male [facility] of  Chronic neck pain. S/p ACDF : C4-5, C5-6, C6-7 Dr. Keeley Guajardo In 2021. Having neck pain shoulder blade pain and upper extremity pain. He also complains of pain/ sensitivity in the upper extremity. Noticed after starting physical therapy. Extensive history for chronic pain for years. I had seen him in 2020. He has been treated with multiple pain centers in the past- [facility], [facility]. He has Spinal cord stimulator implant for LE pain: Aug 2018- helps while it is on- still working well. S/p Cervical SCS implant done in 2019. Post implant he had severe neck pain/ UE pain and SCS had to be explanted in 2019. Pain causes functional limitations/ limits Adl's : Yes     Nursing notes and details of the pain history reviewed. Please see intake notes for details. Currently On Norco/ Gabapentin by Dr. Ortega Gama    Has been evaluated by neurosurgery and referred for cervical spine interventions. Previous treatments:   Physical Therapy : yes,      Medications: - NSAID's : yes             - Membrane stabilizers : yes             - Opioids : yes            - Adjuvants or Others : yes    Spine Surgeries: yes,     Anticoagulation medications: no.    H/O Smoking: no  H/O alcohol abuse : no  H/O Illicit drug use : no    Imaging:     CT myelogram of the cervical spine on 2022:  Impression   1.  Prior anterior fusion at C4 through C7.  No evidence of recurrent central   canal stenosis or disc herniation at these levels. 2. Left posterolateral and lateral disc protrusion at C3-4 that may be   slightly increased compared to the prior MRI study.  This causes slight   compression of the left side of the spinal cord and could compress the left   C4 nerve root.         X-ray cervical spine 12/20/2021:  Impression   Intact cervical fusion hardware with stable alignment and no new abnormal   findings.        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       Past Surgical History:   Procedure Laterality Date    ABDOMEN SURGERY      nissen fundoplication    BACK SURGERY      neck fusion    CARPAL TUNNEL RELEASE Bilateral     CERVICAL FUSION N/A 6/2/2021    C4-C5, C5-C6, C6-C7  ANTERIOR CERVICAL DISCECTOMY AND FUSION -- NEEDS REGULAR BED WITH HORSE SHOE, PLATES, SCREWS -- GLOBUS performed by Arturo Cardenas MD at [address] [place] [address] GASTRIC FUNDOPLICATION      1939 [address] [facility]  90/51/4649    UMBILICAL- [facility]    INSERT/ REPLACE INFUSN PUMP,PROGRAMMABLE N/A 3/2/2019    SPINAL CORD STIMULATOR REMOVAL performed by Arturo Cardenas MD at [place] [address]  08/15/2018    implantation of medtronic lumbar spinal cord generator    [place] IMPLANT NEUROSTIM/ N/A 8/15/2018    SURGICAL IMPLANTATION OF MEDTRONIC LUMBAR SPINAL CORD  ELECTRODES AND GENERATOR performed by Eve Mathews DO at . [address] Right 5/24/2018    RIGHT SHOULDER ARTHROSCOPY, DEBRIDEMENT, SUBACROMIAL DECOMPRESSION, DISTAL CLAVICLE RESECTION, ACROMIOPLASTY performed by Michelle Bass MD at [address] [facility] Left 9/28/2018    LEFT SHOULDER ARTHROSCOPY SUBACROMIAL DECOMPRESSION, CROMIAL PLASTY, DISTAL CLAVICAL EXCISION  ++ARTHREX, BEACH CHAIR, SPIDER++ performed by Michelle Bass MD at [address] ARTHROSCOPY Left 1/28/2020    LEFT SHOULDER ARTHROSCOPIC BICEPS TENODESIS, ROTATOR CUFF REPAIR, EXTENSIVE DEBRIDEMENT  +++ARTHREX+++   +++PNB+++ performed by Michelle Bass MD at [address] [facility] N/A 2/28/2019    C2 SPINAL CORD STIMULATOR PLACEMENT --MEDTRONIC performed by Berna Nixon MD at [address] 1/21/2019    TONSILLECTOMY performed by Sp Gonzalez DO at [address] [facility] [address]       Prior to Admission medications    Medication Sig Start Date End Date Taking? Authorizing Provider   LYRICA 225 MG capsule Take 1 capsule by mouth 2 times daily for 30 days. 3/4/22 4/3/22 Yes Deidra Montes MD   HYDROcodone-acetaminophen (NORCO) 7.5-325 MG per tablet Take 1 tablet by mouth every 8 hours as needed for Pain for up to 30 days.  Intended supply: 30 days 4/3/22 5/3/22 Yes Deidra Montes MD   cyclobenzaprine (FLEXERIL) 10 MG tablet Take 1 tablet by mouth 3 times daily as needed for Muscle spasms 8/30/21  Yes Norbert Funez PA-C   promethazine-dextromethorphan (PROMETHAZINE-DM) 6.25-15 MG/5ML syrup Take 5 mLs by mouth 4 times daily as needed 4/22/21  Yes Historical Provider, MD   metFORMIN (GLUCOPHAGE) 500 MG tablet Take 500 mg by mouth 2 times daily (with meals)  8/29/19  Yes Historical Provider, MD   SUMAtriptan (IMITREX) 6 MG/0.5ML SOLN injection Inject 6 mg into the skin once as needed for Migraine   Yes Historical Provider, MD       Allergies   Allergen Reactions    Penicillins      Family has history to penicillin        Social History     Socioeconomic History    Marital status:      Spouse name: Not on file    Number of children: 1    Years of education: Not on file    Highest education level: Not on file   Occupational History    Not on file   Tobacco Use    Smoking status: Former Smoker     Years: 17.00     Start date: 10/22/1988    Smokeless tobacco: Never Used    Tobacco comment: quit 1998   Vaping Use    Vaping Use: Never used   Substance and Sexual Activity    Alcohol use: Yes     Comment: socially- OCCASIONAL    Drug use: No    Sexual activity: Never   Other Topics Concern    Not on file   Social History Narrative    Not on file     Social Determinants of Health     Financial Resource Strain:     Difficulty of Paying Living Expenses: Not on file   Food Insecurity:     Worried About Running Out of Food in the Last Year: Not on file    Jaime of Food in the Last Year: Not on file   Transportation Needs:     Lack of Transportation (Medical): Not on file    Lack of Transportation (Non-Medical):  Not on file   Physical Activity:     Days of Exercise per Week: Not on file    Minutes of Exercise per Session: Not on file   Stress:     Feeling of Stress : Not on file   Social Connections:     Frequency of Communication with Friends and Family: Not on file    Frequency of Social Gatherings with Friends and Family: Not on file    Attends Holiness Services: Not on file    Active Member of [address] [place] [address] [employer] or Organizations: Not on file    Attends Club or Organization Meetings: Not on file    Marital Status: Not on file   Intimate Partner Violence:     Fear of Current or Ex-Partner: Not on file    Emotionally Abused: Not on file    Physically Abused: Not on file    Sexually Abused: Not on file   Housing Stability:     Unable to Pay for Housing in the Last Year: Not on file    Number of [place] in the Last Year: Not on file    Unstable Housing in the Last Year: Not on file       Family History   Problem Relation Age of Onset    No Known Problems Mother     Prostate Cancer Father     No Known Problems Maternal Grandmother     No Known Problems Maternal Grandfather     No Known Problems Paternal Grandmother     No Known Problems Paternal Grandfather     No Known Problems Daughter        REVIEW OF SYSTEMS:     Patient specifically denies fever/chills, chest pain, shortness of breath, new bowel or bladder complaints. All other review of systems was negative. Review of Systems - Documented reviewed    PHYSICAL EXAMINATION:      BP (!) 148/90   Pulse 83   Temp 97.7 °F (36.5 °C) (Infrared)   Resp 16   Ht 5' 5\" (1.651 m)   Wt 190 lb (86.2 kg)   SpO2 95%   BMI 31.62 kg/m²      General:       General appearance:  Pleasant and well-hydrated, in no distress and A & O x 3  Build:Overweight  Function: Rises from seated position easily and Moves about room without difficulty     HEENT:     Head:normocephalic, atraumatic     Lungs:     Breathing:normal breathing pattern      CVS:     RRR     Abdomen:     Shape:non-distended and normal     Cervical spine:     Inspection:scar form the prior surgery noted, appears to have healed well. Palpation:tenderness paravertebral muscles, tenderness trapezium, left, right and positive. Range of motion:reduced flexion, extension, rotation  and is moderately painful. Spurling's: negative bilaterally     Thoracic spine:                Spine inspection:normal   Palpation:No tenderness over the midline and paraspinal area, bilaterally  Range of motion:normal in flexion, extension rotation bilateral and is not painful.     Lumbar spine:     Spine inspection: scar from the prior surgery +, IPG site looks clean, non tender  Palpation: Tenderness paravertebral muscles No bilaterally  Range of motion: Decreased, flexion Decreased, Lateral bending, extension and rotation bilaterally reduced is not painful.   Sacroiliac joint tenderness No bilaterally  SLR : negative bilaterally     Musculoskeletal:     Trigger points +     Extremities:  No tremors     Upper extremity:  ROM painful, dysesthesia over the b/l UE, shoulder blade.      Neurological:     Sensory: Normal to light touch except for sensory change sin the b/l UE, shoulder blade area.     Motor:   Upper extremity strength 4+ (Mainly due to pain)  B/l LE 5/5     Gait:normal Yes     Dermatology:     Skin:no rashes or lesions noted    Assessment/Plan:     Diagnosis Orders   1. DDD (degenerative disc disease), cervical     2. Cervical spondylosis     3. S/P cervical spinal fusion     4. Cervical radicular pain     5. Neuralgia and neuritis         46 y.o. male with h/o S/p ACDF : C4-5, C5-6, C6-7 Dr. Chris Sharma. In June 2021. Having neck pain and upper extremity pain. He also has neuropathy of bilateral upper extremity pain. Has history of chronic pain issues as detailed above for which  had spinal cord stimulator implanted for lower back lower extremity pain which is helping him. Prior SCS for neck pain - did not help had to be explanted in the past.    Has been treated by multiple pain centers in the past.    Currently getting his pain medication by Dr. Nilton Reed. Recent image findings/neurosurgery notes/other pertinent records reviewed. We will set up for a cervical epidural steroid injection under fluoroscopy C7-T1. RBA discussed patient agreed to proceed. Goals and realistic expectations of the treatment discussed in detail with the patient. We will do procedure with moderate sedation in view of history of anxiety of needles. He will continue pain medications with the current prescribing MD.    If interventions does not provide significant long-term relief and he continues to have significant pain with no surgical indication, may benefit from consultation with chronic pain rehabilitation program at [facility] in future. Counseling : Patient encouraged to stay active and to watch/lose weight    Encouraged to continue Regular home exercise program as tolerated - stretching / strengthening.     Treatment plan discussed with the patient including medication and procedure side effects. Controlled Substances Monitoring:   [place]ARRS reviewed. Tonya Tavarez MD    CC:    Yassine Ye MD  [address] [facility] [address]       NOTE: The above documentation was prepared using voice recognition software. Every attempt was made to ensure accuracy but there may be spelling, grammatical, and contextual errors.

## 2022-03-14 ENCOUNTER — TELEPHONE (OUTPATIENT)
Dept: PAIN MANAGEMENT | Age: 52
End: 2022-03-14

## 2022-03-28 ENCOUNTER — ANESTHESIA EVENT (OUTPATIENT)
Dept: OPERATING ROOM | Age: 52
End: 2022-03-28
Payer: MEDICAID

## 2022-03-28 ASSESSMENT — LIFESTYLE VARIABLES: SMOKING_STATUS: 0

## 2022-03-28 NOTE — ANESTHESIA PRE PROCEDURE
Department of Anesthesiology  Preprocedure Note       Name:  Skyla Bustamante   Age:  46 y.o.  :  1970                                          MRN:  [id number]         Date:  3/28/2022      Surgeon: Jennifer Tao):  Ector Zimmer MD    Procedure: C7-T1 EPIDURAL STEROID INJECTION WITH X-RAY (SEDATION (N/A )    Medications prior to admission:   Prior to Admission medications    Medication Sig Start Date End Date Taking? Authorizing Provider   LYRICA 225 MG capsule Take 1 capsule by mouth 2 times daily for 30 days. 3/4/22 4/3/22  En Morales MD   HYDROcodone-acetaminophen (NORCO) 7.5-325 MG per tablet Take 1 tablet by mouth every 8 hours as needed for Pain for up to 30 days. Intended supply: 30 days 4/3/22 5/3/22  En Morales MD   cyclobenzaprine (FLEXERIL) 10 MG tablet Take 1 tablet by mouth 3 times daily as needed for Muscle spasms 21   Jason Mukherjee PA-C   promethazine-dextromethorphan (PROMETHAZINE-DM) 6.25-15 MG/5ML syrup Take 5 mLs by mouth 4 times daily as needed 21   Historical Provider, MD   metFORMIN (GLUCOPHAGE) 500 MG tablet Take 500 mg by mouth 2 times daily (with meals)  19   Historical Provider, MD   SUMAtriptan (IMITREX) 6 MG/0.5ML SOLN injection Inject 6 mg into the skin once as needed for Migraine    Historical Provider, MD       Current medications:    Current Outpatient Medications   Medication Sig Dispense Refill    LYRICA 225 MG capsule Take 1 capsule by mouth 2 times daily for 30 days. 60 capsule 1    [START ON 4/3/2022] HYDROcodone-acetaminophen (NORCO) 7.5-325 MG per tablet Take 1 tablet by mouth every 8 hours as needed for Pain for up to 30 days.  Intended supply: 30 days 90 tablet 0    cyclobenzaprine (FLEXERIL) 10 MG tablet Take 1 tablet by mouth 3 times daily as needed for Muscle spasms 40 tablet 0    promethazine-dextromethorphan (PROMETHAZINE-DM) 6.25-15 MG/5ML syrup Take 5 mLs by mouth 4 times daily as needed      metFORMIN (GLUCOPHAGE) 500 MG tablet Take 500 mg by mouth 2 times daily (with meals)       SUMAtriptan (IMITREX) 6 MG/0.5ML SOLN injection Inject 6 mg into the skin once as needed for Migraine       No current facility-administered medications for this visit. Allergies: Allergies   Allergen Reactions    Penicillins      Family has history to penicillin        Problem List:    Patient Active Problem List   Diagnosis Code    Chronic bilateral low back pain with bilateral sciatica M54.42, M54.41, G89.29    Chronic migraine without aura without status migrainosus, not intractable G43.709    Tear of right rotator cuff M75.101    Shoulder impingement, left M75.42    Shoulder impingement, right M75.41    Neck pain M54.2    Radiculopathy, lumbar region M54.16    At risk for bleeding associated with tonsillectomy and adenoidectomy Z91.89, Z98.890    Post-op pain G89.18    Chronic tonsillitis J35.01    Intractable pain R52    Cervical radicular pain M54.12    Cervicogenic headache G44.86    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 G43.709    Chronic daily headache R51.9    Cervical herniated disc M50.20    Post-op pneumonia J95.89, J18.9    Sciatica M54.30    Cervical spondylosis M47.812    S/P cervical spinal fusion Z98.1       Past Medical History:        Diagnosis Date    Anxiety     Asthma     exercised induced    Cervicogenic headache 6/12/2019    Chronic daily headache 6/13/2019    Reported since 2009    Chronic generalized pain     Dermatitis     Diabetes mellitus ([address]     PO meds only    Heart stopped beating ([address] 06/05/2021    3 days after cervical fusion- \" chest pain , dyspnea, passed out- intubated for 5 days\".  Cardiologist last 6/21 ? 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  91/10/2828    UMBILICAL- [facility]    INSERT/ REPLACE INFUSN PUMP,PROGRAMMABLE N/A 2019    SPINAL CORD STIMULATOR REMOVAL performed by Shanda Kerr MD at [facility] 12.  08/15/2018    implantation of medtronic lumbar spinal cord generator    [place] IMPLANT NEUROSTIM/ N/A 08/15/2018    SURGICAL IMPLANTATION OF MEDTRONIC LUMBAR SPINAL CORD  ELECTRODES AND GENERATOR performed by Sarkis Méndez DO at . [address] Right 2018    RIGHT SHOULDER ARTHROSCOPY, DEBRIDEMENT, SUBACROMIAL DECOMPRESSION, DISTAL CLAVICLE RESECTION, ACROMIOPLASTY performed by Sugey Rick MD at [address] [facility] Left 2018    LEFT SHOULDER ARTHROSCOPY SUBACROMIAL DECOMPRESSION, CROMIAL PLASTY, DISTAL CLAVICAL EXCISION  ++ARTHREX, BEACH CHAIR, SPIDER++ performed by Sugey Rick MD at [address] [facility] ARTHROSCOPY Left 2020    LEFT SHOULDER ARTHROSCOPIC BICEPS TENODESIS, ROTATOR CUFF REPAIR, EXTENSIVE DEBRIDEMENT  +++ARTHREX+++   +++PNB+++ performed by Sugey Rick MD at [address] [facility] N/A 2019    C2 SPINAL CORD STIMULATOR PLACEMENT --MEDTRONIC performed by Shanda Kerr MD at [address] [facility] N/A 2019    TONSILLECTOMY performed by Ting Bain DO at [facility] OR       Social History:    Social History     Tobacco Use    Smoking status: Former Smoker     Years: 2.00     Quit date: 10/22/1988     Years since quittin.4    Smokeless tobacco: Never Used    Tobacco comment: quit    Substance Use Topics    Alcohol use: Yes     Comment: socially- OCCASIONAL Counseling given: Not Answered  Comment: quit 1998      Vital Signs (Current): There were no vitals filed for this visit. BP Readings from Last 3 Encounters:   03/08/22 (!) 148/90   01/13/22 131/77   12/20/21 (!) 137/94       NPO Status:                                                                                 BMI:   Wt Readings from Last 3 Encounters:   03/08/22 190 lb (86.2 kg)   01/13/22 199 lb (90.3 kg)   01/12/22 187 lb (84.8 kg)     There is no height or weight on file to calculate BMI.    CBC:   Lab Results   Component Value Date    WBC 10.6 06/14/2021    RBC 4.45 06/14/2021    HGB 13.5 01/13/2022    HCT 43.3 01/13/2022    MCV 73.9 06/14/2021    RDW 15.3 06/14/2021     01/13/2022       CMP:   Lab Results   Component Value Date     06/14/2021    K 3.4 06/14/2021    K 4.3 05/26/2021    CL 89 06/14/2021    CO2 35 06/14/2021    BUN 9 06/14/2021    CREATININE 0.7 06/14/2021    GFRAA >60 06/14/2021    LABGLOM >60 06/14/2021    GLUCOSE 107 06/14/2021    PROT 6.4 06/05/2021    CALCIUM 9.6 06/14/2021    BILITOT 0.4 06/05/2021    ALKPHOS 59 06/05/2021    AST 40 06/05/2021    ALT 43 06/05/2021       POC Tests: No results for input(s): POCGLU, POCNA, POCK, POCCL, POCBUN, POCHEMO, POCHCT in the last 72 hours.     Coags:   Lab Results   Component Value Date    PROTIME 12.0 01/13/2022    INR 1.0 01/13/2022    APTT 32.9 08/07/2018       HCG (If Applicable): No results found for: PREGTESTUR, PREGSERUM, HCG, HCGQUANT     ABGs:   Lab Results   Component Value Date    SAE8RUL 26.2 06/06/2021        Type & Screen (If Applicable):  No results found for: LABABO, [address]    Anesthesia Evaluation  Patient summary reviewed and Nursing notes reviewed no history of anesthetic complications:   Airway: Mallampati: III  TM distance: >3 FB   Neck ROM: full  Mouth opening: > = 3 FB Dental:          Pulmonary:Negative Pulmonary ROS breath sounds clear to auscultation  (+) pneumonia: resolved,  asthma: exercise-induced asthma,     (-) sleep apnea and not a current smoker (Quit 1988)                           Cardiovascular:Negative CV ROS  Exercise tolerance: good (>4 METS),   (+) murmur (Grade I),     (-) past MI, CAD, CABG/stent, dysrhythmias and  angina    ECG reviewed  Rhythm: regular  Rate: normal  Echocardiogram reviewed         Beta Blocker:  Not on Beta Blocker      ROS comment: Heart stopped beating 3 days after cervical fusion - \"chest pain, dyspnea, passed out- intubated for 5 days\" Cardiologist last 6/21    ECHO:  Left Ventricle  Left ventricular internal dimensions were normal in diastole and systole. No regional wall motion abnormalities seen. Normal left ventricular ejection fraction. Ejection fraction is visually estimated at 65%. Indeterminate diastolic function. EKG:  Normal sinus rhythm  Minimal voltage criteria for LVH, may be normal variant  Borderline ECG  When compared with ECG of 26-MAY-2021 10:25,  No significant change was found     Neuro/Psych:   (+) neuromuscular disease (Muscle pain due to lyme disease.):, headaches: migraine headaches, depression/anxiety  (Anxiety)   (-) seizures, TIA and CVA            ROS comment: Nerve stimulator for back pain, Left side  Chronic pain lyme disease  Chronic bilateral low back pain with bilateral sciatica  Chronic migraine without aura without status migrainosus, not intractable  Tear of right rotator cuff  Shoulder impingement, left  Shoulder impingement, right  Neck pain  Radiculopathy, lumbar region  Intractable pain  Cervical radicular pain  Cervicogenic headache  Chronic daily headache  Cervical herniated disc  Sciatica  Cervical spondylosis  S/P cervical spinal fusion     GI/Hepatic/Renal:        (-) hepatitis      ROS comment: History of gastric fundoplication.    Endo/Other:    (+) DiabetesType II DM, , blood dyscrasia (History of anemia - H&H WNL's in January): anemia:., .          Pt had no PAT visit       Abdominal:   (+) obese,           Vascular: negative vascular ROS. - DVT and PE. Other Findings:      MRI Cervical Spine    Impression 1. No fracture or joint dislocation is seen. 2. Moderate central canal stenoses at C4-5, C5-6 and C6-7. 3. Multilevel neural foraminal stenoses, worst (moderate to severe) at the right C4-5, bilateral C5-6 and bilateral C6-7 levels. 4. Small left paracentral disc protrusion at C3-4 may impinge the exiting left C4 nerve. EKG Narrative & Impression    Normal sinus rhythm  Minimal voltage criteria for LVH, may be normal variant  Borderline ECG  When compared with ECG of 21-FEB-2019 08:41,  No significant change was found  Confirmed by Lucina Abdi ([id number]) on 5/26/2021 10:49:42 AM            Anesthesia Plan      MAC     ASA 3     (Note copied from a previous encounter with the appropriate addendums and changes)  Induction: intravenous. MIPS: Postoperative opioids intended and Prophylactic antiemetics administered. Anesthetic plan and risks discussed with patient. Plan discussed with CRNA.               Carmina Devlin RN  June 2, 2021  6:20 AM      Ander Caro DO  Staff Anesthesiologist  March 28, 2022  2:57 PM

## 2022-03-29 ENCOUNTER — HOSPITAL ENCOUNTER (OUTPATIENT)
Dept: OPERATING ROOM | Age: 52
Setting detail: OUTPATIENT SURGERY
Discharge: HOME | End: 2022-03-29
Attending: ANESTHESIOLOGY
Payer: MEDICAID

## 2022-03-29 ENCOUNTER — ANESTHESIA (OUTPATIENT)
Dept: OPERATING ROOM | Age: 52
End: 2022-03-29
Payer: MEDICAID

## 2022-03-29 ENCOUNTER — HOSPITAL ENCOUNTER (OUTPATIENT)
Age: 52
Setting detail: OUTPATIENT SURGERY
Discharge: HOME | End: 2022-03-29
Attending: ANESTHESIOLOGY | Admitting: ANESTHESIOLOGY
Payer: MEDICAID

## 2022-03-29 VITALS
HEIGHT: 65 IN | HEART RATE: 80 BPM | BODY MASS INDEX: 32.99 KG/M2 | TEMPERATURE: 97 F | DIASTOLIC BLOOD PRESSURE: 90 MMHG | WEIGHT: 198 LBS | SYSTOLIC BLOOD PRESSURE: 144 MMHG | OXYGEN SATURATION: 97 % | RESPIRATION RATE: 16 BRPM

## 2022-03-29 VITALS
RESPIRATION RATE: 15 BRPM | SYSTOLIC BLOOD PRESSURE: 146 MMHG | DIASTOLIC BLOOD PRESSURE: 90 MMHG | OXYGEN SATURATION: 100 %

## 2022-03-29 DIAGNOSIS — M54.12 CERVICAL RADICULOPATHY: ICD-10-CM

## 2022-03-29 PROCEDURE — 62321 NJX INTERLAMINAR CRV/THRC: CPT | Performed by: ANESTHESIOLOGY

## 2022-03-29 PROCEDURE — 2580000003 HC RX 258: Performed by: ANESTHESIOLOGY

## 2022-03-29 PROCEDURE — 3600000005 HC SURGERY LEVEL 5 BASE: Performed by: ANESTHESIOLOGY

## 2022-03-29 PROCEDURE — 3600000015 HC SURGERY LEVEL 5 ADDTL 15MIN: Performed by: ANESTHESIOLOGY

## 2022-03-29 PROCEDURE — 6360000002 HC RX W HCPCS

## 2022-03-29 PROCEDURE — 6360000002 HC RX W HCPCS: Performed by: ANESTHESIOLOGY

## 2022-03-29 PROCEDURE — 7100000011 HC PHASE II RECOVERY - ADDTL 15 MIN: Performed by: ANESTHESIOLOGY

## 2022-03-29 PROCEDURE — 7100000010 HC PHASE II RECOVERY - FIRST 15 MIN: Performed by: ANESTHESIOLOGY

## 2022-03-29 PROCEDURE — 3700000001 HC ADD 15 MINUTES (ANESTHESIA): Performed by: ANESTHESIOLOGY

## 2022-03-29 PROCEDURE — 6360000004 HC RX CONTRAST MEDICATION: Performed by: ANESTHESIOLOGY

## 2022-03-29 PROCEDURE — 3209999900 FLUORO FOR SURGICAL PROCEDURES

## 2022-03-29 PROCEDURE — 2709999900 HC NON-CHARGEABLE SUPPLY: Performed by: ANESTHESIOLOGY

## 2022-03-29 PROCEDURE — 3700000000 HC ANESTHESIA ATTENDED CARE: Performed by: ANESTHESIOLOGY

## 2022-03-29 PROCEDURE — 82962 GLUCOSE BLOOD TEST: CPT | Performed by: ANESTHESIOLOGY

## 2022-03-29 PROCEDURE — 2500000003 HC RX 250 WO HCPCS: Performed by: ANESTHESIOLOGY

## 2022-03-29 RX ORDER — SODIUM CHLORIDE 0.9 % (FLUSH) 0.9 %
5-40 SYRINGE (ML) INJECTION PRN
Status: DISCONTINUED | OUTPATIENT
Start: 2022-03-29 | End: 2022-03-29 | Stop reason: HOSPADM

## 2022-03-29 RX ORDER — DEXAMETHASONE SODIUM PHOSPHATE 10 MG/ML
INJECTION, SOLUTION INTRAMUSCULAR; INTRAVENOUS PRN
Status: DISCONTINUED | OUTPATIENT
Start: 2022-03-29 | End: 2022-03-29 | Stop reason: ALTCHOICE

## 2022-03-29 RX ORDER — SODIUM CHLORIDE 0.9 % (FLUSH) 0.9 %
5-40 SYRINGE (ML) INJECTION EVERY 12 HOURS SCHEDULED
Status: DISCONTINUED | OUTPATIENT
Start: 2022-03-29 | End: 2022-03-29 | Stop reason: HOSPADM

## 2022-03-29 RX ORDER — MEPERIDINE HYDROCHLORIDE 25 MG/ML
12.5 INJECTION INTRAMUSCULAR; INTRAVENOUS; SUBCUTANEOUS EVERY 5 MIN PRN
Status: DISCONTINUED | OUTPATIENT
Start: 2022-03-29 | End: 2022-03-29 | Stop reason: HOSPADM

## 2022-03-29 RX ORDER — SODIUM CHLORIDE, SODIUM LACTATE, POTASSIUM CHLORIDE, CALCIUM CHLORIDE 600; 310; 30; 20 MG/100ML; MG/100ML; MG/100ML; MG/100ML
INJECTION, SOLUTION INTRAVENOUS CONTINUOUS
Status: DISCONTINUED | OUTPATIENT
Start: 2022-03-29 | End: 2022-03-29 | Stop reason: HOSPADM

## 2022-03-29 RX ORDER — SODIUM CHLORIDE 9 MG/ML
INJECTION, SOLUTION INTRAVENOUS PRN
Status: DISCONTINUED | OUTPATIENT
Start: 2022-03-29 | End: 2022-03-29 | Stop reason: HOSPADM

## 2022-03-29 RX ORDER — LIDOCAINE HYDROCHLORIDE 5 MG/ML
INJECTION, SOLUTION INFILTRATION; INTRAVENOUS PRN
Status: DISCONTINUED | OUTPATIENT
Start: 2022-03-29 | End: 2022-03-29 | Stop reason: ALTCHOICE

## 2022-03-29 RX ORDER — MIDAZOLAM HYDROCHLORIDE 1 MG/ML
INJECTION INTRAMUSCULAR; INTRAVENOUS PRN
Status: DISCONTINUED | OUTPATIENT
Start: 2022-03-29 | End: 2022-03-29 | Stop reason: SDUPTHER

## 2022-03-29 RX ORDER — FENTANYL CITRATE 50 UG/ML
25 INJECTION, SOLUTION INTRAMUSCULAR; INTRAVENOUS EVERY 5 MIN PRN
Status: DISCONTINUED | OUTPATIENT
Start: 2022-03-29 | End: 2022-03-29 | Stop reason: HOSPADM

## 2022-03-29 RX ORDER — DIPHENHYDRAMINE HYDROCHLORIDE 50 MG/ML
12.5 INJECTION INTRAMUSCULAR; INTRAVENOUS
Status: DISCONTINUED | OUTPATIENT
Start: 2022-03-29 | End: 2022-03-29 | Stop reason: HOSPADM

## 2022-03-29 RX ORDER — FENTANYL CITRATE 50 UG/ML
INJECTION, SOLUTION INTRAMUSCULAR; INTRAVENOUS PRN
Status: DISCONTINUED | OUTPATIENT
Start: 2022-03-29 | End: 2022-03-29 | Stop reason: SDUPTHER

## 2022-03-29 RX ORDER — SODIUM CHLORIDE 0.9 % (FLUSH) 0.9 %
SYRINGE (ML) INJECTION PRN
Status: DISCONTINUED | OUTPATIENT
Start: 2022-03-29 | End: 2022-03-29 | Stop reason: ALTCHOICE

## 2022-03-29 RX ADMIN — MIDAZOLAM 1 MG: 1 INJECTION INTRAMUSCULAR; INTRAVENOUS at 09:47

## 2022-03-29 RX ADMIN — FENTANYL CITRATE 50 MCG: 50 INJECTION, SOLUTION INTRAMUSCULAR; INTRAVENOUS at 09:51

## 2022-03-29 RX ADMIN — FENTANYL CITRATE 50 MCG: 50 INJECTION, SOLUTION INTRAMUSCULAR; INTRAVENOUS at 09:47

## 2022-03-29 RX ADMIN — SODIUM CHLORIDE, POTASSIUM CHLORIDE, SODIUM LACTATE AND CALCIUM CHLORIDE: 600; 310; 30; 20 INJECTION, SOLUTION INTRAVENOUS at 09:37

## 2022-03-29 ASSESSMENT — PAIN SCALES - GENERAL
PAINLEVEL_OUTOF10: 0

## 2022-03-29 ASSESSMENT — PAIN - FUNCTIONAL ASSESSMENT
PAIN_FUNCTIONAL_ASSESSMENT: PREVENTS OR INTERFERES SOME ACTIVE ACTIVITIES AND ADLS
PAIN_FUNCTIONAL_ASSESSMENT: 0-10

## 2022-03-29 ASSESSMENT — PAIN DESCRIPTION - DESCRIPTORS: DESCRIPTORS: ACHING;BURNING

## 2022-03-29 NOTE — H&P
[address]  [address] [facility], [address] [place] [address]  [phone]    Procedure History & Physical      Christiano Martel     HPI:    Patient  is here for cervical ROBINSON for neck pain /UE pain. Labs/imaging studies reviewed   All question and concerns addressed including R/B/A associated with the procedure    Past Medical History:   Diagnosis Date    Anxiety     Asthma     exercised induced    Cervicogenic headache 6/12/2019    Chronic daily headache 6/13/2019    Reported since 2009    Chronic generalized pain     Dermatitis     Diabetes mellitus ([address]     PO meds only    Heart stopped beating ([address] 06/05/2021    3 days after cervical fusion- \" chest pain , dyspnea, passed out- intubated for 5 days\".  Cardiologist last 6/21 ? 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       Past Surgical History:   Procedure Laterality Date    ABDOMEN SURGERY      nissen fundoplication    BACK SURGERY  06/2021    neck fusion    CARPAL TUNNEL RELEASE Bilateral     CERVICAL FUSION N/A 06/02/2021    C4-C5, C5-C6, C6-C7  ANTERIOR CERVICAL DISCECTOMY AND FUSION -- NEEDS REGULAR BED WITH HORSE SHOE, PLATES, SCREWS -- GLOBUS performed by Ny Elam MD at [address] [facility] GASTRIC FUNDOPLICATION      [address] [facility]  [id number]    UMBILICAL- [facility]    INSERT/ REPLACE INFUSN PUMP,PROGRAMMABLE N/A 03/02/2019    SPINAL CORD STIMULATOR REMOVAL performed by Ny Elam MD at [address] [facility]  08/15/2018    implantation of medtronic lumbar spinal cord generator    [place] IMPLANT NEUROSTIM/ N/A 08/15/2018    SURGICAL IMPLANTATION OF MEDTRONIC LUMBAR SPINAL CORD  ELECTRODES AND GENERATOR performed by Chiara Muhammad DO at . [address] Right 05/24/2018    RIGHT SHOULDER ARTHROSCOPY, DEBRIDEMENT, SUBACROMIAL DECOMPRESSION, DISTAL CLAVICLE RESECTION, ACROMIOPLASTY performed by Jenny Washburn MD at [address] [facility] Left 09/28/2018    LEFT SHOULDER ARTHROSCOPY SUBACROMIAL DECOMPRESSION, CROMIAL PLASTY, DISTAL CLAVICAL EXCISION  ++ARTHREX, BEACH CHAIR, SPIDER++ performed by Jenny Washburn MD at [address] [facility] ARTHROSCOPY Left 01/28/2020    LEFT SHOULDER ARTHROSCOPIC BICEPS TENODESIS, ROTATOR CUFF REPAIR, EXTENSIVE DEBRIDEMENT  +++ARTHREX+++   +++PNB+++ performed by Jenny Washburn MD at [address] [facility] N/A 02/28/2019    C2 SPINAL CORD STIMULATOR PLACEMENT --MEDTRONIC performed by Anali Olivares MD at [address] 01/21/2019    TONSILLECTOMY performed by Genny Rivera DO at [address] [facility] [address]       Prior to Admission medications    Medication Sig Start Date End Date Taking? Authorizing Provider   LYRICA 225 MG capsule Take 1 capsule by mouth 2 times daily for 30 days. 3/4/22 4/3/22  Hilda Reyes MD   HYDROcodone-acetaminophen (NORCO) 7.5-325 MG per tablet Take 1 tablet by mouth every 8 hours as needed for Pain for up to 30 days.  Intended supply: 30 days 4/3/22 5/3/22  Hilda Reyes MD   cyclobenzaprine (FLEXERIL) 10 MG tablet Take 1 tablet by mouth 3 times daily as needed for Muscle spasms 8/30/21   Chip Knapp PA-C   promethazine-dextromethorphan (PROMETHAZINE-DM) 6.25-15 MG/5ML syrup Take 5 mLs by mouth 4 times daily as needed 4/22/21   Historical Provider, MD   metFORMIN (GLUCOPHAGE) 500 MG tablet Take 500 mg by mouth 2 times daily (with meals)  8/29/19   Historical Provider, MD   SUMAtriptan (IMITREX) 6 MG/0.5ML SOLN injection Inject 6 mg into the skin once as needed for Migraine    Historical Provider, MD       Allergies   Allergen Reactions    Penicillins      Family has history to penicillin        Social History     Socioeconomic History    Marital status:      Spouse name: Not on file    Number of children: 1    Years of education: Not on file   "[employer] education level: Not on file   Occupational History    Not on file   Tobacco Use    Smoking status: Former Smoker     Years: 2.00     Quit date: 10/22/1988     Years since quittin.4    Smokeless tobacco: Never Used    Tobacco comment: quit    Vaping Use    Vaping Use: Never used   Substance and Sexual Activity    Alcohol use: Yes     Comment: socially- OCCASIONAL    Drug use: No    Sexual activity: Never   Other Topics Concern    Not on file   Social History Narrative    Not on file     Social Determinants of Health     Financial Resource Strain:     Difficulty of Paying Living Expenses: Not on file   Food Insecurity:     Worried About Running Out of Food in the Last Year: Not on file    Jaime of Food in the Last Year: Not on file   Transportation Needs:     Lack of Transportation (Medical): Not on file    Lack of Transportation (Non-Medical):  Not on file   Physical Activity:     Days of Exercise per Week: Not on file    Minutes of Exercise per Session: Not on file   Stress:     Feeling of Stress : Not on file   Social Connections:     Frequency of Communication with Friends and Family: Not on file    Frequency of Social Gatherings with Friends and Family: Not on file    Attends Amish Services: Not on file    Active Member of [address] [place] [address] [employer] or Organizations: Not on file    Attends Club or Organization Meetings: Not on file    Marital Status: Not on file   Intimate Partner Violence:     Fear of Current or Ex-Partner: Not on file    Emotionally Abused: Not on file    Physically Abused: Not on file    Sexually Abused: Not on file   Housing Stability:     Unable to Pay for Housing in the Last Year: Not on file    Number of [place] in the Last Year: Not on file    Unstable Housing in the Last Year: Not on file       Family History   Problem Relation Age of Onset    No Known Problems Mother     Prostate Cancer Father     No Known Problems Maternal Grandmother     No Known Problems Maternal Grandfather     No Known Problems Paternal Grandmother     No Known Problems Paternal Grandfather     No Known Problems Daughter          REVIEW OF SYSTEMS:    CONSTITUTIONAL:  negative for  fevers, chills, sweats and fatigue    RESPIRATORY:  negative for  dry cough, cough with sputum, dyspnea, wheezing and chest pain    CARDIOVASCULAR:  negative for chest pain, dyspnea, palpitations, syncope    GASTROINTESTINAL:  negative for nausea, vomiting, change in bowel habits, diarrhea, constipation and abdominal pain    MUSCULOSKELETAL: negative for muscle weakness    SKIN: negative for itching or rashes. BEHAVIOR/PSYCH:  negative for poor appetite, increased appetite, decreased sleep and poor concentration    All other systems negative      PHYSICAL EXAM:    VITALS:  BP (!) 131/97   Pulse 91   Temp 97 °F (36.1 °C) (Skin)   Resp 20   Ht 5' 5\" (1.651 m)   Wt 198 lb (89.8 kg)   SpO2 95%   BMI 32.95 kg/m²     CONSTITUTIONAL:  awake, alert, cooperative, no apparent distress, and appears stated age    EYES: PERRLA, EOMI    LUNGS:  No increased work of breathing, no audible wheezing    CARDIOVASCULAR:  regular rate and rhythm    ABDOMEN:  Soft non tender non distended     EXTREMITIES: no signs of clubbing or cyanosis. MUSCULOSKELETAL: negative for flaccid muscle tone or spastic movements. SKIN: gross examination reveals no signs of rashes, or diaphoresis. NEURO: Cranial nerves II-XII grossly intact. No signs of agitated mood. Assessment/Plan:    Patient  is here for cervical ROBINSON for neck pain /UE pain. The patient was counseled at length about the risks of fede Covid-19 during their perioperative period and any recovery window from their procedure. The patient was made aware that fede Covid-19  may worsen their prognosis for recovering from their procedure  and lend to a higher morbidity and/or mortality risk.   All material risks, benefits, and reasonable alternatives including postponing the procedure were discussed. The patient does wish to proceed with the procedure at this time.       Adeel Maradiaga MD

## 2022-03-29 NOTE — OP NOTE
Operative Note      Patient: Julio Booth  YOB: 1970  MRN: [id number]    Date of Procedure: 3/29/2022    Pre-Op Diagnosis: CERVICAL RADICULOPATHY, cervical DDD    Post-Op Diagnosis: Same       Procedure(s):  C7-T1 EPIDURAL STEROID INJECTION WITH X-RAY     Surgeon(s):  Leon Ojeda MD    Assistant:   * No surgical staff found *    Anesthesia: Monitor Anesthesia Care    Estimated Blood Loss (mL): Minimal    Complications: None    Specimens:   * No specimens in log *    Implants:  * No implants in log *      Drains: * No LDAs found *    Findings: good needle placement    Detailed Description of Procedure:   3/29/2022    Patient: Julio Booth  :  1970  Age:  46 y.o. Sex:  male     PRE-PROCEDURE DIAGNOSIS: Cervical degenerative disc disease, cervical radicular pain. POST-PROCEDURE DIAGNOSIS: Same. PROCEDURE: Fluoroscopic guided cervical epidural steroid injection, #1 at C7-T1 level. SURGEON: Leon Ojeda MD    ANESTHESIA: MAC    ESTIMATED BLOOD LOSS: None.  ______________________________________________________________________  BRIEF HISTORY:  Julio Booth comes in today for the first  therapeutic cervical epidural injection under fluoroscopic guidance. The potential complications of this procedure were discussed with the him again today. He has elected to undergo the aforementioned procedure. Bailee complete History & Physical examination were reviewed in depth, a copy of which is in the chart. DESCRIPTION OF PROCEDURE:    After confirming written and informed consent, a time-out was performed and Bailee name and date of birth, the procedure to be performed as well as the plan for the location of the needle insertion were confirmed. The patient was brought into the procedure room and placed in the prone position with the head flexed midline on the fluoroscopy table.  A pillow was placed under the patient's head to increase cervical interlaminar space. Standard monitors were placed, and vital signs were observed throughout the procedure. The area was prepped with chloraprep and the C7-T1 interspace was marked under fluoroscopy. The skin and subcutaneous tissues at the above level were anesthestized with 0.5% lidocaine. An # 18 gauge 3-1/2 tuohy epidural needle was inserted and advanced toward the inferior lamina until bony contact was made. The needle was then advanced superiorly toward epidural space. From this point on loss of resistance technique with 5 cc glass syringe was used to confirm entrance of the needle into the epidural space under intermittent lateral fluoroscopy. Once in the epidural space, negative aspiration for blood and CSF was confirmed. Needle tip placement was confirmed by visualizing epidural spread of 0.5-1 ml of omnipaque 300 visualized in both AP and lateral live fluoroscopic views. Then after negative aspiration, a solution of 0.9 % Saine 3 ml and 15 mg Dexamethasone was easily injected. The needle was gently removed intact. The patient neck was cleaned and a Band-Aid was placed over the needle insertion point. Disposition the patient tolerated the procedure well and there were no complications . Vital signs remained stable throughout the procedure. The patient was escorted to the recovery area where they remained until discharge and written discharge instructions for the procedure were given. Plan: Estrella Rea will return to our pain management center as scheduled.      Stanislav Goldstein MD

## 2022-03-29 NOTE — H&P (VIEW-ONLY)
[facility]  [address] [facility], [address] [place] [address]  [phone]    Procedure History & Physical      Daryl Gutierrez     HPI:    Patient  is here for cervical ROBINSON for neck pain /UE pain. Labs/imaging studies reviewed   All question and concerns addressed including R/B/A associated with the procedure    Past Medical History:   Diagnosis Date    Anxiety     Asthma     exercised induced    Cervicogenic headache 6/12/2019    Chronic daily headache 6/13/2019    Reported since 2009    Chronic generalized pain     Dermatitis     Diabetes mellitus ([address]     PO meds only    Heart stopped beating ([address] 06/05/2021    3 days after cervical fusion- \" chest pain , dyspnea, passed out- intubated for 5 days\".  Cardiologist last 6/21 ? 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       Past Surgical History:   Procedure Laterality Date    ABDOMEN SURGERY      nissen fundoplication    BACK SURGERY  06/2021    neck fusion    CARPAL TUNNEL RELEASE Bilateral     CERVICAL FUSION N/A 06/02/2021    C4-C5, C5-C6, C6-C7  ANTERIOR CERVICAL DISCECTOMY AND FUSION -- NEEDS REGULAR BED WITH HORSE SHOE, PLATES, SCREWS -- GLOBUS performed by Bang Cortez MD at [facility] GASTRIC FUNDOPLICATION      [address] [facility]  [id number]    UMBILICAL- [facility]    INSERT/ REPLACE INFUSN PUMP,PROGRAMMABLE N/A 03/02/2019    SPINAL CORD STIMULATOR REMOVAL performed by Bang Cortez MD at [place] [address]  08/15/2018    implantation of medtronic lumbar spinal cord generator    [place] IMPLANT NEUROSTIM/ N/A 08/15/2018    SURGICAL IMPLANTATION OF MEDTRONIC LUMBAR SPINAL CORD  ELECTRODES AND GENERATOR performed by José Miguel Gonzalez DO at . [address] Right 05/24/2018    RIGHT SHOULDER ARTHROSCOPY, DEBRIDEMENT, SUBACROMIAL DECOMPRESSION, DISTAL CLAVICLE RESECTION, ACROMIOPLASTY performed by Jemma Herrera MD at [address] [facility] SHAVING Left 09/28/2018    LEFT SHOULDER ARTHROSCOPY SUBACROMIAL DECOMPRESSION, CROMIAL PLASTY, DISTAL CLAVICAL EXCISION  ++ARTHREX, BEACH CHAIR, SPIDER++ performed by Jemma Herrera MD at [address] [facility] [address] ARTHROSCOPY Left 01/28/2020    LEFT SHOULDER ARTHROSCOPIC BICEPS TENODESIS, ROTATOR CUFF REPAIR, EXTENSIVE DEBRIDEMENT  +++ARTHREX+++   +++PNB+++ performed by Jemma Herrera MD at [address] [facility] N/A 02/28/2019    C2 SPINAL CORD STIMULATOR PLACEMENT --MEDTRONIC performed by Fredy Rosales MD at [address] 01/21/2019    TONSILLECTOMY performed by Mary Yost DO at [address] [facility] [address]       Prior to Admission medications    Medication Sig Start Date End Date Taking? Authorizing Provider   LYRICA 225 MG capsule Take 1 capsule by mouth 2 times daily for 30 days. 3/4/22 4/3/22  Elia Sheridan MD   HYDROcodone-acetaminophen (NORCO) 7.5-325 MG per tablet Take 1 tablet by mouth every 8 hours as needed for Pain for up to 30 days.  Intended supply: 30 days 4/3/22 5/3/22  Elia Sheridan MD   cyclobenzaprine (FLEXERIL) 10 MG tablet Take 1 tablet by mouth 3 times daily as needed for Muscle spasms 8/30/21   Sharifa Powers PA-C   promethazine-dextromethorphan (PROMETHAZINE-DM) 6.25-15 MG/5ML syrup Take 5 mLs by mouth 4 times daily as needed 4/22/21   Historical Provider, MD   metFORMIN (GLUCOPHAGE) 500 MG tablet Take 500 mg by mouth 2 times daily (with meals)  8/29/19   Historical Provider, MD   SUMAtriptan (IMITREX) 6 MG/0.5ML SOLN injection Inject 6 mg into the skin once as needed for Migraine    Historical Provider, MD       Allergies   Allergen Reactions    Penicillins      Family has history to penicillin        Social History     Socioeconomic History    Marital status:      Spouse name: Not on file    Number of children: 1    Years of education: Not on file   [employer] education level: Not on file   Occupational History    Not on file   Tobacco Use    Smoking status: Former Smoker     Years: 2.00     Quit date: 10/22/1988     Years since quittin.4    Smokeless tobacco: Never Used    Tobacco comment: quit    Vaping Use    Vaping Use: Never used   Substance and Sexual Activity    Alcohol use: Yes     Comment: socially- OCCASIONAL    Drug use: No    Sexual activity: Never   Other Topics Concern    Not on file   Social History Narrative    Not on file     Social Determinants of Health     Financial Resource Strain:     Difficulty of Paying Living Expenses: Not on file   Food Insecurity:     Worried About Running Out of Food in the Last Year: Not on file    Jaime of Food in the Last Year: Not on file   Transportation Needs:     Lack of Transportation (Medical): Not on file    Lack of Transportation (Non-Medical):  Not on file   Physical Activity:     Days of Exercise per Week: Not on file    Minutes of Exercise per Session: Not on file   Stress:     Feeling of Stress : Not on file   Social Connections:     Frequency of Communication with Friends and Family: Not on file    Frequency of Social Gatherings with Friends and Family: Not on file    Attends Druze Services: Not on file    Active Member of [address] [place] [address] [employer] or Organizations: Not on file    Attends Club or Organization Meetings: Not on file    Marital Status: Not on file   Intimate Partner Violence:     Fear of Current or Ex-Partner: Not on file    Emotionally Abused: Not on file    Physically Abused: Not on file    Sexually Abused: Not on file   Housing Stability:     Unable to Pay for Housing in the Last Year: Not on file    Number of [place] in the Last Year: Not on file    Unstable Housing in the Last Year: Not on file       Family History   Problem Relation Age of Onset    No Known Problems Mother     Prostate Cancer Father     No Known Problems Maternal Grandmother     No Known Problems Maternal Grandfather     No Known Problems Paternal Grandmother     No Known Problems Paternal Grandfather     No Known Problems Daughter          REVIEW OF SYSTEMS:    CONSTITUTIONAL:  negative for  fevers, chills, sweats and fatigue    RESPIRATORY:  negative for  dry cough, cough with sputum, dyspnea, wheezing and chest pain    CARDIOVASCULAR:  negative for chest pain, dyspnea, palpitations, syncope    GASTROINTESTINAL:  negative for nausea, vomiting, change in bowel habits, diarrhea, constipation and abdominal pain    MUSCULOSKELETAL: negative for muscle weakness    SKIN: negative for itching or rashes. BEHAVIOR/PSYCH:  negative for poor appetite, increased appetite, decreased sleep and poor concentration    All other systems negative      PHYSICAL EXAM:    VITALS:  BP (!) 131/97   Pulse 91   Temp 97 °F (36.1 °C) (Skin)   Resp 20   Ht 5' 5\" (1.651 m)   Wt 198 lb (89.8 kg)   SpO2 95%   BMI 32.95 kg/m²     CONSTITUTIONAL:  awake, alert, cooperative, no apparent distress, and appears stated age    EYES: PERRLA, EOMI    LUNGS:  No increased work of breathing, no audible wheezing    CARDIOVASCULAR:  regular rate and rhythm    ABDOMEN:  Soft non tender non distended     EXTREMITIES: no signs of clubbing or cyanosis. MUSCULOSKELETAL: negative for flaccid muscle tone or spastic movements. SKIN: gross examination reveals no signs of rashes, or diaphoresis. NEURO: Cranial nerves II-XII grossly intact. No signs of agitated mood. Assessment/Plan:    Patient  is here for cervical ROBINSON for neck pain /UE pain. The patient was counseled at length about the risks of fede Covid-19 during their perioperative period and any recovery window from their procedure. The patient was made aware that fede Covid-19  may worsen their prognosis for recovering from their procedure  and lend to a higher morbidity and/or mortality risk.   All material risks, benefits, and reasonable alternatives including postponing the procedure were discussed. The patient does wish to proceed with the procedure at this time.       Portillo Robert MD

## 2022-03-29 NOTE — ANESTHESIA POSTPROCEDURE EVALUATION
Department of Anesthesiology  Postprocedure Note    Patient: Ashely Albarran  MRN: [id number]  YOB: 1970  Date of evaluation: 3/29/2022  Time:  10:19 AM     Procedure Summary     Date: 03/29/22 Room / Location: [address] [place] [address] 04 / [address] [facility]    Anesthesia Start: 6874 Anesthesia Stop: 1008    Procedure: C7-T1 EPIDURAL STEROID INJECTION WITH X-RAY (SEDATION (N/A ) Diagnosis: (CERVICAL RADICULOPATHY)    Surgeons: Stanislav Goldstein MD Responsible Provider: Ericka Diaz DO    Anesthesia Type: MAC ASA Status: 3          Anesthesia Type: MAC    Philippe Phase I: Philippe Score: 10    Philippe Phase II:      Last vitals: Reviewed and per EMR flowsheets. Anesthesia Post Evaluation    Patient location during evaluation: bedside  Patient participation: complete - patient participated  Level of consciousness: awake  Pain score: 2  Airway patency: patent  Nausea & Vomiting: no vomiting and no nausea  Complications: no  Cardiovascular status: hemodynamically stable  Respiratory status: acceptable  Hydration status: stable  Comments: Seen and examined. Progressing as expected. No questions or concerns.

## 2022-04-04 ENCOUNTER — TELEPHONE (OUTPATIENT)
Dept: PAIN MANAGEMENT | Age: 52
End: 2022-04-04

## 2022-04-22 ENCOUNTER — TELEMEDICINE (OUTPATIENT)
Dept: PAIN MANAGEMENT | Age: 52
End: 2022-04-22
Payer: MEDICAID

## 2022-04-22 ENCOUNTER — TELEPHONE (OUTPATIENT)
Dept: PAIN MANAGEMENT | Age: 52
End: 2022-04-22

## 2022-04-22 ENCOUNTER — PREP FOR PROCEDURE (OUTPATIENT)
Dept: PAIN MANAGEMENT | Age: 52
End: 2022-04-22

## 2022-04-22 DIAGNOSIS — M50.30 DDD (DEGENERATIVE DISC DISEASE), CERVICAL: Primary | ICD-10-CM

## 2022-04-22 DIAGNOSIS — Z98.1 S/P CERVICAL SPINAL FUSION: ICD-10-CM

## 2022-04-22 DIAGNOSIS — M54.12 CERVICAL RADICULAR PAIN: ICD-10-CM

## 2022-04-22 PROCEDURE — 99213 OFFICE O/P EST LOW 20 MIN: CPT | Performed by: ANESTHESIOLOGY

## 2022-04-22 RX ORDER — SODIUM CHLORIDE 0.9 % (FLUSH) 0.9 %
5-40 SYRINGE (ML) INJECTION EVERY 12 HOURS SCHEDULED
Status: CANCELLED | OUTPATIENT
Start: 2022-04-22

## 2022-04-22 RX ORDER — SODIUM CHLORIDE 9 MG/ML
INJECTION, SOLUTION INTRAVENOUS PRN
Status: CANCELLED | OUTPATIENT
Start: 2022-04-22

## 2022-04-22 RX ORDER — SODIUM CHLORIDE 0.9 % (FLUSH) 0.9 %
5-40 SYRINGE (ML) INJECTION PRN
Status: CANCELLED | OUTPATIENT
Start: 2022-04-22

## 2022-04-22 NOTE — PROGRESS NOTES
[address] [place] [address], [address] [place] [address] Errol  [phone]    Tele health follow up Note      Daryl Gutierrez     Date of Visit:  4/22/2022    CC:  Tele health follow up   Chief Complaint   Patient presents with    Follow-up     chronic lyme disease    Other     cervical pain       Consent:  Telehealth Visit due to COVID 19 pandemic  The patient and/or health care decision maker is aware that he/she may receive a bill for this tele-health service Doxy Me, depending on his insurance coverage, and has provided verbal consent to proceed: Yes  I have considered the risks of abuse, dependence, addiction and diversion. My patient is aware that they will need a follow-up visit (in-person or virtually) at the appropriate time indicated for continued medications. Further, my patient is aware that when this acute crisis has lifted, they will be expected to return for an in-person visit and all elements of standard local and hospital guidelines in order to continue this medication. Patient Location:Home   Physician Location: Other address in [place]  Who helped the patient with the visit: none  Time documentation:  Not billed  by time   Platform used:  doxy. me    HPI:  Chronic neck pain.     S/p ACDF : C4-5, C5-6, C6-7 Dr. Raymundo Diego In June 2021.     Having neck pain shoulder blade pain and upper extremity pain. He also complains of pain/ sensitivity in the upper extremity. Noticed after starting physical therapy.     Extensive history for chronic pain for years. He has been treated with multiple pain centers in the past- [facility], [facility].     He has Spinal cord stimulator implant for LE pain: Aug 2018- helps while it is on- still working well.     S/p Cervical SCS implant done in Feb 2019. Post implant he had severe neck pain/ UE pain and SCS had to be explanted in March 2019. Pain causes functional limitations/ limits Adl's :  Yes      Nursing notes and details of the pain history reviewed. Please see intake notes for details.     Currently On Norco/ Gabapentin by Dr. Laurie Ashford     Has been evaluated by neurosurgery and referred for cervical spine interventions.     Previous treatments:   Physical Therapy / HEP: yes,       Medications: - NSAID's : yes                        - Membrane stabilizers : yes                        - Opioids : yes                       - Adjuvants or Others : yes     Spine Surgeries: yes,      Anticoagulation medications: no.     H/O Smoking: no  H/O alcohol abuse : no  H/O Illicit drug use : no     Imaging:      CT myelogram of the cervical spine on 1/13/2022:  Impression   1. Prior anterior fusion at C4 through C7.  No evidence of recurrent central   canal stenosis or disc herniation at these levels. 2. Left posterolateral and lateral disc protrusion at C3-4 that may be   slightly increased compared to the prior MRI study.  This causes slight   compression of the left side of the spinal cord and could compress the left   C4 nerve root.          X-ray cervical spine 12/20/2021:  Impression   Intact cervical fusion hardware with stable alignment and no new abnormal   findings. OARRS report:    Past Medical History: Reviewed    Past Surgical History: Reviewed     Home Medications: Reviewed    Allergies: Reviewed     Social History: Reviewed     REVIEW OF SYSTEMS:     Ancil  denies fever/chills, chest pain, shortness of breath, new bowel or bladder complaints. All other review of systems was negative. PHYSICAL EXAMINATION:      General:       A & O x3    HEENT:    Head:normocephalic and atraumatic    Lungs:    Breathing:NO distress    Cervical spine:    Inspection:Reduced ROM    Extremities:    Tremors:None     Dermatology:    Skin:no unusual rashes      Assessment/Plan:       Diagnosis Orders   1. DDD (degenerative disc disease), cervical      2. Cervical spondylosis      3. S/P cervical spinal fusion      4. Cervical radicular pain      5. Neuralgia and neuritis            46 y.o.  male with H/o S/p ACDF : C4-5, C5-6, C6-7 Dr. Dee Jung. In June 2021.     Having neck pain and upper extremity pain. He also has neuropathy of bilateral upper extremity pain.     Has history of chronic pain issues as detailed above - S/P SCS for lower back lower extremity pain which is helping him.     Prior SCS for neck pain - did not help had to be explanted in the past.     Has been treated by multiple pain centers in the past.     Currently getting his pain medication by Dr. Eliseo Ordoñez.     Recent image findings/neurosurgery notes/other pertinent records reviewed. S/P Cervical ROBINSON # 1 with very good pain relief for few weeks, now has recurrence of similar pain. Doing HEP.    We will set up for a # 2 cervical epidural steroid injection under fluoroscopy C7-T1. RBA discussed patient agreed to proceed.     Goals and realistic expectations of the treatment discussed in detail with the patient.     We will do procedure with moderate sedation in view of history of anxiety of needles.     He will continue pain medications with the current prescribing MD.  Jennie Mead  Counseling : Patient encouraged to stay active and to watch/lose weight     Encouraged to continue Regular home exercise program as tolerated - stretching / strengthening.     Treatment plan discussed with the patient including medication and procedure side effects.     Controlled Substances Monitoring:   OARRS reviewed. I affirm this is a Patient Initiated Episode with an established Patient who has not had a related appointment within my department in the past 7 days or scheduled within the next 24 hours. Osvaldo Reyes, was evaluated through a synchronous (real-time) audio-video encounter. The patient (or guardian if applicable) is aware that this is a billable service, which includes applicable co-pays. This Virtual Visit was conducted with patient's (and/or legal guardian's) consent.  The visit was conducted pursuant to the emergency declaration under the [facility], [address] [place] [address] authority and the [employer] Act. Patient identification was verified, and a caregiver was present when appropriate. The patient was located at home in a state where the provider was licensed to provide care.     Gailen Cockayne, MD

## 2022-04-22 NOTE — TELEPHONE ENCOUNTER
I phoned patient on check out and scheduling of procedure. Left message for him to stop any OTC meds and  To call office in AM Monday.

## 2022-04-22 NOTE — PROGRESS NOTES
Shayy Ro was read the following message We want to confirm that, for purposes of billing, this is a virtual visit with your provider for which we will submit a claim for reimbursement with your insurance company. You will be responsible for any copays, coinsurance amounts or other amounts not covered by your insurance company. If you do not accept this, unfortunately we will not be able to schedule or proceed with a virtual visit with the provider. Do you accept? Crystal Thibodeaux responded Yes .

## 2022-04-25 ENCOUNTER — ANESTHESIA EVENT (OUTPATIENT)
Dept: OPERATING ROOM | Age: 52
End: 2022-04-25
Payer: MEDICAID

## 2022-04-25 RX ORDER — PREGABALIN 225 MG/1
225 CAPSULE ORAL 2 TIMES DAILY
COMMUNITY
End: 2022-04-27 | Stop reason: SDUPTHER

## 2022-04-25 ASSESSMENT — LIFESTYLE VARIABLES: SMOKING_STATUS: 0

## 2022-04-25 NOTE — ANESTHESIA PRE PROCEDURE
Department of Anesthesiology  Preprocedure Note       Name:  Meghana Maradiaga   Age:  46 y.o.  :  1970                                          MRN:  [id number]         Date:  2022      Surgeon: Deidre Acharya):  Tristen Colindres MD    Procedure: C7-T1 RIGHT PARAMEDIAN CERVICAL EPIDURAL STEROID INJECTION  UNDER X-RAY (Right Neck)    Medications prior to admission:   Prior to Admission medications    Medication Sig Start Date End Date Taking? Authorizing Provider   pregabalin (LYRICA) 225 MG capsule Take 225 mg by mouth 2 times daily. Yes Historical Provider, MD   HYDROcodone-acetaminophen (NORCO) 7.5-325 MG per tablet Take 1 tablet by mouth every 8 hours as needed for Pain for up to 30 days. Intended supply: 30 days 4/3/22 5/3/22  Christelle Coles MD   cyclobenzaprine (FLEXERIL) 10 MG tablet Take 1 tablet by mouth 3 times daily as needed for Muscle spasms 21   Franchesca Arenas PA-C   metFORMIN (GLUCOPHAGE) 500 MG tablet Take 500 mg by mouth 2 times daily (with meals)  19   Historical Provider, MD   SUMAtriptan (IMITREX) 6 MG/0.5ML SOLN injection Inject 6 mg into the skin once as needed for Migraine    Historical Provider, MD       Current medications:    Current Facility-Administered Medications   Medication Dose Route Frequency Provider Last Rate Last Admin    lactated ringers infusion   IntraVENous Continuous Mahmood MD Rob 100 mL/hr at 22 0932 New Bag at 22 0932    0.9 % sodium chloride infusion   IntraVENous PRN Tristen Colindres MD        sodium chloride flush 0.9 % injection 5-40 mL  5-40 mL IntraVENous 2 times per day Tristen Colindres MD        sodium chloride flush 0.9 % injection 5-40 mL  5-40 mL IntraVENous PRN Tristen Colindres MD           Allergies:     Allergies   Allergen Reactions    Penicillins      Family has history to penicillin        Problem List:    Patient Active Problem List   Diagnosis Code    Chronic bilateral low back pain with bilateral sciatica M54.42, M54.41, G89.29    Chronic migraine without aura without status migrainosus, not intractable G43.709    Tear of right rotator cuff M75.101    Shoulder impingement, left M75.42    Shoulder impingement, right M75.41    Neck pain M54.2    Radiculopathy, lumbar region M54.16    At risk for bleeding associated with tonsillectomy and adenoidectomy Z91.89, Z98.890    Post-op pain G89.18    Chronic tonsillitis J35.01    Intractable pain R52    Cervical radicular pain M54.12    Cervicogenic headache G44.86    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 G43.709    Chronic daily headache R51.9    Cervical herniated disc M50.20    Post-op pneumonia J95.89, J18.9    Sciatica M54.30    DDD (degenerative disc disease), cervical M50.30    S/P cervical spinal fusion Z98.1       Past Medical History:        Diagnosis Date    Anxiety     Asthma     exercised induced    Cervicogenic headache 6/12/2019    Chronic daily headache 6/13/2019    Reported since 2009    Chronic generalized pain     Dermatitis     Diabetes mellitus ([address]     PO meds only    Heart stopped beating ([address] 06/05/2021    3 days after cervical fusion- \" chest pain , dyspnea, passed out- intubated for 5 days\".  Cardiologist last 6/21 ? 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       Past Surgical History:        Procedure Laterality Date    ABDOMEN SURGERY      nissen fundoplication    BACK SURGERY  06/2021    neck fusion    CARPAL TUNNEL RELEASE Bilateral     CERVICAL FUSION N/A 06/02/2021    C4-C5, C5-C6, C6-C7  ANTERIOR CERVICAL DISCECTOMY AND FUSION -- NEEDS REGULAR BED WITH HORSE SHOE, PLATES, SCREWS -- GLOBUS performed by Pat Shaw MD at [facility] [address] FUNDOPLICATION  1967    for acid reflux    HERNIA REPAIR  57/83/9332    UMBILICAL- [facility]    INSERT/ REPLACE INFUSN PUMP,PROGRAMMABLE N/A 03/02/2019    SPINAL CORD STIMULATOR REMOVAL(cervical) performed by Josse Hussein MD at [address] [place] [address] N/A 3/29/2022    C7-T1 EPIDURAL STEROID INJECTION WITH X-RAY (SEDATION performed by Chiquis Hoffman MD at [place] [address]  08/15/2018    implantation of medtronic lumbar spinal cord generator    NM IMPLANT NEUROSTIM/ N/A 08/15/2018    SURGICAL IMPLANTATION OF MEDTRONIC LUMBAR SPINAL CORD  ELECTRODES AND GENERATOR performed by Orlan Fabry, DO at [address] Right 2018    RIGHT SHOULDER ARTHROSCOPY, DEBRIDEMENT, SUBACROMIAL DECOMPRESSION, DISTAL CLAVICLE RESECTION, ACROMIOPLASTY performed by Gee Burgos MD at [facility] Left 2018    LEFT SHOULDER ARTHROSCOPY SUBACROMIAL DECOMPRESSION, CROMIAL PLASTY, DISTAL CLAVICAL EXCISION  ++ARTHREX, BEACH CHAIR, SPIDER++ performed by Gee Burgos MD at [facility] ARTHROSCOPY Left 2020    LEFT SHOULDER ARTHROSCOPIC BICEPS TENODESIS, ROTATOR CUFF REPAIR, EXTENSIVE DEBRIDEMENT  +++ARTHREX+++   +++PNB+++ performed by Gee Burgos MD at [address] [place] [address] N/A 2019    C2 SPINAL CORD STIMULATOR PLACEMENT --MEDTRONIC performed by Josse Hussein MD at [facility] N/A 2019    TONSILLECTOMY performed by Farzaneh Choi DO at [facility] OR       Social History:    Social History     Tobacco Use    Smoking status: Former Smoker     Years: 5.00     Types: Cigarettes     Quit date: 10/22/1988     Years since quittin.5    Smokeless tobacco: Never Used    Tobacco comment: quit    Substance Use Topics    Alcohol use: Yes     Comment: socially- OCCASIONAL                                Counseling given: Not Answered  Comment: quit       Vital Signs (Current):   Vitals:    22 1044 22 0928   BP:  (!) 141/87   Pulse:  79   Resp:  16   Temp:  98 °F (36.7 °C)   TempSrc:  Skin   SpO2:  96%   Weight: 198 lb (89.8 kg) 202 lb (91.6 kg)   Height: 5' 5.5\" (1.664 m) 5' 5.5\" (1.664 m)                                              BP Readings from Last 3 Encounters:   04/26/22 (!) 141/87   03/29/22 (!) 146/90   03/29/22 (!) 144/90       NPO Status: Time of last liquid consumption: 2100                        Time of last solid consumption: 2100                        Date of last liquid consumption: 04/25/22                        Date of last solid food consumption: 04/25/22    BMI:   Wt Readings from Last 3 Encounters:   04/26/22 202 lb (91.6 kg)   03/29/22 198 lb (89.8 kg)   03/08/22 190 lb (86.2 kg)     Body mass index is 33.1 kg/m². CBC:   Lab Results   Component Value Date    WBC 10.6 06/14/2021    RBC 4.45 06/14/2021    HGB 13.5 01/13/2022    HCT 43.3 01/13/2022    MCV 73.9 06/14/2021    RDW 15.3 06/14/2021     01/13/2022       CMP:   Lab Results   Component Value Date     06/14/2021    K 3.4 06/14/2021    K 4.3 05/26/2021    CL 89 06/14/2021    CO2 35 06/14/2021    BUN 9 06/14/2021    CREATININE 0.7 06/14/2021    GFRAA >60 06/14/2021    LABGLOM >60 06/14/2021    GLUCOSE 107 06/14/2021    PROT 6.4 06/05/2021    CALCIUM 9.6 06/14/2021    BILITOT 0.4 06/05/2021    ALKPHOS 59 06/05/2021    AST 40 06/05/2021    ALT 43 06/05/2021       POC Tests: No results for input(s): POCGLU, POCNA, POCK, POCCL, POCBUN, POCHEMO, POCHCT in the last 72 hours.     Coags:   Lab Results   Component Value Date    PROTIME 12.0 01/13/2022    INR 1.0 01/13/2022    APTT 32.9 08/07/2018       HCG (If Applicable): No results found for: PREGTESTUR, PREGSERUM, HCG, HCGQUANT     ABGs:   Lab Results   Component Value Date    XTW1KMN 26.2 06/06/2021        Type & Screen (If Applicable):  No results found for: LABABO, [address]    Anesthesia Evaluation  Patient summary reviewed and Nursing notes reviewed no history of anesthetic complications:   Airway: Mallampati: II  TM distance: >3 FB   Neck ROM: full  Mouth opening: > = 3 FB Dental: normal exam Pulmonary:Negative Pulmonary ROS breath sounds clear to auscultation  (+) asthma:     (-) not a current smoker                           Cardiovascular:Negative CV ROS  Exercise tolerance: good (>4 METS),         ECG reviewed  Rhythm: regular  Rate: normal           Beta Blocker:  Not on Beta Blocker         Neuro/Psych:   (+) neuromuscular disease (Muscle pain due to lyme disease.):, headaches: migraine headaches,              ROS comment: Nerve stimulator for back pain, Left side and patient shut it off this morning. Chronic pain lyme disease GI/Hepatic/Renal: Neg GI/Hepatic/Renal ROS            Endo/Other:    (+) Diabetes, . Pt had no PAT visit       Abdominal:   (+) obese,           Vascular: negative vascular ROS. Other Findings:                 Anesthesia Plan      MAC     ASA 2       Induction: intravenous. Anesthetic plan and risks discussed with patient. Plan discussed with CRNA. PAT Chart Review:  Chart reviewed per routine by Justin Alfred MD.  Above represents information available via shared medical record including previous anesthesia history, drug and allergy history. Confirmation of above and final plan per Day of Surgery (DOS) anesthesiologist.    DOS STAFF ADDENDUM:    Pt seen and examined, chart reviewed (including anesthesia, drug and allergy history). Anesthetic plan, risks, benefits, alternatives, and personnel involved discussed with patient. Patient verbalized an understanding and agrees to proceed. Plan discussed with care team members and agreed upon.     Justin Alfred MD  Staff Anesthesiologist  9:42 AM    Justin Alfred MD  9:42 AM

## 2022-04-26 ENCOUNTER — ANESTHESIA (OUTPATIENT)
Dept: OPERATING ROOM | Age: 52
End: 2022-04-26
Payer: MEDICAID

## 2022-04-26 ENCOUNTER — HOSPITAL ENCOUNTER (OUTPATIENT)
Age: 52
Setting detail: OUTPATIENT SURGERY
Discharge: HOME | End: 2022-04-26
Attending: ANESTHESIOLOGY | Admitting: ANESTHESIOLOGY
Payer: MEDICAID

## 2022-04-26 ENCOUNTER — HOSPITAL ENCOUNTER (OUTPATIENT)
Dept: OPERATING ROOM | Age: 52
Setting detail: OUTPATIENT SURGERY
Discharge: HOME | End: 2022-04-26
Attending: ANESTHESIOLOGY
Payer: MEDICAID

## 2022-04-26 VITALS
RESPIRATION RATE: 16 BRPM | DIASTOLIC BLOOD PRESSURE: 90 MMHG | TEMPERATURE: 96.8 F | BODY MASS INDEX: 32.47 KG/M2 | SYSTOLIC BLOOD PRESSURE: 122 MMHG | WEIGHT: 202 LBS | HEART RATE: 78 BPM | HEIGHT: 66 IN | OXYGEN SATURATION: 96 %

## 2022-04-26 VITALS
RESPIRATION RATE: 32 BRPM | OXYGEN SATURATION: 100 % | SYSTOLIC BLOOD PRESSURE: 131 MMHG | DIASTOLIC BLOOD PRESSURE: 98 MMHG

## 2022-04-26 DIAGNOSIS — M54.12 CERVICAL RADICULOPATHY, CHRONIC: ICD-10-CM

## 2022-04-26 LAB — METER GLUCOSE: 101 MG/DL (ref 74–99)

## 2022-04-26 PROCEDURE — 3700000001 HC ADD 15 MINUTES (ANESTHESIA): Performed by: ANESTHESIOLOGY

## 2022-04-26 PROCEDURE — A4216 STERILE WATER/SALINE, 10 ML: HCPCS | Performed by: ANESTHESIOLOGY

## 2022-04-26 PROCEDURE — 7100000010 HC PHASE II RECOVERY - FIRST 15 MIN: Performed by: ANESTHESIOLOGY

## 2022-04-26 PROCEDURE — 6360000002 HC RX W HCPCS

## 2022-04-26 PROCEDURE — 7100000011 HC PHASE II RECOVERY - ADDTL 15 MIN: Performed by: ANESTHESIOLOGY

## 2022-04-26 PROCEDURE — 2580000003 HC RX 258: Performed by: ANESTHESIOLOGY

## 2022-04-26 PROCEDURE — 3209999900 FLUORO FOR SURGICAL PROCEDURES

## 2022-04-26 PROCEDURE — 82962 GLUCOSE BLOOD TEST: CPT | Performed by: ANESTHESIOLOGY

## 2022-04-26 PROCEDURE — 6360000002 HC RX W HCPCS: Performed by: ANESTHESIOLOGY

## 2022-04-26 PROCEDURE — 6360000004 HC RX CONTRAST MEDICATION: Performed by: ANESTHESIOLOGY

## 2022-04-26 PROCEDURE — 3700000000 HC ANESTHESIA ATTENDED CARE: Performed by: ANESTHESIOLOGY

## 2022-04-26 PROCEDURE — 62321 NJX INTERLAMINAR CRV/THRC: CPT | Performed by: ANESTHESIOLOGY

## 2022-04-26 PROCEDURE — 82962 GLUCOSE BLOOD TEST: CPT

## 2022-04-26 PROCEDURE — 2709999900 HC NON-CHARGEABLE SUPPLY: Performed by: ANESTHESIOLOGY

## 2022-04-26 PROCEDURE — 3600000005 HC SURGERY LEVEL 5 BASE: Performed by: ANESTHESIOLOGY

## 2022-04-26 PROCEDURE — 2500000003 HC RX 250 WO HCPCS: Performed by: ANESTHESIOLOGY

## 2022-04-26 PROCEDURE — 3600000015 HC SURGERY LEVEL 5 ADDTL 15MIN: Performed by: ANESTHESIOLOGY

## 2022-04-26 RX ORDER — MIDAZOLAM HYDROCHLORIDE 1 MG/ML
INJECTION INTRAMUSCULAR; INTRAVENOUS PRN
Status: DISCONTINUED | OUTPATIENT
Start: 2022-04-26 | End: 2022-04-26 | Stop reason: SDUPTHER

## 2022-04-26 RX ORDER — SODIUM CHLORIDE 0.9 % (FLUSH) 0.9 %
5-40 SYRINGE (ML) INJECTION PRN
Status: DISCONTINUED | OUTPATIENT
Start: 2022-04-26 | End: 2022-04-26 | Stop reason: HOSPADM

## 2022-04-26 RX ORDER — LIDOCAINE HYDROCHLORIDE 5 MG/ML
INJECTION, SOLUTION INFILTRATION; INTRAVENOUS PRN
Status: DISCONTINUED | OUTPATIENT
Start: 2022-04-26 | End: 2022-04-26 | Stop reason: ALTCHOICE

## 2022-04-26 RX ORDER — FENTANYL CITRATE 50 UG/ML
INJECTION, SOLUTION INTRAMUSCULAR; INTRAVENOUS PRN
Status: DISCONTINUED | OUTPATIENT
Start: 2022-04-26 | End: 2022-04-26 | Stop reason: SDUPTHER

## 2022-04-26 RX ORDER — SODIUM CHLORIDE 9 MG/ML
INJECTION INTRAVENOUS PRN
Status: DISCONTINUED | OUTPATIENT
Start: 2022-04-26 | End: 2022-04-26 | Stop reason: ALTCHOICE

## 2022-04-26 RX ORDER — DEXAMETHASONE SODIUM PHOSPHATE 10 MG/ML
INJECTION, SOLUTION INTRAMUSCULAR; INTRAVENOUS PRN
Status: DISCONTINUED | OUTPATIENT
Start: 2022-04-26 | End: 2022-04-26 | Stop reason: ALTCHOICE

## 2022-04-26 RX ORDER — SODIUM CHLORIDE, SODIUM LACTATE, POTASSIUM CHLORIDE, CALCIUM CHLORIDE 600; 310; 30; 20 MG/100ML; MG/100ML; MG/100ML; MG/100ML
INJECTION, SOLUTION INTRAVENOUS CONTINUOUS
Status: DISCONTINUED | OUTPATIENT
Start: 2022-04-26 | End: 2022-04-26 | Stop reason: HOSPADM

## 2022-04-26 RX ORDER — SODIUM CHLORIDE 0.9 % (FLUSH) 0.9 %
5-40 SYRINGE (ML) INJECTION EVERY 12 HOURS SCHEDULED
Status: DISCONTINUED | OUTPATIENT
Start: 2022-04-26 | End: 2022-04-26 | Stop reason: HOSPADM

## 2022-04-26 RX ORDER — SODIUM CHLORIDE 9 MG/ML
INJECTION, SOLUTION INTRAVENOUS PRN
Status: DISCONTINUED | OUTPATIENT
Start: 2022-04-26 | End: 2022-04-26 | Stop reason: HOSPADM

## 2022-04-26 RX ADMIN — FENTANYL CITRATE 100 MCG: 50 INJECTION, SOLUTION INTRAMUSCULAR; INTRAVENOUS at 10:12

## 2022-04-26 RX ADMIN — SODIUM CHLORIDE, POTASSIUM CHLORIDE, SODIUM LACTATE AND CALCIUM CHLORIDE: 600; 310; 30; 20 INJECTION, SOLUTION INTRAVENOUS at 09:32

## 2022-04-26 RX ADMIN — MIDAZOLAM 2 MG: 1 INJECTION INTRAMUSCULAR; INTRAVENOUS at 10:12

## 2022-04-26 ASSESSMENT — PAIN - FUNCTIONAL ASSESSMENT: PAIN_FUNCTIONAL_ASSESSMENT: 0-10

## 2022-04-26 ASSESSMENT — PAIN DESCRIPTION - DESCRIPTORS: DESCRIPTORS: ACHING

## 2022-04-26 NOTE — ANESTHESIA POSTPROCEDURE EVALUATION
Department of Anesthesiology  Postprocedure Note    Patient: Ronine Waddell  MRN: [id number]  YOB: 1970  Date of evaluation: 4/26/2022  Time:  11:07 AM     Procedure Summary     Date: 04/26/22 Room / Location: [address] [place] [address] / [facility]    Anesthesia Start: 1010 Anesthesia Stop: 1026    Procedure: C7-T1 RIGHT PARAMEDIAN CERVICAL EPIDURAL STEROID INJECTION  UNDER X-RAY (Right Neck) Diagnosis: (RADICULOPATHY)    Surgeons: Osbaldo Brito MD Responsible Provider: Berenice Lala MD    Anesthesia Type: MAC ASA Status: 2          Anesthesia Type: MAC    Philippe Phase I: Philippe Score: 10    Philippe Phase II: Philippe Score: 10    Last vitals: Reviewed and per EMR flowsheets.        Anesthesia Post Evaluation    Patient location during evaluation: PACU  Patient participation: complete - patient participated  Level of consciousness: awake  Airway patency: patent  Nausea & Vomiting: no nausea and no vomiting  Complications: no  Cardiovascular status: hemodynamically stable  Respiratory status: acceptable  Hydration status: euvolemic

## 2022-04-26 NOTE — OP NOTE
Operative Note      Patient: Daryl Gutierrez  YOB: 1970  MRN: [id number]    Date of Procedure: 2022    Pre-Op Diagnosis: RADICULOPATHY cervical, cervical DDD    Post-Op Diagnosis: Same       Procedure(s):  C7-T1 RIGHT PARAMEDIAN CERVICAL EPIDURAL STEROID INJECTION  UNDER X-RAY    Surgeon(s):  Gailen Cockayne, MD    Assistant:   * No surgical staff found *    Anesthesia: Monitor Anesthesia Care    Estimated Blood Loss (mL): Minimal    Complications: None    Specimens:   * No specimens in log *    Implants:  * No implants in log *      Drains: * No LDAs found *    Findings: good needle placement    Detailed Description of Procedure:   2022    Patient: Daryl Gutierrez  :  1970  Age:  46 y.o. Sex:  male     PRE-PROCEDURE DIAGNOSIS: Cervical degenerative disc disease, cervical radicular pain. POST-PROCEDURE DIAGNOSIS: Same. PROCEDURE: Fluoroscopic guided cervical epidural steroid injection, #2 at C7-T1 level. SURGEON: Gailen Cockayne, MD    ANESTHESIA: MAC    ESTIMATED BLOOD LOSS: None.  ______________________________________________________________________  BRIEF HISTORY:  Daryl Gutierrez comes in today for the second  therapeutic cervical epidural injection under fluoroscopic guidance. The potential complications of this procedure were discussed with the him again today. He has elected to undergo the aforementioned procedureTru Bailee complete History & Physical examination were reviewed in depth, a copy of which is in the chart. DESCRIPTION OF PROCEDURE:    After confirming written and informed consent, a time-out was performed and Bailee name and date of birth, the procedure to be performed as well as the plan for the location of the needle insertion were confirmed. The patient was brought into the procedure room and placed in the prone position with the head flexed midline on the fluoroscopy table.  A pillow was placed under the patient's head to increase cervical interlaminar space. Standard monitors were placed, and vital signs were observed throughout the procedure. The area was prepped with chloraprep and the C7-T1 interspace was marked under fluoroscopy. The skin and subcutaneous tissues at the above level were anesthestized with 0.5% lidocaine. An # 18 gauge 3-1/2 tuohy epidural needle was inserted and advanced toward the inferior lamina until bony contact was made. The needle was then advanced superiorly toward epidural space . From this point on loss of resistance technique with 5 cc glass syringe was used to confirm entrance of the needle into the epidural space under intermittent lateral fluoroscopy. Once in the epidural space , negative aspiration for blood and CSF was confirmed . Needle tip placement was confirmed by visualizing epidural spread of 0.5-1 ml of omnipaque 240 visualized in both AP and lateral live fluoroscopic views. Then after negative aspiration, a solution of 0.9 % saline 3 ml and 15 mg Dexamethasone was easily injected. The needle was gently removed intact. The patient neck was cleaned and a Band-Aid was placed over the needle insertion point. Disposition the patient tolerated the procedure well and there were no complications . Vital signs remained stable throughout the procedure. The patient was escorted to the recovery area where they remained until discharge and written discharge instructions for the procedure were given. Plan: Susie Stapleton will return to our pain management center as scheduled.      Brenton Hunt MD

## 2022-04-26 NOTE — INTERVAL H&P NOTE
Update History & Physical    The patient's History and Physical of March 29, 2022 was reviewed with the patient and I examined the patient. There was no change. The surgical site was confirmed by the patient and me. Plan: For # 2 cervical Ksenia for neck pain. The risks, benefits, expected outcome, and alternative to the recommended procedure have been discussed with the patient. Patient understands and wants to proceed with the procedure.      Electronically signed by Gino Núñez MD
Opt out

## 2022-05-19 DIAGNOSIS — Z98.1 S/P CERVICAL SPINAL FUSION: Primary | ICD-10-CM

## 2022-07-12 ENCOUNTER — OFFICE VISIT (OUTPATIENT)
Dept: PAIN MANAGEMENT | Age: 52
End: 2022-07-12
Payer: MEDICAID

## 2022-07-12 ENCOUNTER — PREP FOR PROCEDURE (OUTPATIENT)
Dept: PAIN MANAGEMENT | Age: 52
End: 2022-07-12

## 2022-07-12 VITALS
WEIGHT: 202 LBS | BODY MASS INDEX: 32.47 KG/M2 | DIASTOLIC BLOOD PRESSURE: 76 MMHG | HEIGHT: 66 IN | HEART RATE: 105 BPM | TEMPERATURE: 97.8 F | SYSTOLIC BLOOD PRESSURE: 118 MMHG | OXYGEN SATURATION: 95 %

## 2022-07-12 DIAGNOSIS — M50.30 DDD (DEGENERATIVE DISC DISEASE), CERVICAL: Primary | ICD-10-CM

## 2022-07-12 DIAGNOSIS — M79.2 NEURALGIA AND NEURITIS: ICD-10-CM

## 2022-07-12 DIAGNOSIS — M54.12 CERVICAL RADICULAR PAIN: Primary | ICD-10-CM

## 2022-07-12 DIAGNOSIS — M47.812 CERVICAL SPONDYLOSIS: ICD-10-CM

## 2022-07-12 DIAGNOSIS — M50.30 DDD (DEGENERATIVE DISC DISEASE), CERVICAL: ICD-10-CM

## 2022-07-12 DIAGNOSIS — M54.12 CERVICAL RADICULAR PAIN: ICD-10-CM

## 2022-07-12 DIAGNOSIS — Z98.1 S/P CERVICAL SPINAL FUSION: ICD-10-CM

## 2022-07-12 PROCEDURE — 99213 OFFICE O/P EST LOW 20 MIN: CPT | Performed by: ANESTHESIOLOGY

## 2022-07-12 PROCEDURE — 99214 OFFICE O/P EST MOD 30 MIN: CPT | Performed by: ANESTHESIOLOGY

## 2022-07-12 RX ORDER — SODIUM CHLORIDE 0.9 % (FLUSH) 0.9 %
5-40 SYRINGE (ML) INJECTION EVERY 12 HOURS SCHEDULED
Status: CANCELLED | OUTPATIENT
Start: 2022-07-12

## 2022-07-12 RX ORDER — SODIUM CHLORIDE 0.9 % (FLUSH) 0.9 %
5-40 SYRINGE (ML) INJECTION PRN
Status: CANCELLED | OUTPATIENT
Start: 2022-07-12

## 2022-07-12 RX ORDER — DULOXETIN HYDROCHLORIDE 30 MG/1
30 CAPSULE, DELAYED RELEASE ORAL DAILY
Qty: 30 CAPSULE | Refills: 3 | Status: SHIPPED | OUTPATIENT
Start: 2022-07-12

## 2022-07-12 RX ORDER — SODIUM CHLORIDE 9 MG/ML
INJECTION, SOLUTION INTRAVENOUS PRN
Status: CANCELLED | OUTPATIENT
Start: 2022-07-12

## 2022-07-12 NOTE — PROGRESS NOTES
[address] [facility], [address] [place] [address]  [phone]    Follow up Note      Jes Granado     Date of Visit:  7/12/2022    CC:  Patient presents for follow up   Chief Complaint   Patient presents with    Follow Up After Procedure     C7-T1 RIGHT PARAMEDIAN CERVICAL EPIDURAL STEROID INJECTION  UNDER X-RAY       HPI:  Chronic neck pain. S/p ACDF : C4-5, C5-6, C6-7 Dr. Sisi Luis In June 2021. Having neck pain shoulder blade pain and upper extremity pain. He also complains of pain/ sensitivity in the upper extremity. Extensive history for chronic pain for many years. He has been treated with multiple pain centers in the past- [facility], [facility] etc     He has Spinal cord stimulator implant for LE pain: Aug 2018- helps while it is on- still working well. S/p Cervical SCS implant done in Feb 2019. Post implant he had severe neck pain/ UE pain and SCS had to be explanted in March 2019. Pain causes functional limitations/ limits Adl's : Yes      Nursing notes and details of the pain history reviewed. Please see intake notes for details. Previous treatments:   Physical Therapy / HEP: yes,       Medications: - NSAID's : yes                        - Membrane stabilizers : yes                        - Opioids : yes                       - Adjuvants or Others : yes     Spine Surgeries: yes,      Anticoagulation medications: no.     H/O Smoking: no  H/O alcohol abuse : no  H/O Illicit drug use : no     Imaging:      CT myelogram of the cervical spine on 1/13/2022:  Impression   1. Prior anterior fusion at C4 through C7. No evidence of recurrent central   canal stenosis or disc herniation at these levels. 2. Left posterolateral and lateral disc protrusion at C3-4 that may be   slightly increased compared to the prior MRI study. This causes slight   compression of the left side of the spinal cord and could compress the left   C4 nerve root. X-ray cervical spine 12/20/2021:  Impression   Intact cervical fusion hardware with stable alignment and no new abnormal   findings. OARRS report[de-identified] reviewed. Past Medical History:   Diagnosis Date    Anxiety     Asthma     exercised induced    Cervicogenic headache 6/12/2019    Chronic daily headache 6/13/2019    Reported since 2009    Chronic generalized pain     Dermatitis     Diabetes mellitus ([address]     PO meds only    Heart stopped beating ([facility] [address] 06/05/2021    3 days after cervical fusion- \" chest pain , dyspnea, passed out- intubated for 5 days\".  Cardiologist last 6/21 ? 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       Past Surgical History:   Procedure Laterality Date    ABDOMEN SURGERY      nissen fundoplication    BACK SURGERY  06/2021    neck fusion    CARPAL TUNNEL RELEASE Bilateral     CERVICAL FUSION N/A 06/02/2021    C4-C5, C5-C6, C6-C7  ANTERIOR CERVICAL DISCECTOMY AND FUSION -- NEEDS REGULAR BED WITH HORSE SHOE, PLATES, SCREWS -- GLOBUS performed by Neeraj Ward MD at [address] FUNDOPLICATION  1248    for acid reflux    HERNIA REPAIR  82/46/5921    UMBILICAL- [facility]    INSERT/ REPLACE INFUSN PUMP,PROGRAMMABLE N/A 03/02/2019    SPINAL CORD STIMULATOR REMOVAL(cervical) performed by Neeraj Ward MD at [facility] N/A 3/29/2022    C7-T1 EPIDURAL STEROID INJECTION WITH X-RAY (SEDATION performed by Rebecca Sanchez MD at [address] [place] [address] 24  08/15/2018    implantation of medtronic lumbar spinal cord generator    PAIN MANAGEMENT PROCEDURE Right 4/26/2022    C7-T1 RIGHT PARAMEDIAN CERVICAL EPIDURAL STEROID INJECTION  UNDER X-RAY performed by Rebecca Sanchez MD at [address] [facility] NEUROSTIM/ N/A 08/15/2018    SURGICAL IMPLANTATION OF MEDTRONIC LUMBAR SPINAL CORD  ELECTRODES AND GENERATOR performed by Maggi Crews DO at [address] [place] [address] SHAVING Right 05/24/2018    RIGHT SHOULDER ARTHROSCOPY, DEBRIDEMENT, SUBACROMIAL DECOMPRESSION, DISTAL CLAVICLE RESECTION, ACROMIOPLASTY performed by Simon Jarrell MD at [address] Left 09/28/2018    LEFT SHOULDER ARTHROSCOPY SUBACROMIAL DECOMPRESSION, CROMIAL PLASTY, DISTAL CLAVICAL EXCISION  ++ARTHREX, BEACH CHAIR, SPIDER++ performed by Simon Jarrell MD at [facility] ARTHROSCOPY Left 01/28/2020    LEFT SHOULDER ARTHROSCOPIC BICEPS TENODESIS, ROTATOR CUFF REPAIR, EXTENSIVE DEBRIDEMENT  +++ARTHREX+++   +++PNB+++ performed by Simon Jarrell MD at [facility] N/A 02/28/2019    C2 SPINAL CORD STIMULATOR PLACEMENT --MEDTRONIC performed by Jerzy Bell MD at [address] [facility] N/A 01/21/2019    TONSILLECTOMY performed by Ileana Julian DO at [facility]       Prior to Admission medications    Medication Sig Start Date End Date Taking? Authorizing Provider   pregabalin (LYRICA) 225 MG capsule Take 1 capsule by mouth 2 times daily for 30 days.  4/27/22 5/27/22  Mile Dubois MD   cyclobenzaprine (FLEXERIL) 10 MG tablet Take 1 tablet by mouth 3 times daily as needed for Muscle spasms 8/30/21   Clay Wooten PA-C   metFORMIN (GLUCOPHAGE) 500 MG tablet Take 500 mg by mouth 2 times daily (with meals)  8/29/19   Historical Provider, MD   SUMAtriptan (IMITREX) 6 MG/0.5ML SOLN injection Inject 6 mg into the skin once as needed for Migraine    Historical Provider, MD       Allergies   Allergen Reactions    Penicillins      Family has history to penicillin        Social History     Socioeconomic History    Marital status:      Spouse name: Not on file    Number of children: 1    Years of education: Not on file    Highest education level: Not on file   Occupational History    Not on file   Tobacco Use    Smoking status: Former Smoker     Years: 5.00     Types: Cigarettes     Quit date: 10/22/1988     Years since quittin.7    Smokeless tobacco: Never Used    Tobacco comment: quit    Vaping Use    Vaping Use: Never used   Substance and Sexual Activity    Alcohol use: Yes     Comment: socially- OCCASIONAL    Drug use: No    Sexual activity: Never   Other Topics Concern    Not on file   Social History Narrative    Not on file     Social Determinants of Health     Financial Resource Strain:     Difficulty of Paying Living Expenses: Not on file   Food Insecurity:     Worried About [address] in the Last Year: Not on file    Jaime of Food in the Last Year: Not on file   Transportation Needs:     Lack of Transportation (Medical): Not on file    Lack of Transportation (Non-Medical):  Not on file   Physical Activity:     Days of Exercise per Week: Not on file    Minutes of Exercise per Session: Not on file   Stress:     Feeling of Stress : Not on file   Social Connections:     Frequency of Communication with Friends and Family: Not on file    Frequency of Social Gatherings with Friends and Family: Not on file    Attends Congregation Services: Not on file    Active Member of Clubs or Organizations: Not on file    Attends Club or Organization Meetings: Not on file    Marital Status: Not on file   Intimate Partner Violence:     Fear of Current or Ex-Partner: Not on file    Emotionally Abused: Not on file    Physically Abused: Not on file    Sexually Abused: Not on file   Housing Stability:     Unable to Pay for Housing in the Last Year: Not on file    Number of [place] in the Last Year: Not on file    Unstable Housing in the Last Year: Not on file       Family History   Problem Relation Age of Onset    No Known Problems Mother     Prostate Cancer Father     No Known Problems Maternal Grandmother     No Known Problems Maternal Grandfather     No Known Problems Paternal Grandmother     No Known Problems Paternal Grandfather     No Known Problems Daughter        REVIEW OF SYSTEMS:     Adilia loaiza fever/chills, chest pain, shortness of breath, new bowel or bladder complaints. All other review of systems was negative. PHYSICAL EXAMINATION:      Temp 97.8 °F (36.6 °C) (Infrared)   Ht 5' 5.5\" (1.664 m)   Wt 202 lb (91.6 kg)   BMI 33.10 kg/m²   General:       General appearance:  Pleasant and well-hydrated, in no distress and A & O x 3  Build:Overweight  Function: Rises from seated position easily and Moves about room without difficulty     HEENT:     Head:normocephalic, atraumatic     Lungs:     Breathing:normal breathing pattern      CVS:     RRR     Abdomen:     Shape:non-distended and normal     Cervical spine:     Inspection:scar form the prior surgery noted, appears to have healed well. Palpation:tenderness paravertebral muscles, tenderness trapezium, left, right and positive. Range of motion:reduced flexion, extension, rotation  and is moderately painful. Spurling's: negative bilaterally     Thoracic spine:                Spine inspection:normal   Palpation:No tenderness over the midline and paraspinal area, bilaterally  Range of motion:normal in flexion, extension rotation bilateral and is not painful. Lumbar spine:     Spine inspection: scar from the prior surgery +, IPG site looks clean, non tender  Palpation: Tenderness paravertebral muscles No bilaterally  Range of motion: Decreased, flexion Decreased, Lateral bending, extension and rotation bilaterally reduced is not painful. Sacroiliac joint tenderness No bilaterally  SLR : negative bilaterally     Musculoskeletal:     Trigger points +     Extremities:  No tremors     Upper extremity:  ROM painful, dysesthesia over the b/l UE, shoulder blade. Neurological:     Sensory: Normal to light touch except for sensory change sin the b/l UE, shoulder blade area. Motor:   Upper extremity strength 4+ (Mainly due to pain)  B/l LE 5/5     Gait:normal Yes     Dermatology:     Skin:no rashes or lesions noted     Assessment/Plan:       Diagnosis Orders   1. DDD (degenerative disc disease), cervical      2. Cervical spondylosis      3. S/P cervical spinal fusion      4. Cervical radicular pain      5. Neuralgia and neuritis            46 y.o.   male with H/o S/p ACDF : C4-5, C5-6, C6-7 Dr. Barrett Felton. In June 2021. Having neck pain and upper extremity pain. He also has neuropathy of bilateral upper extremity pain. Has history of chronic pain issues as detailed above - S/P SCS for lower back lower extremity pain which is helping him. Prior SCS for neck pain - did not help had to be explanted in the past.     Has been treated by multiple pain centers in the past.     Was on pain medication by Dr. Siobhan Austin- conor script for Norco 7.5 on 4/27/2022 # 90 tabs. Recent image findings/neurosurgery notes/other pertinent records reviewed. S/P Cervical epidural steroid injection under fluoroscopy C7-T1 on 4/26/2022 with 60-70% relief. Has noticed recurrence of similar pain. Will repeat cervical epidural steroid injection under fluoroscopy guidance. RBA discussed patient agreed to proceed. We will do procedure with moderate sedation in view of history of anxiety of needles. He has stopped Norco and Lyrica. Will Start low dose Cymbalta. Use instructions explained. Goals and realistic expectations of the treatment discussed in detail with the patient. Counseling : Patient encouraged to stay active and continue Regular home exercise program as tolerated - stretching / strengthening. Treatment plan discussed with the patient including medication and procedure side effects. Controlled Substances Monitoring:   OARRS reviewed.     Dylon Shay MD

## 2022-07-12 NOTE — H&P (VIEW-ONLY)
[address] [facility], [address] [place] [address] [place]  [phone]    Follow up Note      Jackie White     Date of Visit:  7/12/2022    CC:  Patient presents for follow up   Chief Complaint   Patient presents with    Follow Up After Procedure     C7-T1 RIGHT PARAMEDIAN CERVICAL EPIDURAL STEROID INJECTION  UNDER X-RAY       HPI:  Chronic neck pain. S/p ACDF : C4-5, C5-6, C6-7 Dr. Irish Hwang In June 2021. Having neck pain shoulder blade pain and upper extremity pain. He also complains of pain/ sensitivity in the upper extremity. Extensive history for chronic pain for many years. He has been treated with multiple pain centers in the past- [facility] etc     He has Spinal cord stimulator implant for LE pain: Aug 2018- helps while it is on- still working well. S/p Cervical SCS implant done in Feb 2019. Post implant he had severe neck pain/ UE pain and SCS had to be explanted in March 2019. Pain causes functional limitations/ limits Adl's : Yes      Nursing notes and details of the pain history reviewed. Please see intake notes for details. Previous treatments:   Physical Therapy / HEP: yes,       Medications: - NSAID's : yes                        - Membrane stabilizers : yes                        - Opioids : yes                       - Adjuvants or Others : yes     Spine Surgeries: yes,      Anticoagulation medications: no.     H/O Smoking: no  H/O alcohol abuse : no  H/O Illicit drug use : no     Imaging:      CT myelogram of the cervical spine on 1/13/2022:  Impression   1. Prior anterior fusion at C4 through C7. No evidence of recurrent central   canal stenosis or disc herniation at these levels. 2. Left posterolateral and lateral disc protrusion at C3-4 that may be   slightly increased compared to the prior MRI study. This causes slight   compression of the left side of the spinal cord and could compress the left   C4 nerve root. SHAVING Right 05/24/2018    RIGHT SHOULDER ARTHROSCOPY, DEBRIDEMENT, SUBACROMIAL DECOMPRESSION, DISTAL CLAVICLE RESECTION, ACROMIOPLASTY performed by Brigida Gould MD at [address] Left 09/28/2018    LEFT SHOULDER ARTHROSCOPY SUBACROMIAL DECOMPRESSION, CROMIAL PLASTY, DISTAL CLAVICAL EXCISION  ++ARTHREX, BEACH CHAIR, SPIDER++ performed by Brigida Gould MD at [address] ARTHROSCOPY Left 01/28/2020    LEFT SHOULDER ARTHROSCOPIC BICEPS TENODESIS, ROTATOR CUFF REPAIR, EXTENSIVE DEBRIDEMENT  +++ARTHREX+++   +++PNB+++ performed by Brigida Gould MD at [address] N/A 02/28/2019    C2 SPINAL CORD STIMULATOR PLACEMENT --MEDTRONIC performed by Nathanael Crigler, MD at [facility] N/A 01/21/2019    TONSILLECTOMY performed by Humble Schrader DO at [address]       Prior to Admission medications    Medication Sig Start Date End Date Taking? Authorizing Provider   pregabalin (LYRICA) 225 MG capsule Take 1 capsule by mouth 2 times daily for 30 days.  4/27/22 5/27/22  Madelaine Aguilar MD   cyclobenzaprine (FLEXERIL) 10 MG tablet Take 1 tablet by mouth 3 times daily as needed for Muscle spasms 8/30/21   Candance Snipes, PA-C   metFORMIN (GLUCOPHAGE) 500 MG tablet Take 500 mg by mouth 2 times daily (with meals)  8/29/19   Historical Provider, MD   SUMAtriptan (IMITREX) 6 MG/0.5ML SOLN injection Inject 6 mg into the skin once as needed for Migraine    Historical Provider, MD       Allergies   Allergen Reactions    Penicillins      Family has history to penicillin        Social History     Socioeconomic History    Marital status:      Spouse name: Not on file    Number of children: 1    Years of education: Not on file    Highest education level: Not on file   Occupational History    Not on file   Tobacco Use    Smoking status: Former Smoker     Years: 5.00     Types: Cigarettes     Quit date: 10/22/1988     Years since quittin.7    Smokeless tobacco: Never Used    Tobacco comment: quit    Vaping Use    Vaping Use: Never used   Substance and Sexual Activity    Alcohol use: Yes     Comment: socially- OCCASIONAL    Drug use: No    Sexual activity: Never   Other Topics Concern    Not on file   Social History Narrative    Not on file     Social Determinants of Health     Financial Resource Strain:     Difficulty of Paying Living Expenses: Not on file   Food Insecurity:     Worried About [address] in the Last Year: Not on file    Jaime of Food in the Last Year: Not on file   Transportation Needs:     Lack of Transportation (Medical): Not on file    Lack of Transportation (Non-Medical):  Not on file   Physical Activity:     Days of Exercise per Week: Not on file    Minutes of Exercise per Session: Not on file   Stress:     Feeling of Stress : Not on file   Social Connections:     Frequency of Communication with Friends and Family: Not on file    Frequency of Social Gatherings with Friends and Family: Not on file    Attends Sikhism Services: Not on file    Active Member of Clubs or Organizations: Not on file    Attends Club or Organization Meetings: Not on file    Marital Status: Not on file   Intimate Partner Violence:     Fear of Current or Ex-Partner: Not on file    Emotionally Abused: Not on file    Physically Abused: Not on file    Sexually Abused: Not on file   Housing Stability:     Unable to Pay for Housing in the Last Year: Not on file    Number of [place] in the Last Year: Not on file    Unstable Housing in the Last Year: Not on file       Family History   Problem Relation Age of Onset    No Known Problems Mother     Prostate Cancer Father     No Known Problems Maternal Grandmother     No Known Problems Maternal Grandfather     No Known Problems Paternal Grandmother     No Known Problems Paternal Grandfather     No Known Problems Daughter        REVIEW OF SYSTEMS:     Ladi Richards denies fever/chills, chest pain, shortness of breath, new bowel or bladder complaints. All other review of systems was negative. PHYSICAL EXAMINATION:      Temp 97.8 °F (36.6 °C) (Infrared)   Ht 5' 5.5\" (1.664 m)   Wt 202 lb (91.6 kg)   BMI 33.10 kg/m²   General:       General appearance:  Pleasant and well-hydrated, in no distress and A & O x 3  Build:Overweight  Function: Rises from seated position easily and Moves about room without difficulty     HEENT:     Head:normocephalic, atraumatic     Lungs:     Breathing:normal breathing pattern      CVS:     RRR     Abdomen:     Shape:non-distended and normal     Cervical spine:     Inspection:scar form the prior surgery noted, appears to have healed well. Palpation:tenderness paravertebral muscles, tenderness trapezium, left, right and positive. Range of motion:reduced flexion, extension, rotation  and is moderately painful. Spurling's: negative bilaterally     Thoracic spine:                Spine inspection:normal   Palpation:No tenderness over the midline and paraspinal area, bilaterally  Range of motion:normal in flexion, extension rotation bilateral and is not painful. Lumbar spine:     Spine inspection: scar from the prior surgery +, IPG site looks clean, non tender  Palpation: Tenderness paravertebral muscles No bilaterally  Range of motion: Decreased, flexion Decreased, Lateral bending, extension and rotation bilaterally reduced is not painful. Sacroiliac joint tenderness No bilaterally  SLR : negative bilaterally     Musculoskeletal:     Trigger points +     Extremities:  No tremors     Upper extremity:  ROM painful, dysesthesia over the b/l UE, shoulder blade. Neurological:     Sensory: Normal to light touch except for sensory change sin the b/l UE, shoulder blade area. Motor:   Upper extremity strength 4+ (Mainly due to pain)  B/l LE 5/5     Gait:normal Yes     Dermatology:     Skin:no rashes or lesions noted     Assessment/Plan:       Diagnosis Orders   1. DDD (degenerative disc disease), cervical      2. Cervical spondylosis      3. S/P cervical spinal fusion      4. Cervical radicular pain      5. Neuralgia and neuritis            46 y.o.   male with H/o S/p ACDF : C4-5, C5-6, C6-7 Dr. Altaf Marino In June 2021. Having neck pain and upper extremity pain. He also has neuropathy of bilateral upper extremity pain. Has history of chronic pain issues as detailed above - S/P SCS for lower back lower extremity pain which is helping him. Prior SCS for neck pain - did not help had to be explanted in the past.     Has been treated by multiple pain centers in the past.     Was on pain medication by Dr. Lombardi Samples- last script for Norco 7.5 on 4/27/2022 # 90 tabs. Recent image findings/neurosurgery notes/other pertinent records reviewed. S/P Cervical epidural steroid injection under fluoroscopy C7-T1 on 4/26/2022 with 60-70% relief. Has noticed recurrence of similar pain. Will repeat cervical epidural steroid injection under fluoroscopy guidance. RBA discussed patient agreed to proceed. We will do procedure with moderate sedation in view of history of anxiety of needles. He has stopped Norco and Lyrica. Will Start low dose Cymbalta. Use instructions explained. Goals and realistic expectations of the treatment discussed in detail with the patient. Counseling : Patient encouraged to stay active and continue Regular home exercise program as tolerated - stretching / strengthening. Treatment plan discussed with the patient including medication and procedure side effects. Controlled Substances Monitoring:   OARRS reviewed.     Dian Daly MD

## 2022-07-16 ENCOUNTER — HOSPITAL ENCOUNTER (OUTPATIENT)
Dept: MRI IMAGING | Age: 52
Discharge: HOME | End: 2022-07-18
Payer: MEDICAID

## 2022-07-16 DIAGNOSIS — M75.42 IMPINGEMENT SYNDROME OF LEFT SHOULDER: ICD-10-CM

## 2022-07-16 DIAGNOSIS — M75.41 IMPINGEMENT SYNDROME OF RIGHT SHOULDER: ICD-10-CM

## 2022-07-16 PROCEDURE — 73221 MRI JOINT UPR EXTREM W/O DYE: CPT

## 2022-07-20 NOTE — PROGRESS NOTES
[facility] PRE-ADMISSION TESTING INSTRUCTIONS      ARRIVAL INSTRUCTIONS:  [x] Parking the day of Surgery is located in the Main Entrance lot. Upon entering the main door make an immediate right to the surgery reception desk. [x] Bring photo ID and insurance card    [] Bring in a copy of Living will or Durable Power of  papers. [x] Please be sure to arrange for responsible adult to provide transportation to and from the hospital    [x] Please arrange for responsible adult to be with you for the 24 hour period post procedure due to having anesthesia    [x] If you awake am of surgery not feeling well or have temperature >100 please call [phone]    GENERAL INSTRUCTIONS:    [x] Nothing by mouth after midnight, including gum, candy, mints or water    [x] You may brush your teeth, but do not swallow any water    [x] Take medications as instructed with 1-2 oz of water    [x] Stop herbal supplements and vitamins 5 days prior to procedure    [x] Follow preop dosing of blood thinners per physician instructions    [] Take 1/2 dose of evening insulin, but no insulin after midnight    [x] No oral diabetic medications after midnight    [x] If diabetic and have low blood sugar or feel symptomatic, take 1-2oz apple juice only    [] Bring inhalers day of surgery    [] Bring C-PAP/ Bi-Pap day of surgery    [] Bring urine specimen day of surgery    [x] Shower or bath with soap, lather and rinse well, AM of Surgery, no lotion, powders or creams to surgical site    [] Follow bowel prep as instructed per surgeon    [x] No tobacco products within 24 hours of surgery     [x] No alcohol or illegal drug use within 24 hours of surgery.     [x] Jewelry, body piercing's, eyeglasses, contact lenses and dentures are not permitted into surgery (bring cases)      [x] Please do not wear any nail polish, make up or hair products on the day of surgery    [x] You can expect a call the business day prior to procedure to notify you if your arrival time changes    [x] If you receive a survey after surgery we would greatly appreciate your comments    [x] Please notify surgeon if you develop any illness between now and time of surgery (cold, cough, sore throat, fever, nausea, vomiting) or any signs of infections  including skin, wounds, and dental.    []  The Outpatient Pharmacy is available to fill your prescription here on your day of surgery, ask your preop nurse for details

## 2022-07-21 ENCOUNTER — ANESTHESIA (OUTPATIENT)
Dept: OPERATING ROOM | Age: 52
End: 2022-07-21
Payer: MEDICAID

## 2022-07-21 ENCOUNTER — ANESTHESIA EVENT (OUTPATIENT)
Dept: OPERATING ROOM | Age: 52
End: 2022-07-21
Payer: MEDICAID

## 2022-07-21 ENCOUNTER — HOSPITAL ENCOUNTER (OUTPATIENT)
Age: 52
Setting detail: OUTPATIENT SURGERY
Discharge: HOME | End: 2022-07-21
Attending: ANESTHESIOLOGY | Admitting: ANESTHESIOLOGY
Payer: MEDICAID

## 2022-07-21 ENCOUNTER — HOSPITAL ENCOUNTER (OUTPATIENT)
Dept: GENERAL RADIOLOGY | Age: 52
Setting detail: OUTPATIENT SURGERY
Discharge: HOME | End: 2022-07-23
Attending: ANESTHESIOLOGY
Payer: MEDICAID

## 2022-07-21 VITALS
RESPIRATION RATE: 18 BRPM | OXYGEN SATURATION: 97 % | BODY MASS INDEX: 29.41 KG/M2 | DIASTOLIC BLOOD PRESSURE: 78 MMHG | HEART RATE: 80 BPM | HEIGHT: 66 IN | TEMPERATURE: 96.8 F | SYSTOLIC BLOOD PRESSURE: 134 MMHG | WEIGHT: 183 LBS

## 2022-07-21 DIAGNOSIS — R52 PAIN MANAGEMENT: ICD-10-CM

## 2022-07-21 LAB — METER GLUCOSE: 91 MG/DL (ref 74–99)

## 2022-07-21 PROCEDURE — 3700000001 HC ADD 15 MINUTES (ANESTHESIA): Performed by: ANESTHESIOLOGY

## 2022-07-21 PROCEDURE — 7100000011 HC PHASE II RECOVERY - ADDTL 15 MIN: Performed by: ANESTHESIOLOGY

## 2022-07-21 PROCEDURE — 3600000012 HC SURGERY LEVEL 2 ADDTL 15MIN: Performed by: ANESTHESIOLOGY

## 2022-07-21 PROCEDURE — 6360000002 HC RX W HCPCS: Performed by: ANESTHESIOLOGY

## 2022-07-21 PROCEDURE — 6360000002 HC RX W HCPCS: Performed by: NURSE ANESTHETIST, CERTIFIED REGISTERED

## 2022-07-21 PROCEDURE — 6360000004 HC RX CONTRAST MEDICATION: Performed by: ANESTHESIOLOGY

## 2022-07-21 PROCEDURE — 3600000002 HC SURGERY LEVEL 2 BASE: Performed by: ANESTHESIOLOGY

## 2022-07-21 PROCEDURE — 3209999900 FLUORO FOR SURGICAL PROCEDURES

## 2022-07-21 PROCEDURE — 2580000003 HC RX 258: Performed by: ANESTHESIOLOGY

## 2022-07-21 PROCEDURE — 2709999900 HC NON-CHARGEABLE SUPPLY: Performed by: ANESTHESIOLOGY

## 2022-07-21 PROCEDURE — 2500000003 HC RX 250 WO HCPCS: Performed by: ANESTHESIOLOGY

## 2022-07-21 PROCEDURE — 6370000000 HC RX 637 (ALT 250 FOR IP): Performed by: ANESTHESIOLOGY

## 2022-07-21 PROCEDURE — 2580000003 HC RX 258: Performed by: NURSE ANESTHETIST, CERTIFIED REGISTERED

## 2022-07-21 PROCEDURE — 82962 GLUCOSE BLOOD TEST: CPT

## 2022-07-21 PROCEDURE — 3700000000 HC ANESTHESIA ATTENDED CARE: Performed by: ANESTHESIOLOGY

## 2022-07-21 PROCEDURE — 7100000010 HC PHASE II RECOVERY - FIRST 15 MIN: Performed by: ANESTHESIOLOGY

## 2022-07-21 PROCEDURE — 62321 NJX INTERLAMINAR CRV/THRC: CPT | Performed by: ANESTHESIOLOGY

## 2022-07-21 PROCEDURE — A4216 STERILE WATER/SALINE, 10 ML: HCPCS | Performed by: ANESTHESIOLOGY

## 2022-07-21 RX ORDER — SODIUM CHLORIDE 0.9 % (FLUSH) 0.9 %
5-40 SYRINGE (ML) INJECTION EVERY 12 HOURS SCHEDULED
Status: DISCONTINUED | OUTPATIENT
Start: 2022-07-21 | End: 2022-07-21 | Stop reason: HOSPADM

## 2022-07-21 RX ORDER — SODIUM CHLORIDE 9 MG/ML
INJECTION INTRAVENOUS PRN
Status: DISCONTINUED | OUTPATIENT
Start: 2022-07-21 | End: 2022-07-21 | Stop reason: ALTCHOICE

## 2022-07-21 RX ORDER — MIDAZOLAM HYDROCHLORIDE 1 MG/ML
INJECTION INTRAMUSCULAR; INTRAVENOUS PRN
Status: DISCONTINUED | OUTPATIENT
Start: 2022-07-21 | End: 2022-07-21 | Stop reason: SDUPTHER

## 2022-07-21 RX ORDER — DEXAMETHASONE SODIUM PHOSPHATE 10 MG/ML
INJECTION, SOLUTION INTRAMUSCULAR; INTRAVENOUS PRN
Status: DISCONTINUED | OUTPATIENT
Start: 2022-07-21 | End: 2022-07-21 | Stop reason: ALTCHOICE

## 2022-07-21 RX ORDER — LIDOCAINE HYDROCHLORIDE 5 MG/ML
INJECTION, SOLUTION INFILTRATION; INTRAVENOUS PRN
Status: DISCONTINUED | OUTPATIENT
Start: 2022-07-21 | End: 2022-07-21 | Stop reason: ALTCHOICE

## 2022-07-21 RX ORDER — HYDROCODONE BITARTRATE AND ACETAMINOPHEN 5; 325 MG/1; MG/1
1 TABLET ORAL ONCE
Status: COMPLETED | OUTPATIENT
Start: 2022-07-21 | End: 2022-07-21

## 2022-07-21 RX ORDER — SODIUM CHLORIDE 9 MG/ML
INJECTION, SOLUTION INTRAVENOUS CONTINUOUS PRN
Status: DISCONTINUED | OUTPATIENT
Start: 2022-07-21 | End: 2022-07-21 | Stop reason: SDUPTHER

## 2022-07-21 RX ORDER — SODIUM CHLORIDE 9 MG/ML
INJECTION, SOLUTION INTRAVENOUS PRN
Status: DISCONTINUED | OUTPATIENT
Start: 2022-07-21 | End: 2022-07-21 | Stop reason: HOSPADM

## 2022-07-21 RX ORDER — SODIUM CHLORIDE 0.9 % (FLUSH) 0.9 %
5-40 SYRINGE (ML) INJECTION PRN
Status: DISCONTINUED | OUTPATIENT
Start: 2022-07-21 | End: 2022-07-21 | Stop reason: HOSPADM

## 2022-07-21 RX ORDER — FENTANYL CITRATE 50 UG/ML
INJECTION, SOLUTION INTRAMUSCULAR; INTRAVENOUS PRN
Status: DISCONTINUED | OUTPATIENT
Start: 2022-07-21 | End: 2022-07-21 | Stop reason: SDUPTHER

## 2022-07-21 RX ADMIN — SODIUM CHLORIDE: 9 INJECTION, SOLUTION INTRAVENOUS at 11:12

## 2022-07-21 RX ADMIN — FENTANYL CITRATE 100 MCG: 50 INJECTION, SOLUTION INTRAMUSCULAR; INTRAVENOUS at 11:16

## 2022-07-21 RX ADMIN — MIDAZOLAM 2 MG: 1 INJECTION INTRAMUSCULAR; INTRAVENOUS at 11:16

## 2022-07-21 RX ADMIN — HYDROCODONE BITARTRATE AND ACETAMINOPHEN 1 TABLET: 5; 325 TABLET ORAL at 11:39

## 2022-07-21 ASSESSMENT — PAIN DESCRIPTION - LOCATION: LOCATION: BACK;NECK

## 2022-07-21 ASSESSMENT — PAIN DESCRIPTION - DESCRIPTORS
DESCRIPTORS: ACHING;DISCOMFORT
DESCRIPTORS: ACHING

## 2022-07-21 ASSESSMENT — PAIN - FUNCTIONAL ASSESSMENT: PAIN_FUNCTIONAL_ASSESSMENT: 0-10

## 2022-07-21 ASSESSMENT — LIFESTYLE VARIABLES: SMOKING_STATUS: 0

## 2022-07-21 ASSESSMENT — PAIN SCALES - GENERAL: PAINLEVEL_OUTOF10: 8

## 2022-07-21 NOTE — INTERVAL H&P NOTE
Update History & Physical    The patient's History and Physical of July 12, 2022 was reviewed with the patient and I examined the patient. There was no change. The surgical site was confirmed by the patient and me. Plan: Cervical ROBINSON. The risks, benefits, expected outcome, and alternative to the recommended procedure have been discussed with the patient. Patient understands and wants to proceed with the procedure.      Electronically signed by Ozzie Richards MD on 7/21/2022

## 2022-07-21 NOTE — ANESTHESIA PRE PROCEDURE
Department of Anesthesiology  Preprocedure Note       Name:  Rufino Martinez   Age:  46 y.o.  :  1970                                          MRN:  [id number]         Date:  2022      Surgeon: Osbaldo Celaya):  Genevieve Stearns MD    Procedure: CERVICAL EPIDURAL STEROID INJECTION UNDER FLUOROSCOPIC GUIDANCE AT C7-T1 PARAMEDIAN    Medications prior to admission:   Prior to Admission medications    Medication Sig Start Date End Date Taking? Authorizing Provider   cyclobenzaprine (FLEXERIL) 10 MG tablet Take 1 tablet by mouth 3 times daily as needed for Muscle spasms 22   Nancy Wood MD   pregabalin (LYRICA) 225 MG capsule Take 1 capsule by mouth in the morning and 1 capsule before bedtime. Do all this for 30 days. 22  Nancy Wood MD   HYDROcodone-acetaminophen (NORCO) 5-325 MG per tablet Take 1 tablet by mouth every 8 hours as needed for Pain for up to 30 days. Intended supply: 3 days. Take lowest dose possible to manage pain 22  Nancy Wood MD   DULoxetine (CYMBALTA) 30 MG extended release capsule Take 1 capsule by mouth daily 22   Genevieve Stearns MD   metFORMIN (GLUCOPHAGE) 500 MG tablet Take 500 mg by mouth 2 times daily (with meals)  19   Historical Provider, MD   SUMAtriptan (IMITREX) 6 MG/0.5ML SOLN injection Inject 6 mg into the skin once as needed for Migraine    Historical Provider, MD       Current medications:    Current Facility-Administered Medications   Medication Dose Route Frequency Provider Last Rate Last Admin    sodium chloride flush 0.9 % injection 5-40 mL  5-40 mL IntraVENous 2 times per day Genevieve Stearns MD        sodium chloride flush 0.9 % injection 5-40 mL  5-40 mL IntraVENous PRN Genevieve Stearns MD        0.9 % sodium chloride infusion   IntraVENous PRN Genevieve Stearns MD           Allergies:     Allergies   Allergen Reactions    Penicillins      Family has history to penicillin        Problem List:    Patient Active Problem List   Diagnosis Code    Chronic bilateral low back pain with bilateral sciatica M54.42, M54.41, G89.29    Chronic migraine without aura without status migrainosus, not intractable G43.709    Tear of right rotator cuff M75.101    Shoulder impingement, left M75.42    Shoulder impingement, right M75.41    Neck pain M54.2    Radiculopathy, lumbar region M54.16    At risk for bleeding associated with tonsillectomy and adenoidectomy Z91.89, Z98.890    Post-op pain G89.18    Chronic tonsillitis J35.01    Intractable pain R52    Cervical radicular pain M54.12    Cervicogenic headache G44.86    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 G43.709    Chronic daily headache R51.9    Cervical herniated disc M50.20    Post-op pneumonia J95.89, J18.9    Sciatica M54.30    DDD (degenerative disc disease), cervical M50.30    S/P cervical spinal fusion Z98.1       Past Medical History:        Diagnosis Date    Anxiety     Asthma     exercised induced    Cervicogenic headache 06/12/2019    Chronic daily headache 06/13/2019    Reported since 2009    Chronic generalized pain     Dermatitis     Diabetes mellitus ([address]     PO meds only    Heart stopped beating ([address] 06/05/2021    3 days after cervical fusion- \" chest pain , dyspnea, passed out- intubated for 5 days\".  Cardiologist last 6/21 ? 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    INSERT/ REPLACE INFUSN PUMP,PROGRAMMABLE N/A 2019    SPINAL CORD STIMULATOR REMOVAL(cervical) performed by Glorya Sicard, MD at [facility] 21 N/A 3/29/2022    C7-T1 EPIDURAL STEROID INJECTION WITH X-RAY (SEDATION performed by David Rosales MD at [facility] 8  08/15/2018    implantation of medtronic lumbar spinal cord generator    PAIN MANAGEMENT PROCEDURE Right 2022    C7-T1 RIGHT PARAMEDIAN CERVICAL EPIDURAL STEROID INJECTION  UNDER X-RAY performed by David Rosales MD at [facility] NEUROSTIM/ N/A 08/15/2018    SURGICAL IMPLANTATION OF MEDTRONIC LUMBAR SPINAL CORD  ELECTRODES AND GENERATOR [facility] Right [facility]    RIGHT SHOULDER ARTHROSCOPY, DEBRIDEMENT, SUBACROMIAL DECOMPRESSION, DISTAL CLAVICLE RESECTION, ACROMIOPLASTY performed by Jo Ann Orr MD at [facility] Left 2018    LEFT SHOULDER ARTHROSCOPY SUBACROMIAL DECOMPRESSION, CROMIAL PLASTY, DISTAL CLAVICAL EXCISION  ++ARTHREX, BEACH CHAIR, SPIDER++ performed by Jo Ann Orr MD at [address] [facility] ARTHROSCOPY Left 2020    LEFT SHOULDER ARTHROSCOPIC BICEPS TENODESIS, ROTATOR CUFF REPAIR, EXTENSIVE DEBRIDEMENT  +++ARTHREX+++   +++PNB+++ performed by Jo Ann Orr MD at [address] [place] [address] N/A 2019    C2 SPINAL CORD STIMULATOR PLACEMENT --MEDTRONIC performed by Glorya Sicard, MD at [facility] N/A 2019    TONSILLECTOMY performed by Tia Palomino DO at [facility] OR       Social History:    Social History     Tobacco Use    Smoking status: Former     Years: 5.00     Types: Cigarettes     Quit date: 10/22/1988     Years since quittin.7    Smokeless tobacco: Never    Tobacco comments:     quit    Substance Use Topics    Alcohol use: Yes     Comment: socially- PROTIME 12.0 01/13/2022 07:30 AM    INR 1.0 01/13/2022 07:30 AM    APTT 32.9 08/07/2018 03:58 PM       HCG (If Applicable): No results found for: PREGTESTUR, PREGSERUM, HCG, HCGQUANT     ABGs:   Lab Results   Component Value Date/Time    CJT6MWU 26.2 06/06/2021 05:53 AM        Type & Screen (If Applicable):  No results found for: LABABO, [address]    Anesthesia Evaluation  Patient summary reviewed and Nursing notes reviewed no history of anesthetic complications:   Airway: Mallampati: II  TM distance: >3 FB   Neck ROM: full  Mouth opening: > = 3 FB   Dental: normal exam         Pulmonary: breath sounds clear to auscultation  (+) asthma:     (-) pneumonia and not a current smoker                           Cardiovascular:Negative CV ROS  Exercise tolerance: good (>4 METS),         ECG reviewed  Rhythm: regular  Rate: normal           Beta Blocker:  Not on Beta Blocker         Neuro/Psych:   (+) neuromuscular disease (Muscle pain due to lyme disease.):, headaches: migraine headaches,              ROS comment: Nerve stimulator for back pain, Left side and patient shut it off this morning. Chronic pain lyme disease GI/Hepatic/Renal: Neg GI/Hepatic/Renal ROS            Endo/Other:    (+) Diabetes, . Pt had no PAT visit       Abdominal:   (+) obese,           Vascular: negative vascular ROS. Other Findings:               Anesthesia Plan      MAC     ASA 2       Induction: intravenous. MIPS: Prophylactic antiemetics administered. Anesthetic plan and risks discussed with patient. Plan discussed with CRNA. Malaika Petersen DO  Staff Anesthesiologist  9:35 AM    DOS STAFF ADDENDUM:    Patient seen and chart reviewed. No interval change in history or exam.   Anesthesia plan discussed, risk/benefits addressed. Patient's concerns and questions answered.      JACQUELYN Stubbs - CRNA  July 21, 2022  11:04 AM

## 2022-07-21 NOTE — OP NOTE
Operative Note      Patient: Yaa Rodríguez  YOB: 1970  MRN: [id number]    Date of Procedure: 2022    Pre-Op Diagnosis: Cervical radiculopathy [M54.12]    Post-Op Diagnosis: Same       Procedure(s):  CERVICAL EPIDURAL STEROID INJECTION UNDER FLUOROSCOPIC GUIDANCE AT C7-T1 PARAMEDIAN    Surgeon(s):  Lupe Brunner MD    Assistant:   * No surgical staff found *    Anesthesia: Monitor Anesthesia Care    Estimated Blood Loss (mL): Minimal    Complications: None    Specimens:   * No specimens in log *    Implants:  * No implants in log *      Drains: * No LDAs found *    Findings: good needle placement    Detailed Description of Procedure:   2022    Patient: Yaa Rodríguez  :  1970  Age:  46 y.o. Sex:  male     PRE-PROCEDURE DIAGNOSIS: Cervical degenerative disc disease, cervical radicular pain. POST-PROCEDURE DIAGNOSIS: Same. PROCEDURE: Fluoroscopic guided cervical epidural steroid injection, # at C7-T1 level. SURGEON: Lupe Brunner MD    ANESTHESIA: MAC    ESTIMATED BLOOD LOSS: None.  ______________________________________________________________________  BRIEF HISTORY:  Yaa Rodríguez comes in today for the   therapeutic cervical epidural injection under fluoroscopic guidance. The potential complications of this procedure were discussed with the him again today. He has elected to undergo the aforementioned procedure. Bailee complete History & Physical examination were reviewed in depth, a copy of which is in the chart. DESCRIPTION OF PROCEDURE:    After confirming written and informed consent, a time-out was performed and Bailee name and date of birth, the procedure to be performed as well as the plan for the location of the needle insertion were confirmed. The patient was brought into the procedure room and placed in the prone position with the head flexed midline on the fluoroscopy table.  A pillow was placed under the patient's head to increase cervical interlaminar space. Standard monitors were placed, and vital signs were observed throughout the procedure. The area was prepped with chloraprep and the C7-T1 interspace was marked under fluoroscopy. The skin and subcutaneous tissues at the above level were anesthestized with 0.5% lidocaine. An # 18 gauge 3-1/2 tuohy epidural needle was inserted and advanced toward the inferior lamina until bony contact was made. The needle was then advanced superiorly toward epidural space. From this point on loss of resistance technique with 5 cc glass syringe was used to confirm entrance of the needle into the epidural space under intermittent lateral fluoroscopy. Once in the epidural space, negative aspiration for blood and CSF was confirmed . Needle tip placement was confirmed by visualizing epidural spread of 0.5-1 ml of Isovue M 300 visualized in both AP and lateral live fluoroscopic views. Then after negative aspiration, a solution of 0.9 % Saline 3 ml and 15 mg Dexamethasone was easily injected. The needle was gently removed intact. The patient neck was cleaned and a Band-Aid was placed over the needle insertion point. Disposition the patient tolerated the procedure well and there were no complications . Vital signs remained stable throughout the procedure. The patient was escorted to the recovery area where they remained until discharge and written discharge instructions for the procedure were given. Plan: Phu Bruno will return to our pain management center as scheduled.      Natanael Chaudhry MD

## 2022-11-01 RX ORDER — DULOXETIN HYDROCHLORIDE 30 MG/1
CAPSULE, DELAYED RELEASE ORAL
Qty: 30 CAPSULE | Refills: 3 | OUTPATIENT
Start: 2022-11-01

## 2022-12-05 ENCOUNTER — PREP FOR PROCEDURE (OUTPATIENT)
Dept: PAIN MANAGEMENT | Age: 52
End: 2022-12-05

## 2022-12-05 ENCOUNTER — OFFICE VISIT (OUTPATIENT)
Dept: PAIN MANAGEMENT | Age: 52
End: 2022-12-05
Payer: MEDICAID

## 2022-12-05 VITALS
DIASTOLIC BLOOD PRESSURE: 94 MMHG | HEART RATE: 92 BPM | RESPIRATION RATE: 16 BRPM | SYSTOLIC BLOOD PRESSURE: 137 MMHG | TEMPERATURE: 98.8 F | OXYGEN SATURATION: 95 %

## 2022-12-05 DIAGNOSIS — M79.2 NEURALGIA AND NEURITIS: ICD-10-CM

## 2022-12-05 DIAGNOSIS — M54.12 CERVICAL RADICULAR PAIN: ICD-10-CM

## 2022-12-05 DIAGNOSIS — M54.12 CERVICAL RADICULAR PAIN: Primary | ICD-10-CM

## 2022-12-05 DIAGNOSIS — M47.812 CERVICAL SPONDYLOSIS: ICD-10-CM

## 2022-12-05 DIAGNOSIS — Z98.1 S/P CERVICAL SPINAL FUSION: ICD-10-CM

## 2022-12-05 DIAGNOSIS — M50.30 DDD (DEGENERATIVE DISC DISEASE), CERVICAL: Primary | ICD-10-CM

## 2022-12-05 PROCEDURE — 99213 OFFICE O/P EST LOW 20 MIN: CPT

## 2022-12-05 RX ORDER — SODIUM CHLORIDE 0.9 % (FLUSH) 0.9 %
5-40 SYRINGE (ML) INJECTION EVERY 12 HOURS SCHEDULED
OUTPATIENT
Start: 2022-12-05

## 2022-12-05 RX ORDER — SODIUM CHLORIDE 0.9 % (FLUSH) 0.9 %
5-40 SYRINGE (ML) INJECTION PRN
OUTPATIENT
Start: 2022-12-05

## 2022-12-05 RX ORDER — ATORVASTATIN CALCIUM 20 MG/1
TABLET, FILM COATED ORAL
COMMUNITY
Start: 2022-11-01

## 2022-12-05 RX ORDER — DULOXETIN HYDROCHLORIDE 30 MG/1
CAPSULE, DELAYED RELEASE ORAL
Qty: 30 CAPSULE | Refills: 3 | OUTPATIENT
Start: 2022-12-05

## 2022-12-05 RX ORDER — SODIUM CHLORIDE 9 MG/ML
INJECTION, SOLUTION INTRAVENOUS PRN
OUTPATIENT
Start: 2022-12-05

## 2022-12-05 NOTE — PROGRESS NOTES
[address] [facility], [address] [place] [address] [place]  [phone]    Follow up Note      Nathaly Catalan     Date of Visit:  12/5/2022    CC:  Patient presents for follow up   Chief Complaint   Patient presents with    Follow-up    Neck Pain    Chronic Pain     Pain over his whole body       HPI:  Chronic neck pain. S/p ACDF : C4-5, C5-6, C6-7 Dr. Jacob Glez In June 2021. Having neck pain shoulder blade pain and upper extremity pain. He also complains of pain/ sensitivity in the upper extremity. Extensive history for chronic pain for many years. He has been treated with multiple pain centers in the past- [facility], [facility] etc     He has Spinal cord stimulator implant for LE pain: Aug 2018- helps while it is on- still working well. S/p Cervical SCS implant done in Feb 2019. Post implant he had severe neck pain/ UE pain and SCS had to be explanted in March 2019. Pain causes functional limitations/ limits Adl's : Yes      Nursing notes and details of the pain history reviewed. Please see intake notes for details. Previous treatments:   Physical Therapy / HEP: yes,       Medications: - NSAID's : yes                        - Membrane stabilizers : yes                        - Opioids : yes                       - Adjuvants or Others : yes     Spine Surgeries: yes,      Anticoagulation medications: no.     H/O Smoking: no  H/O alcohol abuse : no  H/O Illicit drug use : no     Imaging:      CT myelogram of the cervical spine on 1/13/2022:  Impression   1. Prior anterior fusion at C4 through C7. No evidence of recurrent central   canal stenosis or disc herniation at these levels. 2. Left posterolateral and lateral disc protrusion at C3-4 that may be   slightly increased compared to the prior MRI study. This causes slight   compression of the left side of the spinal cord and could compress the left   C4 nerve root.           X-ray cervical spine 12/20/2021:  Impression   Intact cervical fusion hardware with stable alignment and no new abnormal   findings. OARRS report[de-identified] reviewed. Past Medical History:   Diagnosis Date    Anxiety     Asthma     exercised induced    Cervicogenic headache 06/12/2019    Chronic daily headache 06/13/2019    Reported since 2009    Chronic generalized pain     Dermatitis     Diabetes mellitus [address]     PO meds only    Heart stopped beating ([address] 06/05/2021    3 days after cervical fusion- \" chest pain , dyspnea, passed out- intubated for 5 days\".  Cardiologist last 6/21 ? 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 FUNDOPLICATION  5125    for acid reflux    HERNIA REPAIR  39/09/2727    [facility]- [facility]    INSERT/ REPLACE INFUSN PUMP,PROGRAMMABLE N/A 03/02/2019    SPINAL CORD STIMULATOR REMOVAL(cervical) performed by Mayur Kohler MD at [facility] N/A 3/29/2022    C7-T1 EPIDURAL STEROID INJECTION WITH X-RAY (SEDATION performed by Phillip Rothman MD at [address] [place] [address]  08/15/2018    implantation of medtronic lumbar spinal cord generator    PAIN MANAGEMENT PROCEDURE Right 4/26/2022    C7-T1 RIGHT PARAMEDIAN CERVICAL EPIDURAL STEROID INJECTION  UNDER X-RAY performed by Phillip Rothman MD at [address] N/A 7/21/2022    CERVICAL EPIDURAL STEROID INJECTION UNDER FLUOROSCOPIC GUIDANCE AT C7-T1 PARAMEDIAN performed by Phillip Rothman MD at [place] [address] NEUROSTIM/ N/A 08/15/2018    SURGICAL IMPLANTATION OF MEDTRONIC LUMBAR SPINAL CORD  ELECTRODES AND GENERATOR performed by Olga Guardado DO at [address] Right 05/24/2018    RIGHT SHOULDER ARTHROSCOPY, DEBRIDEMENT, SUBACROMIAL DECOMPRESSION, DISTAL CLAVICLE RESECTION, ACROMIOPLASTY performed by Mike Hill MD at [address] Left 09/28/2018    LEFT SHOULDER ARTHROSCOPY SUBACROMIAL DECOMPRESSION, CROMIAL PLASTY, DISTAL CLAVICAL EXCISION  ++ARTHREX, BEACH CHAIR, SPIDER++ performed by Mike Hill MD at [address] ARTHROSCOPY Left 01/28/2020    LEFT SHOULDER ARTHROSCOPIC BICEPS TENODESIS, ROTATOR CUFF REPAIR, EXTENSIVE DEBRIDEMENT  +++ARTHREX+++   +++PNB+++ performed by Mike Hill MD at [facility] N/A 02/28/2019    C2 SPINAL CORD STIMULATOR PLACEMENT --MEDTRONIC performed by Lizandro Wellington MD at [facility] N/A 01/21/2019    TONSILLECTOMY performed by Smith Trinh DO at [address] [facility] [address]       Prior to Admission medications    Medication Sig Start Date End Date Taking? Authorizing Provider   cyclobenzaprine (FLEXERIL) 10 MG tablet Take 1 tablet by mouth 3 times daily as needed for Muscle spasms 7/20/22  Yes Juan Medellin MD   metFORMIN (GLUCOPHAGE) 500 MG tablet Take 500 mg by mouth 2 times daily (with meals)  8/29/19  Yes Historical Provider, MD   SUMAtriptan (IMITREX) 6 MG/0.5ML SOLN injection Inject 6 mg into the skin once as needed for Migraine   Yes Historical Provider, MD   atorvastatin (LIPITOR) 20 MG tablet take 1 tablet by mouth once daily 11/1/22   Historical Provider, MD   pregabalin (LYRICA) 225 MG capsule Take 1 capsule by mouth in the morning and 1 capsule before bedtime. Do all this for 30 days.  7/20/22 8/19/22  Juan Medellin MD   DULoxetine (CYMBALTA) 30 MG extended release capsule Take 1 capsule by mouth daily  Patient not taking: Reported on 12/5/2022 7/12/22   Doralee Hatchet B MD Hugo       Allergies   Allergen Reactions    Penicillins      Family has history to penicillin        Social History     Socioeconomic History    Marital status:      Spouse name: Not on file    Number of children: 1    Years of education: Not on file    Highest education level: Not on file   Occupational History    Not on file   Tobacco Use    Smoking status: Former     Years: 5.00     Types: Cigarettes     Quit date: 10/22/1988     Years since quittin.1    Smokeless tobacco: Never    Tobacco comments:     quit    Vaping Use    Vaping Use: Never used   Substance and Sexual Activity    Alcohol use: Yes     Comment: socially- OCCASIONAL    Drug use: No    Sexual activity: Not on file   Other Topics Concern    Not on file   Social History Narrative    Not on file     Social Determinants of Health     Financial Resource Strain: Not on file   Food Insecurity: Not on file   Transportation Needs: Not on file   Physical Activity: Not on file   Stress: Not on file   Social Connections: Not on file   Intimate Partner Violence: Not on file   Housing Stability: Not on file       Family History   Problem Relation Age of Onset    No Known Problems Mother     Prostate Cancer Father     No Known Problems Maternal Grandmother     No Known Problems Maternal Grandfather     No Known Problems Paternal Grandmother     No Known Problems Paternal Grandfather     No Known Problems Daughter        REVIEW OF SYSTEMS:     Prisca Amen denies fever/chills, chest pain, shortness of breath, new bowel or bladder complaints. All other review of systems was negative.     PHYSICAL EXAMINATION:      BP (!) 137/94   Pulse 92   Temp 98.8 °F (37.1 °C) (Infrared)   Resp 16   SpO2 95%   General:       General appearance:  Pleasant and well-hydrated, in no distress and A & O x 3  Build:Overweight  Function: Rises from seated position easily and Moves about room without difficulty     HEENT:     Head:normocephalic, atraumatic Lungs:     Breathing:normal breathing pattern      CVS:     RRR     Abdomen:     Shape:non-distended and normal     Cervical spine:     Inspection:scar form the prior surgery noted, appears to have healed well. Palpation:tenderness paravertebral muscles, tenderness trapezium, left, right and positive. Range of motion:reduced flexion, extension, rotation  and is moderately painful. Spurling's: negative bilaterally     Thoracic spine:                Spine inspection:normal   Palpation:No tenderness over the midline and paraspinal area, bilaterally  Range of motion:normal in flexion, extension rotation bilateral and is not painful. Lumbar spine:     Spine inspection: scar from the prior surgery +, IPG site looks clean, non tender  Palpation: Tenderness paravertebral muscles No bilaterally  Range of motion: Decreased, flexion Decreased, Lateral bending, extension and rotation bilaterally reduced is not painful. Sacroiliac joint tenderness No bilaterally  SLR : negative bilaterally     Musculoskeletal:     Trigger points +     Extremities:  No tremors     Upper extremity:  ROM painful, dysesthesia over the b/l UE, shoulder blade. Neurological:     Sensory: Normal to light touch except for sensory change sin the b/l UE, shoulder blade area. Motor:   Upper extremity strength 4+ (Mainly due to pain)  B/l LE 5/5     Gait:normal Yes     Dermatology:     Skin:no rashes or lesions noted     Assessment/Plan:       Diagnosis Orders   1. DDD (degenerative disc disease), cervical      2. Cervical spondylosis      3. S/P cervical spinal fusion      4. Cervical radicular pain      5. Neuralgia and neuritis            46 y.o.   male with H/o S/p ACDF : C4-5, C5-6, C6-7 Dr. Bessy Rodriguez. In June 2021. Having neck pain and upper extremity pain. He also has neuropathy of bilateral upper extremity pain.      Has history of chronic pain issues as detailed above - S/P SCS for lower back lower extremity pain which is helping him.     Prior SCS for neck pain - did not help had to be explanted in the past.     Has been treated by multiple pain centers in the past.     Was on pain medication by Dr. Christopher Rico- Most recently has got Norco by Dr. Stewart Mckeon on 8/17/2022. He has seen Dr. Stewart Mckeon today and is getting pain meds from Dr. Stewart Mckeon. Image findings/neurosurgery notes/other pertinent records reviewed. S/P Cervical epidural steroid injection under fluoroscopy C7-T1 on 7/21/2022 with 60-70% relief for over 3 months. Has noticed recurrence of similar pain . Will repeat cervical epidural steroid injection under fluoroscopy guidance. RBA discussed patient agreed to proceed. We will do procedure with moderate sedation in view of history of anxiety of needles. On low dose Cymbalta. Use instructions explained. Goals and realistic expectations of the treatment discussed in detail with the patient. Counseling : Patient encouraged to stay active and continue Regular home exercise program as tolerated - stretching / strengthening. Treatment plan discussed with the patient including medication and procedure side effects. Controlled Substances Monitoring:   OARRS reviewed.     Jae Salvador MD

## 2022-12-05 NOTE — PROGRESS NOTES
[address] [facility] instructed that procedure was approved for 12/15/2022 and that Jackwn should call him a few days before for the pre op call and between 2:00 PM and 4:00 PM  the business day before with the arrival time. Instructed Tao to hold ibuprofen for 24 hours, naprosyn for 4 days and any aspirin containing products or fish oil for 7 days. Instructed to call office back if any questions. Willa Olvera verbalized understanding.     Electronically signed by Gaylord Romberg, RN on 12/5/2022 at 3:33 PM

## 2022-12-05 NOTE — PROGRESS NOTES
Do you currently have any of the following:    Fever: No  Headache:  No  Cough: No  Shortness of breath: No  Exposed to anyone with these symptoms: No         Michelle Houston presents to the [facility] on 12/5/2022. Kiya Gould is complaining of pain in his whole body. The pain is constant. The pain is described as aching, shooting, stabbing, sharp, burning, penetrating, and nagging. Pain is rated on his best day at a 7, on his worst day at a 10, and on average at a 7 on the VAS scale. He took his last dose of Norco 3 months ago. Any procedures since your last visit: Yes, CERVICAL EPIDURAL STEROID INJECTION UNDER FLUOROSCOPIC GUIDANCE AT C7-T1 PARAMEDIAN with 80 % relief. Pacemaker or defibrillator: No managed by n/a. He is not on NSAIDS and is not on anticoagulation medications. Medication Contract and Consent for Opioid Use Documents Filed       Patient Documents       Type of Document Status Date Received Received By Description    Medication Contract Received 11/19/2018  1:33 PM Nicolas MARTÍNEZ 11/14/18 [facility]     Medication Contract Received 11/4/2019  1:15 PM GARY [address] [place] [address] pain management pt agressment    Medication Contract Received 2/1/2021 11:22 AM Kelly Hung PAIN MGMT CONTRACT DR. POWERS                     BP (!) 137/94   Pulse 92   Temp 98.8 °F (37.1 °C) (Infrared)   Resp 16   SpO2 95%      Tao's blood pressure was elevated today. He was instructed to contact his primary care provider as soon as possible. No LMP for male patient.

## 2022-12-05 NOTE — H&P (VIEW-ONLY)
[address] [facility], [address] [place] [address] Errol  [phone]    Follow up Note      Augustus Hernandez     Date of Visit:  12/5/2022    CC:  Patient presents for follow up   Chief Complaint   Patient presents with    Follow-up    Neck Pain    Chronic Pain     Pain over his whole body       HPI:  Chronic neck pain. S/p ACDF : C4-5, C5-6, C6-7 Dr. Mona Villarreal In June 2021. Having neck pain shoulder blade pain and upper extremity pain. He also complains of pain/ sensitivity in the upper extremity. Extensive history for chronic pain for many years. He has been treated with multiple pain centers in the past- [facility], [facility] etc     He has Spinal cord stimulator implant for LE pain: Aug 2018- helps while it is on- still working well. S/p Cervical SCS implant done in Feb 2019. Post implant he had severe neck pain/ UE pain and SCS had to be explanted in March 2019. Pain causes functional limitations/ limits Adl's : Yes      Nursing notes and details of the pain history reviewed. Please see intake notes for details. Previous treatments:   Physical Therapy / HEP: yes,       Medications: - NSAID's : yes                        - Membrane stabilizers : yes                        - Opioids : yes                       - Adjuvants or Others : yes     Spine Surgeries: yes,      Anticoagulation medications: no.     H/O Smoking: no  H/O alcohol abuse : no  H/O Illicit drug use : no     Imaging:      CT myelogram of the cervical spine on 1/13/2022:  Impression   1. Prior anterior fusion at C4 through C7. No evidence of recurrent central   canal stenosis or disc herniation at these levels. 2. Left posterolateral and lateral disc protrusion at C3-4 that may be   slightly increased compared to the prior MRI study. This causes slight   compression of the left side of the spinal cord and could compress the left   C4 nerve root.           X-ray cervical spine 12/20/2021:  Impression   Intact cervical fusion hardware with stable alignment and no new abnormal   findings. OARRS report[de-identified] reviewed. Past Medical History:   Diagnosis Date    Anxiety     Asthma     exercised induced    Cervicogenic headache 06/12/2019    Chronic daily headache 06/13/2019    Reported since 2009    Chronic generalized pain     Dermatitis     Diabetes mellitus ([facility] [address]     PO meds only    Heart stopped beating ([facility] [address] 06/05/2021    3 days after cervical fusion- \" chest pain , dyspnea, passed out- intubated for 5 days\".  Cardiologist last 6/21 ? 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       Past Surgical History:   Procedure Laterality Date    ABDOMEN SURGERY      nissen fundoplication    BACK SURGERY  06/2021    neck fusion    CARPAL TUNNEL RELEASE Bilateral     CERVICAL FUSION N/A 06/02/2021    C4-C5, C5-C6, C6-C7  ANTERIOR CERVICAL DISCECTOMY AND FUSION -- NEEDS REGULAR BED WITH HORSE SHOE, PLATES, SCREWS -- GLOBUS performed by Regena Phalen, MD at [address] FUNDOPLICATION  7619    for acid reflux    HERNIA REPAIR  21/36/3290    UMBILICAL- [facility]    INSERT/ REPLACE INFUSN PUMP,PROGRAMMABLE N/A 03/02/2019    SPINAL CORD STIMULATOR REMOVAL(cervical) performed by Regena Phalen, MD at [place] [address] N/A 3/29/2022    C7-T1 EPIDURAL STEROID INJECTION WITH X-RAY (SEDATION performed by David Skinner MD at [address] [place] [address]  08/15/2018    implantation of medtronic lumbar spinal cord generator    PAIN MANAGEMENT PROCEDURE Right 4/26/2022    C7-T1 RIGHT PARAMEDIAN CERVICAL EPIDURAL STEROID INJECTION  UNDER X-RAY performed by David Skinner MD at [address] [facility] N/A 7/21/2022    CERVICAL EPIDURAL STEROID INJECTION UNDER FLUOROSCOPIC GUIDANCE AT C7-T1 PARAMEDIAN performed by David Skinner MD at [place] [address] NEUROSTIM/ N/A 08/15/2018    SURGICAL IMPLANTATION OF MEDTRONIC LUMBAR SPINAL CORD  ELECTRODES AND GENERATOR performed by Vijay Desir DO at [address] Right 05/24/2018    RIGHT SHOULDER ARTHROSCOPY, DEBRIDEMENT, SUBACROMIAL DECOMPRESSION, DISTAL CLAVICLE RESECTION, ACROMIOPLASTY performed by Kimberly Kim MD at [address] Left 09/28/2018    LEFT SHOULDER ARTHROSCOPY SUBACROMIAL DECOMPRESSION, CROMIAL PLASTY, DISTAL CLAVICAL EXCISION  ++ARTHREX, BEACH CHAIR, SPIDER++ performed by Kimberly Kim MD at [address] ARTHROSCOPY Left 01/28/2020    LEFT SHOULDER ARTHROSCOPIC BICEPS TENODESIS, ROTATOR CUFF REPAIR, EXTENSIVE DEBRIDEMENT  +++ARTHREX+++   +++PNB+++ performed by Kimberly Kim MD at [address] N/A 02/28/2019    C2 SPINAL CORD STIMULATOR PLACEMENT --MEDTRONIC performed by Dorian Echavarria MD at [address] [facility] N/A 01/21/2019    TONSILLECTOMY performed by Venita Trinh DO at [facility]       Prior to Admission medications    Medication Sig Start Date End Date Taking? Authorizing Provider   cyclobenzaprine (FLEXERIL) 10 MG tablet Take 1 tablet by mouth 3 times daily as needed for Muscle spasms 7/20/22  Yes Kvng Chao MD   metFORMIN (GLUCOPHAGE) 500 MG tablet Take 500 mg by mouth 2 times daily (with meals)  8/29/19  Yes Historical Provider, MD   SUMAtriptan (IMITREX) 6 MG/0.5ML SOLN injection Inject 6 mg into the skin once as needed for Migraine   Yes Historical Provider, MD   atorvastatin (LIPITOR) 20 MG tablet take 1 tablet by mouth once daily 11/1/22   Historical Provider, MD   pregabalin (LYRICA) 225 MG capsule Take 1 capsule by mouth in the morning and 1 capsule before bedtime. Do all this for 30 days.  7/20/22 8/19/22  Kvng Chao MD   DULoxetine (CYMBALTA) 30 MG extended release capsule Take 1 capsule by mouth daily  Patient not taking: Reported on 12/5/2022 7/12/22   Heber CASH MD Hugo       Allergies   Allergen Reactions    Penicillins      Family has history to penicillin        Social History     Socioeconomic History    Marital status:      Spouse name: Not on file    Number of children: 1    Years of education: Not on file    Highest education level: Not on file   Occupational History    Not on file   Tobacco Use    Smoking status: Former     Years: 5.00     Types: Cigarettes     Quit date: 10/22/1988     Years since quittin.1    Smokeless tobacco: Never    Tobacco comments:     quit    Vaping Use    Vaping Use: Never used   Substance and Sexual Activity    Alcohol use: Yes     Comment: socially- OCCASIONAL    Drug use: No    Sexual activity: Not on file   Other Topics Concern    Not on file   Social History Narrative    Not on file     Social Determinants of Health     Financial Resource Strain: Not on file   Food Insecurity: Not on file   Transportation Needs: Not on file   Physical Activity: Not on file   Stress: Not on file   Social Connections: Not on file   Intimate Partner Violence: Not on file   Housing Stability: Not on file       Family History   Problem Relation Age of Onset    No Known Problems Mother     Prostate Cancer Father     No Known Problems Maternal Grandmother     No Known Problems Maternal Grandfather     No Known Problems Paternal Grandmother     No Known Problems Paternal Grandfather     No Known Problems Daughter        REVIEW OF SYSTEMS:     Ruthanna Height denies fever/chills, chest pain, shortness of breath, new bowel or bladder complaints. All other review of systems was negative.     PHYSICAL EXAMINATION:      BP (!) 137/94   Pulse 92   Temp 98.8 °F (37.1 °C) (Infrared)   Resp 16   SpO2 95%   General:       General appearance:  Pleasant and well-hydrated, in no distress and A & O x 3  Build:Overweight  Function: Rises from seated position easily and Moves about room without difficulty     HEENT:     Head:normocephalic, atraumatic Lungs:     Breathing:normal breathing pattern      CVS:     RRR     Abdomen:     Shape:non-distended and normal     Cervical spine:     Inspection:scar form the prior surgery noted, appears to have healed well. Palpation:tenderness paravertebral muscles, tenderness trapezium, left, right and positive. Range of motion:reduced flexion, extension, rotation  and is moderately painful. Spurling's: negative bilaterally     Thoracic spine:                Spine inspection:normal   Palpation:No tenderness over the midline and paraspinal area, bilaterally  Range of motion:normal in flexion, extension rotation bilateral and is not painful. Lumbar spine:     Spine inspection: scar from the prior surgery +, IPG site looks clean, non tender  Palpation: Tenderness paravertebral muscles No bilaterally  Range of motion: Decreased, flexion Decreased, Lateral bending, extension and rotation bilaterally reduced is not painful. Sacroiliac joint tenderness No bilaterally  SLR : negative bilaterally     Musculoskeletal:     Trigger points +     Extremities:  No tremors     Upper extremity:  ROM painful, dysesthesia over the b/l UE, shoulder blade. Neurological:     Sensory: Normal to light touch except for sensory change sin the b/l UE, shoulder blade area. Motor:   Upper extremity strength 4+ (Mainly due to pain)  B/l LE 5/5     Gait:normal Yes     Dermatology:     Skin:no rashes or lesions noted     Assessment/Plan:       Diagnosis Orders   1. DDD (degenerative disc disease), cervical      2. Cervical spondylosis      3. S/P cervical spinal fusion      4. Cervical radicular pain      5. Neuralgia and neuritis            46 y.o.   male with H/o S/p ACDF : C4-5, C5-6, C6-7 Dr. Kaley Flanagan. In June 2021. Having neck pain and upper extremity pain. He also has neuropathy of bilateral upper extremity pain.      Has history of chronic pain issues as detailed above - S/P SCS for lower back lower extremity pain which is helping him.     Prior SCS for neck pain - did not help had to be explanted in the past.     Has been treated by multiple pain centers in the past.     Was on pain medication by Dr. Kylie Dan- Most recently has got Norco by Dr. Mari Cabello on 8/17/2022. He has seen Dr. Mari Cabello today and is getting pain meds from Dr. Mari Cabello. Image findings/neurosurgery notes/other pertinent records reviewed. S/P Cervical epidural steroid injection under fluoroscopy C7-T1 on 7/21/2022 with 60-70% relief for over 3 months. Has noticed recurrence of similar pain . Will repeat cervical epidural steroid injection under fluoroscopy guidance. RBA discussed patient agreed to proceed. We will do procedure with moderate sedation in view of history of anxiety of needles. On low dose Cymbalta. Use instructions explained. Goals and realistic expectations of the treatment discussed in detail with the patient. Counseling : Patient encouraged to stay active and continue Regular home exercise program as tolerated - stretching / strengthening. Treatment plan discussed with the patient including medication and procedure side effects. Controlled Substances Monitoring:   OARRS reviewed.     Gi Kitchen MD

## 2022-12-13 RX ORDER — PHENTERMINE HYDROCHLORIDE 37.5 MG/1
37.5 CAPSULE ORAL EVERY MORNING
COMMUNITY

## 2022-12-13 RX ORDER — HYDROCODONE BITARTRATE AND ACETAMINOPHEN 7.5; 325 MG/1; MG/1
1 TABLET ORAL EVERY 6 HOURS PRN
COMMUNITY

## 2022-12-13 NOTE — PROGRESS NOTES
[facility] PRE-ADMISSION TESTING INSTRUCTIONS      ARRIVAL INSTRUCTIONS:  [x] Parking the day of Surgery is located in the Main Entrance lot. Upon entering the main door make an immediate right to the surgery reception desk. [x] Bring photo ID and insurance card    [] Bring in a copy of Living will or Durable Power of  papers. [x] Please be sure to arrange for responsible adult to provide transportation to and from the hospital    [x] Please arrange for responsible adult to be with you for the 24 hour period post procedure due to having anesthesia    [x] If you awake am of surgery not feeling well or have temperature >100 please call [phone]    GENERAL INSTRUCTIONS:    [x] Nothing by mouth after midnight, including gum, candy, mints or water    [x] You may brush your teeth, but do not swallow any water    [x] Take medications as instructed with 1-2 oz of water    [x] Stop herbal supplements and vitamins 5 days prior to procedure    [x] Follow preop dosing of blood thinners per physician instructions    [] Take 1/2 dose of evening insulin, but no insulin after midnight    [x] No oral diabetic medications after midnight    [x] If diabetic and have low blood sugar or feel symptomatic, take 1-2oz apple juice only    [] Bring inhalers day of surgery    [] Bring C-PAP/ Bi-Pap day of surgery    [] Bring urine specimen day of surgery    [x] Shower or bath with soap, lather and rinse well, AM of Surgery, no lotion, powders or creams to surgical site    [x] Follow bowel prep as instructed per surgeon     No tobacco products within 24 hours of s[x]urgery     [x] No alcohol or illegal drug use within 24 hours of surgery.     [x] Jewelry, body piercing's, eyeglasses, contact lenses and dentures are not permitted into surgery (bring cases)      [x] Please do not wear any nail polish, make up or hair products on the day of surgery    [x] You can expect a call the business day prior to procedure to notify you if your arrival time changes    [x] If you receive a survey after surgery we would greatly appreciate your comments    [x] Please notify surgeon if you develop any illness between now and time of surgery (cold, cough, sore throat, fever, nausea, vomiting) or any signs of infections  including skin, wounds, and dental.    []  The Outpatient Pharmacy is available to fill your prescription here on your day of surgery, ask your preop nurse for details

## 2022-12-15 ENCOUNTER — ANESTHESIA (OUTPATIENT)
Dept: OPERATING ROOM | Age: 52
End: 2022-12-15
Payer: MEDICAID

## 2022-12-15 ENCOUNTER — HOSPITAL ENCOUNTER (OUTPATIENT)
Age: 52
Setting detail: OUTPATIENT SURGERY
Discharge: HOME | End: 2022-12-15
Attending: ANESTHESIOLOGY | Admitting: ANESTHESIOLOGY
Payer: MEDICAID

## 2022-12-15 ENCOUNTER — ANESTHESIA EVENT (OUTPATIENT)
Dept: OPERATING ROOM | Age: 52
End: 2022-12-15
Payer: MEDICAID

## 2022-12-15 ENCOUNTER — HOSPITAL ENCOUNTER (OUTPATIENT)
Dept: GENERAL RADIOLOGY | Age: 52
Setting detail: OUTPATIENT SURGERY
Discharge: HOME | End: 2022-12-17
Attending: ANESTHESIOLOGY
Payer: MEDICAID

## 2022-12-15 VITALS
DIASTOLIC BLOOD PRESSURE: 86 MMHG | OXYGEN SATURATION: 96 % | BODY MASS INDEX: 29.41 KG/M2 | TEMPERATURE: 97.8 F | RESPIRATION RATE: 18 BRPM | HEART RATE: 80 BPM | WEIGHT: 183 LBS | HEIGHT: 66 IN | SYSTOLIC BLOOD PRESSURE: 138 MMHG

## 2022-12-15 DIAGNOSIS — R52 PAIN MANAGEMENT: ICD-10-CM

## 2022-12-15 LAB — METER GLUCOSE: 71 MG/DL (ref 74–99)

## 2022-12-15 PROCEDURE — 3700000001 HC ADD 15 MINUTES (ANESTHESIA): Performed by: ANESTHESIOLOGY

## 2022-12-15 PROCEDURE — A4216 STERILE WATER/SALINE, 10 ML: HCPCS | Performed by: ANESTHESIOLOGY

## 2022-12-15 PROCEDURE — 3600000012 HC SURGERY LEVEL 2 ADDTL 15MIN: Performed by: ANESTHESIOLOGY

## 2022-12-15 PROCEDURE — 7100000011 HC PHASE II RECOVERY - ADDTL 15 MIN: Performed by: ANESTHESIOLOGY

## 2022-12-15 PROCEDURE — 3700000000 HC ANESTHESIA ATTENDED CARE: Performed by: ANESTHESIOLOGY

## 2022-12-15 PROCEDURE — 2709999900 HC NON-CHARGEABLE SUPPLY: Performed by: ANESTHESIOLOGY

## 2022-12-15 PROCEDURE — 62321 NJX INTERLAMINAR CRV/THRC: CPT | Performed by: ANESTHESIOLOGY

## 2022-12-15 PROCEDURE — 6360000004 HC RX CONTRAST MEDICATION: Performed by: ANESTHESIOLOGY

## 2022-12-15 PROCEDURE — 82962 GLUCOSE BLOOD TEST: CPT

## 2022-12-15 PROCEDURE — 7100000010 HC PHASE II RECOVERY - FIRST 15 MIN: Performed by: ANESTHESIOLOGY

## 2022-12-15 PROCEDURE — 6360000002 HC RX W HCPCS: Performed by: ANESTHESIOLOGY

## 2022-12-15 PROCEDURE — 3600000002 HC SURGERY LEVEL 2 BASE: Performed by: ANESTHESIOLOGY

## 2022-12-15 PROCEDURE — 2580000003 HC RX 258: Performed by: ANESTHESIOLOGY

## 2022-12-15 PROCEDURE — 6360000002 HC RX W HCPCS

## 2022-12-15 PROCEDURE — 2500000003 HC RX 250 WO HCPCS: Performed by: ANESTHESIOLOGY

## 2022-12-15 PROCEDURE — 6360000002 HC RX W HCPCS: Performed by: NURSE ANESTHETIST, CERTIFIED REGISTERED

## 2022-12-15 PROCEDURE — 6370000000 HC RX 637 (ALT 250 FOR IP): Performed by: ANESTHESIOLOGY

## 2022-12-15 PROCEDURE — 3209999900 FLUORO FOR SURGICAL PROCEDURES

## 2022-12-15 RX ORDER — FENTANYL CITRATE 50 UG/ML
INJECTION, SOLUTION INTRAMUSCULAR; INTRAVENOUS PRN
Status: DISCONTINUED | OUTPATIENT
Start: 2022-12-15 | End: 2022-12-15 | Stop reason: SDUPTHER

## 2022-12-15 RX ORDER — SODIUM CHLORIDE 0.9 % (FLUSH) 0.9 %
5-40 SYRINGE (ML) INJECTION EVERY 12 HOURS SCHEDULED
Status: DISCONTINUED | OUTPATIENT
Start: 2022-12-15 | End: 2022-12-15 | Stop reason: HOSPADM

## 2022-12-15 RX ORDER — SODIUM CHLORIDE 0.9 % (FLUSH) 0.9 %
5-40 SYRINGE (ML) INJECTION PRN
Status: DISCONTINUED | OUTPATIENT
Start: 2022-12-15 | End: 2022-12-15 | Stop reason: HOSPADM

## 2022-12-15 RX ORDER — MIDAZOLAM HYDROCHLORIDE 1 MG/ML
INJECTION INTRAMUSCULAR; INTRAVENOUS PRN
Status: DISCONTINUED | OUTPATIENT
Start: 2022-12-15 | End: 2022-12-15 | Stop reason: SDUPTHER

## 2022-12-15 RX ORDER — LIDOCAINE HYDROCHLORIDE 5 MG/ML
INJECTION, SOLUTION INFILTRATION; INTRAVENOUS PRN
Status: DISCONTINUED | OUTPATIENT
Start: 2022-12-15 | End: 2022-12-15 | Stop reason: ALTCHOICE

## 2022-12-15 RX ORDER — SUMATRIPTAN 6 MG/.5ML
6 INJECTION, SOLUTION SUBCUTANEOUS ONCE
Status: COMPLETED | OUTPATIENT
Start: 2022-12-15 | End: 2022-12-15

## 2022-12-15 RX ORDER — SODIUM CHLORIDE 9 MG/ML
INJECTION INTRAVENOUS PRN
Status: DISCONTINUED | OUTPATIENT
Start: 2022-12-15 | End: 2022-12-15 | Stop reason: ALTCHOICE

## 2022-12-15 RX ORDER — DEXAMETHASONE SODIUM PHOSPHATE 10 MG/ML
INJECTION, SOLUTION INTRAMUSCULAR; INTRAVENOUS PRN
Status: DISCONTINUED | OUTPATIENT
Start: 2022-12-15 | End: 2022-12-15 | Stop reason: ALTCHOICE

## 2022-12-15 RX ORDER — SODIUM CHLORIDE 9 MG/ML
INJECTION, SOLUTION INTRAVENOUS PRN
Status: DISCONTINUED | OUTPATIENT
Start: 2022-12-15 | End: 2022-12-15 | Stop reason: HOSPADM

## 2022-12-15 RX ADMIN — MIDAZOLAM 2 MG: 1 INJECTION INTRAMUSCULAR; INTRAVENOUS at 12:12

## 2022-12-15 RX ADMIN — SUMATRIPTAN 6 MG: 6 INJECTION SUBCUTANEOUS at 10:32

## 2022-12-15 RX ADMIN — SODIUM CHLORIDE: 9 INJECTION, SOLUTION INTRAVENOUS at 12:02

## 2022-12-15 RX ADMIN — FENTANYL CITRATE 50 MCG: 50 INJECTION, SOLUTION INTRAMUSCULAR; INTRAVENOUS at 12:20

## 2022-12-15 RX ADMIN — FENTANYL CITRATE 50 MCG: 50 INJECTION, SOLUTION INTRAMUSCULAR; INTRAVENOUS at 12:13

## 2022-12-15 ASSESSMENT — PAIN DESCRIPTION - DESCRIPTORS: DESCRIPTORS: DISCOMFORT

## 2022-12-15 ASSESSMENT — PAIN - FUNCTIONAL ASSESSMENT: PAIN_FUNCTIONAL_ASSESSMENT: 0-10

## 2022-12-15 NOTE — ANESTHESIA POSTPROCEDURE EVALUATION
Department of Anesthesiology  Postprocedure Note    Patient: Antony Mack  MRN: [id number]  YOB: 1970  Date of evaluation: 12/15/2022      Procedure Summary     Date: 12/15/22 Room / Location: [facility] PROCEDURE ROOM 02 / [facility]    Anesthesia Start: 9336 Anesthesia Stop: 7866    Procedure: CERVICAL  EPIDURAL STEROID INJECTION UNDER FLUOROSCOPIC GUIDANCE AT C7-T1 LEFT  PARAMEDIAN (Neck) Diagnosis:       Radiculopathy, cervical      (Radiculopathy, cervical [M54.12])    Surgeons: Richardson Kline MD Responsible Provider: Judy Chery MD    Anesthesia Type: MAC ASA Status: 2          Anesthesia Type: MAC    Philippe Phase I: Philippe Score: 10    Philippe Phase II:        Anesthesia Post Evaluation    Patient location during evaluation: PACU  Patient participation: complete - patient participated  Level of consciousness: awake  Airway patency: patent  Nausea & Vomiting: no nausea and no vomiting  Complications: no  Cardiovascular status: hemodynamically stable  Respiratory status: acceptable  Hydration status: euvolemic

## 2022-12-15 NOTE — INTERVAL H&P NOTE
Update History & Physical    The patient's History and Physical of December 5, 2022 was reviewed with the patient and I examined the patient. There was no change. The surgical site was confirmed by the patient and me. Plan: Cervical ROBINSON. The risks, benefits, expected outcome, and alternative to the recommended procedure have been discussed with the patient. Patient understands and wants to proceed with the procedure.      Electronically signed by Aranza Giraldo MD on 12/15/2022

## 2022-12-15 NOTE — ANESTHESIA PRE PROCEDURE
Department of Anesthesiology  Preprocedure Note       Name:  Augustus Hernandez   Age:  45 y.o.  :  1970                                          MRN:  [id number]         Date:  12/15/2022      Surgeon: Helena Childers):  Jazmin Dudley MD    Procedure: CERVICAL  EPIDURAL STEROID INJECTION UNDER FLUOROSCOPIC GUIDANCE AT C7-T1 LEFT  PARAMEDIAN    Medications prior to admission:   Prior to Admission medications    Medication Sig Start Date End Date Taking? Authorizing Provider   phentermine 37.5 MG capsule Take 37.5 mg by mouth every morning. Historical Provider, MD   HYDROcodone-acetaminophen (NORCO) 7.5-325 MG per tablet Take 1 tablet by mouth every 6 hours as needed for Pain. Historical Provider, MD   atorvastatin (LIPITOR) 20 MG tablet take 1 tablet by mouth once daily 22   Historical Provider, MD   cyclobenzaprine (FLEXERIL) 10 MG tablet Take 1 tablet by mouth 3 times daily as needed for Muscle spasms 22   Amish Shultz MD   metFORMIN (GLUCOPHAGE) 500 MG tablet Take 500 mg by mouth 2 times daily (with meals)  19   Historical Provider, MD   SUMAtriptan (IMITREX) 6 MG/0.5ML SOLN injection Inject 6 mg into the skin once as needed for Migraine    Historical Provider, MD       Current medications:    No current outpatient medications on file. No current facility-administered medications for this visit. Allergies:     Allergies   Allergen Reactions    Penicillins      Family has history to penicillin        Problem List:    Patient Active Problem List   Diagnosis Code    Chronic bilateral low back pain with bilateral sciatica M54.42, M54.41, G89.29    Chronic migraine without aura without status migrainosus, not intractable G43.709    Tear of right rotator cuff M75.101    Shoulder impingement, left M75.42    Shoulder impingement, right M75.41    Neck pain M54.2    Radiculopathy, lumbar region M54.16    At risk for bleeding associated with tonsillectomy and adenoidectomy Z91.89, Z98.890    Post-op pain G89.18    Chronic tonsillitis J35.01    Intractable pain R52    Cervical radicular pain M54.12    Cervicogenic headache G44.86    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 G43.709    Chronic daily headache R51.9    Cervical herniated disc M50.20    Post-op pneumonia J95.89, J18.9    Sciatica M54.30    DDD (degenerative disc disease), cervical M50.30    S/P cervical spinal fusion Z98.1       Past Medical History:        Diagnosis Date    Anxiety     Asthma     exercised induced    Cervicogenic headache 06/12/2019    Chronic daily headache 06/13/2019    Reported since 2009    Chronic generalized pain     Dermatitis     Diabetes mellitus ([address]     PO meds only    Heart stopped beating ([address] 06/05/2021    3 days after cervical fusion- \" chest pain , dyspnea, passed out- intubated for 5 days\".  Cardiologist last 6/21 ? 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       Past Surgical History:        Procedure Laterality Date    ABDOMEN SURGERY      nissen fundoplication    BACK SURGERY  06/2021    neck fusion    CARPAL TUNNEL RELEASE Bilateral     CERVICAL FUSION N/A 06/02/2021    C4-C5, C5-C6, C6-C7  ANTERIOR CERVICAL DISCECTOMY AND FUSION -- NEEDS REGULAR BED WITH HORSE SHOE, PLATES, SCREWS -- GLOBUS performed by Kelly Francois MD at [facility] GASTRIC FUNDOPLICATION  7107    for acid reflux    HERNIA REPAIR  64/10/4478    UMBILICAL- [facility]    INSERT/ REPLACE INFUSN PUMP,PROGRAMMABLE N/A 03/02/2019    SPINAL CORD STIMULATOR REMOVAL(cervical) performed by Kelly Francois MD at [facility] [address] N/A 3/29/2022    C7-T1 EPIDURAL STEROID INJECTION WITH X-RAY (SEDATION performed by Gi Kitchen MD at [facility] 8  08/15/2018    implantation of medtronic lumbar spinal cord generator    PAIN MANAGEMENT PROCEDURE Right 4/26/2022    C7-T1 RIGHT PARAMEDIAN CERVICAL EPIDURAL STEROID INJECTION  UNDER X-RAY performed by Jenny Alarcon MD at [address] [facility] N/A 2022    CERVICAL EPIDURAL STEROID INJECTION UNDER FLUOROSCOPIC GUIDANCE AT C7-T1 PARAMEDIAN performed by Jenny Alarcon MD at [address] [facility] NEUROSTIM/ N/A 08/15/2018    SURGICAL IMPLANTATION OF MEDTRONIC LUMBAR SPINAL CORD  ELECTRODES AND GENERATOR performed by Judy Gary DO at . [address] Right 2018    RIGHT SHOULDER ARTHROSCOPY, DEBRIDEMENT, SUBACROMIAL DECOMPRESSION, DISTAL CLAVICLE RESECTION, ACROMIOPLASTY performed by Rudy Castellanos MD at [address] [facility] Left 2018    LEFT SHOULDER ARTHROSCOPY SUBACROMIAL DECOMPRESSION, CROMIAL PLASTY, DISTAL CLAVICAL EXCISION  ++ARTHREX, BEACH CHAIR, SPIDER++ performed by Rudy Castellanos MD at [address] [facility] ARTHROSCOPY Left 2020    LEFT SHOULDER ARTHROSCOPIC BICEPS TENODESIS, ROTATOR CUFF REPAIR, EXTENSIVE DEBRIDEMENT  +++ARTHREX+++   +++PNB+++ performed by Rudy Castellanos MD at [facility] N/A 2019    C2 SPINAL CORD STIMULATOR PLACEMENT --MEDTRONIC performed by Jes Chavez MD at [facility] N/A 2019    TONSILLECTOMY performed by Theron Pineda DO at [facility] OR       Social History:    Social History     Tobacco Use    Smoking status: Former     Years: 5.00     Types: Cigarettes     Quit date: 10/22/1988     Years since quittin.1    Smokeless tobacco: Never    Tobacco comments:     quit    Substance Use Topics    Alcohol use: Yes     Comment: socially- OCCASIONAL                                Counseling given: Not Answered  Tobacco comments: quit       Vital Signs (Current): There were no vitals filed for this visit.                                            BP Readings from Last 3 Encounters:   22 (!) 137/94   22 134/78 07/12/22 118/76       NPO Status:                                                                                 BMI:   Wt Readings from Last 3 Encounters:   12/13/22 183 lb (83 kg)   07/21/22 183 lb (83 kg)   07/12/22 202 lb (91.6 kg)     There is no height or weight on file to calculate BMI.    CBC:   Lab Results   Component Value Date/Time    WBC 10.6 06/14/2021 04:29 AM    RBC 4.45 06/14/2021 04:29 AM    HGB 13.5 01/13/2022 07:30 AM    HCT 43.3 01/13/2022 07:30 AM    MCV 73.9 06/14/2021 04:29 AM    RDW 15.3 06/14/2021 04:29 AM     01/13/2022 07:30 AM       CMP:   Lab Results   Component Value Date/Time     06/14/2021 04:29 AM    K 3.4 06/14/2021 04:29 AM    K 4.3 05/26/2021 10:05 AM    CL 89 06/14/2021 04:29 AM    CO2 35 06/14/2021 04:29 AM    BUN 9 06/14/2021 04:29 AM    CREATININE 0.7 06/14/2021 04:29 AM    GFRAA >60 06/14/2021 04:29 AM    LABGLOM >60 06/14/2021 04:29 AM    GLUCOSE 107 06/14/2021 04:29 AM    PROT 6.4 06/05/2021 11:11 AM    CALCIUM 9.6 06/14/2021 04:29 AM    BILITOT 0.4 06/05/2021 11:11 AM    ALKPHOS 59 06/05/2021 11:11 AM    AST 40 06/05/2021 11:11 AM    ALT 43 06/05/2021 11:11 AM       POC Tests: No results for input(s): POCGLU, POCNA, POCK, POCCL, POCBUN, POCHEMO, POCHCT in the last 72 hours.     Coags:   Lab Results   Component Value Date/Time    PROTIME 12.0 01/13/2022 07:30 AM    INR 1.0 01/13/2022 07:30 AM    APTT 32.9 08/07/2018 03:58 PM       HCG (If Applicable): No results found for: PREGTESTUR, PREGSERUM, HCG, HCGQUANT     ABGs:   Lab Results   Component Value Date/Time    LXL4TYE 26.2 06/06/2021 05:53 AM        Type & Screen (If Applicable):  No results found for: LABABO, [address]    Anesthesia Evaluation  Patient summary reviewed and Nursing notes reviewed no history of anesthetic complications:   Airway: Mallampati: II  TM distance: >3 FB   Neck ROM: full  Mouth opening: > = 3 FB   Dental: normal exam         Pulmonary: breath sounds clear to auscultation  (+) asthma: ROS comment: Former smoker   Cardiovascular:Negative CV ROS  Exercise tolerance: good (>4 METS),         ECG reviewed  Rhythm: regular  Rate: normal           Beta Blocker:  Not on Beta Blocker         Neuro/Psych:   (+) neuromuscular disease (Muscle pain due to lyme disease.):, headaches: migraine headaches,              ROS comment: Nerve stimulator for back pain, Left side and patient shut it off this morning. Chronic pain lyme disease GI/Hepatic/Renal: Neg GI/Hepatic/Renal ROS            Endo/Other:    (+) DiabetesType II DM, , .          Pt had no PAT visit       Abdominal:             Vascular: negative vascular ROS. Other Findings:               Anesthesia Plan      MAC     ASA 2       Induction: intravenous. Anesthetic plan and risks discussed with patient. Plan discussed with CRNA. Arturo Lyons MD  Staff Anesthesiologist  9:46 AM    Chart reviewed . Patient assessed before induction. I agree with the above note.   JACQUELYN Harris - CRNA

## 2022-12-15 NOTE — OP NOTE
Operative Note      Patient: Ozzie Kelley  YOB: 1970  MRN: [id number]    Date of Procedure: 12/15/2022    Pre-Op Diagnosis: Radiculopathy, cervical [M54.12]    Post-Op Diagnosis: Same       Procedure(s):  CERVICAL  EPIDURAL STEROID INJECTION UNDER FLUOROSCOPIC GUIDANCE AT C7-T1 LEFT  PARAMEDIAN    Surgeon(s):  Gi Kitchen MD    Assistant:   * No surgical staff found *    Anesthesia: Monitor Anesthesia Care    Estimated Blood Loss (mL): Minimal    Complications: None    Specimens:   * No specimens in log *    Implants:  * No implants in log *      Drains: * No LDAs found *    Findings: good needle placement    Detailed Description of Procedure:   12/15/2022    Patient: Ozzie Kelley  :  1970  Age:  45 y.o. Sex:  male     PRE-PROCEDURE DIAGNOSIS: Cervical degenerative disc disease, cervical radicular pain. POST-PROCEDURE DIAGNOSIS: Same. PROCEDURE: Fluoroscopic guided cervical epidural steroid injection, # at C7-T1 level. SURGEON: Gi Kitchen MD    ANESTHESIA: MAC    ESTIMATED BLOOD LOSS: None.  ______________________________________________________________________  BRIEF HISTORY:  Ozzie Kelley comes in today for the   therapeutic cervical epidural injection under fluoroscopic guidance. The potential complications of this procedure were discussed with the him again today. He has elected to undergo the aforementioned procedureTru Morocho complete History & Physical examination were reviewed in depth, a copy of which is in the chart. DESCRIPTION OF PROCEDURE:    After confirming written and informed consent, a time-out was performed and Bailee name and date of birth, the procedure to be performed as well as the plan for the location of the needle insertion were confirmed. The patient was brought into the procedure room and placed in the prone position with the head flexed midline on the fluoroscopy table.  A pillow was placed under the patient's head to increase cervical interlaminar space. Standard monitors were placed, and vital signs were observed throughout the procedure. The area was prepped with chloraprep and the C7-T1 interspace was marked under fluoroscopy. The skin and subcutaneous tissues at the above level were anesthestized with 0.5% lidocaine. An # 18 gauge 3 1/2 tuohy epidural needle was inserted and advanced toward the inferior lamina until bony contact was made. The needle was then advanced superiorly toward epidural space . From this point on loss of resistance technique with 5 cc glass syringe was used to confirm entrance of the needle into the epidural space under intermittent lateral fluoroscopy. Once in the epidural space , negative aspiration for blood and CSF was confirmed. Needle tip placement was confirmed by visualizing epidural spread of 0.5-1 ml of Isovue M 300 visualized in both AP and lateral live fluoroscopic views. Then after negative aspiration, a solution of 0.9 % saline 3 ml and  15 mg Dexamethasone was easily injected. The needle was gently removed intact. The patient neck was cleaned and a Band-Aid was placed over the needle insertion point. Disposition the patient tolerated the procedure well and there were no complications . Vital signs remained stable throughout the procedure. The patient was escorted to the recovery area where they remained until discharge and written discharge instructions for the procedure were given. Plan: Shantel Perez will return to our pain management center as scheduled.      Chuyita Hall MD

## 2022-12-15 NOTE — DISCHARGE INSTRUCTIONS
[place] [facility] Block/Radiofrequency  Home Going Instructions    1-Go home, rest for the remainder of the day  2-Please do not lift over 20 pounds the day of the injection  3-If you received sedation No: alcohol, driving, operating lawn mowers, plows, tractors or other dangerous equipment until next morning. Do not make important decisions or sign legal documents for 24 hours. You may experience light headedness, dizziness, nausea or sleepiness after sedation. Do not stay alone. A responsible adult must be with you for 24 hours. You could be nauseated from the medications you have received. Your IV site may be sore and bruised. 4-No dietary restrictions     5-Resume all medications the same day, blood thinners to be resumed 24 hours after injection if you were instructed to stop any. 6-Keep the surgical site clean and dry, you may shower the next morning and remove the      dressing. 7- No sitz baths, tub baths or hot tubs/swimming for 24 hours. 8- If you have any pain at the injection site(s), application of an ice pack to the area should be       helpful, 20 minutes on/20 minutes off for next 48 hours. 9- [facility] immediately at if you develop.   Fever greater than 100.4 F  Have bleeding or drainage from the puncture site  Have progressive Leg/arm numbness and or weakness  Loss of control of bowel and or bladder (wet/soil yourself)  Severe headache with inability to lift head  10-You may return to work the next day

## 2023-05-16 ENCOUNTER — OFFICE VISIT (OUTPATIENT)
Dept: PAIN MANAGEMENT | Age: 53
End: 2023-05-16
Payer: MEDICAID

## 2023-05-16 ENCOUNTER — PREP FOR PROCEDURE (OUTPATIENT)
Dept: PAIN MANAGEMENT | Age: 53
End: 2023-05-16

## 2023-05-16 VITALS
TEMPERATURE: 97.2 F | BODY MASS INDEX: 30.49 KG/M2 | SYSTOLIC BLOOD PRESSURE: 124 MMHG | HEART RATE: 97 BPM | HEIGHT: 65 IN | OXYGEN SATURATION: 98 % | DIASTOLIC BLOOD PRESSURE: 84 MMHG | RESPIRATION RATE: 16 BRPM | WEIGHT: 183 LBS

## 2023-05-16 DIAGNOSIS — M50.30 DDD (DEGENERATIVE DISC DISEASE), CERVICAL: Primary | ICD-10-CM

## 2023-05-16 DIAGNOSIS — M50.30 DDD (DEGENERATIVE DISC DISEASE), CERVICAL: ICD-10-CM

## 2023-05-16 DIAGNOSIS — M54.12 CERVICAL RADICULAR PAIN: Primary | ICD-10-CM

## 2023-05-16 DIAGNOSIS — Z98.1 S/P CERVICAL SPINAL FUSION: ICD-10-CM

## 2023-05-16 DIAGNOSIS — M54.12 CERVICAL RADICULAR PAIN: ICD-10-CM

## 2023-05-16 PROCEDURE — 99213 OFFICE O/P EST LOW 20 MIN: CPT | Performed by: ANESTHESIOLOGY

## 2023-05-16 PROCEDURE — 99214 OFFICE O/P EST MOD 30 MIN: CPT | Performed by: ANESTHESIOLOGY

## 2023-05-16 RX ORDER — SODIUM CHLORIDE 0.9 % (FLUSH) 0.9 %
5-40 SYRINGE (ML) INJECTION PRN
Status: CANCELLED | OUTPATIENT
Start: 2023-05-16

## 2023-05-16 RX ORDER — SODIUM CHLORIDE 0.9 % (FLUSH) 0.9 %
5-40 SYRINGE (ML) INJECTION EVERY 12 HOURS SCHEDULED
Status: CANCELLED | OUTPATIENT
Start: 2023-05-16

## 2023-05-16 RX ORDER — SODIUM CHLORIDE 9 MG/ML
INJECTION, SOLUTION INTRAVENOUS PRN
Status: CANCELLED | OUTPATIENT
Start: 2023-05-16

## 2023-05-16 NOTE — PROGRESS NOTES
Rina Trujillo presents to the [facility] on 5/16/2023. Melanie Rodriges is complaining of pain cervical . The pain is constant. The pain is described as aching, throbbing, shooting, and stabbing. Pain is rated on his best day at a 7, on his worst day at a 10, and on average at a 8 on the VAS scale. He took his last dose of Norco 4 days ago. Any procedures since your last visit: No    Pacemaker or defibrillator: No   He is not on NSAIDS and is not on anticoagulation medications   Medication Contract and Consent for Opioid Use Documents Filed       Patient Documents       Type of Document Status Date Received Received By Description    Medication Contract Received 11/19/2018  1:33 PM Jesse MARTÍNEZ 11/14/18 [facility]     Medication Contract Received 11/4/2019  1:15 PM GARY [address] [place] [address] pain management pt agressment    Medication Contract Received 2/1/2021 11:22 AM [facility] Mayorga PAIN MGMT CONTRACT DR. POWERS                     Temp 97.2 °F (36.2 °C) (Temporal)   Resp 16   Ht 5' 5\" (1.651 m)   Wt 183 lb (83 kg)   SpO2 98%   BMI 30.45 kg/m²      No LMP for male patient.

## 2023-05-16 NOTE — PROGRESS NOTES
[facility], [address] [place] [address] Errol  [phone]    Follow up Note      Scout Alvarez     Date of Visit:  5/16/2023    CC:  Patient presents for follow up   Chief Complaint   Patient presents with    Follow-up     cervical       HPI:  Chronic neck pain. S/p ACDF : C4-5, C5-6, C6-7 Dr. Suzanne Coreas In June 2021. Having neck pain shoulder blade pain and upper extremity pain. He also complains of pain/ sensitivity in the upper extremity. Extensive history for chronic pain for many years. He has been treated with multiple pain centers in the past- [facility], [facility] etc     He has Spinal cord stimulator implant for LE pain: Aug 2018- helps while it is on- still working well. S/p Cervical SCS implant done in Feb 2019. Post implant he had severe neck pain/ UE pain and SCS had to be explanted in March 2019. Pain causes functional limitations/ limits Adl's : Yes      Nursing notes and details of the pain history reviewed. Please see intake notes for details. Previous treatments:   Physical Therapy / HEP: yes,       Medications: - NSAID's : yes                        - Membrane stabilizers : yes                        - Opioids : yes                       - Adjuvants or Others : yes     Spine Surgeries: yes,      Anticoagulation medications: no.     H/O Smoking: no  H/O alcohol abuse : no  H/O Illicit drug use : no     Imaging:      CT myelogram of the cervical spine on 1/13/2022:  Impression   1. Prior anterior fusion at C4 through C7. No evidence of recurrent central   canal stenosis or disc herniation at these levels. 2. Left posterolateral and lateral disc protrusion at C3-4 that may be   slightly increased compared to the prior MRI study. This causes slight   compression of the left side of the spinal cord and could compress the left   C4 nerve root.           X-ray cervical spine 12/20/2021:  Impression   Intact cervical fusion

## 2023-05-23 ENCOUNTER — TELEPHONE (OUTPATIENT)
Dept: PAIN MANAGEMENT | Age: 53
End: 2023-05-23

## 2023-05-23 NOTE — TELEPHONE ENCOUNTER
Call to [facility] and message left that procedure was approved for 6/1/2023 and that Ajay should call him a few days before for the pre op call and between 2:00 PM and 4:00 PM  the business day before with the arrival time. Instructed Tao to hold ibuprofen for 24 hours, naprosyn for 4 days and any aspirin containing products or fish oil for 7 days. Instructed to call office back. Advised that if they do not return the call it may result in their procedure being delayed.     Electronically signed by Otilia Batista RN on 5/23/2023 at 9:41 AM

## 2023-05-30 RX ORDER — HYDROCODONE BITARTRATE AND ACETAMINOPHEN 10; 325 MG/1; MG/1
1 TABLET ORAL EVERY 8 HOURS PRN
COMMUNITY

## 2023-05-30 NOTE — PROGRESS NOTES
[facility] PRE-ADMISSION TESTING INSTRUCTIONS    The Preadmission Testing patient is instructed accordingly using the following criteria (check applicable):    ARRIVAL INSTRUCTIONS:  [x] Parking the day of Surgery is located in the Main Entrance lot. Upon entering the door, make an immediate right to the surgery reception desk    [x] Bring photo ID and insurance card    [x] Bring in a copy of Living will or Durable Power of  papers. [] Please be sure to arrange for responsible adult to provide transportation to and from the hospital    [x] Please arrange for responsible adult to be with you for the 24 hour period post procedure due to having anesthesia    [x] If you awake am of surgery not feeling well or have temperature >100 please call [phone]    GENERAL INSTRUCTIONS:    [x] Nothing by mouth after midnight, including gum, candy, mints or water    [x] You may brush your teeth, but do not swallow any water    [x] Take medications as instructed with 1-2 oz of water    [x] Stop herbal supplements and vitamins 5 days prior to procedure    [x] Follow preop dosing of blood thinners per physician instructions    [] Take 1/2 dose of evening insulin, but no insulin after midnight    [x] No oral diabetic medications after midnight    [x] If diabetic and have low blood sugar or feel symptomatic, take 1-2oz apple juice only    [] Bring inhalers day of surgery    [] Bring C-PAP/ Bi-Pap day of surgery    [] Bring urine specimen day of surgery    [x] Shower or bath with soap, lather and rinse well, AM of Surgery, no lotion, powders or creams to surgical site    [] Follow bowel prep as instructed per surgeon    [x] No tobacco products within 24 hours of surgery     [x] No alcohol or illegal drug use within 24 hours of surgery.     [x] Jewelry, body piercing's, eyeglasses, contact lenses and dentures are not permitted into surgery (bring cases)      [x] Please do not wear any nail polish,

## 2023-06-01 ENCOUNTER — HOSPITAL ENCOUNTER (OUTPATIENT)
Age: 53
Setting detail: OUTPATIENT SURGERY
Discharge: HOME | End: 2023-06-01
Attending: ANESTHESIOLOGY | Admitting: ANESTHESIOLOGY
Payer: MEDICAID

## 2023-06-01 ENCOUNTER — ANESTHESIA (OUTPATIENT)
Dept: OPERATING ROOM | Age: 53
End: 2023-06-01
Payer: MEDICAID

## 2023-06-01 ENCOUNTER — HOSPITAL ENCOUNTER (OUTPATIENT)
Dept: GENERAL RADIOLOGY | Age: 53
Setting detail: OUTPATIENT SURGERY
Discharge: HOME | End: 2023-06-03
Attending: ANESTHESIOLOGY
Payer: MEDICAID

## 2023-06-01 ENCOUNTER — ANESTHESIA EVENT (OUTPATIENT)
Dept: OPERATING ROOM | Age: 53
End: 2023-06-01
Payer: MEDICAID

## 2023-06-01 VITALS
RESPIRATION RATE: 18 BRPM | SYSTOLIC BLOOD PRESSURE: 150 MMHG | HEIGHT: 65 IN | HEART RATE: 80 BPM | BODY MASS INDEX: 30.49 KG/M2 | WEIGHT: 183 LBS | TEMPERATURE: 97.8 F | OXYGEN SATURATION: 96 % | DIASTOLIC BLOOD PRESSURE: 90 MMHG

## 2023-06-01 DIAGNOSIS — R52 PAIN MANAGEMENT: ICD-10-CM

## 2023-06-01 LAB — METER GLUCOSE: 105 MG/DL (ref 74–99)

## 2023-06-01 PROCEDURE — 2709999900 HC NON-CHARGEABLE SUPPLY: Performed by: ANESTHESIOLOGY

## 2023-06-01 PROCEDURE — 62321 NJX INTERLAMINAR CRV/THRC: CPT | Performed by: ANESTHESIOLOGY

## 2023-06-01 PROCEDURE — 3600000012 HC SURGERY LEVEL 2 ADDTL 15MIN: Performed by: ANESTHESIOLOGY

## 2023-06-01 PROCEDURE — 6360000002 HC RX W HCPCS: Performed by: ANESTHESIOLOGY

## 2023-06-01 PROCEDURE — 3600000002 HC SURGERY LEVEL 2 BASE: Performed by: ANESTHESIOLOGY

## 2023-06-01 PROCEDURE — 2500000003 HC RX 250 WO HCPCS: Performed by: ANESTHESIOLOGY

## 2023-06-01 PROCEDURE — 3700000000 HC ANESTHESIA ATTENDED CARE: Performed by: ANESTHESIOLOGY

## 2023-06-01 PROCEDURE — 2580000003 HC RX 258: Performed by: NURSE ANESTHETIST, CERTIFIED REGISTERED

## 2023-06-01 PROCEDURE — 2580000003 HC RX 258: Performed by: ANESTHESIOLOGY

## 2023-06-01 PROCEDURE — 3209999900 FLUORO FOR SURGICAL PROCEDURES

## 2023-06-01 PROCEDURE — 6360000004 HC RX CONTRAST MEDICATION: Performed by: ANESTHESIOLOGY

## 2023-06-01 PROCEDURE — 6360000002 HC RX W HCPCS: Performed by: NURSE ANESTHETIST, CERTIFIED REGISTERED

## 2023-06-01 PROCEDURE — 6370000000 HC RX 637 (ALT 250 FOR IP)

## 2023-06-01 PROCEDURE — 7100000010 HC PHASE II RECOVERY - FIRST 15 MIN: Performed by: ANESTHESIOLOGY

## 2023-06-01 PROCEDURE — A4216 STERILE WATER/SALINE, 10 ML: HCPCS | Performed by: ANESTHESIOLOGY

## 2023-06-01 PROCEDURE — 3700000001 HC ADD 15 MINUTES (ANESTHESIA): Performed by: ANESTHESIOLOGY

## 2023-06-01 PROCEDURE — 7100000011 HC PHASE II RECOVERY - ADDTL 15 MIN: Performed by: ANESTHESIOLOGY

## 2023-06-01 PROCEDURE — 82962 GLUCOSE BLOOD TEST: CPT

## 2023-06-01 RX ORDER — MIDAZOLAM HYDROCHLORIDE 1 MG/ML
INJECTION INTRAMUSCULAR; INTRAVENOUS PRN
Status: DISCONTINUED | OUTPATIENT
Start: 2023-06-01 | End: 2023-06-01 | Stop reason: SDUPTHER

## 2023-06-01 RX ORDER — SODIUM CHLORIDE 9 MG/ML
INJECTION, SOLUTION INTRAVENOUS PRN
Status: DISCONTINUED | OUTPATIENT
Start: 2023-06-01 | End: 2023-06-01 | Stop reason: HOSPADM

## 2023-06-01 RX ORDER — HYDROCODONE BITARTRATE AND ACETAMINOPHEN 10; 325 MG/1; MG/1
TABLET ORAL
Status: COMPLETED
Start: 2023-06-01 | End: 2023-06-01

## 2023-06-01 RX ORDER — SODIUM CHLORIDE 9 MG/ML
INJECTION, SOLUTION INTRAVENOUS CONTINUOUS PRN
Status: DISCONTINUED | OUTPATIENT
Start: 2023-06-01 | End: 2023-06-01 | Stop reason: SDUPTHER

## 2023-06-01 RX ORDER — DEXAMETHASONE SODIUM PHOSPHATE 10 MG/ML
INJECTION, SOLUTION INTRAMUSCULAR; INTRAVENOUS PRN
Status: DISCONTINUED | OUTPATIENT
Start: 2023-06-01 | End: 2023-06-01 | Stop reason: ALTCHOICE

## 2023-06-01 RX ORDER — SODIUM CHLORIDE 0.9 % (FLUSH) 0.9 %
5-40 SYRINGE (ML) INJECTION EVERY 12 HOURS SCHEDULED
Status: DISCONTINUED | OUTPATIENT
Start: 2023-06-01 | End: 2023-06-01 | Stop reason: HOSPADM

## 2023-06-01 RX ORDER — HYDROCODONE BITARTRATE AND ACETAMINOPHEN 10; 325 MG/1; MG/1
1 TABLET ORAL ONCE
Status: COMPLETED | OUTPATIENT
Start: 2023-06-01 | End: 2023-06-01

## 2023-06-01 RX ORDER — LIDOCAINE HYDROCHLORIDE 5 MG/ML
INJECTION, SOLUTION INFILTRATION; INTRAVENOUS PRN
Status: DISCONTINUED | OUTPATIENT
Start: 2023-06-01 | End: 2023-06-01 | Stop reason: ALTCHOICE

## 2023-06-01 RX ORDER — SODIUM CHLORIDE 0.9 % (FLUSH) 0.9 %
5-40 SYRINGE (ML) INJECTION PRN
Status: DISCONTINUED | OUTPATIENT
Start: 2023-06-01 | End: 2023-06-01 | Stop reason: HOSPADM

## 2023-06-01 RX ORDER — FENTANYL CITRATE 50 UG/ML
INJECTION, SOLUTION INTRAMUSCULAR; INTRAVENOUS PRN
Status: DISCONTINUED | OUTPATIENT
Start: 2023-06-01 | End: 2023-06-01 | Stop reason: SDUPTHER

## 2023-06-01 RX ORDER — SODIUM CHLORIDE 9 MG/ML
INJECTION INTRAVENOUS PRN
Status: DISCONTINUED | OUTPATIENT
Start: 2023-06-01 | End: 2023-06-01 | Stop reason: ALTCHOICE

## 2023-06-01 RX ADMIN — FENTANYL CITRATE 50 MCG: 50 INJECTION, SOLUTION INTRAMUSCULAR; INTRAVENOUS at 12:15

## 2023-06-01 RX ADMIN — FENTANYL CITRATE 50 MCG: 50 INJECTION, SOLUTION INTRAMUSCULAR; INTRAVENOUS at 12:11

## 2023-06-01 RX ADMIN — SODIUM CHLORIDE: 9 INJECTION, SOLUTION INTRAVENOUS at 12:06

## 2023-06-01 RX ADMIN — HYDROCODONE BITARTRATE AND ACETAMINOPHEN 1 TABLET: 10; 325 TABLET ORAL at 13:21

## 2023-06-01 RX ADMIN — MIDAZOLAM 2 MG: 1 INJECTION INTRAMUSCULAR; INTRAVENOUS at 12:11

## 2023-06-01 ASSESSMENT — PAIN DESCRIPTION - PAIN TYPE: TYPE: SURGICAL PAIN

## 2023-06-01 ASSESSMENT — PAIN - FUNCTIONAL ASSESSMENT: PAIN_FUNCTIONAL_ASSESSMENT: 0-10

## 2023-06-01 ASSESSMENT — PAIN DESCRIPTION - LOCATION: LOCATION: NECK

## 2023-06-01 ASSESSMENT — PAIN DESCRIPTION - DESCRIPTORS: DESCRIPTORS: ACHING;DISCOMFORT;SORE

## 2023-06-01 ASSESSMENT — PAIN SCALES - GENERAL: PAINLEVEL_OUTOF10: 8

## 2023-06-01 ASSESSMENT — LIFESTYLE VARIABLES: SMOKING_STATUS: 1

## 2023-06-01 NOTE — INTERVAL H&P NOTE
Update History & Physical    The patient's History and Physical of May 16, 2023 was reviewed with the patient and I examined the patient. There was no change. The surgical site was confirmed by the patient and me. Plan: The risks, benefits, expected outcome, and alternative to the recommended procedure have been discussed with the patient. Patient understands and wants to proceed with the procedure.      Electronically signed by Juwan Persaud MD on 6/1/2023

## 2023-06-01 NOTE — DISCHARGE INSTRUCTIONS
[facility] Block/Radiofrequency  Home Going Instructions    1-Go home, rest for the remainder of the day  2-Please do not lift over 20 pounds the day of the injection  3-If you received sedation No: alcohol, driving, operating lawn mowers, plows, tractors or other dangerous equipment until next morning. Do not make important decisions or sign legal documents for 24 hours. You may experience light headedness, dizziness, nausea or sleepiness after sedation. Do not stay alone. A responsible adult must be with you for 24 hours. You could be nauseated from the medications you have received. Your IV site may be sore and bruised. 4-No dietary restrictions     5-Resume all medications the same day, blood thinners to be resumed 24 hours after injection if you were instructed to stop any. 6-Keep the surgical site clean and dry, you may shower the next morning and remove the      dressing. 7- No sitz baths, tub baths or hot tubs/swimming for 24 hours. 8- If you have any pain at the injection site(s), application of an ice pack to the area should be       helpful, 20 minutes on/20 minutes off for next 48 hours. 9- [facility] immediately at if you develop.   Fever greater than 100.4 F  Have bleeding or drainage from the puncture site  Have progressive Leg/arm numbness and or weakness  Loss of control of bowel and or bladder (wet/soil yourself)  Severe headache with inability to lift head  10-You may return to work the next day

## 2023-06-01 NOTE — ANESTHESIA PRE PROCEDURE
Smoking status: Former     Years: 5.00     Types: Cigarettes     Quit date: 10/22/1988     Years since quittin.6    Smokeless tobacco: Never    Tobacco comments:     quit    Substance Use Topics    Alcohol use: Yes     Comment: socially- OCCASIONAL                                Counseling given: Not Answered  Tobacco comments: quit       Vital Signs (Current):   Vitals:    23 1531 23 0954   BP:  (!) 148/95   Pulse:  81   Resp:  16   Temp:  97.8 °F (36.6 °C)   TempSrc:  Temporal   SpO2:  95%   Weight: 183 lb (83 kg)    Height: 5' 5\" (1.651 m)                                               BP Readings from Last 3 Encounters:   23 (!) 148/95   23 124/84   12/15/22 138/86       NPO Status: Time of last liquid consumption:                         Time of last solid consumption:                         Date of last liquid consumption: 23                        Date of last solid food consumption: 23    BMI:   Wt Readings from Last 3 Encounters:   23 183 lb (83 kg)   23 183 lb (83 kg)   12/15/22 183 lb (83 kg)     Body mass index is 30.45 kg/m².     CBC:   Lab Results   Component Value Date/Time    WBC 10.6 2021 04:29 AM    RBC 4.45 2021 04:29 AM    HGB 13.5 2022 07:30 AM    HCT 43.3 2022 07:30 AM    MCV 73.9 2021 04:29 AM    RDW 15.3 2021 04:29 AM     2022 07:30 AM       CMP:   Lab Results   Component Value Date/Time     2021 04:29 AM    K 3.4 2021 04:29 AM    K 4.3 2021 10:05 AM    CL 89 2021 04:29 AM    CO2 35 2021 04:29 AM    BUN 9 2021 04:29 AM    CREATININE 0.7 2021 04:29 AM    GFRAA >60 2021 04:29 AM    LABGLOM >60 2021 04:29 AM    GLUCOSE 107 2021 04:29 AM    PROT 6.4 2021 11:11 AM    CALCIUM 9.6 2021 04:29 AM    BILITOT 0.4 2021 11:11 AM    ALKPHOS 59 2021 11:11 AM    AST 40 2021 11:11 AM    ALT 43

## 2023-06-01 NOTE — ANESTHESIA POSTPROCEDURE EVALUATION
Department of Anesthesiology  Postprocedure Note    Patient: Slick Snowden  MRN: [id number]  YOB: 1970  Date of evaluation: 6/1/2023      Procedure Summary     Date: 06/01/23 Room / Location: [facility] PROCEDURE ROOM 02 / 106 [facility]    Anesthesia Start: 7092 Anesthesia Stop: 7231    Procedure: CERVICAL  EPIDURAL STEROID INJECTION UNDER FLUOROSCOPIC GUIDANCE AT C7-T1 LEFT  PARAMEDIAN (Left: Neck) Diagnosis:       DDD (degenerative disc disease), cervical      Cervical radiculopathy      (DDD (degenerative disc disease), cervical [M50.30])      (Cervical radiculopathy [M54.12])    Surgeons: Ángel Brooks MD Responsible Provider: Ninoska Lazo MD    Anesthesia Type: MAC ASA Status: 3          Anesthesia Type: No value filed.     Philippe Phase I: Philippe Score: 10    Philippe Phase II:        Anesthesia Post Evaluation    Patient location during evaluation: bedside  Patient participation: complete - patient participated  Level of consciousness: awake and alert  Airway patency: patent  Nausea & Vomiting: no vomiting and no nausea  Complications: no  Cardiovascular status: hemodynamically stable  Respiratory status: spontaneous ventilation and room air  Hydration status: stable

## 2023-06-27 ENCOUNTER — TELEPHONE (OUTPATIENT)
Dept: NEUROLOGY | Age: 53
End: 2023-06-27

## 2023-06-27 NOTE — TELEPHONE ENCOUNTER
PT is upset he did not have a appt when he called in on 06/27/23. Pt referral (migraines) just come in on 06/19/23.   Was trying to schedule an appt with Nino Keith in 09/23 pt refuse to accept the visit wanted office manger number

## 2023-07-31 ENCOUNTER — TELEPHONE (OUTPATIENT)
Dept: PAIN MANAGEMENT | Age: 53
End: 2023-07-31

## 2023-07-31 NOTE — TELEPHONE ENCOUNTER
Pt called in states he is in pain and needs to have another procedure but he needs it done before September pt was last seen in the office in may I scheduled him for a office visit 8/8 to come in and discuss treatment with you.  Please advise thank you

## 2023-08-08 ENCOUNTER — PREP FOR PROCEDURE (OUTPATIENT)
Dept: PAIN MANAGEMENT | Age: 53
End: 2023-08-08

## 2023-08-08 ENCOUNTER — OFFICE VISIT (OUTPATIENT)
Dept: PAIN MANAGEMENT | Age: 53
End: 2023-08-08
Payer: MEDICAID

## 2023-08-08 VITALS
BODY MASS INDEX: 30.49 KG/M2 | OXYGEN SATURATION: 98 % | HEART RATE: 95 BPM | HEIGHT: 65 IN | SYSTOLIC BLOOD PRESSURE: 116 MMHG | WEIGHT: 183 LBS | RESPIRATION RATE: 18 BRPM | DIASTOLIC BLOOD PRESSURE: 70 MMHG | TEMPERATURE: 97.8 F

## 2023-08-08 DIAGNOSIS — M54.12 CERVICAL RADICULAR PAIN: ICD-10-CM

## 2023-08-08 DIAGNOSIS — M79.2 NEURALGIA AND NEURITIS: ICD-10-CM

## 2023-08-08 DIAGNOSIS — M54.12 CERVICAL RADICULAR PAIN: Primary | ICD-10-CM

## 2023-08-08 DIAGNOSIS — M50.30 DDD (DEGENERATIVE DISC DISEASE), CERVICAL: ICD-10-CM

## 2023-08-08 DIAGNOSIS — Z98.1 S/P CERVICAL SPINAL FUSION: ICD-10-CM

## 2023-08-08 DIAGNOSIS — M50.30 DDD (DEGENERATIVE DISC DISEASE), CERVICAL: Primary | ICD-10-CM

## 2023-08-08 PROCEDURE — 99213 OFFICE O/P EST LOW 20 MIN: CPT | Performed by: ANESTHESIOLOGY

## 2023-08-08 RX ORDER — CEFUROXIME AXETIL 250 MG/1
TABLET ORAL
COMMUNITY
Start: 2023-07-17

## 2023-08-08 RX ORDER — SODIUM CHLORIDE 0.9 % (FLUSH) 0.9 %
5-40 SYRINGE (ML) INJECTION PRN
Status: CANCELLED | OUTPATIENT
Start: 2023-08-08

## 2023-08-08 RX ORDER — SODIUM CHLORIDE 0.9 % (FLUSH) 0.9 %
5-40 SYRINGE (ML) INJECTION EVERY 12 HOURS SCHEDULED
Status: CANCELLED | OUTPATIENT
Start: 2023-08-08

## 2023-08-08 RX ORDER — SODIUM CHLORIDE 9 MG/ML
INJECTION, SOLUTION INTRAVENOUS PRN
Status: CANCELLED | OUTPATIENT
Start: 2023-08-08

## 2023-08-08 NOTE — PROGRESS NOTES
[address] [place] [address]  [phone]    Follow up Note      Livan Jose     Date of Visit:  8/8/2023    CC:  Patient presents for follow up   Chief Complaint   Patient presents with    Follow-up     CERVICAL  EPIDURAL STEROID INJECTION UNDER FLUOROSCOPIC GUIDANCE AT C7-T1 LEFT  PARAMEDIAN       HPI:  Chronic neck pain. S/p ACDF : C4-5, C5-6, C6-7 Dr. London Chang In June 2021. Having neck pain shoulder blade pain and upper extremity pain. He also complains of pain/ sensitivity in the upper extremity. Extensive history for chronic pain for many years. He has been treated with multiple pain centers in the past- [facility], [facility] etc     He has Spinal cord stimulator implant for LE pain: Aug 2018- helps while it is on- still working well. S/p Cervical SCS implant done in Feb 2019. Post implant he had severe neck pain/ UE pain and SCS had to be explanted in March 2019. Pain causes functional limitations/ limits Adl's : Yes      Nursing notes and details of the pain history reviewed. Please see intake notes for details. Previous treatments:   Physical Therapy / HEP: yes,       Medications: - NSAID's : yes                        - Membrane stabilizers : yes                        - Opioids : yes                       - Adjuvants or Others : yes     Spine Surgeries: yes,      Anticoagulation medications: no.     H/O Smoking: no  H/O alcohol abuse : no  H/O Illicit drug use : no     Imaging:      CT myelogram of the cervical spine on 1/13/2022:  Impression   1. Prior anterior fusion at C4 through C7. No evidence of recurrent central   canal stenosis or disc herniation at these levels. 2. Left posterolateral and lateral disc protrusion at C3-4 that may be   slightly increased compared to the prior MRI study. This causes slight   compression of the left side of the spinal cord and could compress the left   C4 nerve root.

## 2023-08-28 ENCOUNTER — TELEPHONE (OUTPATIENT)
Dept: PAIN MANAGEMENT | Age: 53
End: 2023-08-28

## 2023-08-28 DIAGNOSIS — M54.12 CERVICAL RADICULAR PAIN: Primary | ICD-10-CM

## 2023-08-28 NOTE — TELEPHONE ENCOUNTER
Call to Jared Holt that procedure was approved for 9/7/2023 and that Florinda Rueda should call him a few days before for the pre op call and between 2:00 PM and 4:00 PM  the business day before with the arrival time. Instructed Tao to hold ibuprofen for 24 hours, naprosyn for 4 days and any aspirin containing products or fish oil for 7 days. Instructed to call office back if any questions. Aniyah Tamayo verbalized understanding.     Electronically signed by Lupillo Eagle RN on 8/28/2023 at 10:06 AM

## 2023-09-01 NOTE — PROGRESS NOTES
[address] PRE-ADMISSION TESTING INSTRUCTIONS    The Preadmission Testing patient is instructed accordingly using the following criteria (check applicable):    ARRIVAL INSTRUCTIONS:  [x] Parking the day of Surgery is located in the Main Entrance lot. Upon entering the door, make an immediate right to the surgery reception desk    [x] Bring photo ID and insurance card    [] Bring in a copy of Living will or Durable Power of  papers. [x] Please be sure to arrange for responsible adult to provide transportation to and from the hospital    [x] Please arrange for responsible adult to be with you for the 24 hour period post procedure due to having anesthesia    [x] If you awake am of surgery not feeling well or have temperature >100 please call [phone]    GENERAL INSTRUCTIONS:    [x] Nothing by mouth after midnight, including gum, candy, mints or water    [x] You may brush your teeth, but do not swallow any water    [x] Take medications as instructed with 1-2 oz of water    [] Stop herbal supplements and vitamins 5 days prior to procedure    [] Follow preop dosing of blood thinners per physician instructions    [] Take 1/2 dose of evening insulin, but no insulin after midnight    [x] No oral diabetic medications after midnight    [x] If diabetic and have low blood sugar or feel symptomatic, take 1-2oz apple juice only    [] Bring inhalers day of surgery    [] Bring C-PAP/ Bi-Pap day of surgery    [] Bring urine specimen day of surgery    [x] Shower or bath with soap, lather and rinse well, AM of Surgery, no lotion, powders or creams to surgical site    [] Follow bowel prep as instructed per surgeon    [x] No tobacco products within 24 hours of surgery     [x] No alcohol or illegal drug use within 24 hours of surgery.     [x] Jewelry, body piercing's, eyeglasses, contact lenses and dentures are not permitted into surgery (bring cases)      [] Please do not wear any nail polish,

## 2023-09-07 ENCOUNTER — APPOINTMENT (OUTPATIENT)
Dept: GENERAL RADIOLOGY | Age: 53
End: 2023-09-07
Attending: ANESTHESIOLOGY
Payer: MEDICAID

## 2023-09-07 ENCOUNTER — ANESTHESIA EVENT (OUTPATIENT)
Dept: OPERATING ROOM | Age: 53
End: 2023-09-07
Payer: MEDICAID

## 2023-09-07 ENCOUNTER — HOSPITAL ENCOUNTER (OUTPATIENT)
Age: 53
Setting detail: OUTPATIENT SURGERY
Discharge: HOME | End: 2023-09-07
Attending: ANESTHESIOLOGY | Admitting: ANESTHESIOLOGY
Payer: MEDICAID

## 2023-09-07 ENCOUNTER — ANESTHESIA (OUTPATIENT)
Dept: OPERATING ROOM | Age: 53
End: 2023-09-07
Payer: MEDICAID

## 2023-09-07 VITALS
BODY MASS INDEX: 30.09 KG/M2 | DIASTOLIC BLOOD PRESSURE: 91 MMHG | OXYGEN SATURATION: 93 % | WEIGHT: 180.6 LBS | HEIGHT: 65 IN | RESPIRATION RATE: 18 BRPM | SYSTOLIC BLOOD PRESSURE: 131 MMHG | HEART RATE: 88 BPM | TEMPERATURE: 98 F

## 2023-09-07 LAB — GLUCOSE BLD-MCNC: 96 MG/DL (ref 74–99)

## 2023-09-07 PROCEDURE — 6360000002 HC RX W HCPCS

## 2023-09-07 PROCEDURE — 2580000003 HC RX 258

## 2023-09-07 PROCEDURE — 62321 NJX INTERLAMINAR CRV/THRC: CPT | Performed by: ANESTHESIOLOGY

## 2023-09-07 PROCEDURE — 6360000002 HC RX W HCPCS: Performed by: ANESTHESIOLOGY

## 2023-09-07 PROCEDURE — 7100000011 HC PHASE II RECOVERY - ADDTL 15 MIN: Performed by: ANESTHESIOLOGY

## 2023-09-07 PROCEDURE — 2580000003 HC RX 258: Performed by: ANESTHESIOLOGY

## 2023-09-07 PROCEDURE — 3600000002 HC SURGERY LEVEL 2 BASE: Performed by: ANESTHESIOLOGY

## 2023-09-07 PROCEDURE — 7100000010 HC PHASE II RECOVERY - FIRST 15 MIN: Performed by: ANESTHESIOLOGY

## 2023-09-07 PROCEDURE — 6360000004 HC RX CONTRAST MEDICATION: Performed by: ANESTHESIOLOGY

## 2023-09-07 PROCEDURE — 3700000000 HC ANESTHESIA ATTENDED CARE: Performed by: ANESTHESIOLOGY

## 2023-09-07 PROCEDURE — 2709999900 HC NON-CHARGEABLE SUPPLY: Performed by: ANESTHESIOLOGY

## 2023-09-07 PROCEDURE — 2500000003 HC RX 250 WO HCPCS: Performed by: ANESTHESIOLOGY

## 2023-09-07 PROCEDURE — 82962 GLUCOSE BLOOD TEST: CPT

## 2023-09-07 PROCEDURE — 6370000000 HC RX 637 (ALT 250 FOR IP): Performed by: ANESTHESIOLOGY

## 2023-09-07 RX ORDER — LIDOCAINE HYDROCHLORIDE 5 MG/ML
INJECTION, SOLUTION INFILTRATION; INTRAVENOUS PRN
Status: DISCONTINUED | OUTPATIENT
Start: 2023-09-07 | End: 2023-09-07 | Stop reason: ALTCHOICE

## 2023-09-07 RX ORDER — SODIUM CHLORIDE 0.9 % (FLUSH) 0.9 %
SYRINGE (ML) INJECTION PRN
Status: DISCONTINUED | OUTPATIENT
Start: 2023-09-07 | End: 2023-09-07 | Stop reason: ALTCHOICE

## 2023-09-07 RX ORDER — SODIUM CHLORIDE 0.9 % (FLUSH) 0.9 %
5-40 SYRINGE (ML) INJECTION PRN
Status: DISCONTINUED | OUTPATIENT
Start: 2023-09-07 | End: 2023-09-07 | Stop reason: HOSPADM

## 2023-09-07 RX ORDER — FENTANYL CITRATE 50 UG/ML
INJECTION, SOLUTION INTRAMUSCULAR; INTRAVENOUS PRN
Status: DISCONTINUED | OUTPATIENT
Start: 2023-09-07 | End: 2023-09-07 | Stop reason: SDUPTHER

## 2023-09-07 RX ORDER — DEXAMETHASONE SODIUM PHOSPHATE 10 MG/ML
INJECTION INTRAMUSCULAR; INTRAVENOUS PRN
Status: DISCONTINUED | OUTPATIENT
Start: 2023-09-07 | End: 2023-09-07 | Stop reason: ALTCHOICE

## 2023-09-07 RX ORDER — SODIUM CHLORIDE 9 MG/ML
INJECTION, SOLUTION INTRAVENOUS PRN
Status: DISCONTINUED | OUTPATIENT
Start: 2023-09-07 | End: 2023-09-07 | Stop reason: HOSPADM

## 2023-09-07 RX ORDER — MIDAZOLAM HYDROCHLORIDE 1 MG/ML
INJECTION INTRAMUSCULAR; INTRAVENOUS PRN
Status: DISCONTINUED | OUTPATIENT
Start: 2023-09-07 | End: 2023-09-07 | Stop reason: SDUPTHER

## 2023-09-07 RX ORDER — SODIUM CHLORIDE 9 MG/ML
INJECTION, SOLUTION INTRAVENOUS CONTINUOUS PRN
Status: DISCONTINUED | OUTPATIENT
Start: 2023-09-07 | End: 2023-09-07 | Stop reason: SDUPTHER

## 2023-09-07 RX ORDER — SODIUM CHLORIDE 0.9 % (FLUSH) 0.9 %
5-40 SYRINGE (ML) INJECTION EVERY 12 HOURS SCHEDULED
Status: DISCONTINUED | OUTPATIENT
Start: 2023-09-07 | End: 2023-09-07 | Stop reason: HOSPADM

## 2023-09-07 RX ORDER — HYDROCODONE BITARTRATE AND ACETAMINOPHEN 10; 325 MG/1; MG/1
1 TABLET ORAL
Status: COMPLETED | OUTPATIENT
Start: 2023-09-07 | End: 2023-09-07

## 2023-09-07 RX ADMIN — SODIUM CHLORIDE: 9 INJECTION, SOLUTION INTRAVENOUS at 10:02

## 2023-09-07 RX ADMIN — MIDAZOLAM 2 MG: 1 INJECTION INTRAMUSCULAR; INTRAVENOUS at 10:07

## 2023-09-07 RX ADMIN — FENTANYL CITRATE 100 MCG: 50 INJECTION, SOLUTION INTRAMUSCULAR; INTRAVENOUS at 10:07

## 2023-09-07 RX ADMIN — HYDROCODONE BITARTRATE AND ACETAMINOPHEN 1 TABLET: 10; 325 TABLET ORAL at 10:57

## 2023-09-07 ASSESSMENT — PAIN - FUNCTIONAL ASSESSMENT: PAIN_FUNCTIONAL_ASSESSMENT: 0-10

## 2023-09-07 ASSESSMENT — PAIN SCALES - GENERAL
PAINLEVEL_OUTOF10: 8
PAINLEVEL_OUTOF10: 8

## 2023-09-07 ASSESSMENT — PAIN DESCRIPTION - ORIENTATION
ORIENTATION: LEFT
ORIENTATION: LEFT

## 2023-09-07 ASSESSMENT — PAIN DESCRIPTION - DESCRIPTORS
DESCRIPTORS: ACHING;DISCOMFORT;SORE
DESCRIPTORS: ACHING;DISCOMFORT;SORE

## 2023-09-07 ASSESSMENT — PAIN DESCRIPTION - LOCATION
LOCATION: NECK
LOCATION: NECK

## 2023-09-07 ASSESSMENT — PAIN DESCRIPTION - PAIN TYPE
TYPE: ACUTE PAIN
TYPE: ACUTE PAIN

## 2023-09-07 ASSESSMENT — LIFESTYLE VARIABLES: SMOKING_STATUS: 1

## 2023-09-07 NOTE — ANESTHESIA POSTPROCEDURE EVALUATION
Department of Anesthesiology  Postprocedure Note    Patient: Kelley Coles  MRN: [id number]  YOB: 1970  Date of evaluation: 9/7/2023      Procedure Summary     Date: 09/07/23 Room / Location: [facility] PROCEDURE ROOM 02 / [address] [facility] Floor [facility]    Anesthesia Start: 1005 Anesthesia Stop: 1017    Procedure: CERVICAL EPIDURAL STEROID INJECTION UNDER FLUOROSCOPIC GUIDANCE AT C7-T1  PARAMEDIAN (Neck) Diagnosis:       Cervical radiculopathy      (Cervical radiculopathy [M54.12])    Surgeons: Antonette Acharya MD Responsible Provider: Sugey Ruvalcaba MD    Anesthesia Type: MAC ASA Status: 3          Anesthesia Type: No value filed.     Philippe Phase I: Philippe Score: 10    Philippe Phase II:        Anesthesia Post Evaluation    Patient location during evaluation: PACU  Patient participation: complete - patient participated  Level of consciousness: awake and alert  Airway patency: patent  Nausea & Vomiting: no nausea and no vomiting  Respiratory status: acceptable  Hydration status: euvolemic  Pain management: adequate

## 2023-09-07 NOTE — OP NOTE
Operative Note      Patient: Anil Weeks  YOB: 1970  MRN: [id number]    Date of Procedure: 2023    Pre-Op Diagnosis Codes:     * Cervical radiculopathy [M54.12]    Post-Op Diagnosis: Same       Procedure(s):  CERVICAL EPIDURAL STEROID INJECTION UNDER FLUOROSCOPIC GUIDANCE AT C7-T1  PARAMEDIAN    Surgeon(s):  Harpreet Correa MD    Assistant:   * No surgical staff found *    Anesthesia: Monitor Anesthesia Care    Estimated Blood Loss (mL): Minimal    Complications: None    Specimens:   * No specimens in log *    Implants:  * No implants in log *      Drains: * No LDAs found *    Findings: good needle placement        Detailed Description of Procedure:   2023    Patient: Anil Weeks  :  1970  Age:  45 y.o. Sex:  male     PRE-PROCEDURE DIAGNOSIS: Cervical degenerative disc disease, cervical radicular pain. POST-PROCEDURE DIAGNOSIS: Same. PROCEDURE: Fluoroscopic guided cervical epidural steroid injection, # at C7-T1 level. SURGEON: Harpreet Correa MD    ANESTHESIA: MAC    ESTIMATED BLOOD LOSS: None.  ______________________________________________________________________  BRIEF HISTORY:  Anil Weeks comes in today for the   therapeutic cervical epidural injection under fluoroscopic guidance. The potential complications of this procedure were discussed with the him again today. He has elected to undergo the aforementioned procedureTru Don complete History & Physical examination were reviewed in depth, a copy of which is in the chart. DESCRIPTION OF PROCEDURE:    After confirming written and informed consent, a time-out was performed and Arian name and date of birth, the procedure to be performed as well as the plan for the location of the needle insertion were confirmed. The patient was brought into the procedure room and placed in the prone position with the head flexed midline on the fluoroscopy table.  A pillow was placed

## 2023-09-07 NOTE — DISCHARGE INSTRUCTIONS
Lianna Mark Block/Radiofrequency  Home Going Instructions    1-Go home, rest for the remainder of the day  2-Please do not lift over 20 pounds the day of the injection  3-If you received sedation No: alcohol, driving, operating lawn mowers, plows, tractors or other dangerous equipment until next morning. Do not make important decisions or sign legal documents for 24 hours. You may experience light headedness, dizziness, nausea or sleepiness after sedation. Do not stay alone. A responsible adult must be with you for 24 hours. You could be nauseated from the medications you have received. Your IV site may be sore and bruised. 4-No dietary restrictions     5-Resume all medications the same day, blood thinners to be resumed 24 hours after injection if you were instructed to stop any. 6-Keep the surgical site clean and dry, you may shower the next morning and remove the      dressing. 7- No sitz baths, tub baths or hot tubs/swimming for 24 hours. 8- If you have any pain at the injection site(s), application of an ice pack to the area should be       helpful, 20 minutes on/20 minutes off for next 48 hours. 9- Call [facility] immediately at if you develop.   Fever greater than 100.4 F  Have bleeding or drainage from the puncture site  Have progressive Leg/arm numbness and or weakness  Loss of control of bowel and or bladder (wet/soil yourself)  Severe headache with inability to lift head  10-You may return to work the next day

## 2023-09-07 NOTE — INTERVAL H&P NOTE
Update History & Physical    The patient's History and Physical of August 8, 2023 was reviewed with the patient and I examined the patient. There was no change. The surgical site was confirmed by the patient and me. Plan: The risks, benefits, expected outcome, and alternative to the recommended procedure have been discussed with the patient. Patient understands and wants to proceed with the procedure.      Electronically signed by Baldomero Rider MD on 9/7/2023

## 2023-09-21 ENCOUNTER — TELEPHONE (OUTPATIENT)
Age: 53
End: 2023-09-21

## 2023-10-20 ENCOUNTER — TELEPHONE (OUTPATIENT)
Age: 53
End: 2023-10-20

## 2023-10-20 NOTE — TELEPHONE ENCOUNTER
Called pt to reschedule new pt appt out of office on Tuesday. Left vm for pt to call in and reschedule.

## 2023-12-01 ENCOUNTER — OFFICE VISIT (OUTPATIENT)
Age: 53
End: 2023-12-01
Payer: MEDICAID

## 2023-12-01 VITALS
SYSTOLIC BLOOD PRESSURE: 140 MMHG | DIASTOLIC BLOOD PRESSURE: 76 MMHG | WEIGHT: 183.8 LBS | HEIGHT: 65 IN | HEART RATE: 109 BPM | BODY MASS INDEX: 30.62 KG/M2 | TEMPERATURE: 97.3 F

## 2023-12-01 DIAGNOSIS — R25.2 MUSCLE CRAMPING: ICD-10-CM

## 2023-12-01 DIAGNOSIS — G43.709 CHRONIC MIGRAINE W/O AURA W/O STATUS MIGRAINOSUS, NOT INTRACTABLE: Primary | ICD-10-CM

## 2023-12-01 PROCEDURE — 99204 OFFICE O/P NEW MOD 45 MIN: CPT | Performed by: PSYCHIATRY & NEUROLOGY

## 2023-12-01 RX ORDER — ROPINIROLE 0.5 MG/1
0.5 TABLET, FILM COATED ORAL 3 TIMES DAILY
Qty: 90 TABLET | Refills: 2 | Status: SHIPPED | OUTPATIENT
Start: 2023-12-01

## 2023-12-01 RX ORDER — ERENUMAB-AOOE 70 MG/ML
70 INJECTION SUBCUTANEOUS
Qty: 1 ML | Refills: 2 | Status: SHIPPED | OUTPATIENT
Start: 2023-12-01

## 2023-12-01 RX ORDER — PHENTERMINE HYDROCHLORIDE 37.5 MG/1
37.5 TABLET ORAL DAILY
COMMUNITY
Start: 2023-10-23

## 2023-12-01 NOTE — PROGRESS NOTES
Branden Pollard MD       Juan Manuel Lucero is a 45 y.o. male presenting as a new patient for a   Chief Complaint   Patient presents with    Migraine    New Patient    Lyme Disease      Chronicity: since 2010  Has lyme disease  Was diagnosed in 2015- in [facility] - positive lyme test  Treated with 2 months of iv antibiotics. Progression: has chronic pain from chronic lyme- gets narcotics, gets inj in cervical spine, neurostimulator in lumbar spine. He said that in Feb 2019 doctors placed a cervical cord stimulator, which had to be removed two days later due to his pain being significantly worsened. has hx of headaches- chronic    In 11 months- started having cramps in hands/forearms/feet. Severe spasms. Bothersome  Not sure if related to cervical spinal fusion in 2021. Muscle cramping:  Q daily  May be relief for a couple of days a month  Since jan 2023  Frequency- multiple times a day  Affects a minute or so    Affects fausto hands, forearms, feet. Needs to stretch a lot to relieve it  Very painful  Almost sees the muscle outline  Can affect day time and never woken up with it  Can happen at rest and when active     Takes flexeril for a long term. Not helped any    Association: used to get numbness in hands.    No numbness in hands or legs presently    Balance: not perfect  No falls     Autonomic symptoms   C/o feeling hot all over   Needs windows open even in winter       Treatment: has constant pain- uses norco chronically  Uses flexeril   Uses neurostimulator in lumbar spine      Headaches:  Chronic since 2010  Diagnosed in 2015  Progression: only thing that helps is imitrex and dark glasses    Q daily headaches  Uses imitrex 3 days a week  Fausto vertex, parietal  Shooting, throbbing, pressure like pain  9/10  Has photophobia, phonophobia  Has no nausea  No vomiting  Had dizziness with it  Can function somewhat with it  Visual aura: black spots in vision  Sensory aura : none       Prior treatment

## 2023-12-05 ENCOUNTER — TELEPHONE (OUTPATIENT)
Age: 53
End: 2023-12-05

## 2023-12-05 NOTE — TELEPHONE ENCOUNTER
Unfortunately requip will not help for it      He is already on flexeril to help neck  Also just starting migraine inj.  This may help  But will take at least a month to see a difference      Marianna Dexter MD

## 2023-12-05 NOTE — TELEPHONE ENCOUNTER
----- Message from Margaux Michel MA sent at 12/5/2023  1:45 PM EST -----  Pt. Stating he is having cramping on the right side of his neck that radiates up to the right side of  his face.

## 2023-12-08 ENCOUNTER — OFFICE VISIT (OUTPATIENT)
Dept: PAIN MANAGEMENT | Age: 53
End: 2023-12-08
Payer: MEDICAID

## 2023-12-08 ENCOUNTER — PREP FOR PROCEDURE (OUTPATIENT)
Dept: PAIN MANAGEMENT | Age: 53
End: 2023-12-08

## 2023-12-08 VITALS
RESPIRATION RATE: 18 BRPM | TEMPERATURE: 97.3 F | HEART RATE: 106 BPM | BODY MASS INDEX: 30.49 KG/M2 | HEIGHT: 65 IN | WEIGHT: 183 LBS | OXYGEN SATURATION: 98 %

## 2023-12-08 DIAGNOSIS — M54.12 CERVICAL RADICULAR PAIN: Primary | ICD-10-CM

## 2023-12-08 DIAGNOSIS — M79.2 NEURALGIA AND NEURITIS: ICD-10-CM

## 2023-12-08 DIAGNOSIS — T85.199S MALFUNCTION OF NERVE STIMULATOR LEAD, SEQUELA: ICD-10-CM

## 2023-12-08 DIAGNOSIS — R25.2 MUSCLE CRAMPING: ICD-10-CM

## 2023-12-08 DIAGNOSIS — M50.30 DDD (DEGENERATIVE DISC DISEASE), CERVICAL: Primary | ICD-10-CM

## 2023-12-08 DIAGNOSIS — Z98.1 S/P CERVICAL SPINAL FUSION: ICD-10-CM

## 2023-12-08 DIAGNOSIS — M47.812 CERVICAL SPONDYLOSIS: ICD-10-CM

## 2023-12-08 DIAGNOSIS — M54.12 CERVICAL RADICULAR PAIN: ICD-10-CM

## 2023-12-08 LAB
C-REACTIVE PROTEIN: <3 MG/L (ref 0–5)
HBA1C MFR BLD: 6 % (ref 4–5.6)
T4 FREE: 1.1 NG/DL (ref 0.9–1.7)
TSH SERPL DL<=0.05 MIU/L-ACNC: 1.15 UIU/ML (ref 0.27–4.2)
VITAMIN D 25-HYDROXY: 20.1 NG/ML (ref 30–100)

## 2023-12-08 PROCEDURE — 95971 ALYS SMPL SP/PN NPGT W/PRGRM: CPT | Performed by: ANESTHESIOLOGY

## 2023-12-08 PROCEDURE — 99213 OFFICE O/P EST LOW 20 MIN: CPT | Performed by: ANESTHESIOLOGY

## 2023-12-08 RX ORDER — SODIUM CHLORIDE 0.9 % (FLUSH) 0.9 %
5-40 SYRINGE (ML) INJECTION PRN
Status: CANCELLED | OUTPATIENT
Start: 2023-12-08

## 2023-12-08 RX ORDER — SODIUM CHLORIDE 9 MG/ML
INJECTION, SOLUTION INTRAVENOUS PRN
Status: CANCELLED | OUTPATIENT
Start: 2023-12-08

## 2023-12-08 RX ORDER — SODIUM CHLORIDE 0.9 % (FLUSH) 0.9 %
5-40 SYRINGE (ML) INJECTION EVERY 12 HOURS SCHEDULED
Status: CANCELLED | OUTPATIENT
Start: 2023-12-08

## 2023-12-09 LAB
FOLATE: 7.9 NG/ML (ref 4.8–24.2)
SEDIMENTATION RATE, ERYTHROCYTE: 0 MM/HR (ref 0–15)
VITAMIN B-12: 658 PG/ML (ref 211–946)

## 2023-12-11 LAB
ANTI-NUCLEAR ANTIBODY (ANA): NEGATIVE
BORRELIA BURGDORFERI ABS TOTAL: 0.46 IV
COPPER: 99 UG/DL (ref 70–140)
HIV AG/AB: NONREACTIVE

## 2023-12-12 LAB
ALBUMIN (CALCULATED): 3.6 G/DL (ref 3.5–4.7)
ALPHA-1-GLOBULIN: 0.3 G/DL (ref 0.2–0.4)
ALPHA-2-GLOBULIN: 0.9 G/DL (ref 0.5–1)
ANTI RNP AB: NEGATIVE
ANTI-SMITH: NEGATIVE
BETA GLOBULIN: 1.3 G/DL (ref 0.8–1.3)
GAMMA GLOBULIN: 1.6 G/DL (ref 0.7–1.6)
JO-1 ANTIBODY: NEGATIVE
PATHOLOGIST REVIEW: NORMAL
PATHOLOGIST: NORMAL
PROTEIN ELECTROPHORESIS, SERUM: NORMAL
SCLERODERMA (SCL-70) AB: NEGATIVE
SERUM IFX INTERP: NORMAL
SJOGREN'S ANTIBODIES (SSA): NEGATIVE
SJOGREN'S ANTIBODIES (SSB): NEGATIVE
TOTAL PROTEIN: 7.7 G/DL (ref 6.4–8.3)
VITAMIN B6: 33.4 NMOL/L (ref 20–125)

## 2023-12-13 LAB
P E INTERPRETATION, U: NORMAL
PATHOLOGIST: NORMAL
SPECIMEN TYPE: NORMAL
URINE IFX INTERP: NORMAL
URINE IFX SPECIMEN: NORMAL

## 2023-12-15 LAB — VITAMIN B1 WHOLE BLOOD: 108 NMOL/L (ref 70–180)

## 2023-12-18 ENCOUNTER — TELEPHONE (OUTPATIENT)
Age: 53
End: 2023-12-18

## 2023-12-18 NOTE — TELEPHONE ENCOUNTER
Has been on topamax, beta blockers, antidepressants several years ago      So appeal      Marianna Dexter MD

## 2023-12-18 NOTE — TELEPHONE ENCOUNTER
----- Message from Margaux Michel MA sent at 12/18/2023  3:54 PM EST -----  Regarding: AIMOVIG NOT COVERED  Aimovig can not be covered, this is a new med for pt. He's never been on it and has not been on any other medications that insurance requires .

## 2023-12-20 ENCOUNTER — TELEPHONE (OUTPATIENT)
Age: 53
End: 2023-12-20

## 2023-12-20 NOTE — TELEPHONE ENCOUNTER
That is the only place I do EMG's at      Not sure how I can help him in that case        Marianna Dexter MD

## 2023-12-20 NOTE — TELEPHONE ENCOUNTER
----- Message from Michelle Moses MA sent at 12/19/2023  1:49 PM EST -----  Regarding: emg  Pt refuses to go to [facility] to have procedure done.

## 2024-01-05 ENCOUNTER — TELEPHONE (OUTPATIENT)
Dept: PAIN MANAGEMENT | Age: 54
End: 2024-01-05

## 2024-01-05 NOTE — TELEPHONE ENCOUNTER
Call to aTo Villareal that procedure was approved for 1//2024 and that [facility] should call him a few days before for the pre op call and after 3:00 PM the business day before with the arrival time. Instructed Tao to hold ibuprofen for 24 hours, naprosyn for 4 days and any aspirin containing products or fish oil for 7 days. Instructed to call office back if any questions. Tao verbalized understanding.    Electronically signed by Emil Restrepo RN on 1/5/2024 at 10:42 AM

## 2024-01-08 ENCOUNTER — ANESTHESIA EVENT (OUTPATIENT)
Dept: OPERATING ROOM | Age: 54
End: 2024-01-08
Payer: MEDICAID

## 2024-01-08 ENCOUNTER — PREP FOR PROCEDURE (OUTPATIENT)
Dept: PAIN MANAGEMENT | Age: 54
End: 2024-01-08

## 2024-01-08 DIAGNOSIS — M54.12 CERVICAL RADICULOPATHY: ICD-10-CM

## 2024-01-08 ASSESSMENT — LIFESTYLE VARIABLES: SMOKING_STATUS: 1

## 2024-01-08 NOTE — ANESTHESIA PRE PROCEDURE
Department of Anesthesiology  Preprocedure Note       Name:  Tao Villareal   Age:  53 y.o.  :  1970                                          MRN:  [id number]         Date:  2024      Surgeon: Surgeon(s):  Francisco Arias MD    Procedure: Procedure(s):  CERVICAL  EPIDURAL STEROID INJECTION UNDER FLUOROSCOPIC GUIDANCE AT C7-T1 SEDATION    Medications prior to admission:   Prior to Admission medications    Medication Sig Start Date End Date Taking? Authorizing Provider   metFORMIN (GLUCOPHAGE) 1000 MG tablet Take 1 tablet by mouth 2 times daily 11/10/23   Rom Elizondo MD   phentermine (ADIPEX-P) 37.5 MG tablet Take 1 tablet by mouth daily. 10/23/23   Rom Elizondo MD   Erenumab-aooe (AIMOVIG) 70 MG/ML SOAJ Inject 70 mg into the skin every 28 days  Patient not taking: Reported on 2023   Marianna Dexter MD   rOPINIRole (REQUIP) 0.5 MG tablet Take 1 tablet by mouth 3 times daily  Patient not taking: Reported on 2023   Marianna Dexter MD   SUMAtriptan (IMITREX) 6 MG/0.5ML SOAJ injection syringe inject 0.5 milliliters ( 6 milligrams ) subcutaneously AT ONSET OF HEADACHE once daily if needed 23   Rom Elizondo MD   HYDROcodone-acetaminophen (NORCO)  MG per tablet Take 1 tablet by mouth every 8 hours as needed for Pain.    Rom Elizondo MD   atorvastatin (LIPITOR) 20 MG tablet take 1 tablet by mouth once daily 22   Rom Elizondo MD   cyclobenzaprine (FLEXERIL) 10 MG tablet Take 1 tablet by mouth 3 times daily as needed for Muscle spasms 22   Florian Black MD   metFORMIN (GLUCOPHAGE) 500 MG tablet Take 2 tablets by mouth 2 times daily (with meals)  Patient not taking: Reported on 2023   Rom Elizondo MD       Current medications:    No current facility-administered medications for this encounter.     Current Outpatient Medications   Medication Sig Dispense Refill    metFORMIN

## 2024-01-09 ENCOUNTER — ANESTHESIA (OUTPATIENT)
Dept: OPERATING ROOM | Age: 54
End: 2024-01-09
Payer: MEDICAID

## 2024-01-09 ENCOUNTER — APPOINTMENT (OUTPATIENT)
Dept: OPERATING ROOM | Age: 54
End: 2024-01-09
Attending: ANESTHESIOLOGY
Payer: MEDICAID

## 2024-01-09 ENCOUNTER — HOSPITAL ENCOUNTER (OUTPATIENT)
Age: 54
Setting detail: OUTPATIENT SURGERY
Discharge: HOME | End: 2024-01-09
Attending: ANESTHESIOLOGY | Admitting: ANESTHESIOLOGY
Payer: MEDICAID

## 2024-01-09 VITALS
HEART RATE: 98 BPM | BODY MASS INDEX: 29.72 KG/M2 | HEIGHT: 65 IN | DIASTOLIC BLOOD PRESSURE: 76 MMHG | SYSTOLIC BLOOD PRESSURE: 135 MMHG | WEIGHT: 178.4 LBS | TEMPERATURE: 97.6 F | OXYGEN SATURATION: 96 % | RESPIRATION RATE: 16 BRPM

## 2024-01-09 DIAGNOSIS — M54.12 CERVICAL RADICULOPATHY: Primary | ICD-10-CM

## 2024-01-09 LAB — GLUCOSE BLD-MCNC: 95 MG/DL (ref 74–99)

## 2024-01-09 PROCEDURE — 3600000005 HC SURGERY LEVEL 5 BASE: Performed by: ANESTHESIOLOGY

## 2024-01-09 PROCEDURE — 2500000003 HC RX 250 WO HCPCS: Performed by: ANESTHESIOLOGY

## 2024-01-09 PROCEDURE — 82962 GLUCOSE BLOOD TEST: CPT | Performed by: ANESTHESIOLOGY

## 2024-01-09 PROCEDURE — 7100000011 HC PHASE II RECOVERY - ADDTL 15 MIN: Performed by: ANESTHESIOLOGY

## 2024-01-09 PROCEDURE — 82962 GLUCOSE BLOOD TEST: CPT

## 2024-01-09 PROCEDURE — 6360000002 HC RX W HCPCS: Performed by: NURSE ANESTHETIST, CERTIFIED REGISTERED

## 2024-01-09 PROCEDURE — 7100000010 HC PHASE II RECOVERY - FIRST 15 MIN: Performed by: ANESTHESIOLOGY

## 2024-01-09 PROCEDURE — 6360000004 HC RX CONTRAST MEDICATION: Performed by: ANESTHESIOLOGY

## 2024-01-09 PROCEDURE — 6370000000 HC RX 637 (ALT 250 FOR IP): Performed by: ANESTHESIOLOGY

## 2024-01-09 PROCEDURE — 3700000000 HC ANESTHESIA ATTENDED CARE: Performed by: ANESTHESIOLOGY

## 2024-01-09 PROCEDURE — 2580000003 HC RX 258: Performed by: ANESTHESIOLOGY

## 2024-01-09 PROCEDURE — 6360000002 HC RX W HCPCS: Performed by: ANESTHESIOLOGY

## 2024-01-09 PROCEDURE — 62321 NJX INTERLAMINAR CRV/THRC: CPT | Performed by: ANESTHESIOLOGY

## 2024-01-09 PROCEDURE — 2709999900 HC NON-CHARGEABLE SUPPLY: Performed by: ANESTHESIOLOGY

## 2024-01-09 RX ORDER — SODIUM CHLORIDE 0.9 % (FLUSH) 0.9 %
SYRINGE (ML) INJECTION PRN
Status: DISCONTINUED | OUTPATIENT
Start: 2024-01-09 | End: 2024-01-09 | Stop reason: ALTCHOICE

## 2024-01-09 RX ORDER — HYDROCODONE BITARTRATE AND ACETAMINOPHEN 5; 325 MG/1; MG/1
1 TABLET ORAL
Status: COMPLETED | OUTPATIENT
Start: 2024-01-09 | End: 2024-01-09

## 2024-01-09 RX ORDER — SODIUM CHLORIDE 0.9 % (FLUSH) 0.9 %
5-40 SYRINGE (ML) INJECTION PRN
Status: DISCONTINUED | OUTPATIENT
Start: 2024-01-09 | End: 2024-01-09 | Stop reason: HOSPADM

## 2024-01-09 RX ORDER — SODIUM CHLORIDE 9 MG/ML
INJECTION, SOLUTION INTRAVENOUS PRN
Status: DISCONTINUED | OUTPATIENT
Start: 2024-01-09 | End: 2024-01-09 | Stop reason: HOSPADM

## 2024-01-09 RX ORDER — HYDROCODONE BITARTRATE AND ACETAMINOPHEN 5; 325 MG/1; MG/1
1 TABLET ORAL ONCE
Status: COMPLETED | OUTPATIENT
Start: 2024-01-09 | End: 2024-01-09

## 2024-01-09 RX ORDER — DIPHENHYDRAMINE HYDROCHLORIDE 50 MG/ML
12.5 INJECTION INTRAMUSCULAR; INTRAVENOUS
Status: DISCONTINUED | OUTPATIENT
Start: 2024-01-09 | End: 2024-01-09 | Stop reason: HOSPADM

## 2024-01-09 RX ORDER — DROPERIDOL 2.5 MG/ML
0.62 INJECTION, SOLUTION INTRAMUSCULAR; INTRAVENOUS
Status: DISCONTINUED | OUTPATIENT
Start: 2024-01-09 | End: 2024-01-09 | Stop reason: HOSPADM

## 2024-01-09 RX ORDER — LIDOCAINE HYDROCHLORIDE 5 MG/ML
INJECTION, SOLUTION INFILTRATION; INTRAVENOUS PRN
Status: DISCONTINUED | OUTPATIENT
Start: 2024-01-09 | End: 2024-01-09 | Stop reason: ALTCHOICE

## 2024-01-09 RX ORDER — SODIUM CHLORIDE 0.9 % (FLUSH) 0.9 %
5-40 SYRINGE (ML) INJECTION EVERY 12 HOURS SCHEDULED
Status: DISCONTINUED | OUTPATIENT
Start: 2024-01-09 | End: 2024-01-09 | Stop reason: HOSPADM

## 2024-01-09 RX ORDER — MIDAZOLAM HYDROCHLORIDE 1 MG/ML
INJECTION INTRAMUSCULAR; INTRAVENOUS PRN
Status: DISCONTINUED | OUTPATIENT
Start: 2024-01-09 | End: 2024-01-09 | Stop reason: SDUPTHER

## 2024-01-09 RX ORDER — FENTANYL CITRATE 50 UG/ML
INJECTION, SOLUTION INTRAMUSCULAR; INTRAVENOUS PRN
Status: DISCONTINUED | OUTPATIENT
Start: 2024-01-09 | End: 2024-01-09 | Stop reason: SDUPTHER

## 2024-01-09 RX ORDER — MEPERIDINE HYDROCHLORIDE 25 MG/ML
12.5 INJECTION INTRAMUSCULAR; INTRAVENOUS; SUBCUTANEOUS ONCE
Status: DISCONTINUED | OUTPATIENT
Start: 2024-01-09 | End: 2024-01-09 | Stop reason: HOSPADM

## 2024-01-09 RX ORDER — DEXAMETHASONE SODIUM PHOSPHATE 10 MG/ML
INJECTION, SOLUTION INTRAMUSCULAR; INTRAVENOUS PRN
Status: DISCONTINUED | OUTPATIENT
Start: 2024-01-09 | End: 2024-01-09 | Stop reason: ALTCHOICE

## 2024-01-09 RX ORDER — SODIUM CHLORIDE, SODIUM LACTATE, POTASSIUM CHLORIDE, CALCIUM CHLORIDE 600; 310; 30; 20 MG/100ML; MG/100ML; MG/100ML; MG/100ML
INJECTION, SOLUTION INTRAVENOUS CONTINUOUS
Status: DISCONTINUED | OUTPATIENT
Start: 2024-01-09 | End: 2024-01-09 | Stop reason: HOSPADM

## 2024-01-09 RX ADMIN — FENTANYL CITRATE 50 MCG: 50 INJECTION INTRAMUSCULAR; INTRAVENOUS at 10:08

## 2024-01-09 RX ADMIN — HYDROCODONE BITARTRATE AND ACETAMINOPHEN 1 TABLET: 5; 325 TABLET ORAL at 10:33

## 2024-01-09 RX ADMIN — FENTANYL CITRATE 50 MCG: 50 INJECTION INTRAMUSCULAR; INTRAVENOUS at 10:09

## 2024-01-09 RX ADMIN — SODIUM CHLORIDE, POTASSIUM CHLORIDE, SODIUM LACTATE AND CALCIUM CHLORIDE: 600; 310; 30; 20 INJECTION, SOLUTION INTRAVENOUS at 09:46

## 2024-01-09 RX ADMIN — MIDAZOLAM 2 MG: 1 INJECTION INTRAMUSCULAR; INTRAVENOUS at 10:07

## 2024-01-09 RX ADMIN — HYDROCODONE BITARTRATE AND ACETAMINOPHEN 1 TABLET: 5; 325 TABLET ORAL at 10:53

## 2024-01-09 ASSESSMENT — PAIN DESCRIPTION - LOCATION
LOCATION: NECK
LOCATION: NECK

## 2024-01-09 ASSESSMENT — PAIN - FUNCTIONAL ASSESSMENT
PAIN_FUNCTIONAL_ASSESSMENT: 0-10

## 2024-01-09 ASSESSMENT — PAIN SCALES - GENERAL
PAINLEVEL_OUTOF10: 10
PAINLEVEL_OUTOF10: 10

## 2024-01-09 ASSESSMENT — PAIN DESCRIPTION - DESCRIPTORS: DESCRIPTORS: ACHING

## 2024-01-09 NOTE — OP NOTE
Operative Note      Patient: Tao Villareal  YOB: 1970  MRN: [id number]    Date of Procedure: 2024    Pre-Op Diagnosis Codes:     * Cervical radiculopathy [M54.12]    Post-Op Diagnosis: Same       Procedure(s):  CERVICAL  EPIDURAL STEROID INJECTION UNDER FLUOROSCOPIC GUIDANCE AT C7-T1 SEDATION    Surgeon(s):  Francisco Arias MD    Assistant:   * No surgical staff found *    Anesthesia: Monitor Anesthesia Care    Estimated Blood Loss (mL): Minimal    Complications: None    Specimens:   * No specimens in log *    Implants:  * No implants in log *      Drains: * No LDAs found *    Findings: good needle placement        Detailed Description of Procedure:   2024    Patient: Tao Villareal  :  1970  Age:  53 y.o.  Sex:  male     PRE-PROCEDURE DIAGNOSIS: Cervical degenerative disc disease, cervical radicular pain.    POST-PROCEDURE DIAGNOSIS: Same.    PROCEDURE: Fluoroscopic guided cervical epidural steroid injection, at C7-T1 level.    SURGEON: Francisco Arias MD    ANESTHESIA: MAC    ESTIMATED BLOOD LOSS: None.  ______________________________________________________________________  BRIEF HISTORY:  Tao Villareal comes in today for the   therapeutic cervical epidural injection under fluoroscopic guidance. The potential complications of this procedure were discussed with the him again today.  He has elected to undergo the aforementioned procedure.    Tao’s complete History & Physical examination were reviewed in depth, a copy of which is in the chart.      DESCRIPTION OF PROCEDURE:    After confirming written and informed consent, a time-out was performed and Tao’s name and date of birth, the procedure to be performed as well as the plan for the location of the needle insertion were confirmed.    The patient was brought into the procedure room and placed in the prone position with the head flexed midline on the fluoroscopy table. A pillow was placed under

## 2024-01-09 NOTE — ANESTHESIA POSTPROCEDURE EVALUATION
Department of Anesthesiology  Postprocedure Note    Patient: Tao Villareal  MRN: [id number]  YOB: 1970  Date of evaluation: 1/9/2024    Procedure Summary       Date: 01/09/24 Room / Location: [address]    Anesthesia Start: 1004 Anesthesia Stop: 1024    Procedure: CERVICAL  EPIDURAL STEROID INJECTION UNDER FLUOROSCOPIC GUIDANCE AT C7-T1 SEDATION Diagnosis:       Cervical radiculopathy      (Cervical radiculopathy [M54.12])    Surgeons: Francisco Arias MD Responsible Provider: Carlo Lisa MD    Anesthesia Type: MAC ASA Status: 3            Anesthesia Type: MAC    Philippe Phase I: Philippe Score: 10    Philippe Phase II:      Anesthesia Post Evaluation    Patient location during evaluation: PACU  Patient participation: complete - patient participated  Level of consciousness: awake and alert  Airway patency: patent  Nausea & Vomiting: no nausea and no vomiting  Cardiovascular status: hemodynamically stable  Respiratory status: room air and spontaneous ventilation  Hydration status: stable  Pain management: adequate    No notable events documented.

## 2024-01-09 NOTE — DISCHARGE INSTRUCTIONS
[facility] Pain Management Department  [phone]   Post-Pain Block/ Radiofrequency Home Going Instructions    1-Go home, rest for the remainder of the day  2-Please do not lift over 20 pounds the day of the injection  3-If you received sedation No: alcohol, driving, operating lawn mowers, plows, tractors or other dangerous equipment until next morning. Do not make important decisions or sign legal documents for 24 hours. You may experience light headedness, dizziness, nausea or sleepiness after sedation. Do not stay alone. A responsible adult must be with you for 24 hours. You could be nauseated from the medications you have received. Your IV site may be sore and bruised.    4-No dietary restrictions     5-Resume all medications the same day, blood thinners to be resumed 24 hours after injection    6-Keep the surgical site clean and dry, you may shower the next morning and remove the      dressing.     7- No sitz baths, tub baths or hot tubs/swimming for 24 hours.       8- If you have any pain at the injection site(s), application of an ice pack to the area should be       helpful, 20 minutes on/20 minutes off for next 48 hours.  9- Call [facility] pain management immediately at if you develop.  Fever greater than 100.4 F  Have bleeding or drainage from the puncture site  Have progressive Leg/arm numbness and or weakness  Loss of control of bowel and or bladder (wet/soil yourself)  Severe headache with inability to lift head  10-You may return to work the next day               Nausea and Vomiting After Surgery: Care Instructions  Your Care Instructions     After you've had surgery, you may feel sick to your stomach (nauseated) or you may vomit. Sometimes anesthesia can make you feel sick. It's a common side effect and often doesn't last long. Pain also can make you feel sick or vomit. After the anesthesia wears off, you may feel pain from the incision (cut). That pain could then upset your stomach. Taking pain

## 2024-01-09 NOTE — H&P
[facility]  [address], [place] [address]  [phone]    Procedure History & Physical      Tao Villareal     HPI:    Patient  is here for Cervical ROBINSON for neck pain and UE pain.  Labs/imaging studies reviewed   All question and concerns addressed including R/B/A associated with the procedure    Past Medical History:   Diagnosis Date    Anxiety     Asthma     exercised induced    Cervicogenic headache 06/12/2019    Chronic daily headache 06/13/2019    Reported since 2009    Chronic generalized pain     COVID-19 2020    mild    Degenerative disc disease, cervical     Dermatitis     Diabetes mellitus (HCC)     PO meds only    Heart stopped beating (HCC) 06/05/2021    3 days after cervical fusion- \" chest pain , dyspnea, passed out- intubated for 5 days\".has not seen a cardiologist since. 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       Past Surgical History:   Procedure Laterality Date    ABDOMEN SURGERY      nissen fundoplication    BACK SURGERY  06/2021    neck fusion    CARPAL TUNNEL RELEASE Bilateral     CERVICAL FUSION N/A 06/02/2021    C4-C5, C5-C6, C6-C7  ANTERIOR CERVICAL DISCECTOMY AND FUSION -- NEEDS REGULAR BED WITH HORSE SHOE, PLATES, SCREWS -- GLOBUS performed by Suzi Marlow MD at [facility] [place]    COLONOSCOPY      GASTRIC FUNDOPLICATION  2015    for acid reflux    HERNIA REPAIR  03/02/2021    UMBILICAL- [facility]    NERVE BLOCK N/A 3/29/2022    C7-T1 EPIDURAL STEROID INJECTION WITH X-RAY (SEDATION performed by Francisco Arias MD at [facility] OR    OTHER SURGICAL HISTORY  08/15/2018    implantation of medtronic lumbar spinal cord generator    PAIN MANAGEMENT PROCEDURE Right 4/26/2022    C7-T1 RIGHT PARAMEDIAN CERVICAL EPIDURAL STEROID INJECTION  UNDER X-RAY performed by Francisco Arias MD at [facility] OR    PAIN MANAGEMENT PROCEDURE N/A 7/21/2022    CERVICAL

## 2024-01-19 ENCOUNTER — HOSPITAL ENCOUNTER (OUTPATIENT)
Dept: NEUROLOGY | Age: 54
Discharge: HOME | End: 2024-01-19
Payer: MEDICAID

## 2024-01-19 PROCEDURE — 95912 NRV CNDJ TEST 11-12 STUDIES: CPT

## 2024-01-19 PROCEDURE — 95886 MUSC TEST DONE W/N TEST COMP: CPT

## 2024-01-19 NOTE — PROCEDURES
L Ulnar - Digit V (Antidromic)      Wrist 2.45 3.07 16.2 14 57 34.7   L Radial - Anatomical snuff box (Forearm)      Forearm 1.61 2.19 21.5 10 62 34.8   L Superficial peroneal - Ankle      Lat leg 1.93 2.81 15.0 10 52 31   L Sural - Ankle (Calf)      Calf 2.97 3.80 11.4 14 47 31.1       F  Wave      Nerve F Lat M Lat F-M Lat    ms ms ms   L Peroneal - EDB 44.2 3.4 40.8   L Tibial - AH 49.4 3.6 45.8   L Median - APB 27.6 3.5 24.0   L Ulnar - ADM 25.7 2.2 23.4       H Reflex      Nerve Lat Hmax    ms   L Tibial - Soleus 29.6   R Tibial - Soleus 29.9       EMG         EMG Summary Table     Spontaneous MUAP Recruitment   Muscle IA Fib PSW Fasc H.F. Amp Dur. PPP Pattern   L. Extensor digitorum brevis N None None None None N N N Poor Activation   L. Tibialis anterior N None None None None N N N Poor Activation   L. Tibialis posterior N None None None None N N N Poor Activation   L. Gastrocnemius (Medial head) N None None None None N N N Poor Activation   L. Abductor hallucis N None None None None N N N Poor Activation   L. Vastus lateralis N None None None None N N N Poor Activation   L. First dorsal interosseous N None None None None N N N Poor Activation   L. Extensor indicis proprius N None None None None N N N N   L. Flexor pollicis longus N None None None None N N N N   L. Pronator teres N None None None None N N N N   L. Abductor pollicis brevis N None None None None N N N Poor Activation   L. Biceps brachii N None None None None N 1+ N Sl Decr   L. Triceps brachii N None None None None N 1+ N Sl Decr   L. Deltoid N None None None None N 1+ N Sl Decr   L. Cervical paraspinals N None 1+ None None N N N N   L. Gluteus medius N None None None None N N N N   L. Gluteus milady N None None None None N N N Poor Activation   L. Lumbar paraspinals (low) N None None None None N N N Poor Activation          Summary of Findings:   Study Limitations: there was poor activation in several muscles examined by needle

## 2024-01-29 RX ORDER — ERENUMAB-AOOE 70 MG/ML
70 INJECTION SUBCUTANEOUS
Qty: 1 ML | Refills: 0 | Status: SHIPPED | OUTPATIENT
Start: 2024-01-29

## 2024-01-29 NOTE — TELEPHONE ENCOUNTER
Requested Prescriptions     Pending Prescriptions Disp Refills    Erenumab-aooe (AIMOVIG) 70 MG/ML SOAJ 1 mL 2     Sig: Inject 70 mg into the skin every 28 days

## 2024-01-29 NOTE — TELEPHONE ENCOUNTER
Needs f/u      The following medication has been approved and sent to your pharmacy    Requested Prescriptions     Signed Prescriptions Disp Refills    Erenumab-aooe (AIMOVIG) 70 MG/ML SOAJ 1 mL 0     Sig: Inject 70 mg into the skin every 28 days     Authorizing Provider: XIOMARA MCKENZIE

## 2024-02-02 ENCOUNTER — TELEPHONE (OUTPATIENT)
Dept: PAIN MANAGEMENT | Age: 54
End: 2024-02-02

## 2024-02-02 NOTE — TELEPHONE ENCOUNTER
Tao called requesting a phone call from you. He is requesting removal of SCS. Please advise. Thank you

## 2024-02-02 NOTE — TELEPHONE ENCOUNTER
Spoke with the patient.    Need to make a follow up appointment in office to discuss. Will coordinate.     Also meed medtronic rep in the office at that time.    Francisco Arias MD

## 2024-02-06 ENCOUNTER — OFFICE VISIT (OUTPATIENT)
Dept: PAIN MANAGEMENT | Age: 54
End: 2024-02-06
Payer: MEDICAID

## 2024-02-06 VITALS
BODY MASS INDEX: 29.66 KG/M2 | OXYGEN SATURATION: 97 % | RESPIRATION RATE: 18 BRPM | WEIGHT: 178 LBS | DIASTOLIC BLOOD PRESSURE: 80 MMHG | HEART RATE: 85 BPM | HEIGHT: 65 IN | SYSTOLIC BLOOD PRESSURE: 118 MMHG

## 2024-02-06 DIAGNOSIS — M47.812 CERVICAL SPONDYLOSIS: ICD-10-CM

## 2024-02-06 DIAGNOSIS — Z98.1 S/P CERVICAL SPINAL FUSION: ICD-10-CM

## 2024-02-06 DIAGNOSIS — M50.30 DDD (DEGENERATIVE DISC DISEASE), CERVICAL: ICD-10-CM

## 2024-02-06 DIAGNOSIS — M51.36 DDD (DEGENERATIVE DISC DISEASE), LUMBAR: ICD-10-CM

## 2024-02-06 DIAGNOSIS — M47.817 LUMBOSACRAL SPONDYLOSIS WITHOUT MYELOPATHY: ICD-10-CM

## 2024-02-06 DIAGNOSIS — T85.192S MALFUNCTION OF SPINAL CORD STIMULATOR, SEQUELA: Primary | ICD-10-CM

## 2024-02-06 DIAGNOSIS — M54.12 CERVICAL RADICULAR PAIN: ICD-10-CM

## 2024-02-06 PROCEDURE — 99213 OFFICE O/P EST LOW 20 MIN: CPT | Performed by: ANESTHESIOLOGY

## 2024-02-06 NOTE — PROGRESS NOTES
Tao Villareal presents to the [facility] on 2/6/2024. Tao is complaining of pain in his neck and back. The pain is constant. The pain is described as aching. Pain is rated on his best day at a 7, on his worst day at a 10, and on average at a 8 on the VAS scale. He took his last dose of Norco today.      Any procedures since your last visit: Yes, with Pt states pain in less - would not give a number.    He is not on NSAIDS and  is not on anticoagulation medications to include none and is managed by NA.     Pacemaker or defibrillator: No     Medication Contract and Consent for Opioid Use Documents Filed       Patient Documents       Type of Document Status Date Received Received By Description    Medication Contract Received 11/19/2018  1:33 PM KENYA SULLIVAN [facility]14[facility]18 [facility]     Medication Contract Received 11/4/2019  1:15 PM SHERRIE VEGA pain management pt agressment    Medication Contract Received 2/1/2021 11:22 AM LUDA MOLNIA PAIN MGMT CONTRACT DR. POWERS                        Resp 18   Ht 1.651 m (5' 5\")   Wt 80.7 kg (178 lb)   BMI 29.62 kg/m²      No LMP for male patient.  
detail with the patient.      Counseling : Patient encouraged to stay active and continue Regular home exercise program as tolerated - stretching / strengthening.     Treatment plan discussed with the patient including medication and procedure side effects.     Controlled Substances Monitoring:   OARRS reviewed.    Francisco Arias MD

## 2024-02-15 ENCOUNTER — TELEMEDICINE (OUTPATIENT)
Age: 54
End: 2024-02-15
Payer: MEDICAID

## 2024-02-15 DIAGNOSIS — R25.2 MUSCLE CRAMPING: Primary | ICD-10-CM

## 2024-02-15 DIAGNOSIS — G43.709 CHRONIC MIGRAINE W/O AURA W/O STATUS MIGRAINOSUS, NOT INTRACTABLE: ICD-10-CM

## 2024-02-15 DIAGNOSIS — E55.9 VITAMIN D DEFICIENCY: ICD-10-CM

## 2024-02-15 PROCEDURE — 99214 OFFICE O/P EST MOD 30 MIN: CPT | Performed by: PSYCHIATRY & NEUROLOGY

## 2024-02-15 RX ORDER — ERENUMAB-AOOE 70 MG/ML
70 INJECTION SUBCUTANEOUS
Qty: 1 ML | Refills: 3 | Status: SHIPPED | OUTPATIENT
Start: 2024-02-15

## 2024-02-15 RX ORDER — ERGOCALCIFEROL 1.25 MG/1
50000 CAPSULE ORAL WEEKLY
Qty: 12 CAPSULE | Refills: 1 | Status: SHIPPED | OUTPATIENT
Start: 2024-02-15

## 2024-02-15 RX ORDER — ROPINIROLE 1 MG/1
1 TABLET, FILM COATED ORAL 3 TIMES DAILY
Qty: 90 TABLET | Refills: 3 | Status: SHIPPED | OUTPATIENT
Start: 2024-02-15

## 2024-02-15 RX ORDER — BACLOFEN 10 MG/1
5 TABLET ORAL 3 TIMES DAILY
Qty: 45 TABLET | Refills: 3 | Status: SHIPPED | OUTPATIENT
Start: 2024-02-15

## 2024-02-15 NOTE — PROGRESS NOTES
MD Samir Chanelus SHREYA Villareal is a 53 y.o. male presenting as a follow patient for a   Chief Complaint   Patient presents with    Neurologic Problem      Component      Latest Ref Rng 12/8/2023   Total Protein      6.4 - 8.3 g/dL 7.7    Albumin (calculated)      3.5 - 4.7 g/dL 3.6    Alpha-1-Globulin      0.2 - 0.4 g/dL 0.3    Alpha-2-Globulin      0.5 - 1.0 g/dL 0.9    Beta Globulin      0.8 - 1.3 g/dL 1.3    Gamma Globulin      0.7 - 1.6 g/dL 1.6    Protein Electrophoresis, Serum Normal serum protein electrophoresis. No monoclonal protein identified.    Pathologist Reviewed by: TALISHA Foy M.D.    Pathologist Reviewed by: MIKI Lopez    Anti-Cornejo      NEGATIVE  NEGATIVE    Sjogren's Antibodies (SSA)      NEGATIVE  NEGATIVE    Sjogren's Antibodies (SSB)      NEGATIVE  NEGATIVE    Anti RNP      NEGATIVE  NEGATIVE    Whit-1 Antibody      NEGATIVE  NEGATIVE    Scleroderma SCL-70      NEGATIVE  NEGATIVE    SPECIMEN TYPE .Random Urine    P E Interpretation, U Normal. No Monoclonal protein identified.    Serum IFX Interp Normal. No Monoclonal protein identified.    Pathologist Review Reviewed by: TALISHA Foy M.D.    Urine IFX Specimen .Random Urine (C)   Urine IFX Interp Normal. No Monoclonal protein identified.    Vitamin B1,Whole Blood      70 - 180 nmol/L 108    Vitamin B-12      211 - 946 pg/mL 658    Vitamin B6      20.0 - 125.0 nmol/L 33.4    Vit D, 25-Hydroxy      30.0 - 100.0 ng/mL 20.1 (L)    FOLATE, FOLAT      4.8 - 24.2 ng/mL 7.9    ROSINA      NEGATIVE  NEGATIVE    Borrella Burgdorferi Antibodies Total      <=0.90 IV 0.46    HIV Ag/Ab      NONREACTIVE  NONREACTIVE    Sed Rate      0 - 15 mm/Hr 0    CRP      0 - 5 mg/L <3.0    Hemoglobin A1C      4.0 - 5.6 % 6.0 (H)    Copper      70.0 - 140.0 ug/dL 99.0    TSH      0.27 - 4.20 uIU/mL 1.15    T4 Free      0.9 - 1.7 ng/dL 1.1         Emg:Diagnostic Interpretation:   This study was Abnormal     Electrodiagnosis: Extensive

## 2024-02-20 ENCOUNTER — HOSPITAL ENCOUNTER (OUTPATIENT)
Dept: MRI IMAGING | Age: 54
Discharge: HOME | End: 2024-02-22

## 2024-02-20 DIAGNOSIS — M75.42 IMPINGEMENT SYNDROME OF LEFT SHOULDER: ICD-10-CM

## 2024-02-29 ENCOUNTER — HOSPITAL ENCOUNTER (OUTPATIENT)
Dept: MRI IMAGING | Age: 54
Discharge: HOME | End: 2024-03-02
Payer: MEDICAID

## 2024-02-29 ENCOUNTER — HOSPITAL ENCOUNTER (OUTPATIENT)
Dept: MRI IMAGING | Age: 54
Discharge: HOME | End: 2024-03-02
Attending: PSYCHIATRY & NEUROLOGY
Payer: MEDICAID

## 2024-02-29 DIAGNOSIS — M75.111 INCOMPLETE ROTATOR CUFF TEAR OR RUPTURE OF RIGHT SHOULDER, NOT SPECIFIED AS TRAUMATIC: ICD-10-CM

## 2024-02-29 DIAGNOSIS — R25.2 MUSCLE CRAMPING: ICD-10-CM

## 2024-02-29 DIAGNOSIS — M75.122 COMPLETE TEAR OF LEFT ROTATOR CUFF, UNSPECIFIED WHETHER TRAUMATIC: ICD-10-CM

## 2024-02-29 PROCEDURE — 73221 MRI JOINT UPR EXTREM W/O DYE: CPT

## 2024-02-29 PROCEDURE — 72141 MRI NECK SPINE W/O DYE: CPT

## 2024-03-04 ENCOUNTER — TELEPHONE (OUTPATIENT)
Age: 54
End: 2024-03-04

## 2024-03-04 ENCOUNTER — TELEPHONE (OUTPATIENT)
Dept: PAIN MANAGEMENT | Age: 54
End: 2024-03-04

## 2024-03-04 DIAGNOSIS — R25.2 MUSCLE CRAMPING: Primary | ICD-10-CM

## 2024-03-04 NOTE — TELEPHONE ENCOUNTER
I will not recommend another neck surgery      Problems like the damage was seen in emg was chronic  Not active to warrant another neck surgery        Is baclofen + requip helping     Marianna Dexter MD

## 2024-03-04 NOTE — TELEPHONE ENCOUNTER
----- Message from Michelle Moses MA sent at 3/4/2024 11:07 AM EST -----  Pt called in stating that his MRI results are in and wanted to know if you could look over the results and let him know what the plan moving forward was.

## 2024-03-04 NOTE — TELEPHONE ENCOUNTER
I spoke to Tao Villareal and explained to him that Dr. Arias did not order the the Cervical MRI that he had but if he wanted to schedule an appointment to go over his MRI that I could schedule him.  He stated that he did not want to get a ride to get there to \"maybe\" go over the MRI and said that Dr. Arias is his pain doctor and if he saw something wrong with it that the doctor would call him.  He then said he will call his PCP and have them call Dr. Arias to discuss this issue.

## 2024-03-04 NOTE — TELEPHONE ENCOUNTER
Mri cervical spine:    No more than mild residual spinal stenosis at c6/7  But moderate to severe joaquin pinched nerves at c6      Can explain arm cramps and emg changes seen    Would recommend a trial of inj in cervical spine to see if it helps the arms danis with cramps      Marianna Dexter MD

## 2024-03-04 NOTE — TELEPHONE ENCOUNTER
Tao Villareal had a cervical MRI ordered by Dr. Dexter. He would like to  you to review the MRI and call him about the results.I tried to scheduled him an office visit he insisted that you call him.

## 2024-03-08 NOTE — TELEPHONE ENCOUNTER
I'am enclosing this message to your pain management doctor as well- to be made aware of what we are discussing about:    Cervical spine related issues looks chronic on emg. On reviewing mri, Will not recommend repeat surgery.         Unfortunately,     For cramps- we can use muscle relaxants or dopamine agonists or neuropathic medications    If baclofen and requip not helping, only other alternative is ?cymbalta or lamictal or trileptal- which can all be neuropathic medications    Neurontin or lyrica cannot be a choice with narcotics.       Marianna Dexter MD

## 2024-03-14 ENCOUNTER — TELEPHONE (OUTPATIENT)
Age: 54
End: 2024-03-14

## 2024-03-14 NOTE — TELEPHONE ENCOUNTER
If he is not interested in medications for it-   Last resort: referral to surgeon or spinal stimulator      Either way needs referral to neurosurgery or can see if pain mgt will consider stimulator trial      Marianna Dexter MD

## 2024-03-14 NOTE — TELEPHONE ENCOUNTER
----- Message from Michelle Moses MA sent at 3/13/2024  1:50 PM EDT -----  Pt says he has tried all the other meds suggested and they were not helpful. States pain from nerve pinching on the right side of his neck. He has started exercising again and wants to continue to do so but is having a hard time due to nerve pinching.

## 2024-03-26 ENCOUNTER — TELEPHONE (OUTPATIENT)
Age: 54
End: 2024-03-26

## 2024-03-26 NOTE — TELEPHONE ENCOUNTER
----- Message from Michelle Moses MA sent at 3/22/2024 10:47 AM EDT -----  Pt would like you and Dr Arias to have a conversation about him.

## 2024-03-26 NOTE — TELEPHONE ENCOUNTER
Is he a candidate for spinal stimulator trial for chronic left c6/7 radiculopathy    Will send message to pain shaina Dexter MD

## 2024-04-17 ENCOUNTER — TELEMEDICINE (OUTPATIENT)
Age: 54
End: 2024-04-17
Payer: MEDICAID

## 2024-04-17 DIAGNOSIS — R25.2 MUSCLE CRAMPING: ICD-10-CM

## 2024-04-17 DIAGNOSIS — G89.29 CHRONIC NECK PAIN: ICD-10-CM

## 2024-04-17 DIAGNOSIS — M54.2 CHRONIC NECK PAIN: ICD-10-CM

## 2024-04-17 DIAGNOSIS — G43.709 CHRONIC MIGRAINE W/O AURA W/O STATUS MIGRAINOSUS, NOT INTRACTABLE: Primary | ICD-10-CM

## 2024-04-17 DIAGNOSIS — E55.9 VITAMIN D DEFICIENCY: ICD-10-CM

## 2024-04-17 PROCEDURE — 99214 OFFICE O/P EST MOD 30 MIN: CPT | Performed by: PSYCHIATRY & NEUROLOGY

## 2024-04-17 RX ORDER — ERENUMAB-AOOE 140 MG/ML
140 INJECTION, SOLUTION SUBCUTANEOUS
Qty: 1 ML | Refills: 3 | Status: SHIPPED | OUTPATIENT
Start: 2024-04-17

## 2024-04-17 NOTE — PROGRESS NOTES
ONSET OF HEADACHE once daily if needed, Disp: , Rfl:     HYDROcodone-acetaminophen (NORCO)  MG per tablet, Take 1 tablet by mouth every 8 hours as needed for Pain., Disp: , Rfl:     atorvastatin (LIPITOR) 20 MG tablet, take 1 tablet by mouth once daily, Disp: , Rfl:     cyclobenzaprine (FLEXERIL) 10 MG tablet, Take 1 tablet by mouth 3 times daily as needed for Muscle spasms, Disp: 40 tablet, Rfl: 0     Allergies   Allergen Reactions    Penicillins      Family has history to penicillin         REVIEW OF SYSTEMS    General:  Negativeor Change in Weight, Negativefor Fever   Eyes:   Negative for glaucoma, Negative for Cataracts, Negativefor Glasses  ENT:   Negative for Trouble Swallowing, Negative for Nose Bleeds, Negative for Dentures, Negative for Sinus Problems  Cardiac:  Negative for Chest Pain, Negative for Irregular Heart Beat, Negative for Heart failure, Negative for Angina, Negative for Murmur, Negative for High Blood Pressure, Negative for Pain in the legs w/exercise, Negative for Blood Clots, Negative for Leg Swelling  Respiratory:  Negative for Shortness of Breath, Negative for Cough/Wheezing, Negativefor Asthma, Negative for Other Lung Problems  Heme/Lymph:  Negative for Swollen Nodes/Glands, Negative for Bleeding Problems, Negative for Anemia  Musculoskeletal:   Negative for Joint Replacement, Negative for Broken Bones  GI:  Negative  for Gallbladder, Negative  for Blood in Stool, Negative for Diarrhea, Negative Intestinal Bleeding, Negative for Poor Appetite, Negative for Hiatal Hernia, Negative for Ulcer, Negative for Hemorrhoids, Negative for Nausea/Vomiting, Negative for Constipation  G/U:  NegativeNegative for Pain/Burning, Negative for Urinary Frequency/Urgency, Negative for Blood in Urine, Negative for Slow/Small Stream, Negative for poor Bladder Emptying, Negative for up at night to urinate, Negativesexual Dysfunction  Endo:  Negative for Cold or Heat Intolerance, Negative for Hot

## 2024-04-26 ENCOUNTER — OFFICE VISIT (OUTPATIENT)
Dept: PAIN MANAGEMENT | Age: 54
End: 2024-04-26
Payer: MEDICAID

## 2024-04-26 ENCOUNTER — PREP FOR PROCEDURE (OUTPATIENT)
Dept: PAIN MANAGEMENT | Age: 54
End: 2024-04-26

## 2024-04-26 VITALS
TEMPERATURE: 97.5 F | SYSTOLIC BLOOD PRESSURE: 108 MMHG | WEIGHT: 178 LBS | RESPIRATION RATE: 18 BRPM | DIASTOLIC BLOOD PRESSURE: 70 MMHG | OXYGEN SATURATION: 95 % | BODY MASS INDEX: 29.66 KG/M2 | HEIGHT: 65 IN | HEART RATE: 108 BPM

## 2024-04-26 DIAGNOSIS — M54.12 CERVICAL RADICULOPATHY: Primary | ICD-10-CM

## 2024-04-26 DIAGNOSIS — M51.36 DDD (DEGENERATIVE DISC DISEASE), LUMBAR: ICD-10-CM

## 2024-04-26 DIAGNOSIS — M54.12 CERVICAL RADICULAR PAIN: ICD-10-CM

## 2024-04-26 DIAGNOSIS — Z98.1 S/P CERVICAL SPINAL FUSION: Primary | ICD-10-CM

## 2024-04-26 DIAGNOSIS — T85.192S MALFUNCTION OF SPINAL CORD STIMULATOR, SEQUELA: ICD-10-CM

## 2024-04-26 DIAGNOSIS — M47.817 LUMBOSACRAL SPONDYLOSIS WITHOUT MYELOPATHY: ICD-10-CM

## 2024-04-26 DIAGNOSIS — M47.812 CERVICAL SPONDYLOSIS: ICD-10-CM

## 2024-04-26 DIAGNOSIS — M50.30 DDD (DEGENERATIVE DISC DISEASE), CERVICAL: ICD-10-CM

## 2024-04-26 PROCEDURE — 99214 OFFICE O/P EST MOD 30 MIN: CPT | Performed by: ANESTHESIOLOGY

## 2024-04-26 PROCEDURE — 99213 OFFICE O/P EST LOW 20 MIN: CPT | Performed by: ANESTHESIOLOGY

## 2024-04-26 RX ORDER — SODIUM CHLORIDE 9 MG/ML
INJECTION, SOLUTION INTRAVENOUS PRN
Status: CANCELLED | OUTPATIENT
Start: 2024-04-26

## 2024-04-26 RX ORDER — SODIUM CHLORIDE 0.9 % (FLUSH) 0.9 %
5-40 SYRINGE (ML) INJECTION PRN
Status: CANCELLED | OUTPATIENT
Start: 2024-04-26

## 2024-04-26 RX ORDER — SODIUM CHLORIDE 0.9 % (FLUSH) 0.9 %
5-40 SYRINGE (ML) INJECTION EVERY 12 HOURS SCHEDULED
Status: CANCELLED | OUTPATIENT
Start: 2024-04-26

## 2024-04-26 NOTE — H&P (VIEW-ONLY)
[facility]  [address], [place] [address]  [phone]    Follow up Note      Tao Villareal     Date of Visit:  4/26/2024    CC:  Patient presents for follow up   Chief Complaint   Patient presents with    Back Pain       HPI:  H/o Chronic neck pain.     S/p ACDF : C4-5, C5-6, C6-7 Dr. Marlow In June 2021.     H/o neck pain shoulder blade pain and upper extremity pain. He also complains of pain/ sensitivity in the upper extremity.      Extensive history for chronic pain for many years.  He has been treated with multiple pain centers in the past- [facility], [facility]. etc     He has Spinal cord stimulator implant for LE pain: Aug 2018- helps.     S/p Cervical SCS implant done in Feb 2019. Post implant he had severe neck pain/ UE pain and SCS had to be explanted in March 2019.     Pain causes functional limitations/ limits Adl's : Yes      Nursing notes and details of the pain history reviewed. Please see intake notes for details.     Previous treatments:   Physical Therapy / HEP: yes,       Medications: - NSAID's : yes                        - Membrane stabilizers : yes                        - Opioids : yes                       - Adjuvants or Others : yes     Spine Surgeries: yes,      Anticoagulation medications: no.     H/O Smoking: no  H/O alcohol abuse : no  H/O Illicit drug use : no     Imaging:     Xray LS spine and Xray Thoracic spine : 2/8/2024:  IMPRESSION:  Electrodes position tips at the T8 level.  Intrathecal placement at the L1/L2  level.  Minimal degenerative changes of the thoracic and lumbar spine with no  evidence of acute fracture or malalignment.    MRI of Cervical spine: 3/1/2024:  IMPRESSION:  1. Postoperative changes from anterior cervical discectomy and fusion from  C4-C7.  2. Mild central canal stenosis at C6-7.  3. Multilevel neural foraminal stenoses, worst (moderate to severe) at C6-7.     CT myelogram of the cervical spine on    Substance and Sexual Activity    Alcohol use: Yes     Comment: socially- OCCASIONAL    Drug use: No    Sexual activity: Not on file   Other Topics Concern    Not on file   Social History Narrative    Not on file     Social Determinants of Health     Financial Resource Strain: Not on file   Food Insecurity: Not on file   Transportation Needs: Not on file   Physical Activity: Not on file   Stress: Not on file   Social Connections: Not on file   Intimate Partner Violence: Not on file   Housing Stability: Not on file       Family History   Problem Relation Age of Onset    No Known Problems Mother     Prostate Cancer Father     No Known Problems Maternal Grandmother     No Known Problems Maternal Grandfather     No Known Problems Paternal Grandmother     No Known Problems Paternal Grandfather     No Known Problems Daughter        REVIEW OF SYSTEMS:     Tao denies fever/chills, chest pain, shortness of breath, new bowel or bladder complaints. All other review of systems was negative.    PHYSICAL EXAMINATION:      /70   Pulse (!) 108   Temp 97.5 °F (36.4 °C) (Infrared)   Resp 18   Ht 1.651 m (5' 5\")   Wt 80.7 kg (178 lb)   SpO2 95%   BMI 29.62 kg/m²   General:       General appearance:  Pleasant and well-hydrated, in no distress and A & O x 3  Build:Overweight  Function: Rises from seated position easily and Moves about room without difficulty     HEENT:     Head:normocephalic, atraumatic     Lungs:     Breathing:normal breathing pattern      CVS:     RRR     Abdomen:     Shape:non-distended and normal     Cervical spine:     Inspection:scar form the prior surgery noted, appears to have healed well.  Palpation:tenderness paravertebral muscles, tenderness trapezium, left, right and positive.  Range of motion:reduced flexion, extension, rotation  and is moderately painful.  Spurling's: negative bilaterally     Thoracic spine:                Spine inspection:normal      Lumbar spine:     Spine inspection:  anxiety of needles.    HE also wants to get a second opinion from a different spine surgeon- recommend [facility]/ / [facility]- can be coordinated with his PCP.    He apparently has been having issues with his spinal cord stimulator.  Gets intermittent shocking sensation.     X-ray Thoracic spine  & X-ray LS spine. Reviewed - leads appear to be in good position.    SCS analysis/ reprogramming- apparently did not help and he wants the SCS explanted.  We had the [employer] rep in the office today.  But patient refuses reprogramming and wants to proceed with removal of SCS lead and IPG.    He is getting shoulder arthroscopy  on may 6th. Will plan removal later after he recovers form shoulder surgery.    Goals and realistic expectations of the treatment discussed in detail with the patient.      Counseling : Patient encouraged to stay active and continue Regular home exercise program as tolerated - stretching / strengthening.     Treatment plan discussed with the patient including medication and procedure side effects.     Controlled Substances Monitoring:   OARRS reviewed.    Francisco Arias MD

## 2024-04-26 NOTE — PROGRESS NOTES
Tao Villareal presents to the [facility] on 4/26/2024. Tao is complaining of pain all over. The pain is constant. The pain is described as aching, throbbing, stabbing, sharp, and penetrating. Pain is rated on his best day at a 8, on his worst day at a 10, and on average at a 9 on the VAS scale. He took his last dose of Norco yesterday.      Any procedures since your last visit: No  He is not on NSAIDS and  is not on anticoagulation medications to include none   Pacemaker or defibrillator: No  Medication Contract and Consent for Opioid Use Documents Filed       Patient Documents       Type of Document Status Date Received Received By Description    Medication Contract Received 11/19/2018  1:33 PM KENYA SULLIVAN 11/14/18 [facility]     Medication Contract Received 11/4/2019  1:15 PM SHERRIE VEGA pain management pt agressment    Medication Contract Received 2/1/2021 11:22 AM LUDA MOLINA PAIN MGMT CONTRACT DR. POWERS                        Resp 18   Ht 1.651 m (5' 5\")   Wt 80.7 kg (178 lb)   BMI 29.62 kg/m²      No LMP for male patient.

## 2024-04-26 NOTE — PROGRESS NOTES
[facility]  [address] [place] [address]  [phone]    Follow up Note      Tao Villareal     Date of Visit:  4/26/2024    CC:  Patient presents for follow up   Chief Complaint   Patient presents with    Back Pain       HPI:  H/o Chronic neck pain.     S/p ACDF : C4-5, C5-6, C6-7 Dr. Marlow In June 2021.     H/o neck pain shoulder blade pain and upper extremity pain. He also complains of pain/ sensitivity in the upper extremity.      Extensive history for chronic pain for many years.  He has been treated with multiple pain centers in the past- [facility], [facility]. etc     He has Spinal cord stimulator implant for LE pain: Aug 2018- helps.     S/p Cervical SCS implant done in Feb 2019. Post implant he had severe neck pain/ UE pain and SCS had to be explanted in March 2019.     Pain causes functional limitations/ limits Adl's : Yes      Nursing notes and details of the pain history reviewed. Please see intake notes for details.     Previous treatments:   Physical Therapy / HEP: yes,       Medications: - NSAID's : yes                        - Membrane stabilizers : yes                        - Opioids : yes                       - Adjuvants or Others : yes     Spine Surgeries: yes,      Anticoagulation medications: no.     H/O Smoking: no  H/O alcohol abuse : no  H/O Illicit drug use : no     Imaging:     Xray LS spine and Xray Thoracic spine : 2/8/2024:  IMPRESSION:  Electrodes position tips at the T8 level.  Intrathecal placement at the L1/L2  level.  Minimal degenerative changes of the thoracic and lumbar spine with no  evidence of acute fracture or malalignment.    MRI of Cervical spine: 3/1/2024:  IMPRESSION:  1. Postoperative changes from anterior cervical discectomy and fusion from  C4-C7.  2. Mild central canal stenosis at C6-7.  3. Multilevel neural foraminal stenoses, worst (moderate to severe) at C6-7.     CT myelogram of the cervical spine on

## 2024-04-29 ENCOUNTER — TELEPHONE (OUTPATIENT)
Dept: PAIN MANAGEMENT | Age: 54
End: 2024-04-29

## 2024-04-29 DIAGNOSIS — M54.12 CERVICAL RADICULOPATHY: Primary | ICD-10-CM

## 2024-04-29 NOTE — TELEPHONE ENCOUNTER
Call to Tao Villareal and message left that procedure was approved for 5/2/2024 and that [facility] should call him a few days before for the pre op call and between 2:00 PM and 4:00 PM  the business day before with the arrival time. Instructed Tao to hold ibuprofen for 24 hours, naprosyn for 4 days and any aspirin containing products or fish oil for 7 days. Instructed to call office back. Advised that if they do not return the call it may result in their procedure being delayed.    Electronically signed by Jay Lind RN on 4/29/2024 at 10:30 AM

## 2024-04-30 RX ORDER — CYCLOBENZAPRINE HCL 10 MG
10 TABLET ORAL 3 TIMES DAILY PRN
COMMUNITY

## 2024-04-30 NOTE — PROGRESS NOTES
[facility] PRE-ADMISSION TESTING INSTRUCTIONS    The Preadmission Testing patient is instructed accordingly using the following criteria (check applicable):    ARRIVAL INSTRUCTIONS:  [x] Parking the day of Surgery is located in the Main Entrance lot.  Upon entering the door, make an immediate right to the surgery reception desk    [x] Bring photo ID and insurance card    [x] Bring in a copy of Living will or Durable Power of  papers.    [x] Please be sure to arrange for a responsible adult to provide transportation to and from the hospital    [x] Please arrange for a responsible adult to be with you for the 24 hour period post procedure due to having anesthesia    [x] If you awake am of surgery not feeling well or have temperature >100 please call [phone]    GENERAL INSTRUCTIONS:    [x] No solid foods after midnight, may have up to 8oz of water until 2 hours prior to arrival. No gum, no candy, no mints.        [x] You may brush your teeth, do not swallow any toothpaste    [x] Take medications as instructed     [x] Stop herbal supplements and vitamins  prior to procedure    [] Follow preop dosing of blood thinners per physician instructions    [] Take 1/2 dose of evening insulin, but no insulin after midnight    [] No oral diabetic medications after midnight    [] If diabetic and have low blood sugar or feel symptomatic, take 1-2oz apple juice only    [] Bring inhalers day of surgery    [] Bring urine specimen day of surgery    [x] Shower or bath with soap, lather and rinse well, AM of Surgery, no lotion, powders or creams to surgical site    [] Follow bowel prep as instructed per surgeon    [x] No tobacco products within 24 hours of surgery     [x] No alcohol or illegal drug use, marijuana included, within 24 hours of surgery.    [x] Jewelry, body piercing's, eyeglasses, contact lenses and dentures are not permitted into surgery (bring cases)      [x] Please do not wear any

## 2024-05-01 ENCOUNTER — ANESTHESIA EVENT (OUTPATIENT)
Dept: OPERATING ROOM | Age: 54
End: 2024-05-01
Payer: MEDICAID

## 2024-05-01 ASSESSMENT — LIFESTYLE VARIABLES: SMOKING_STATUS: 0

## 2024-05-01 NOTE — ANESTHESIA PRE PROCEDURE
Department of Anesthesiology  Preprocedure Note       Name:  Tao Villareal   Age:  53 y.o.  :  1970                                          MRN:  [id number]         Date:  2024      Surgeon: Surgeon(s):  Francisco Arias MD    Procedure: Procedure(s):  CERVICAL 7-THORACIC 1 EPIDURAL STEROID INJECTION UNDER FLUOROSCOPIC GUIDANCE AT RIGHT PARAMEDIAN    Medications prior to admission:   Prior to Admission medications    Medication Sig Start Date End Date Taking? Authorizing Provider   cyclobenzaprine (FLEXERIL) 10 MG tablet Take 1 tablet by mouth 3 times daily as needed for Muscle spasms   Yes Rom Elizondo MD   Erenumab-aooe (AIMOVIG) 140 MG/ML SOAJ Inject 140 mg into the skin every 28 days 24   Marianna Dexter MD   vitamin D (ERGOCALCIFEROL) 1.25 MG (14740 UT) CAPS capsule Take 1 capsule by mouth once a week 2/15/24   Marianna Dexter MD   metFORMIN (GLUCOPHAGE) 1000 MG tablet Take 1 tablet by mouth 2 times daily 11/10/23   Rom Elizondo MD   SUMAtriptan (IMITREX) 6 MG/0.5ML SOAJ injection syringe inject 0.5 milliliters ( 6 milligrams ) subcutaneously AT ONSET OF HEADACHE once daily if needed 23   Rom Elizondo MD   HYDROcodone-acetaminophen (NORCO)  MG per tablet Take 1 tablet by mouth every 8 hours as needed for Pain.    Rom Elizondo MD   atorvastatin (LIPITOR) 20 MG tablet take 1 tablet by mouth once daily 22   Rom Elizondo MD       Current medications:    No current facility-administered medications for this encounter.     Current Outpatient Medications   Medication Sig Dispense Refill    cyclobenzaprine (FLEXERIL) 10 MG tablet Take 1 tablet by mouth 3 times daily as needed for Muscle spasms      Erenumab-aooe (AIMOVIG) 140 MG/ML SOAJ Inject 140 mg into the skin every 28 days 1 mL 3    vitamin D (ERGOCALCIFEROL) 1.25 MG (01727 UT) CAPS capsule Take 1 capsule by mouth once a week 12 capsule 1    metFORMIN (GLUCOPHAGE) 1000

## 2024-05-02 ENCOUNTER — ANESTHESIA (OUTPATIENT)
Dept: OPERATING ROOM | Age: 54
End: 2024-05-02
Payer: MEDICAID

## 2024-05-02 ENCOUNTER — HOSPITAL ENCOUNTER (OUTPATIENT)
Dept: GENERAL RADIOLOGY | Age: 54
Setting detail: OUTPATIENT SURGERY
Discharge: HOME | End: 2024-05-04
Attending: ANESTHESIOLOGY
Payer: MEDICAID

## 2024-05-02 ENCOUNTER — HOSPITAL ENCOUNTER (OUTPATIENT)
Age: 54
Setting detail: OBSERVATION
Discharge: HOME | End: 2024-05-03
Attending: STUDENT IN AN ORGANIZED HEALTH CARE EDUCATION/TRAINING PROGRAM | Admitting: INTERNAL MEDICINE
Payer: MEDICAID

## 2024-05-02 ENCOUNTER — APPOINTMENT (OUTPATIENT)
Dept: GENERAL RADIOLOGY | Age: 54
End: 2024-05-02
Payer: MEDICAID

## 2024-05-02 ENCOUNTER — HOSPITAL ENCOUNTER (OUTPATIENT)
Age: 54
Setting detail: OUTPATIENT SURGERY
Discharge: HOME | End: 2024-05-02
Attending: ANESTHESIOLOGY | Admitting: ANESTHESIOLOGY
Payer: MEDICAID

## 2024-05-02 VITALS
WEIGHT: 174 LBS | DIASTOLIC BLOOD PRESSURE: 109 MMHG | OXYGEN SATURATION: 98 % | SYSTOLIC BLOOD PRESSURE: 165 MMHG | RESPIRATION RATE: 20 BRPM | HEART RATE: 103 BPM | BODY MASS INDEX: 28.99 KG/M2 | TEMPERATURE: 98.6 F | HEIGHT: 65 IN

## 2024-05-02 DIAGNOSIS — R07.89 ATYPICAL CHEST PAIN: Primary | ICD-10-CM

## 2024-05-02 DIAGNOSIS — R52 PAIN MANAGEMENT: ICD-10-CM

## 2024-05-02 PROBLEM — R07.9 CHEST PAIN: Status: ACTIVE | Noted: 2024-05-02

## 2024-05-02 PROBLEM — E78.5 HYPERLIPIDEMIA: Status: ACTIVE | Noted: 2024-05-02

## 2024-05-02 PROBLEM — E11.9 CONTROLLED TYPE 2 DIABETES MELLITUS WITHOUT COMPLICATION (HCC): Status: ACTIVE | Noted: 2024-05-02

## 2024-05-02 PROBLEM — R03.0 ELEVATED BP WITHOUT DIAGNOSIS OF HYPERTENSION: Status: ACTIVE | Noted: 2024-05-02

## 2024-05-02 LAB
ALBUMIN SERPL-MCNC: 5.1 G/DL (ref 3.5–5.2)
ALP SERPL-CCNC: 67 U/L (ref 40–129)
ALT SERPL-CCNC: 76 U/L (ref 0–40)
ANION GAP SERPL CALCULATED.3IONS-SCNC: 14 MMOL/L (ref 7–16)
AST SERPL-CCNC: 76 U/L (ref 0–39)
BASOPHILS # BLD: 0.03 K/UL (ref 0–0.2)
BASOPHILS NFR BLD: 0 % (ref 0–2)
BILIRUB SERPL-MCNC: 0.9 MG/DL (ref 0–1.2)
BUN SERPL-MCNC: 13 MG/DL (ref 6–20)
CALCIUM SERPL-MCNC: 9.5 MG/DL (ref 8.6–10.2)
CHLORIDE SERPL-SCNC: 102 MMOL/L (ref 98–107)
CO2 SERPL-SCNC: 22 MMOL/L (ref 22–29)
CREAT SERPL-MCNC: 0.9 MG/DL (ref 0.7–1.2)
D DIMER: <200 NG/ML DDU (ref 0–232)
EKG ATRIAL RATE: 104 BPM
EKG P AXIS: 33 DEGREES
EKG P-R INTERVAL: 148 MS
EKG Q-T INTERVAL: 344 MS
EKG QRS DURATION: 76 MS
EKG QTC CALCULATION (BAZETT): 452 MS
EKG R AXIS: -37 DEGREES
EKG T AXIS: 15 DEGREES
EKG VENTRICULAR RATE: 104 BPM
EOSINOPHIL # BLD: 0.04 K/UL (ref 0.05–0.5)
EOSINOPHILS RELATIVE PERCENT: 0 % (ref 0–6)
ERYTHROCYTE [DISTWIDTH] IN BLOOD BY AUTOMATED COUNT: 16.1 % (ref 11.5–15)
GFR, ESTIMATED: >90 ML/MIN/1.73M2
GLUCOSE BLD-MCNC: 173 MG/DL (ref 74–99)
GLUCOSE BLD-MCNC: 98 MG/DL (ref 74–99)
GLUCOSE SERPL-MCNC: 91 MG/DL (ref 74–99)
HCT VFR BLD AUTO: 42.8 % (ref 37–54)
HGB BLD-MCNC: 13.7 G/DL (ref 12.5–16.5)
IMM GRANULOCYTES # BLD AUTO: 0.03 K/UL (ref 0–0.58)
IMM GRANULOCYTES NFR BLD: 0 % (ref 0–5)
INR PPP: 1.1
LYMPHOCYTES NFR BLD: 1.27 K/UL (ref 1.5–4)
LYMPHOCYTES RELATIVE PERCENT: 13 % (ref 20–42)
MAGNESIUM SERPL-MCNC: 1.7 MG/DL (ref 1.6–2.6)
MCH RBC QN AUTO: 23.9 PG (ref 26–35)
MCHC RBC AUTO-ENTMCNC: 32 G/DL (ref 32–34.5)
MCV RBC AUTO: 74.7 FL (ref 80–99.9)
MONOCYTES NFR BLD: 0.32 K/UL (ref 0.1–0.95)
MONOCYTES NFR BLD: 3 % (ref 2–12)
NEUTROPHILS NFR BLD: 83 % (ref 43–80)
NEUTS SEG NFR BLD: 8.49 K/UL (ref 1.8–7.3)
PLATELET # BLD AUTO: 261 K/UL (ref 130–450)
PMV BLD AUTO: 10.5 FL (ref 7–12)
POTASSIUM SERPL-SCNC: 4.6 MMOL/L (ref 3.5–5)
PROT SERPL-MCNC: 8.2 G/DL (ref 6.4–8.3)
PROTHROMBIN TIME: 11.7 SEC (ref 9.3–12.4)
RBC # BLD AUTO: 5.73 M/UL (ref 3.8–5.8)
SODIUM SERPL-SCNC: 138 MMOL/L (ref 132–146)
TROPONIN I SERPL HS-MCNC: 7 NG/L (ref 0–11)
TROPONIN I SERPL HS-MCNC: <6 NG/L (ref 0–11)
WBC OTHER # BLD: 10.2 K/UL (ref 4.5–11.5)

## 2024-05-02 PROCEDURE — 82962 GLUCOSE BLOOD TEST: CPT

## 2024-05-02 PROCEDURE — 2580000003 HC RX 258: Performed by: REGISTERED NURSE

## 2024-05-02 PROCEDURE — 6360000002 HC RX W HCPCS

## 2024-05-02 PROCEDURE — 6370000000 HC RX 637 (ALT 250 FOR IP)

## 2024-05-02 PROCEDURE — 2700000000 HC OXYGEN THERAPY PER DAY

## 2024-05-02 PROCEDURE — 80053 COMPREHEN METABOLIC PANEL: CPT

## 2024-05-02 PROCEDURE — 99285 EMERGENCY DEPT VISIT HI MDM: CPT

## 2024-05-02 PROCEDURE — 85379 FIBRIN DEGRADATION QUANT: CPT

## 2024-05-02 PROCEDURE — 6360000002 HC RX W HCPCS: Performed by: INTERNAL MEDICINE

## 2024-05-02 PROCEDURE — 82550 ASSAY OF CK (CPK): CPT

## 2024-05-02 PROCEDURE — 6360000002 HC RX W HCPCS: Performed by: REGISTERED NURSE

## 2024-05-02 PROCEDURE — A4216 STERILE WATER/SALINE, 10 ML: HCPCS | Performed by: ANESTHESIOLOGY

## 2024-05-02 PROCEDURE — 3700000001 HC ADD 15 MINUTES (ANESTHESIA): Performed by: ANESTHESIOLOGY

## 2024-05-02 PROCEDURE — 62321 NJX INTERLAMINAR CRV/THRC: CPT | Performed by: ANESTHESIOLOGY

## 2024-05-02 PROCEDURE — 3600000002 HC SURGERY LEVEL 2 BASE: Performed by: ANESTHESIOLOGY

## 2024-05-02 PROCEDURE — 2709999900 HC NON-CHARGEABLE SUPPLY: Performed by: ANESTHESIOLOGY

## 2024-05-02 PROCEDURE — 71045 X-RAY EXAM CHEST 1 VIEW: CPT

## 2024-05-02 PROCEDURE — 6370000000 HC RX 637 (ALT 250 FOR IP): Performed by: INTERNAL MEDICINE

## 2024-05-02 PROCEDURE — 84484 ASSAY OF TROPONIN QUANT: CPT

## 2024-05-02 PROCEDURE — 85610 PROTHROMBIN TIME: CPT

## 2024-05-02 PROCEDURE — 3600000012 HC SURGERY LEVEL 2 ADDTL 15MIN: Performed by: ANESTHESIOLOGY

## 2024-05-02 PROCEDURE — 6360000002 HC RX W HCPCS: Performed by: ANESTHESIOLOGY

## 2024-05-02 PROCEDURE — 3700000000 HC ANESTHESIA ATTENDED CARE: Performed by: ANESTHESIOLOGY

## 2024-05-02 PROCEDURE — 85025 COMPLETE CBC W/AUTO DIFF WBC: CPT

## 2024-05-02 PROCEDURE — G0378 HOSPITAL OBSERVATION PER HR: HCPCS

## 2024-05-02 PROCEDURE — 96372 THER/PROPH/DIAG INJ SC/IM: CPT

## 2024-05-02 PROCEDURE — 7100000001 HC PACU RECOVERY - ADDTL 15 MIN: Performed by: ANESTHESIOLOGY

## 2024-05-02 PROCEDURE — 99223 1ST HOSP IP/OBS HIGH 75: CPT | Performed by: INTERNAL MEDICINE

## 2024-05-02 PROCEDURE — 83036 HEMOGLOBIN GLYCOSYLATED A1C: CPT

## 2024-05-02 PROCEDURE — 2580000003 HC RX 258: Performed by: INTERNAL MEDICINE

## 2024-05-02 PROCEDURE — 93005 ELECTROCARDIOGRAM TRACING: CPT

## 2024-05-02 PROCEDURE — 2500000003 HC RX 250 WO HCPCS: Performed by: ANESTHESIOLOGY

## 2024-05-02 PROCEDURE — 83735 ASSAY OF MAGNESIUM: CPT

## 2024-05-02 PROCEDURE — 7100000000 HC PACU RECOVERY - FIRST 15 MIN: Performed by: ANESTHESIOLOGY

## 2024-05-02 PROCEDURE — 2580000003 HC RX 258: Performed by: ANESTHESIOLOGY

## 2024-05-02 PROCEDURE — 96374 THER/PROPH/DIAG INJ IV PUSH: CPT

## 2024-05-02 PROCEDURE — 6360000004 HC RX CONTRAST MEDICATION: Performed by: ANESTHESIOLOGY

## 2024-05-02 RX ORDER — POLYETHYLENE GLYCOL 3350 17 G/17G
17 POWDER, FOR SOLUTION ORAL DAILY PRN
Status: DISCONTINUED | OUTPATIENT
Start: 2024-05-02 | End: 2024-05-03 | Stop reason: HOSPADM

## 2024-05-02 RX ORDER — SODIUM CHLORIDE 9 MG/ML
INJECTION, SOLUTION INTRAVENOUS PRN
Status: DISCONTINUED | OUTPATIENT
Start: 2024-05-02 | End: 2024-05-03 | Stop reason: HOSPADM

## 2024-05-02 RX ORDER — ACETAMINOPHEN 650 MG/1
650 SUPPOSITORY RECTAL EVERY 6 HOURS PRN
Status: DISCONTINUED | OUTPATIENT
Start: 2024-05-02 | End: 2024-05-03 | Stop reason: HOSPADM

## 2024-05-02 RX ORDER — ONDANSETRON 4 MG/1
4 TABLET, ORALLY DISINTEGRATING ORAL EVERY 8 HOURS PRN
Status: DISCONTINUED | OUTPATIENT
Start: 2024-05-02 | End: 2024-05-03 | Stop reason: HOSPADM

## 2024-05-02 RX ORDER — INSULIN LISPRO 100 [IU]/ML
0-4 INJECTION, SOLUTION INTRAVENOUS; SUBCUTANEOUS
Status: DISCONTINUED | OUTPATIENT
Start: 2024-05-02 | End: 2024-05-03 | Stop reason: HOSPADM

## 2024-05-02 RX ORDER — SODIUM CHLORIDE 0.9 % (FLUSH) 0.9 %
5-40 SYRINGE (ML) INJECTION PRN
Status: DISCONTINUED | OUTPATIENT
Start: 2024-05-02 | End: 2024-05-03 | Stop reason: HOSPADM

## 2024-05-02 RX ORDER — HYDROCODONE BITARTRATE AND ACETAMINOPHEN 5; 325 MG/1; MG/1
1 TABLET ORAL ONCE
Status: COMPLETED | OUTPATIENT
Start: 2024-05-02 | End: 2024-05-02

## 2024-05-02 RX ORDER — CYCLOBENZAPRINE HCL 10 MG
10 TABLET ORAL 3 TIMES DAILY PRN
Status: DISCONTINUED | OUTPATIENT
Start: 2024-05-02 | End: 2024-05-03 | Stop reason: HOSPADM

## 2024-05-02 RX ORDER — ATORVASTATIN CALCIUM 20 MG/1
20 TABLET, FILM COATED ORAL DAILY
Status: DISCONTINUED | OUTPATIENT
Start: 2024-05-02 | End: 2024-05-03

## 2024-05-02 RX ORDER — SODIUM CHLORIDE 9 MG/ML
INJECTION, SOLUTION INTRAMUSCULAR; INTRAVENOUS; SUBCUTANEOUS PRN
Status: DISCONTINUED | OUTPATIENT
Start: 2024-05-02 | End: 2024-05-02 | Stop reason: ALTCHOICE

## 2024-05-02 RX ORDER — ENOXAPARIN SODIUM 100 MG/ML
40 INJECTION SUBCUTANEOUS DAILY
Status: DISCONTINUED | OUTPATIENT
Start: 2024-05-02 | End: 2024-05-03 | Stop reason: HOSPADM

## 2024-05-02 RX ORDER — SODIUM CHLORIDE 9 MG/ML
INJECTION, SOLUTION INTRAVENOUS CONTINUOUS PRN
Status: DISCONTINUED | OUTPATIENT
Start: 2024-05-02 | End: 2024-05-02 | Stop reason: SDUPTHER

## 2024-05-02 RX ORDER — IOPAMIDOL 612 MG/ML
INJECTION, SOLUTION INTRATHECAL PRN
Status: DISCONTINUED | OUTPATIENT
Start: 2024-05-02 | End: 2024-05-02 | Stop reason: ALTCHOICE

## 2024-05-02 RX ORDER — FENTANYL CITRATE 50 UG/ML
INJECTION, SOLUTION INTRAMUSCULAR; INTRAVENOUS PRN
Status: DISCONTINUED | OUTPATIENT
Start: 2024-05-02 | End: 2024-05-02 | Stop reason: SDUPTHER

## 2024-05-02 RX ORDER — HYDROCODONE BITARTRATE AND ACETAMINOPHEN 10; 325 MG/1; MG/1
1 TABLET ORAL EVERY 8 HOURS PRN
Status: DISCONTINUED | OUTPATIENT
Start: 2024-05-02 | End: 2024-05-03 | Stop reason: HOSPADM

## 2024-05-02 RX ORDER — ASPIRIN 81 MG/1
324 TABLET, CHEWABLE ORAL ONCE
Status: COMPLETED | OUTPATIENT
Start: 2024-05-02 | End: 2024-05-02

## 2024-05-02 RX ORDER — FENTANYL CITRATE 50 UG/ML
25 INJECTION, SOLUTION INTRAMUSCULAR; INTRAVENOUS ONCE
Status: COMPLETED | OUTPATIENT
Start: 2024-05-02 | End: 2024-05-02

## 2024-05-02 RX ORDER — DEXTROSE MONOHYDRATE 100 MG/ML
INJECTION, SOLUTION INTRAVENOUS CONTINUOUS PRN
Status: DISCONTINUED | OUTPATIENT
Start: 2024-05-02 | End: 2024-05-03 | Stop reason: HOSPADM

## 2024-05-02 RX ORDER — ACETAMINOPHEN 325 MG/1
650 TABLET ORAL EVERY 6 HOURS PRN
Status: DISCONTINUED | OUTPATIENT
Start: 2024-05-02 | End: 2024-05-03 | Stop reason: HOSPADM

## 2024-05-02 RX ORDER — INSULIN LISPRO 100 [IU]/ML
0-4 INJECTION, SOLUTION INTRAVENOUS; SUBCUTANEOUS NIGHTLY
Status: DISCONTINUED | OUTPATIENT
Start: 2024-05-02 | End: 2024-05-03 | Stop reason: HOSPADM

## 2024-05-02 RX ORDER — SODIUM CHLORIDE 0.9 % (FLUSH) 0.9 %
5-40 SYRINGE (ML) INJECTION PRN
Status: DISCONTINUED | OUTPATIENT
Start: 2024-05-02 | End: 2024-05-02 | Stop reason: HOSPADM

## 2024-05-02 RX ORDER — SODIUM CHLORIDE 0.9 % (FLUSH) 0.9 %
5-40 SYRINGE (ML) INJECTION EVERY 12 HOURS SCHEDULED
Status: DISCONTINUED | OUTPATIENT
Start: 2024-05-02 | End: 2024-05-02 | Stop reason: HOSPADM

## 2024-05-02 RX ORDER — DEXAMETHASONE SODIUM PHOSPHATE 10 MG/ML
INJECTION, SOLUTION INTRAMUSCULAR; INTRAVENOUS PRN
Status: DISCONTINUED | OUTPATIENT
Start: 2024-05-02 | End: 2024-05-02 | Stop reason: ALTCHOICE

## 2024-05-02 RX ORDER — SODIUM CHLORIDE 0.9 % (FLUSH) 0.9 %
5-40 SYRINGE (ML) INJECTION EVERY 12 HOURS SCHEDULED
Status: DISCONTINUED | OUTPATIENT
Start: 2024-05-02 | End: 2024-05-03 | Stop reason: HOSPADM

## 2024-05-02 RX ORDER — MIDAZOLAM HYDROCHLORIDE 1 MG/ML
INJECTION INTRAMUSCULAR; INTRAVENOUS PRN
Status: DISCONTINUED | OUTPATIENT
Start: 2024-05-02 | End: 2024-05-02 | Stop reason: SDUPTHER

## 2024-05-02 RX ORDER — LIDOCAINE HYDROCHLORIDE 5 MG/ML
INJECTION, SOLUTION INFILTRATION; INTRAVENOUS PRN
Status: DISCONTINUED | OUTPATIENT
Start: 2024-05-02 | End: 2024-05-02 | Stop reason: ALTCHOICE

## 2024-05-02 RX ORDER — SODIUM CHLORIDE 9 MG/ML
INJECTION, SOLUTION INTRAVENOUS PRN
Status: DISCONTINUED | OUTPATIENT
Start: 2024-05-02 | End: 2024-05-02 | Stop reason: HOSPADM

## 2024-05-02 RX ORDER — ONDANSETRON 2 MG/ML
4 INJECTION INTRAMUSCULAR; INTRAVENOUS EVERY 6 HOURS PRN
Status: DISCONTINUED | OUTPATIENT
Start: 2024-05-02 | End: 2024-05-03 | Stop reason: HOSPADM

## 2024-05-02 RX ORDER — GLUCAGON 1 MG/ML
1 KIT INJECTION PRN
Status: DISCONTINUED | OUTPATIENT
Start: 2024-05-02 | End: 2024-05-03 | Stop reason: HOSPADM

## 2024-05-02 RX ADMIN — ATORVASTATIN CALCIUM 20 MG: 20 TABLET, FILM COATED ORAL at 21:11

## 2024-05-02 RX ADMIN — FENTANYL CITRATE 50 MCG: 50 INJECTION, SOLUTION INTRAMUSCULAR; INTRAVENOUS at 14:14

## 2024-05-02 RX ADMIN — HYDROCODONE BITARTRATE AND ACETAMINOPHEN 1 TABLET: 10; 325 TABLET ORAL at 18:50

## 2024-05-02 RX ADMIN — ASPIRIN 81 MG CHEWABLE TABLET 324 MG: 81 TABLET CHEWABLE at 15:08

## 2024-05-02 RX ADMIN — FENTANYL CITRATE 25 MCG: 50 INJECTION INTRAMUSCULAR; INTRAVENOUS at 15:09

## 2024-05-02 RX ADMIN — SODIUM CHLORIDE: 9 INJECTION, SOLUTION INTRAVENOUS at 13:58

## 2024-05-02 RX ADMIN — HYDROCODONE BITARTRATE AND ACETAMINOPHEN 1 TABLET: 5; 325 TABLET ORAL at 22:53

## 2024-05-02 RX ADMIN — MIDAZOLAM 2 MG: 1 INJECTION INTRAMUSCULAR; INTRAVENOUS at 13:58

## 2024-05-02 RX ADMIN — MIDAZOLAM 1 MG: 1 INJECTION INTRAMUSCULAR; INTRAVENOUS at 14:02

## 2024-05-02 RX ADMIN — CYCLOBENZAPRINE HYDROCHLORIDE 10 MG: 10 TABLET, FILM COATED ORAL at 18:50

## 2024-05-02 RX ADMIN — FENTANYL CITRATE 100 MCG: 50 INJECTION, SOLUTION INTRAMUSCULAR; INTRAVENOUS at 13:58

## 2024-05-02 RX ADMIN — SODIUM CHLORIDE, PRESERVATIVE FREE 10 ML: 5 INJECTION INTRAVENOUS at 21:11

## 2024-05-02 RX ADMIN — ENOXAPARIN SODIUM 40 MG: 100 INJECTION SUBCUTANEOUS at 21:11

## 2024-05-02 ASSESSMENT — PAIN - FUNCTIONAL ASSESSMENT
PAIN_FUNCTIONAL_ASSESSMENT: 0-10
PAIN_FUNCTIONAL_ASSESSMENT: 0-10

## 2024-05-02 ASSESSMENT — PAIN SCALES - GENERAL
PAINLEVEL_OUTOF10: 9
PAINLEVEL_OUTOF10: 8

## 2024-05-02 ASSESSMENT — PAIN DESCRIPTION - ORIENTATION: ORIENTATION: MID

## 2024-05-02 ASSESSMENT — LIFESTYLE VARIABLES
HOW OFTEN DO YOU HAVE A DRINK CONTAINING ALCOHOL: MONTHLY OR LESS
HOW MANY STANDARD DRINKS CONTAINING ALCOHOL DO YOU HAVE ON A TYPICAL DAY: 1 OR 2

## 2024-05-02 ASSESSMENT — PAIN DESCRIPTION - LOCATION
LOCATION: CHEST
LOCATION: GENERALIZED

## 2024-05-02 ASSESSMENT — PAIN DESCRIPTION - DESCRIPTORS
DESCRIPTORS: DISCOMFORT
DESCRIPTORS: PRESSURE;TIGHTNESS

## 2024-05-02 NOTE — ED NOTES
ED to Inpatient Handoff Report    Notified Carolin GARCIA that electronic handoff available and patient ready for transport to room 0432.    Safety Risks: Risk of falls    Patient in Restraints: no    Constant Observer or Patient : no    Telemetry Monitoring Ordered :No           Order to transfer to unit without monitor:N/A    Last MEWS: 1 Time completed: 1807    Deterioration Index Score:   Predictive Model Details          23 (Normal)  Factor Value    Calculated 5/2/2024 18:07 42% Age 53 years old    Deterioration Index Model 18% Cardiac rhythm Sinus tachy     15% Potassium 4.6 mmol/L     10% Pulse 100     7% Systolic 139     5% WBC count 10.2 k/uL     3% Pulse oximetry 99 %     1% Sodium 138 mmol/L     0% Respiratory rate 16     0% Hematocrit 42.8 %     0% Temperature 98.3 °F (36.8 °C)        Vitals:    05/02/24 1656 05/02/24 1711 05/02/24 1726 05/02/24 1807   BP: (!) 151/90   (!) 139/96   Pulse: (!) 110 (!) 106 (!) 112 100   Resp: 17 19 16 16   Temp:    98.3 °F (36.8 °C)   TempSrc:    Oral   SpO2: 97% 99% 96% 99%   Weight:       Height:             Opportunity for questions and clarification was provided.

## 2024-05-02 NOTE — H&P
[facility]   History and Physical      CHIEF COMPLAINT:  chest tightness    History of Present Illness: pt is a 53 y.o. male who presents for chest tightness. Pt states that he started to have this feeling after epidural steroid injection. Pt pointed to mid chest as to where pain was located. Pain is different from chronic pain that he has. Pt noted that during this time the only place he had some numbness transiently was down right arm. Pt notes some pain inferior to left anterior rib cage in the abdomen that was small area. Currently tightness present but better. Pt was c/o chronic pain currently as well. Pt denies abd pain, n/v, diarrhea, constipation, melena, hematochezia, change in urination, dysuria, or hematuria. Pt notes that he is concerned about the tightness because of a previous injection where he had cardiac arrest.     REVIEW OF SYSTEMS:    A detailed system review was conducted with patient and is negative unless stated in hpi.        PMH:  Past Medical History:   Diagnosis Date    Anxiety     Asthma     exercised induced    Cervicogenic headache 06/12/2019    Chronic daily headache 06/13/2019    Reported since 2009    Chronic generalized pain     COVID-19 2020    mild    Degenerative disc disease, cervical     Dermatitis     Diabetes mellitus (HCC)     PO meds only    Heart stopped beating (HCC) 06/05/2021    3 days after cervical fusion- \" chest pain , dyspnea, passed out- intubated for 5 days\".has not seen a cardiologist since. 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9 06/14/2021 04:29 AM    METASPCT 1.7 06/14/2021 04:29 AM    LYMPHOPCT 13 05/02/2024 03:41 PM    MONOPCT 3 05/02/2024 03:41 PM    MYELOPCT 0.9 06/14/2021 04:29 AM    EOSPCT 0 05/02/2024 03:41 PM    BASOPCT 0 05/02/2024 03:41 PM    MONOSABS 0.32 05/02/2024 03:41 PM    EOSABS 0.04 05/02/2024 03:41 PM    BASOSABS 0.03 05/02/2024 03:41 PM     CMP:    Lab Results   Component Value Date/Time     05/02/2024 03:41 PM    K 4.6 05/02/2024 03:41 PM    K 4.3 05/26/2021 10:05 AM     05/02/2024 03:41 PM    CO2 22 05/02/2024 03:41 PM    BUN 13 05/02/2024 03:41 PM    CREATININE 0.9 05/02/2024 03:41 PM    GFRAA >60 06/14/2021 04:29 AM    LABGLOM >90 05/02/2024 03:41 PM    GLUCOSE 91 05/02/2024 03:41 PM    CALCIUM 9.5 05/02/2024 03:41 PM    BILITOT 0.9 05/02/2024 03:41 PM    ALKPHOS 67 05/02/2024 03:41 PM    AST 76 05/02/2024 03:41 PM    ALT 76 05/02/2024 03:41 PM     Magnesium:    Lab Results   Component Value Date/Time    MG 1.7 05/02/2024 03:41 PM       Radiology: XR CHEST PORTABLE    Result Date: 5/2/2024  EXAMINATION: ONE XRAY VIEW OF THE CHEST 5/2/2024 3:23 pm COMPARISON: None. HISTORY: ORDERING SYSTEM PROVIDED HISTORY: Chest tightness TECHNOLOGIST PROVIDED HISTORY: Reason for exam:->Chest tightness FINDINGS: The previously seen moderate left mid chest and right lower chest patchy infiltrates have resolved, consistent with clearing of pneumonia. The previously seen small right pleural effusion is no longer evident. The lungs are now clear.  There is no sign of any infiltrate or effusion. The heart remains normal in size.  The mediastinum is normal in appearance. Again seen is a metal plate and anchoring screws in the

## 2024-05-02 NOTE — OP NOTE
Operative Note      Patient: Tao Villareal  YOB: 1970  MRN: [id number]    Date of Procedure: 2024    Pre-Op Diagnosis Codes:     * Cervical radiculopathy [M54.12]    Post-Op Diagnosis: Same       Procedure(s):  CERVICAL 7-THORACIC 1 EPIDURAL STEROID INJECTION UNDER FLUOROSCOPIC GUIDANCE AT RIGHT PARAMEDIAN    Surgeon(s):  Francisco Arias MD    Assistant:   * No surgical staff found *    Anesthesia: Monitor Anesthesia Care    Estimated Blood Loss (mL): Minimal    Complications: None    Specimens:   * No specimens in log *    Implants:  * No implants in log *      Drains: * No LDAs found *    Findings:  Infection Present At Time Of Surgery (PATOS) (choose all levels that have infection present):  No infection present  Other Findings: good needle placement    Detailed Description of Procedure:   2024    Patient: Tao Villareal  :  1970  Age:  53 y.o.  Sex:  male     PRE-PROCEDURE DIAGNOSIS: Cervical degenerative disc disease, cervical radicular pain.    POST-PROCEDURE DIAGNOSIS: Same.    PROCEDURE: Fluoroscopic guided cervical epidural steroid injection, #C7-T1 at  level.    SURGEON: Francisco Arias MD    ANESTHESIA: MAC    ESTIMATED BLOOD LOSS: None.  ______________________________________________________________________  BRIEF HISTORY:  Tao Villareal comes in today for the   therapeutic cervical epidural injection under fluoroscopic guidance. The potential complications of this procedure were discussed with the him again today.  He has elected to undergo the aforementioned procedure.    Tao’s complete History & Physical examination were reviewed in depth, a copy of which is in the chart.      DESCRIPTION OF PROCEDURE:    After confirming written and informed consent, a time-out was performed and Tao’s name and date of birth, the procedure to be performed as well as the plan for the location of the needle insertion were confirmed.    The patient was  brought into the procedure room and placed in the prone position with the head flexed midline on the fluoroscopy table. A pillow was placed under the patient's head to increase cervical interlaminar space. Standard monitors were placed, and vital signs were observed throughout the procedure. The area was prepped with chloraprep and the C7-T1 interspace was marked under fluoroscopy. The skin and subcutaneous tissues at the above level were anesthestized with 0.5% lidocaine. An # 18 gauge 3 1/2 tuohy epidural needle was inserted and advanced toward the inferior lamina until bony contact was made. The needle was then advanced superiorly toward epidural space . From this point on loss of resistance technique with 5 cc glass syringe was used to confirm entrance of the needle into the epidural space under intermittent lateral fluoroscopy. Once in the epidural space , negative aspiration for blood and CSF was confirmed . Needle tip placement was confirmed by visualizing epidural spread of 0.5-1 ml of Isovue M 300 visualized in both AP and lateral live fluoroscopic views. Then after negative aspiration, a solution of 0.9 % saline 2 ml and 15mg Dexamethasone was easily injected. The needle was gently removed intact. The patient neck was cleaned and a Band-Aid was placed over the needle insertion point.    Disposition the patient tolerated the procedure well and there were no complications . Vital signs remained stable throughout the procedure. The patient was escorted to the recovery area where they remained until discharge and written discharge instructions for the procedure were given.    He was complaining of chest tightness- was evaluated by Anesthesia staff and transferred to ER .    Plan: Toa will return to our pain management center as scheduled.     Francisco Arias MD

## 2024-05-02 NOTE — PROGRESS NOTES
1425- patient brought to pacu from pain injection c/o chest pain/tightness.  12 lead EKG completed, results called to anesthesia.   Will transfer patient to ER for further follow up.   ER notified, patient to ER handoff given face to face.   All patient belongings with patient including cell phone.  Yobany [phone] notified per patient request.

## 2024-05-02 NOTE — PROGRESS NOTES
Call placed to Dr. Oliveros for admission orders and pain medication per patient request. Orders received for diet and pain meds, states will place rest of orders when he sees patient.

## 2024-05-02 NOTE — ED PROVIDER NOTES
Notable for the following components:    Lactic Acid 2.3 (*)     All other components within normal limits   CBC WITH AUTO DIFFERENTIAL - Abnormal; Notable for the following components:    MCV 74.9 (*)     MCH 23.7 (*)     MCHC 31.7 (*)     RDW 15.4 (*)     Neutrophils % 83 (*)     Lymphocytes % 12 (*)     Lymphocytes Absolute 0.97 (*)     Eosinophils Absolute 0.00 (*)     All other components within normal limits   HEMOGLOBIN A1C - Abnormal; Notable for the following components:    Hemoglobin A1C 5.7 (*)     All other components within normal limits   URINALYSIS WITH MICROSCOPIC - Abnormal; Notable for the following components:    Glucose, Ur 500 (*)     Ketones, Urine TRACE (*)     All other components within normal limits   URINE DRUG SCREEN - Abnormal; Notable for the following components:    Benzodiazepine Screen, Urine POSITIVE (*)     Opiates, Urine POSITIVE (*)     Fentanyl, Ur POSITIVE (*)     All other components within normal limits   POCT GLUCOSE - Abnormal; Notable for the following components:    POC Glucose 173 (*)     All other components within normal limits   POCT GLUCOSE - Abnormal; Notable for the following components:    POC Glucose 114 (*)     All other components within normal limits   POCT GLUCOSE - Abnormal; Notable for the following components:    POC Glucose 144 (*)     All other components within normal limits   MAGNESIUM   PROTIME-INR   TROPONIN   D-DIMER, QUANTITATIVE   TROPONIN   PHOSPHORUS   MAGNESIUM   LIPID, FASTING   POCT GLUCOSE   POCT GLUCOSE   POCT GLUCOSE   POCT GLUCOSE   POCT GLUCOSE       As interpreted by me, the above displayed labs are abnormal. All other labs obtained during this visit were within normal range or not returned as of this dictation.    EKG Interpretation:  Interpreted by emergency department physician, Bharat Weeks MD  Rate: 104  Rhythm: Sinus  Interpretation: left ventricular hypertrophy and sinus tachycardia  Comparison: changes compared to previous     Migraines     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 06/13/2019       CONSULTS: (Who and What was discussed)  IP CONSULT TO CARDIOLOGY  IP CONSULT TO IV TEAM    Discussion with Other Profesionals : [facility]    Social Determinants : None    Records Reviewed : Inpatient Notes reviewed inpatient cardiology notes from  6/5/2021, patient was seen and evaluated status post cardiac arrest, at that time cardiac cause of cardiac arrest was felt to be less likely as opposed to pulmonary cause.  CC/HPI Summary, DDx, ED Course, and Reassessment: 53 y.o. male who presents emergently from PACU with complaints of chest pain status post epidural steroid injection under fluoroscopic guidance with pain management earlier today.    On initial evaluation remarkable for tachycardia to 110s.  Afebrile, vital signs.  On initial exam lung heart sounds unremarkable, skin warm and dry, abdomen soft nontender nondistended.  Presentation concerning for: ACS, JAMARCUS, sprain, strain, PE, COPD exacerbation to name a few.    Labs reviewed by me showed CMP with no electrolyte abnormalities which is reassuring.  Troponins unremarkable x 2 which is reassuring.  Mild transaminitis with ALT AST 76 and 76.  CBC without leukocytosis or anemia which is reassuring.    Imaging reviewed by me showed no acute process which radiologist confirms.    On reevaluation patient reports chest tightness somewhat improved.  He has an elevated heart score and will require admission for cardiac evaluation.    Consulted internal medicine, [facility].    I discussed the results of the workup with the patient as well as the recommendation for admission.  Patient verbalizes their understanding and is agreeable and amenable to the plan at this time.  All questions answered, through shared decision making we will plan for admission.    Disposition Considerations (Tests not ordered but considered, Shared Decision  Making, Pt Expectation of Test or Tx.): Admission    FINAL IMPRESSION      1. Atypical chest pain          DISPOSITION/PLAN     DISPOSITION Admitted 05/02/2024 05:49:39 PM    PATIENT REFERRED TO:  Anamika Calderon MD  [address] [place] [id number] [phone]    Schedule an appointment as soon as possible for a visit  discharge follow up    Terrance Mitchell MD  [address] [place] [id number] [phone]    Schedule an appointment as soon as possible for a visit  discharge follow up      DISCHARGE MEDICATIONS:  Discharge Medication List as of 5/3/2024  3:15 PM        START taking these medications    Details   amLODIPine (NORVASC) 5 MG tablet Take 1 tablet by mouth daily, Disp-30 tablet, R-1Normal             DISCONTINUED MEDICATIONS:  Discharge Medication List as of 5/3/2024  3:15 PM               (Please note that portions of this note were completed with a voice recognition program.  Efforts were made to edit the dictations but occasionally words are mis-transcribed.)    Bharat Weeks MD (electronically signed)

## 2024-05-02 NOTE — ANESTHESIA POSTPROCEDURE EVALUATION
Department of Anesthesiology  Postprocedure Note    Patient: Tao Villareal  MRN: [id number]  YOB: 1970  Date of evaluation: 5/2/2024    Procedure Summary       Date: 05/02/24 Room / Location: [facility] PROCEDURE ROOM 02 / [facility]    Anesthesia Start: 1354 Anesthesia Stop: 1410    Procedure: CERVICAL 7-THORACIC 1 EPIDURAL STEROID INJECTION UNDER FLUOROSCOPIC GUIDANCE AT RIGHT PARAMEDIAN (Right: Neck) Diagnosis:       Cervical radiculopathy      (Cervical radiculopathy [M54.12])    Surgeons: Francisco Arias MD Responsible Provider: James Ware DO    Anesthesia Type: MAC ASA Status: 3            Anesthesia Type: No value filed.    Philippe Phase I: Philippe Score: 10    Philippe Phase II:      Anesthesia Post Evaluation    Patient location during evaluation: bedside  Patient participation: complete - patient participated  Level of consciousness: awake  Pain score: 3  Airway patency: patent  Nausea & Vomiting: no vomiting and no nausea  Cardiovascular status: hemodynamically stable  Respiratory status: acceptable  Hydration status: stable  Comments: Chest tightness post procedure along with weakened grasp and paresthesias of the toes.  Pain management: adequate    No notable events documented.

## 2024-05-02 NOTE — INTERVAL H&P NOTE
Update History & Physical    The patient's History and Physical of April 26, 2024 was reviewed with the patient and I examined the patient. There was no change. The surgical site was confirmed by the patient and me.     Plan: The risks, benefits, expected outcome, and alternative to the recommended procedure have been discussed with the patient. Patient understands and wants to proceed with the procedure.     Electronically signed by Francisco Arias MD on 5/2/2024

## 2024-05-03 VITALS
SYSTOLIC BLOOD PRESSURE: 143 MMHG | WEIGHT: 174 LBS | OXYGEN SATURATION: 100 % | TEMPERATURE: 98 F | BODY MASS INDEX: 28.99 KG/M2 | RESPIRATION RATE: 16 BRPM | DIASTOLIC BLOOD PRESSURE: 94 MMHG | HEART RATE: 73 BPM | HEIGHT: 65 IN

## 2024-05-03 PROBLEM — I10 PRIMARY HYPERTENSION: Status: ACTIVE | Noted: 2024-05-03

## 2024-05-03 LAB
ALBUMIN SERPL-MCNC: 4.8 G/DL (ref 3.5–5.2)
ALP SERPL-CCNC: 64 U/L (ref 40–129)
ALT SERPL-CCNC: 61 U/L (ref 0–40)
AMPHET UR QL SCN: NEGATIVE
ANION GAP SERPL CALCULATED.3IONS-SCNC: 11 MMOL/L (ref 7–16)
AST SERPL-CCNC: 40 U/L (ref 0–39)
BARBITURATES UR QL SCN: NEGATIVE
BASOPHILS # BLD: 0 K/UL (ref 0–0.2)
BASOPHILS NFR BLD: 0 % (ref 0–2)
BENZODIAZ UR QL: POSITIVE
BILIRUB SERPL-MCNC: 0.6 MG/DL (ref 0–1.2)
BILIRUB UR QL STRIP: NEGATIVE
BUN SERPL-MCNC: 16 MG/DL (ref 6–20)
BUPRENORPHINE UR QL: NEGATIVE
CALCIUM SERPL-MCNC: 10.1 MG/DL (ref 8.6–10.2)
CANNABINOIDS UR QL SCN: NEGATIVE
CHLORIDE SERPL-SCNC: 100 MMOL/L (ref 98–107)
CK SERPL-CCNC: 291 U/L (ref 20–200)
CLARITY UR: CLEAR
CO2 SERPL-SCNC: 26 MMOL/L (ref 22–29)
COCAINE UR QL SCN: NEGATIVE
COLOR UR: YELLOW
CREAT SERPL-MCNC: 0.7 MG/DL (ref 0.7–1.2)
EOSINOPHIL # BLD: 0 K/UL (ref 0.05–0.5)
EOSINOPHILS RELATIVE PERCENT: 0 % (ref 0–6)
ERYTHROCYTE [DISTWIDTH] IN BLOOD BY AUTOMATED COUNT: 15.4 % (ref 11.5–15)
FENTANYL UR QL: POSITIVE
GFR, ESTIMATED: >90 ML/MIN/1.73M2
GLUCOSE BLD-MCNC: 114 MG/DL (ref 74–99)
GLUCOSE BLD-MCNC: 144 MG/DL (ref 74–99)
GLUCOSE SERPL-MCNC: 134 MG/DL (ref 74–99)
GLUCOSE UR STRIP-MCNC: 500 MG/DL
HBA1C MFR BLD: 5.7 % (ref 4–5.6)
HCT VFR BLD AUTO: 41.7 % (ref 37–54)
HGB BLD-MCNC: 13.2 G/DL (ref 12.5–16.5)
HGB UR QL STRIP.AUTO: NEGATIVE
IMM GRANULOCYTES # BLD AUTO: <0.03 K/UL (ref 0–0.58)
IMM GRANULOCYTES NFR BLD: 0 % (ref 0–5)
KETONES UR STRIP-MCNC: ABNORMAL MG/DL
LACTATE BLDV-SCNC: 2.3 MMOL/L (ref 0.5–2.2)
LEUKOCYTE ESTERASE UR QL STRIP: NEGATIVE
LYMPHOCYTES NFR BLD: 0.97 K/UL (ref 1.5–4)
LYMPHOCYTES RELATIVE PERCENT: 12 % (ref 20–42)
MAGNESIUM SERPL-MCNC: 2 MG/DL (ref 1.6–2.6)
MCH RBC QN AUTO: 23.7 PG (ref 26–35)
MCHC RBC AUTO-ENTMCNC: 31.7 G/DL (ref 32–34.5)
MCV RBC AUTO: 74.9 FL (ref 80–99.9)
METHADONE UR QL: NEGATIVE
MONOCYTES NFR BLD: 0.4 K/UL (ref 0.1–0.95)
MONOCYTES NFR BLD: 5 % (ref 2–12)
NEUTROPHILS NFR BLD: 83 % (ref 43–80)
NEUTS SEG NFR BLD: 6.77 K/UL (ref 1.8–7.3)
NITRITE UR QL STRIP: NEGATIVE
OPIATES UR QL SCN: POSITIVE
OXYCODONE UR QL SCN: NEGATIVE
PCP UR QL SCN: NEGATIVE
PH UR STRIP: 6 [PH] (ref 5–9)
PHOSPHATE SERPL-MCNC: 3.1 MG/DL (ref 2.5–4.5)
PLATELET # BLD AUTO: 235 K/UL (ref 130–450)
PMV BLD AUTO: 10.4 FL (ref 7–12)
POTASSIUM SERPL-SCNC: 4.5 MMOL/L (ref 3.5–5)
PROT SERPL-MCNC: 7.6 G/DL (ref 6.4–8.3)
PROT UR STRIP-MCNC: NEGATIVE MG/DL
RBC # BLD AUTO: 5.57 M/UL (ref 3.8–5.8)
RBC #/AREA URNS HPF: ABNORMAL /HPF
SODIUM SERPL-SCNC: 137 MMOL/L (ref 132–146)
SP GR UR STRIP: 1.02 (ref 1–1.03)
TEST INFORMATION: ABNORMAL
UROBILINOGEN UR STRIP-ACNC: 0.2 EU/DL (ref 0–1)
WBC #/AREA URNS HPF: ABNORMAL /HPF
WBC OTHER # BLD: 8.2 K/UL (ref 4.5–11.5)

## 2024-05-03 PROCEDURE — 83605 ASSAY OF LACTIC ACID: CPT

## 2024-05-03 PROCEDURE — 6370000000 HC RX 637 (ALT 250 FOR IP): Performed by: INTERNAL MEDICINE

## 2024-05-03 PROCEDURE — 99254 IP/OBS CNSLTJ NEW/EST MOD 60: CPT | Performed by: INTERNAL MEDICINE

## 2024-05-03 PROCEDURE — 80053 COMPREHEN METABOLIC PANEL: CPT

## 2024-05-03 PROCEDURE — 2580000003 HC RX 258: Performed by: INTERNAL MEDICINE

## 2024-05-03 PROCEDURE — 83735 ASSAY OF MAGNESIUM: CPT

## 2024-05-03 PROCEDURE — 99239 HOSP IP/OBS DSCHRG MGMT >30: CPT | Performed by: INTERNAL MEDICINE

## 2024-05-03 PROCEDURE — 84100 ASSAY OF PHOSPHORUS: CPT

## 2024-05-03 PROCEDURE — 81001 URINALYSIS AUTO W/SCOPE: CPT

## 2024-05-03 PROCEDURE — 85025 COMPLETE CBC W/AUTO DIFF WBC: CPT

## 2024-05-03 PROCEDURE — 80307 DRUG TEST PRSMV CHEM ANLYZR: CPT

## 2024-05-03 PROCEDURE — 6370000000 HC RX 637 (ALT 250 FOR IP)

## 2024-05-03 PROCEDURE — APPSS60 APP SPLIT SHARED TIME 46-60 MINUTES

## 2024-05-03 PROCEDURE — 82962 GLUCOSE BLOOD TEST: CPT

## 2024-05-03 PROCEDURE — G0378 HOSPITAL OBSERVATION PER HR: HCPCS

## 2024-05-03 RX ORDER — AMLODIPINE BESYLATE 5 MG/1
5 TABLET ORAL DAILY
Status: DISCONTINUED | OUTPATIENT
Start: 2024-05-03 | End: 2024-05-03 | Stop reason: HOSPADM

## 2024-05-03 RX ORDER — ATORVASTATIN CALCIUM 20 MG/1
20 TABLET, FILM COATED ORAL DAILY
Status: DISCONTINUED | OUTPATIENT
Start: 2024-05-03 | End: 2024-05-03 | Stop reason: HOSPADM

## 2024-05-03 RX ORDER — SUMATRIPTAN 50 MG/1
50 TABLET, FILM COATED ORAL DAILY PRN
Status: DISCONTINUED | OUTPATIENT
Start: 2024-05-03 | End: 2024-05-03 | Stop reason: HOSPADM

## 2024-05-03 RX ORDER — AMLODIPINE BESYLATE 5 MG/1
5 TABLET ORAL DAILY
Qty: 30 TABLET | Refills: 1 | Status: SHIPPED | OUTPATIENT
Start: 2024-05-04

## 2024-05-03 RX ADMIN — HYDROCODONE BITARTRATE AND ACETAMINOPHEN 1 TABLET: 10; 325 TABLET ORAL at 06:23

## 2024-05-03 RX ADMIN — CYCLOBENZAPRINE HYDROCHLORIDE 10 MG: 10 TABLET, FILM COATED ORAL at 06:27

## 2024-05-03 RX ADMIN — CYCLOBENZAPRINE HYDROCHLORIDE 10 MG: 10 TABLET, FILM COATED ORAL at 00:52

## 2024-05-03 RX ADMIN — SUMATRIPTAN SUCCINATE 50 MG: 50 TABLET ORAL at 08:41

## 2024-05-03 RX ADMIN — ATORVASTATIN CALCIUM 20 MG: 20 TABLET, FILM COATED ORAL at 08:41

## 2024-05-03 RX ADMIN — AMLODIPINE BESYLATE 5 MG: 5 TABLET ORAL at 11:52

## 2024-05-03 RX ADMIN — SODIUM CHLORIDE, PRESERVATIVE FREE 10 ML: 5 INJECTION INTRAVENOUS at 08:41

## 2024-05-03 ASSESSMENT — PAIN DESCRIPTION - LOCATION
LOCATION: GENERALIZED
LOCATION: GENERALIZED

## 2024-05-03 ASSESSMENT — PAIN DESCRIPTION - DESCRIPTORS: DESCRIPTORS: DISCOMFORT;SORE

## 2024-05-03 ASSESSMENT — PAIN SCALES - GENERAL
PAINLEVEL_OUTOF10: 10
PAINLEVEL_OUTOF10: 8

## 2024-05-03 NOTE — PROGRESS NOTES
CLINICAL PHARMACY NOTE: MEDS TO BEDS    Total # of Prescriptions Filled: 1   The following medications were delivered to the patient:  Amlodipine 5 mg      Additional Documentation:

## 2024-05-03 NOTE — PROGRESS NOTES
Pt called in to state for us not to call Dr. Alejandro' office until after seen by cardiologist as he may get discharged from hospital today. Edplained to pt that Dr. Alejandro' office needs to be aware and all cardiology findings have to be reviewed with anesthesia.

## 2024-05-03 NOTE — CONSULTS
Inpatient Cardiology Consultation      Reason for Consult:  Chest Pain     Consulting Physician: Dr. Mitchell     Requesting Physician:  Dr. Oliveros     Date of Consultation: 5/3/2024    History of Present Illness:   Tao Villareal is known remotely to [facility] cardiology through inpatient consultation with Dr. Vega in 2021.  During this admission patient was postop for C4-C7 anterior cervical dissecting infusion.    Patient presented to the emergency department on 5/1/2024 from the PACU with complaints of chest pain status post epidural steroid injection under fluoroscopic guidance with pain management earlier that day.  Labs include sodium 138, potassium 4.6, BUN/creatinine 13/0.9, magnesium 1.7, troponin 7, <6, WBC 10.2, Hgb 13.7.    At the time of examination patient is sitting up in bed and appears to be in no acute distress.  Patient reports that yesterday after he received his epidural injection he began to develop midsternal chest tightness.  Patient was very vague with symptoms but denies any radiation, associated shortness of breath, nausea, lightheadedness or dizziness.  When asked how long the pain lasted patient states \"I do not know, I do not look at the clock\".  Patient is currently pain-free at this time.  He denies the use of tobacco products, alcohol or illicit drug use.      Please note: past medical records were reviewed per electronic medical record (EMR) - see detailed reports under Past Medical/ Surgical History.     Past Medical History:    Hyperlipidemia, on statin  Prediabetes on metformin  Cervical spondylosis  C4-C7 anterior cervical discectomy and fusion on 6/2/2021  Chronic pain treated with narcotic pain meds  Headaches, on Imitrex and Aimovig  Hx of Lyme disease  Hx of neurostimulator implanted 2/2019 (removed 4/2021)  Hx of hernia repair  Hx of bilateral carpal tunnel release  Hx of cardiac arrest 2021 in the postoperative setting-evaluated by cardiology at that time who felt  injection vial 0-4 Units, 0-4 Units, SubCUTAneous, TID WC  insulin lispro (HUMALOG) injection vial 0-4 Units, 0-4 Units, SubCUTAneous, Nightly  Glucose (TRUEPLUS) oral gel 15 g, 15 g, Oral, PRN    Allergies:  Penicillins    Social History:    Tobacco: Reports former use of cigarettes from [id number].  Quit as a teenager  Alcohol: Denies  Illicit drug use: Denies    Family History:   Family History   Problem Relation Age of Onset    No Known Problems Mother     Prostate Cancer Father     No Known Problems Maternal Grandmother     No Known Problems Maternal Grandfather     No Known Problems Paternal Grandmother     No Known Problems Paternal Grandfather     No Known Problems Daughter        Review of Systems:   As per HPI    PHYSICAL EXAM:   BP Range Last 24 hours:   Heart Rate Range Last 24 hours:    /83   Pulse 88   Temp 98 °F (36.7 °C) (Oral)   Resp 17   Ht 1.651 m (5' 5\")   Wt 78.9 kg (174 lb)   SpO2 96%   BMI 28.96 kg/m²   CONST:  Well developed, well nourished who appears of stated age. Awake, alert and cooperative. No apparent distress.   HEENT:   Head- Normocephalic, atraumatic   Eyes- Conjunctivae pink  Throat- Oral mucosa pink and moist  Neck-  No stridor, trachea midline, no jugular venous distention. No carotid bruit.    CHEST: Chest symmetrical and non-tender to palpation. Respirations unlabored.  RESPIRATORY: Lung sounds - clear throughout fields   CARDIOVASCULAR:     Heart Inspection- shows no noted pulsations  Heart Palpation- no heaves or thrills; PMI is non-displaced   Heart Ausculation- Regular rate and rhythm, no murmur. No s3, s4 or rub   PV: No lower extremity edema. No varicosities. Pedal pulses palpable  ABDOMEN: Soft, non-tender to light palpation. Bowel sounds present. No palpable masses   MS: Good muscle strength and tone. No atrophy or abnormal movements.   : Deferred  SKIN: Warm and dry no statis dermatitis or ulcers   NEURO / PSYCH: Oriented to person, place and time. Speech

## 2024-05-03 NOTE — PROGRESS NOTES
Spoke to Dr Booth - Reviewed history, Labs, EKG...up to the cardiology consult note by Susy Carney, JACQUELYN-CNP an attested addendum by Dr Mitchell dated 5/3/2024 @ 4227 (see Plan section)    Dr Botoh stated may proceed with surgery as planned without further preop evaluation.    Jocy at Dr Alejandro' office notified of the above.

## 2024-05-03 NOTE — DISCHARGE INSTRUCTIONS
Your information:  Name: Tao Villareal  : 1970    Your instructions:        What to do after you leave the hospital:    Recommended diet: regular diet    Recommended activity: activity as tolerated        The following personal items were collected during your admission and were returned to you:    Belongings  Dental Appliances: Retainer (s)  Vision - Corrective Lenses: Eyeglasses  Hearing Aid: None  Clothing: Pants, Footwear  Jewelry: None  Body Piercings Removed: N/A  Electronic Devices: Cell Phone,   Weapons (Notify Protective Services/Security): None  Home Medications: None  Valuables Given To: Patient  Provide Name(s) of Who Valuable(s) Were Given To: self    Information obtained by:  By signing below, I understand that if any problems occur once I leave the hospital I am to contact my primary doctor.  I understand and acknowledge receipt of the instructions indicated above.

## 2024-05-03 NOTE — DISCHARGE SUMMARY
[facility] Hospitalist       Hospitalist Physician Discharge Summary       No follow-up provider specified.    Activity level: as aviva    Diet: ADULT DIET; Regular; 5 carb choices (75 gm/meal)    Dispo:home    Condition at discharge: fair        Patient ID:  Tao Villareal  [id number]  53 y.o.  1970    Admit date: 5/2/2024    Discharge date and time:  5/3/2024  3:04 PM    Admission Diagnoses: Principal Problem:    Atypical chest pain  Active Problems:    Elevated BP without diagnosis of hypertension    Controlled type 2 diabetes mellitus without complication (HCC)    Hyperlipidemia    Chest pain  Resolved Problems:    * No resolved hospital problems. *      Discharge Diagnoses: Principal Problem:    Atypical chest pain  Active Problems:    Elevated BP without diagnosis of hypertension    Controlled type 2 diabetes mellitus without complication (HCC)    Hyperlipidemia    Chest pain  Resolved Problems:    * No resolved hospital problems. *    Chest pain/tightness  Htn  Chronic pain   Hyperlipidemia  Dm type 2 controlled      Consults:  IP CONSULT TO CARDIOLOGY  IP CONSULT TO IV TEAM    Procedures: none    Hospital Course: Patient was admitted with Atypical chest pain [R07.89]. Patient is a 53 y.o. male who presents for chest tightness. Pt states that he started to have this feeling after epidural steroid injection. Pt pointed to mid chest as to where pain was located. Pain is different from chronic pain that he has. Pt noted that during this time the only place he had some numbness transiently was down right arm. Pt notes some pain inferior to left anterior rib cage in the abdomen that was small area. Currently tightness present but better. Pt was c/o chronic pain currently as well. Pt denies abd pain, n/v, diarrhea, constipation, melena, hematochezia, change in urination, dysuria, or hematuria. Pt notes that he is concerned about the tightness because of a previous injection where he had  MG/0.5ML Soaj injection syringe  Commonly known as: IMITREX     vitamin D 1.25 MG (44200 UT) Caps capsule  Commonly known as: ERGOCALCIFEROL  Take 1 capsule by mouth once a week               Where to Get Your Medications        These medications were sent to [address] -  [phone] - F [phone] [address] [facility] [address]      Phone: [phone]   amLODIPine 5 MG tablet           Total time for discharge is 37 min    Signed:  Electronically signed by Tomas Oliveros DO on 5/3/2024 at 3:04 PM

## 2024-05-05 ENCOUNTER — ANESTHESIA EVENT (OUTPATIENT)
Dept: OPERATING ROOM | Age: 54
End: 2024-05-05
Payer: MEDICAID

## 2024-05-06 ENCOUNTER — ANESTHESIA (OUTPATIENT)
Dept: OPERATING ROOM | Age: 54
End: 2024-05-06
Payer: MEDICAID

## 2024-05-06 ENCOUNTER — HOSPITAL ENCOUNTER (OUTPATIENT)
Age: 54
Setting detail: OUTPATIENT SURGERY
Discharge: HOME | End: 2024-05-06
Attending: GENERAL ACUTE CARE HOSPITAL | Admitting: GENERAL ACUTE CARE HOSPITAL
Payer: MEDICAID

## 2024-05-06 VITALS
DIASTOLIC BLOOD PRESSURE: 79 MMHG | WEIGHT: 174 LBS | TEMPERATURE: 96.9 F | HEIGHT: 65 IN | OXYGEN SATURATION: 99 % | RESPIRATION RATE: 18 BRPM | HEART RATE: 88 BPM | SYSTOLIC BLOOD PRESSURE: 140 MMHG | BODY MASS INDEX: 28.99 KG/M2

## 2024-05-06 DIAGNOSIS — M25.811 SHOULDER IMPINGEMENT, RIGHT: Primary | ICD-10-CM

## 2024-05-06 LAB
ANION GAP SERPL CALCULATED.3IONS-SCNC: 13 MMOL/L (ref 7–16)
BUN SERPL-MCNC: 11 MG/DL (ref 6–20)
CALCIUM SERPL-MCNC: 9.9 MG/DL (ref 8.6–10.2)
CHLORIDE SERPL-SCNC: 100 MMOL/L (ref 98–107)
CO2 SERPL-SCNC: 26 MMOL/L (ref 22–29)
CREAT SERPL-MCNC: 0.9 MG/DL (ref 0.7–1.2)
EKG ATRIAL RATE: 81 BPM
EKG P AXIS: 21 DEGREES
EKG P-R INTERVAL: 150 MS
EKG Q-T INTERVAL: 386 MS
EKG QRS DURATION: 88 MS
EKG QTC CALCULATION (BAZETT): 448 MS
EKG R AXIS: -27 DEGREES
EKG T AXIS: 6 DEGREES
EKG VENTRICULAR RATE: 81 BPM
ERYTHROCYTE [DISTWIDTH] IN BLOOD BY AUTOMATED COUNT: 16.2 % (ref 11.5–15)
GFR, ESTIMATED: >90 ML/MIN/1.73M2
GLUCOSE BLD-MCNC: 88 MG/DL (ref 74–99)
GLUCOSE SERPL-MCNC: 92 MG/DL (ref 74–99)
HCT VFR BLD AUTO: 44.7 % (ref 37–54)
HGB BLD-MCNC: 14.3 G/DL (ref 12.5–16.5)
MCH RBC QN AUTO: 23.8 PG (ref 26–35)
MCHC RBC AUTO-ENTMCNC: 32 G/DL (ref 32–34.5)
MCV RBC AUTO: 74.4 FL (ref 80–99.9)
PLATELET # BLD AUTO: 239 K/UL (ref 130–450)
PMV BLD AUTO: 10.2 FL (ref 7–12)
POTASSIUM SERPL-SCNC: 3.4 MMOL/L (ref 3.5–5)
RBC # BLD AUTO: 6.01 M/UL (ref 3.8–5.8)
SODIUM SERPL-SCNC: 139 MMOL/L (ref 132–146)
WBC OTHER # BLD: 9.1 K/UL (ref 4.5–11.5)

## 2024-05-06 PROCEDURE — 2580000003 HC RX 258: Performed by: GENERAL ACUTE CARE HOSPITAL

## 2024-05-06 PROCEDURE — 6360000002 HC RX W HCPCS: Performed by: ANESTHESIOLOGY

## 2024-05-06 PROCEDURE — 6360000002 HC RX W HCPCS

## 2024-05-06 PROCEDURE — 6370000000 HC RX 637 (ALT 250 FOR IP): Performed by: GENERAL ACUTE CARE HOSPITAL

## 2024-05-06 PROCEDURE — 80048 BASIC METABOLIC PNL TOTAL CA: CPT

## 2024-05-06 PROCEDURE — 2709999900 HC NON-CHARGEABLE SUPPLY: Performed by: GENERAL ACUTE CARE HOSPITAL

## 2024-05-06 PROCEDURE — 7100000000 HC PACU RECOVERY - FIRST 15 MIN: Performed by: GENERAL ACUTE CARE HOSPITAL

## 2024-05-06 PROCEDURE — 7100000001 HC PACU RECOVERY - ADDTL 15 MIN: Performed by: GENERAL ACUTE CARE HOSPITAL

## 2024-05-06 PROCEDURE — 3600000014 HC SURGERY LEVEL 4 ADDTL 15MIN: Performed by: GENERAL ACUTE CARE HOSPITAL

## 2024-05-06 PROCEDURE — 7100000010 HC PHASE II RECOVERY - FIRST 15 MIN: Performed by: GENERAL ACUTE CARE HOSPITAL

## 2024-05-06 PROCEDURE — 85027 COMPLETE CBC AUTOMATED: CPT

## 2024-05-06 PROCEDURE — 2500000003 HC RX 250 WO HCPCS: Performed by: ANESTHESIOLOGY

## 2024-05-06 PROCEDURE — 3600000004 HC SURGERY LEVEL 4 BASE: Performed by: GENERAL ACUTE CARE HOSPITAL

## 2024-05-06 PROCEDURE — 6360000002 HC RX W HCPCS: Performed by: GENERAL ACUTE CARE HOSPITAL

## 2024-05-06 PROCEDURE — C1713 ANCHOR/SCREW BN/BN,TIS/BN: HCPCS | Performed by: GENERAL ACUTE CARE HOSPITAL

## 2024-05-06 PROCEDURE — 2500000003 HC RX 250 WO HCPCS

## 2024-05-06 PROCEDURE — 82962 GLUCOSE BLOOD TEST: CPT

## 2024-05-06 PROCEDURE — 3700000000 HC ANESTHESIA ATTENDED CARE: Performed by: GENERAL ACUTE CARE HOSPITAL

## 2024-05-06 PROCEDURE — 3700000001 HC ADD 15 MINUTES (ANESTHESIA): Performed by: GENERAL ACUTE CARE HOSPITAL

## 2024-05-06 PROCEDURE — 2580000003 HC RX 258

## 2024-05-06 PROCEDURE — 64415 NJX AA&/STRD BRCH PLXS IMG: CPT | Performed by: ANESTHESIOLOGY

## 2024-05-06 PROCEDURE — 7100000011 HC PHASE II RECOVERY - ADDTL 15 MIN: Performed by: GENERAL ACUTE CARE HOSPITAL

## 2024-05-06 PROCEDURE — 93005 ELECTROCARDIOGRAM TRACING: CPT

## 2024-05-06 PROCEDURE — 2720000010 HC SURG SUPPLY STERILE: Performed by: GENERAL ACUTE CARE HOSPITAL

## 2024-05-06 DEVICE — KNOTLESS TENSIONABLE FIBERTAK BICEPS KIT
Type: IMPLANTABLE DEVICE | Site: SHOULDER | Status: FUNCTIONAL
Brand: ARTHREX®

## 2024-05-06 RX ORDER — ONDANSETRON 2 MG/ML
4 INJECTION INTRAMUSCULAR; INTRAVENOUS EVERY 6 HOURS PRN
Status: DISCONTINUED | OUTPATIENT
Start: 2024-05-06 | End: 2024-05-06 | Stop reason: HOSPADM

## 2024-05-06 RX ORDER — FENTANYL CITRATE 50 UG/ML
INJECTION, SOLUTION INTRAMUSCULAR; INTRAVENOUS PRN
Status: DISCONTINUED | OUTPATIENT
Start: 2024-05-06 | End: 2024-05-06 | Stop reason: SDUPTHER

## 2024-05-06 RX ORDER — EPINEPHRINE 1 MG/ML
INJECTION, SOLUTION, CONCENTRATE INTRAVENOUS PRN
Status: DISCONTINUED | OUTPATIENT
Start: 2024-05-06 | End: 2024-05-06 | Stop reason: ALTCHOICE

## 2024-05-06 RX ORDER — SODIUM CHLORIDE 9 MG/ML
INJECTION, SOLUTION INTRAVENOUS PRN
Status: DISCONTINUED | OUTPATIENT
Start: 2024-05-06 | End: 2024-05-06 | Stop reason: HOSPADM

## 2024-05-06 RX ORDER — ROPIVACAINE HYDROCHLORIDE 5 MG/ML
15 INJECTION, SOLUTION EPIDURAL; INFILTRATION; PERINEURAL
Status: DISCONTINUED | OUTPATIENT
Start: 2024-05-06 | End: 2024-05-06 | Stop reason: HOSPADM

## 2024-05-06 RX ORDER — ROPIVACAINE HYDROCHLORIDE 5 MG/ML
INJECTION, SOLUTION EPIDURAL; INFILTRATION; PERINEURAL PRN
Status: DISCONTINUED | OUTPATIENT
Start: 2024-05-06 | End: 2024-05-06 | Stop reason: SDUPTHER

## 2024-05-06 RX ORDER — KETAMINE HYDROCHLORIDE 10 MG/ML
INJECTION, SOLUTION INTRAMUSCULAR; INTRAVENOUS PRN
Status: DISCONTINUED | OUTPATIENT
Start: 2024-05-06 | End: 2024-05-06 | Stop reason: SDUPTHER

## 2024-05-06 RX ORDER — DEXAMETHASONE SODIUM PHOSPHATE 10 MG/ML
4 INJECTION, SOLUTION INTRAMUSCULAR; INTRAVENOUS ONCE
Status: COMPLETED | OUTPATIENT
Start: 2024-05-06 | End: 2024-05-06

## 2024-05-06 RX ORDER — SODIUM CHLORIDE 0.9 % (FLUSH) 0.9 %
5-40 SYRINGE (ML) INJECTION PRN
Status: DISCONTINUED | OUTPATIENT
Start: 2024-05-06 | End: 2024-05-06 | Stop reason: HOSPADM

## 2024-05-06 RX ORDER — DEXTROSE MONOHYDRATE 100 MG/ML
INJECTION, SOLUTION INTRAVENOUS CONTINUOUS PRN
Status: DISCONTINUED | OUTPATIENT
Start: 2024-05-06 | End: 2024-05-06 | Stop reason: HOSPADM

## 2024-05-06 RX ORDER — DIPHENHYDRAMINE HYDROCHLORIDE 50 MG/ML
12.5 INJECTION INTRAMUSCULAR; INTRAVENOUS
Status: DISCONTINUED | OUTPATIENT
Start: 2024-05-06 | End: 2024-05-06 | Stop reason: HOSPADM

## 2024-05-06 RX ORDER — FENTANYL CITRATE 50 UG/ML
25 INJECTION, SOLUTION INTRAMUSCULAR; INTRAVENOUS EVERY 5 MIN PRN
Status: DISCONTINUED | OUTPATIENT
Start: 2024-05-06 | End: 2024-05-06 | Stop reason: HOSPADM

## 2024-05-06 RX ORDER — SODIUM CHLORIDE 0.9 % (FLUSH) 0.9 %
5-40 SYRINGE (ML) INJECTION EVERY 12 HOURS SCHEDULED
Status: DISCONTINUED | OUTPATIENT
Start: 2024-05-06 | End: 2024-05-06 | Stop reason: HOSPADM

## 2024-05-06 RX ORDER — PROCHLORPERAZINE EDISYLATE 5 MG/ML
5 INJECTION INTRAMUSCULAR; INTRAVENOUS
Status: DISCONTINUED | OUTPATIENT
Start: 2024-05-06 | End: 2024-05-06 | Stop reason: HOSPADM

## 2024-05-06 RX ORDER — ONDANSETRON 2 MG/ML
INJECTION INTRAMUSCULAR; INTRAVENOUS PRN
Status: DISCONTINUED | OUTPATIENT
Start: 2024-05-06 | End: 2024-05-06 | Stop reason: SDUPTHER

## 2024-05-06 RX ORDER — GLUCAGON 1 MG/ML
1 KIT INJECTION PRN
Status: DISCONTINUED | OUTPATIENT
Start: 2024-05-06 | End: 2024-05-06 | Stop reason: HOSPADM

## 2024-05-06 RX ORDER — SODIUM CHLORIDE 9 MG/ML
INJECTION, SOLUTION INTRAVENOUS CONTINUOUS PRN
Status: DISCONTINUED | OUTPATIENT
Start: 2024-05-06 | End: 2024-05-06 | Stop reason: SDUPTHER

## 2024-05-06 RX ORDER — OXYCODONE HYDROCHLORIDE 5 MG/1
5 TABLET ORAL EVERY 4 HOURS PRN
Status: DISCONTINUED | OUTPATIENT
Start: 2024-05-06 | End: 2024-05-06 | Stop reason: HOSPADM

## 2024-05-06 RX ORDER — ROCURONIUM BROMIDE 10 MG/ML
INJECTION, SOLUTION INTRAVENOUS PRN
Status: DISCONTINUED | OUTPATIENT
Start: 2024-05-06 | End: 2024-05-06 | Stop reason: SDUPTHER

## 2024-05-06 RX ORDER — MORPHINE SULFATE 2 MG/ML
2 INJECTION, SOLUTION INTRAMUSCULAR; INTRAVENOUS
Status: DISCONTINUED | OUTPATIENT
Start: 2024-05-06 | End: 2024-05-06 | Stop reason: HOSPADM

## 2024-05-06 RX ORDER — OXYCODONE HYDROCHLORIDE AND ACETAMINOPHEN 5; 325 MG/1; MG/1
1 TABLET ORAL EVERY 6 HOURS PRN
Qty: 20 TABLET | Refills: 0 | Status: SHIPPED | OUTPATIENT
Start: 2024-05-06 | End: 2024-05-13

## 2024-05-06 RX ORDER — LIDOCAINE HYDROCHLORIDE 20 MG/ML
INJECTION, SOLUTION EPIDURAL; INFILTRATION; INTRACAUDAL; PERINEURAL PRN
Status: DISCONTINUED | OUTPATIENT
Start: 2024-05-06 | End: 2024-05-06 | Stop reason: SDUPTHER

## 2024-05-06 RX ORDER — OXYCODONE HYDROCHLORIDE 5 MG/1
10 TABLET ORAL EVERY 4 HOURS PRN
Status: DISCONTINUED | OUTPATIENT
Start: 2024-05-06 | End: 2024-05-06 | Stop reason: HOSPADM

## 2024-05-06 RX ORDER — DEXAMETHASONE SODIUM PHOSPHATE 4 MG/ML
INJECTION, SOLUTION INTRA-ARTICULAR; INTRALESIONAL; INTRAMUSCULAR; INTRAVENOUS; SOFT TISSUE PRN
Status: DISCONTINUED | OUTPATIENT
Start: 2024-05-06 | End: 2024-05-06 | Stop reason: SDUPTHER

## 2024-05-06 RX ORDER — NALOXONE HYDROCHLORIDE 0.4 MG/ML
INJECTION, SOLUTION INTRAMUSCULAR; INTRAVENOUS; SUBCUTANEOUS PRN
Status: DISCONTINUED | OUTPATIENT
Start: 2024-05-06 | End: 2024-05-06 | Stop reason: HOSPADM

## 2024-05-06 RX ORDER — MORPHINE SULFATE 4 MG/ML
4 INJECTION, SOLUTION INTRAMUSCULAR; INTRAVENOUS
Status: DISCONTINUED | OUTPATIENT
Start: 2024-05-06 | End: 2024-05-06 | Stop reason: HOSPADM

## 2024-05-06 RX ORDER — ONDANSETRON 4 MG/1
4 TABLET, ORALLY DISINTEGRATING ORAL EVERY 8 HOURS PRN
Status: DISCONTINUED | OUTPATIENT
Start: 2024-05-06 | End: 2024-05-06 | Stop reason: HOSPADM

## 2024-05-06 RX ORDER — IPRATROPIUM BROMIDE AND ALBUTEROL SULFATE 2.5; .5 MG/3ML; MG/3ML
1 SOLUTION RESPIRATORY (INHALATION)
Status: DISCONTINUED | OUTPATIENT
Start: 2024-05-06 | End: 2024-05-06 | Stop reason: HOSPADM

## 2024-05-06 RX ORDER — LIDOCAINE HYDROCHLORIDE 10 MG/ML
INJECTION, SOLUTION INFILTRATION; PERINEURAL PRN
Status: DISCONTINUED | OUTPATIENT
Start: 2024-05-06 | End: 2024-05-06 | Stop reason: SDUPTHER

## 2024-05-06 RX ORDER — ONDANSETRON 2 MG/ML
4 INJECTION INTRAMUSCULAR; INTRAVENOUS
Status: DISCONTINUED | OUTPATIENT
Start: 2024-05-06 | End: 2024-05-06 | Stop reason: HOSPADM

## 2024-05-06 RX ORDER — LIDOCAINE HYDROCHLORIDE 10 MG/ML
10 INJECTION, SOLUTION INFILTRATION; PERINEURAL
Status: DISCONTINUED | OUTPATIENT
Start: 2024-05-06 | End: 2024-05-06 | Stop reason: HOSPADM

## 2024-05-06 RX ORDER — MIDAZOLAM HYDROCHLORIDE 2 MG/2ML
1 INJECTION, SOLUTION INTRAMUSCULAR; INTRAVENOUS EVERY 5 MIN PRN
Status: DISCONTINUED | OUTPATIENT
Start: 2024-05-06 | End: 2024-05-06 | Stop reason: HOSPADM

## 2024-05-06 RX ORDER — PROPOFOL 10 MG/ML
INJECTION, EMULSION INTRAVENOUS PRN
Status: DISCONTINUED | OUTPATIENT
Start: 2024-05-06 | End: 2024-05-06 | Stop reason: SDUPTHER

## 2024-05-06 RX ADMIN — HYDROMORPHONE HYDROCHLORIDE 0.5 MG: 1 INJECTION, SOLUTION INTRAMUSCULAR; INTRAVENOUS; SUBCUTANEOUS at 16:56

## 2024-05-06 RX ADMIN — SODIUM CHLORIDE: 9 INJECTION, SOLUTION INTRAVENOUS at 15:44

## 2024-05-06 RX ADMIN — ROCURONIUM BROMIDE 50 MG: 10 INJECTION, SOLUTION INTRAVENOUS at 14:39

## 2024-05-06 RX ADMIN — HYDROMORPHONE HYDROCHLORIDE 0.5 MG: 1 INJECTION, SOLUTION INTRAMUSCULAR; INTRAVENOUS; SUBCUTANEOUS at 16:52

## 2024-05-06 RX ADMIN — ROPIVACAINE HYDROCHLORIDE 15 ML: 5 INJECTION, SOLUTION EPIDURAL; INFILTRATION; PERINEURAL at 13:32

## 2024-05-06 RX ADMIN — SODIUM CHLORIDE: 9 INJECTION, SOLUTION INTRAVENOUS at 13:31

## 2024-05-06 RX ADMIN — LIDOCAINE HYDROCHLORIDE 100 MG: 20 INJECTION, SOLUTION EPIDURAL; INFILTRATION; INTRACAUDAL; PERINEURAL at 14:39

## 2024-05-06 RX ADMIN — ONDANSETRON 4 MG: 2 INJECTION INTRAMUSCULAR; INTRAVENOUS at 14:44

## 2024-05-06 RX ADMIN — OXYCODONE 10 MG: 5 TABLET ORAL at 17:37

## 2024-05-06 RX ADMIN — FENTANYL CITRATE 100 MCG: 50 INJECTION, SOLUTION INTRAMUSCULAR; INTRAVENOUS at 14:39

## 2024-05-06 RX ADMIN — KETAMINE HYDROCHLORIDE 25 MG: 10 INJECTION INTRAMUSCULAR; INTRAVENOUS at 14:39

## 2024-05-06 RX ADMIN — PROPOFOL 200 MG: 10 INJECTION, EMULSION INTRAVENOUS at 14:39

## 2024-05-06 RX ADMIN — DEXAMETHASONE SODIUM PHOSPHATE 4 MG: 10 INJECTION, SOLUTION INTRAMUSCULAR; INTRAVENOUS at 13:32

## 2024-05-06 RX ADMIN — DEXAMETHASONE SODIUM PHOSPHATE 10 MG: 4 INJECTION, SOLUTION INTRAMUSCULAR; INTRAVENOUS at 14:44

## 2024-05-06 RX ADMIN — MIDAZOLAM HYDROCHLORIDE 2 MG: 1 INJECTION, SOLUTION INTRAMUSCULAR; INTRAVENOUS at 13:26

## 2024-05-06 RX ADMIN — SUGAMMADEX 300 MG: 100 INJECTION, SOLUTION INTRAVENOUS at 16:03

## 2024-05-06 RX ADMIN — LIDOCAINE HYDROCHLORIDE 1 ML: 10 INJECTION, SOLUTION INFILTRATION; PERINEURAL at 13:31

## 2024-05-06 RX ADMIN — WATER 2000 MG: 1 INJECTION INTRAMUSCULAR; INTRAVENOUS; SUBCUTANEOUS at 14:34

## 2024-05-06 ASSESSMENT — PAIN DESCRIPTION - LOCATION
LOCATION: SHOULDER
LOCATION: SHOULDER

## 2024-05-06 ASSESSMENT — PAIN SCALES - GENERAL
PAINLEVEL_OUTOF10: 10
PAINLEVEL_OUTOF10: 8

## 2024-05-06 ASSESSMENT — PAIN DESCRIPTION - DESCRIPTORS
DESCRIPTORS: ACHING;CRUSHING;DISCOMFORT
DESCRIPTORS: DISCOMFORT
DESCRIPTORS: ACHING;CRUSHING;DISCOMFORT

## 2024-05-06 ASSESSMENT — PAIN DESCRIPTION - ORIENTATION
ORIENTATION: LOWER
ORIENTATION: RIGHT

## 2024-05-06 NOTE — ANESTHESIA PROCEDURE NOTES
Peripheral Block    Patient location during procedure: pre-op  Reason for block: post-op pain management and at surgeon's request  Start time: 5/6/2024 1:35 PM  Staffing  Performed: anesthesiologist   Anesthesiologist: Ravinder Nina MD  Performed by: Ravinder Nina MD  Authorized by: Ravinder Nina MD    Preanesthetic Checklist  Completed: patient identified, IV checked, site marked, risks and benefits discussed, surgical/procedural consents, equipment checked, pre-op evaluation, timeout performed, anesthesia consent given, oxygen available and monitors applied/VS acknowledged  Peripheral Block   Patient position: sitting  Prep: ChloraPrep  Provider prep: mask and sterile gloves  Patient monitoring: cardiac monitor, continuous pulse ox, frequent blood pressure checks and IV access  Block type: Brachial plexus  Interscalene  Laterality: right  Injection technique: single-shot  Guidance: ultrasound guided    Needle   Needle type: short-bevel   Needle gauge: 22 G  Needle localization: ultrasound guidance  Needle length: 5 cm  Assessment   Injection assessment: negative aspiration for heme, no paresthesia on injection and local visualized surrounding nerve on ultrasound  Slow fractionated injection: yes  Hemodynamics: stable

## 2024-05-06 NOTE — OP NOTE
Operative Note      Patient: Tao Villareal  YOB: 1970  MRN: [id number]    Date of Procedure: 5/6/2024    Pre-Op Diagnosis Codes:     * Bicipital tendinitis of right shoulder [M75.21]     * Incomplete tear of right rotator cuff, unspecified whether traumatic [M75.111]     * Arthritis of right shoulder region [M19.011]    Post-Op Diagnosis: Same       Procedure(s):  RIGHT SHOULDER ARTHROSCOPY EXTENSIVE GLENOHUMERAL DEBRIDEMENT AND CUFF MEND WITH OPEN SUBPECTORAL BICEP TENODESIS + ISB    Surgeon(s):  Robin Alejandro DO    Assistant:   Surgical Assistant: Michael Goodrich; Madai Arias RN    Anesthesia: General    Estimated Blood Loss (mL): Minimal    Complications: None    Specimens:   * No specimens in log *    Implants:  Implant Name Type Inv. Item Serial No.  Lot No. LRB No. Used Action   FIXATION KIT KNOTLESS TENSIONABLE BICEPS DRL GUIDE FIBERTAK - [id number]  FIXATION KIT KNOTLESS TENSIONABLE BICEPS DRL GUIDE FIBERTAK  [employer]- [id number] Right 1 Implanted         Drains: * No LDAs found *    Findings:  Infection Present At Time Of Surgery (PATOS) (choose all levels that have infection present):  No infection present  Other Findings: Small residual osteophyte about the anterior acromion, significant intra-articular labral fraying/biceps tendinitis, interstitial articular sided rotator cuff tearing    Detailed Description of Procedure:   Patient was greeted in the preoperative holding area.  All final questions were answered.  The correct operative upper extremity was marked with indelible skin marker.    The patient then underwent regional pain block per the anesthesia staff.  Once block been given time to take effect he was taken to the operative suite and placed in supine position on the operating table.  All bony prominences were well-padded.  General anesthesia was induced per the anesthesia team.  Range of motion examination was then performed and this was found to be

## 2024-05-06 NOTE — PROGRESS NOTES
Call placed to Dr. Alejandro regarding Patient requesting to talk to him. Dr. Alejandro stated he will talk to patient at follow up visit.     Patient insisting he wants to speak to Dr. Alejandro before discharging home. Second call placed to Dr. Alejandro. Dr. Alejandro states he will follow up with the patient in the office.     Clinical manager, Blessing, at bedside to talk to patient.

## 2024-05-06 NOTE — ANESTHESIA POSTPROCEDURE EVALUATION
Department of Anesthesiology  Postprocedure Note    Patient: Tao Villareal  MRN: [id number]  YOB: 1970  Date of evaluation: 5/6/2024    Procedure Summary       Date: 05/06/24 Room / Location: [address]    Anesthesia Start: 1431 Anesthesia Stop: 1617    Procedure: RIGHT SHOULDER ARTHROSCOPY EXTENSIVE GLENOHUMERAL DEBRIDEMENT AND CUFF MEND WITH OPEN SUBPECTORAL BICEP TENODESIS + ISB (Right: Shoulder) Diagnosis:       Bicipital tendinitis of right shoulder      Incomplete tear of right rotator cuff, unspecified whether traumatic      Arthritis of right shoulder region      (Bicipital tendinitis of right shoulder [M75.21])      (Incomplete tear of right rotator cuff, unspecified whether traumatic [M75.111])      (Arthritis of right shoulder region [M19.011])    Surgeons: Robin Alejandro DO Responsible Provider: Ravinder Nina MD    Anesthesia Type: general ASA Status: 3            Anesthesia Type: No value filed.    Philippe Phase I: Philippe Score: 10    Philippe Phase II:      Anesthesia Post Evaluation    Patient location during evaluation: PACU  Patient participation: complete - patient participated  Level of consciousness: awake  Pain score: 4  Airway patency: patent  Nausea & Vomiting: no nausea and no vomiting  Cardiovascular status: blood pressure returned to baseline and hemodynamically stable  Respiratory status: acceptable  Hydration status: euvolemic  Pain management: adequate        No notable events documented.

## 2024-05-06 NOTE — ANESTHESIA PRE PROCEDURE
Department of Anesthesiology  Preprocedure Note       Name:  Tao Villareal   Age:  53 y.o.  :  1970                                          MRN:  [id number]         Date:  2024      Surgeon: Surgeon(s):  Robin Alejandro DO    Procedure: Procedure(s):  RIGHT SHOULDER ARTHROSCOPY EXTENSIVE GLENOHUMERAL DEBRIDEMENT POSSIBLE CUFF MEND WITH OPEN SUBPECTORAL BICEP TENODESIS  ++[employer]++    ++ISB++    Medications prior to admission:   Prior to Admission medications    Medication Sig Start Date End Date Taking? Authorizing Provider   amLODIPine (NORVASC) 5 MG tablet Take 1 tablet by mouth daily  Patient taking differently: Take 1 tablet by mouth every morning 24   Tomas Oliveros DO   cyclobenzaprine (FLEXERIL) 10 MG tablet Take 1 tablet by mouth 3 times daily as needed for Muscle spasms    Rom Elizondo MD   Erenumab-aooe (AIMOVIG) 140 MG/ML SOAJ Inject 140 mg into the skin every 28 days 24   Marianna Dexter MD   vitamin D (ERGOCALCIFEROL) 1.25 MG (02452 UT) CAPS capsule Take 1 capsule by mouth once a week 2/15/24   Marianna Dexter MD   metFORMIN (GLUCOPHAGE) 1000 MG tablet Take 1 tablet by mouth 2 times daily 11/10/23   Rom Elizondo MD   SUMAtriptan (IMITREX) 6 MG/0.5ML SOAJ injection syringe inject 0.5 milliliters ( 6 milligrams ) subcutaneously AT ONSET OF HEADACHE once daily if needed 23   Rom Elizondo MD   HYDROcodone-acetaminophen (NORCO)  MG per tablet Take 1 tablet by mouth every 8 hours as needed for Pain.    Rom Elizondo MD   atorvastatin (LIPITOR) 20 MG tablet take 1 tablet by mouth once daily 22   Rom Elizondo MD       Current medications:    Current Facility-Administered Medications   Medication Dose Route Frequency Provider Last Rate Last Admin    sodium chloride flush 0.9 % injection 5-40 mL  5-40 mL IntraVENous 2 times per day Robin Alejandro DO        sodium chloride flush 0.9 % injection 5-40 mL  5-40 mL

## 2024-05-06 NOTE — H&P
History and Physical      CHIEF COMPLAINT: Right shoulder pain    History of Present Illness:  Tao Villareal is a 53 y.o. year-old male presents for surgery of the right shoulder.  This patient was referred to me by one of my partners.  He had previously undergone arthroscopic surgery of the right shoulder several years ago.  He did well in his recovery but starting having increasing pain recently.  After evaluation in the office, I was concerned for potential biceps tendinitis.  He did not have his bicep tendon addressed at the time of his index surgery.  Also, his preoperative MRI showed interstitial tearing of the rotator cuff.  I subsequently discussed revision arthroscopic surgery with possible cuff repair and cuff mend in addition to open subpectoral biceps tenodesis.  The surgery and subsequent recovery, all risk, benefits alternatives treatment were reviewed.  He is here today for surgery.  Patient did recently have a short stay in the hospital with chest pain.  However, this issue has been resolved and he has been cleared for surgery by medicine and cardiology.        Past Medical History:   Diagnosis Date    Anxiety     Asthma     exercised induced    Cervicogenic headache 06/12/2019    Chest pain 05/02/2024    See Epic notes    Chronic daily headache 06/13/2019    Reported since 2009    Chronic generalized pain     COVID-19 2020    mild    Degenerative disc disease, cervical     Dermatitis     Diabetes mellitus (HCC)     PO meds only    Heart stopped beating (HCC) 06/05/2021    3 days after cervical fusion- \" chest pain , dyspnea, passed out- intubated for 5 days\".has not seen a cardiologist since. 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

## 2024-07-03 ENCOUNTER — OFFICE VISIT (OUTPATIENT)
Dept: PAIN MANAGEMENT | Age: 54
End: 2024-07-03
Payer: MEDICAID

## 2024-07-03 VITALS
BODY MASS INDEX: 28.99 KG/M2 | OXYGEN SATURATION: 97 % | TEMPERATURE: 97.1 F | HEART RATE: 97 BPM | SYSTOLIC BLOOD PRESSURE: 136 MMHG | WEIGHT: 174 LBS | DIASTOLIC BLOOD PRESSURE: 90 MMHG | HEIGHT: 65 IN | RESPIRATION RATE: 18 BRPM

## 2024-07-03 DIAGNOSIS — Z98.1 S/P CERVICAL SPINAL FUSION: ICD-10-CM

## 2024-07-03 DIAGNOSIS — T85.192S MALFUNCTION OF SPINAL CORD STIMULATOR, SEQUELA: ICD-10-CM

## 2024-07-03 DIAGNOSIS — G89.4 CHRONIC PAIN SYNDROME: Primary | ICD-10-CM

## 2024-07-03 DIAGNOSIS — M47.812 CERVICAL SPONDYLOSIS: ICD-10-CM

## 2024-07-03 DIAGNOSIS — M51.36 DDD (DEGENERATIVE DISC DISEASE), LUMBAR: ICD-10-CM

## 2024-07-03 DIAGNOSIS — M47.817 LUMBOSACRAL SPONDYLOSIS WITHOUT MYELOPATHY: ICD-10-CM

## 2024-07-03 DIAGNOSIS — M50.30 DDD (DEGENERATIVE DISC DISEASE), CERVICAL: ICD-10-CM

## 2024-07-03 PROCEDURE — 3075F SYST BP GE 130 - 139MM HG: CPT | Performed by: ANESTHESIOLOGY

## 2024-07-03 PROCEDURE — 99213 OFFICE O/P EST LOW 20 MIN: CPT | Performed by: ANESTHESIOLOGY

## 2024-07-03 PROCEDURE — 99214 OFFICE O/P EST MOD 30 MIN: CPT | Performed by: ANESTHESIOLOGY

## 2024-07-03 PROCEDURE — 3080F DIAST BP >= 90 MM HG: CPT | Performed by: ANESTHESIOLOGY

## 2024-07-03 RX ORDER — DEXTROMETHORPHAN HYDROBROMIDE AND PROMETHAZINE HYDROCHLORIDE 15; 6.25 MG/5ML; MG/5ML
SYRUP ORAL
COMMUNITY
Start: 2024-06-28

## 2024-07-03 RX ORDER — PHENTERMINE HYDROCHLORIDE 37.5 MG/1
37.5 TABLET ORAL DAILY
COMMUNITY
Start: 2024-05-29

## 2024-07-03 NOTE — PROGRESS NOTES
Tao Villareal presents to the [facility] on 7/3/2024. Tao is complaining of pain all over. The pain is constant. The pain is described as aching, throbbing, stabbing, sharp, and penetrating. Pain is rated on his best day at a 6, on his worst day at a 10, and on average at a 8 on the VAS scale. He took his last dose of Norco and Flexeril Last night.      Any procedures since your last visit: Yes,   CERVICAL 7-THORACIC 1 EPIDURAL STEROID INJECTION UNDER FLUOROSCOPIC GUIDANCE AT RIGHT PARAMEDIAN     with 85 % relief.    He is not on NSAIDS and  is not on anticoagulation medications to include none  Pacemaker or defibrillator: No   Medication Contract and Consent for Opioid Use Documents Filed       Patient Documents       Type of Document Status Date Received Received By Description    Medication Contract Received 11/19/2018  1:33 PM KENYA SULLIVAN 11/14/18 [facility]     Medication Contract Received 11/4/2019  1:15 PM SHERRIE VEGA pain management pt agressment    Medication Contract Received 2/1/2021 11:22 AM [facility] CONTRACT DR. POWERS                        There were no vitals taken for this visit.     No LMP for male patient.

## 2024-07-03 NOTE — PROGRESS NOTES
[facility]  [address] [place] [address]  [phone]    Follow up Note      Tao Villareal     Date of Visit:  7/3/2024    CC:  Patient presents for follow up   Chief Complaint   Patient presents with    Follow-up     CERVICAL 7-THORACIC 1 EPIDURAL STEROID INJECTION UNDER FLUOROSCOPIC GUIDANCE AT RIGHT PARAMEDIAN        HPI:  H/o Chronic neck pain.     S/p ACDF : C4-5, C5-6, C6-7 Dr. Marlow In June 2021.     H/o neck pain shoulder blade pain and upper extremity pain. He also complains of pain/ sensitivity in the upper extremity.      Extensive history for chronic pain for many years. He has been treated with multiple pain centers in the past- [facility], [facility] etc     He has Spinal cord stimulator implant for LE pain: Aug 2018.     S/p Cervical SCS implant done in Feb 2019. Post implant he had severe neck pain/ UE pain and SCS had to be explanted in March 2019.     Pain causes functional limitations/ limits Adl's : Yes      Nursing notes and details of the pain history reviewed. Please see intake notes for details.     Previous treatments:   Physical Therapy / HEP: yes,       Medications: - NSAID's : yes                        - Membrane stabilizers : yes                        - Opioids : yes                       - Adjuvants or Others : yes     Spine Surgeries: yes,      Anticoagulation medications: no.     H/O Smoking: no  H/O alcohol abuse : no  H/O Illicit drug use : no     Imaging:     Xray LS spine and Xray Thoracic spine : 2/8/2024:  IMPRESSION:  Electrodes position tips at the T8 level.  Intrathecal placement at the L1/L2  level.  Minimal degenerative changes of the thoracic and lumbar spine with no  evidence of acute fracture or malalignment.    MRI of Cervical spine: 3/1/2024:  IMPRESSION:  1. Postoperative changes from anterior cervical discectomy and fusion from  C4-C7.  2. Mild central canal stenosis at C6-7.  3. Multilevel neural

## 2024-08-13 RX ORDER — ERENUMAB-AOOE 140 MG/ML
140 INJECTION, SOLUTION SUBCUTANEOUS
Qty: 1 ML | Refills: 3 | Status: SHIPPED | OUTPATIENT
Start: 2024-08-13

## 2024-08-21 ENCOUNTER — PREP FOR PROCEDURE (OUTPATIENT)
Dept: PAIN MANAGEMENT | Age: 54
End: 2024-08-21

## 2024-08-21 ENCOUNTER — TELEPHONE (OUTPATIENT)
Dept: PAIN MANAGEMENT | Age: 54
End: 2024-08-21

## 2024-08-21 RX ORDER — SODIUM CHLORIDE, SODIUM LACTATE, POTASSIUM CHLORIDE, CALCIUM CHLORIDE 600; 310; 30; 20 MG/100ML; MG/100ML; MG/100ML; MG/100ML
INJECTION, SOLUTION INTRAVENOUS CONTINUOUS
Status: CANCELLED | OUTPATIENT
Start: 2024-08-21

## 2024-08-21 NOTE — TELEPHONE ENCOUNTER
Call to Tao Villareal that procedure was scheduled for 8/29/2024 and that [facility] should call him a few days before for the pre op call and between 2:00 PM and 4:00 PM  the business day before with the arrival time. Instructed Tao to hold ibuprofen for 24 hours, Celebrex, Mobic, and naprosyn for 4 days and any aspirin containing products, CoQ 10, or fish oil for 7 days. Instructed to call office back if any questions. Tao verbalized understanding.    Electronically signed by Emil Restrepo RN on 8/21/2024 at 1:11 PM

## 2024-08-28 ENCOUNTER — TELEPHONE (OUTPATIENT)
Dept: PAIN MANAGEMENT | Age: 54
End: 2024-08-28

## 2024-08-28 DIAGNOSIS — T85.192S MALFUNCTION OF SPINAL CORD STIMULATOR, SEQUELA: ICD-10-CM

## 2024-08-28 DIAGNOSIS — M51.36 DDD (DEGENERATIVE DISC DISEASE), LUMBAR: Primary | ICD-10-CM

## 2024-08-28 PROBLEM — M51.369 DDD (DEGENERATIVE DISC DISEASE), LUMBAR: Status: ACTIVE | Noted: 2024-08-28

## 2024-08-28 NOTE — TELEPHONE ENCOUNTER
Call to Tao Villareal that procedure was scheduled for 9/5/2024 and that [facility] should call him a few days before for the pre op call and between 2:00 PM and 4:00 PM  the business day before with the arrival time. Instructed Tao to hold ibuprofen for 24 hours, Celebrex, Mobic, naprosyn, and phentermine for 4 days and any aspirin containing products, CoQ 10, or fish oil for 7 days. Instructed to call office back if any questions. Tao verbalized understanding. Labs faxed to [email or web address] per patient request.    Electronically signed by Jay Lind RN on 8/28/2024 at 10:31 AM

## 2024-09-03 NOTE — PROGRESS NOTES
[facility] PRE-ADMISSION TESTING INSTRUCTIONS    The Preadmission Testing patient is instructed accordingly using the following criteria (check applicable):    ARRIVAL INSTRUCTIONS:  [x] Parking the day of Surgery is located in the Main Entrance lot.  Upon entering the door, make an immediate right to the surgery reception desk    [x] Bring photo ID and insurance card    [] Bring in a copy of Living will or Durable Power of  papers.    [x] Please be sure to arrange for responsible adult to provide transportation to and from the hospital    [x] Please arrange for responsible adult to be with you for the 24 hour period post procedure due to having anesthesia    [x] If you awake am of surgery not feeling well or have temperature >100 please call [phone]    GENERAL INSTRUCTIONS:    [x] May have clear liquids until 4 hours prior to surgery. Examples include water, fruit juices (no pulp), jello, popsicles, black coffee or tea, beef or chicken broth.               No gum, candy or mints.    [x] You may brush your teeth, but do not swallow any water    [x] Take medications as instructed with 1-2 oz of water    [x] Stop herbal supplements and vitamins 5 days prior to procedure    [] Follow preop dosing of blood thinners per physician instructions    [] Take 1/2 dose of evening insulin, but no insulin after midnight    [x] No oral diabetic medications after midnight    [x] If diabetic and have low blood sugar or feel symptomatic, take 1-2oz apple juice only    [] Bring inhalers day of surgery    [] Bring C-PAP/ Bi-Pap day of surgery    [] Bring urine specimen day of surgery    [x] Shower or bath with soap, lather and rinse well, AM of Surgery, no lotion, powders or creams to surgical site    [] Follow bowel prep as instructed per surgeon    [x] No tobacco products within 24 hours of surgery     [x] No alcohol or illegal drug use within 24 hours of surgery.    [x] Jewelry, body

## 2024-09-04 ENCOUNTER — TELEPHONE (OUTPATIENT)
Age: 54
End: 2024-09-04

## 2024-09-04 ENCOUNTER — ANESTHESIA EVENT (OUTPATIENT)
Dept: OPERATING ROOM | Age: 54
End: 2024-09-04
Payer: MEDICAID

## 2024-09-04 NOTE — TELEPHONE ENCOUNTER
Is aimovig approved quinn are you having trouble getting it prior authorized?      Marianna Dexter MD

## 2024-09-04 NOTE — TELEPHONE ENCOUNTER
Called pt. To let him know I have been trying to get his aimovig approved by his insurance. Pt. Was upset stating it was my fault it wasn't getting approved. I offered to talk to Dr. Dexter and ask if we could change it to a similar med, pt. Refused stating it had to be this medication.

## 2024-09-05 ENCOUNTER — ANESTHESIA (OUTPATIENT)
Dept: OPERATING ROOM | Age: 54
End: 2024-09-05
Payer: MEDICAID

## 2024-09-05 ENCOUNTER — HOSPITAL ENCOUNTER (OUTPATIENT)
Age: 54
Setting detail: OUTPATIENT SURGERY
Discharge: HOME | End: 2024-09-05
Attending: ANESTHESIOLOGY | Admitting: ANESTHESIOLOGY
Payer: MEDICAID

## 2024-09-05 ENCOUNTER — HOSPITAL ENCOUNTER (OUTPATIENT)
Dept: GENERAL RADIOLOGY | Age: 54
Discharge: HOME | End: 2024-09-07
Attending: ANESTHESIOLOGY
Payer: MEDICAID

## 2024-09-05 VITALS
SYSTOLIC BLOOD PRESSURE: 143 MMHG | HEART RATE: 88 BPM | BODY MASS INDEX: 29.32 KG/M2 | OXYGEN SATURATION: 100 % | WEIGHT: 176 LBS | HEIGHT: 65 IN | TEMPERATURE: 97.5 F | DIASTOLIC BLOOD PRESSURE: 82 MMHG | RESPIRATION RATE: 16 BRPM

## 2024-09-05 DIAGNOSIS — M51.36 DDD (DEGENERATIVE DISC DISEASE), LUMBAR: ICD-10-CM

## 2024-09-05 DIAGNOSIS — R52 PAIN MANAGEMENT: ICD-10-CM

## 2024-09-05 PROBLEM — T85.192A MALFUNCTION OF SPINAL CORD STIMULATOR (HCC): Status: ACTIVE | Noted: 2024-09-05

## 2024-09-05 LAB — GLUCOSE BLD-MCNC: 111 MG/DL (ref 74–99)

## 2024-09-05 PROCEDURE — 6360000002 HC RX W HCPCS

## 2024-09-05 PROCEDURE — 6370000000 HC RX 637 (ALT 250 FOR IP): Performed by: ANESTHESIOLOGY

## 2024-09-05 PROCEDURE — 2580000003 HC RX 258: Performed by: ANESTHESIOLOGY

## 2024-09-05 PROCEDURE — 6360000002 HC RX W HCPCS: Performed by: PHYSICIAN ASSISTANT

## 2024-09-05 PROCEDURE — A4217 STERILE WATER/SALINE, 500 ML: HCPCS | Performed by: ANESTHESIOLOGY

## 2024-09-05 PROCEDURE — 2580000003 HC RX 258

## 2024-09-05 PROCEDURE — 6360000002 HC RX W HCPCS: Performed by: ANESTHESIOLOGY

## 2024-09-05 PROCEDURE — 7100000010 HC PHASE II RECOVERY - FIRST 15 MIN: Performed by: ANESTHESIOLOGY

## 2024-09-05 PROCEDURE — 88300 SURGICAL PATH GROSS: CPT

## 2024-09-05 PROCEDURE — 3700000000 HC ANESTHESIA ATTENDED CARE: Performed by: ANESTHESIOLOGY

## 2024-09-05 PROCEDURE — 2709999900 HC NON-CHARGEABLE SUPPLY: Performed by: ANESTHESIOLOGY

## 2024-09-05 PROCEDURE — 7100000011 HC PHASE II RECOVERY - ADDTL 15 MIN: Performed by: ANESTHESIOLOGY

## 2024-09-05 PROCEDURE — 6370000000 HC RX 637 (ALT 250 FOR IP): Performed by: STUDENT IN AN ORGANIZED HEALTH CARE EDUCATION/TRAINING PROGRAM

## 2024-09-05 PROCEDURE — 3600000002 HC SURGERY LEVEL 2 BASE: Performed by: ANESTHESIOLOGY

## 2024-09-05 PROCEDURE — 63661 REMOVE SPINE ELTRD PERQ ARAY: CPT | Performed by: ANESTHESIOLOGY

## 2024-09-05 PROCEDURE — 2500000003 HC RX 250 WO HCPCS: Performed by: ANESTHESIOLOGY

## 2024-09-05 PROCEDURE — 82962 GLUCOSE BLOOD TEST: CPT

## 2024-09-05 PROCEDURE — 3600000012 HC SURGERY LEVEL 2 ADDTL 15MIN: Performed by: ANESTHESIOLOGY

## 2024-09-05 PROCEDURE — 2580000003 HC RX 258: Performed by: PHYSICIAN ASSISTANT

## 2024-09-05 PROCEDURE — 2500000003 HC RX 250 WO HCPCS

## 2024-09-05 PROCEDURE — 3700000001 HC ADD 15 MINUTES (ANESTHESIA): Performed by: ANESTHESIOLOGY

## 2024-09-05 PROCEDURE — 63688 REV/RMV IMP SP NPG/R DTCH CN: CPT | Performed by: ANESTHESIOLOGY

## 2024-09-05 RX ORDER — SODIUM CHLORIDE 9 MG/ML
INJECTION, SOLUTION INTRAMUSCULAR; INTRAVENOUS; SUBCUTANEOUS PRN
Status: DISCONTINUED | OUTPATIENT
Start: 2024-09-05 | End: 2024-09-05 | Stop reason: ALTCHOICE

## 2024-09-05 RX ORDER — SODIUM CHLORIDE 0.9 % (FLUSH) 0.9 %
5-40 SYRINGE (ML) INJECTION EVERY 12 HOURS SCHEDULED
Status: DISCONTINUED | OUTPATIENT
Start: 2024-09-05 | End: 2024-09-05 | Stop reason: HOSPADM

## 2024-09-05 RX ORDER — MIDAZOLAM HYDROCHLORIDE 1 MG/ML
INJECTION INTRAMUSCULAR; INTRAVENOUS PRN
Status: DISCONTINUED | OUTPATIENT
Start: 2024-09-05 | End: 2024-09-05 | Stop reason: SDUPTHER

## 2024-09-05 RX ORDER — OXYCODONE HYDROCHLORIDE 5 MG/1
5 TABLET ORAL
Status: COMPLETED | OUTPATIENT
Start: 2024-09-05 | End: 2024-09-05

## 2024-09-05 RX ORDER — SODIUM CHLORIDE 9 MG/ML
INJECTION, SOLUTION INTRAVENOUS PRN
Status: DISCONTINUED | OUTPATIENT
Start: 2024-09-05 | End: 2024-09-05 | Stop reason: HOSPADM

## 2024-09-05 RX ORDER — SODIUM CHLORIDE 0.9 % (FLUSH) 0.9 %
5-40 SYRINGE (ML) INJECTION PRN
Status: DISCONTINUED | OUTPATIENT
Start: 2024-09-05 | End: 2024-09-05 | Stop reason: HOSPADM

## 2024-09-05 RX ORDER — SODIUM CHLORIDE, SODIUM LACTATE, POTASSIUM CHLORIDE, CALCIUM CHLORIDE 600; 310; 30; 20 MG/100ML; MG/100ML; MG/100ML; MG/100ML
INJECTION, SOLUTION INTRAVENOUS CONTINUOUS
Status: DISCONTINUED | OUTPATIENT
Start: 2024-09-05 | End: 2024-09-05 | Stop reason: HOSPADM

## 2024-09-05 RX ORDER — FENTANYL CITRATE 50 UG/ML
50 INJECTION, SOLUTION INTRAMUSCULAR; INTRAVENOUS EVERY 5 MIN PRN
Status: DISCONTINUED | OUTPATIENT
Start: 2024-09-05 | End: 2024-09-05 | Stop reason: HOSPADM

## 2024-09-05 RX ORDER — PROCHLORPERAZINE EDISYLATE 5 MG/ML
5 INJECTION INTRAMUSCULAR; INTRAVENOUS
Status: DISCONTINUED | OUTPATIENT
Start: 2024-09-05 | End: 2024-09-05 | Stop reason: HOSPADM

## 2024-09-05 RX ORDER — PROPOFOL 10 MG/ML
INJECTION, EMULSION INTRAVENOUS PRN
Status: DISCONTINUED | OUTPATIENT
Start: 2024-09-05 | End: 2024-09-05 | Stop reason: SDUPTHER

## 2024-09-05 RX ORDER — NALOXONE HYDROCHLORIDE 0.4 MG/ML
INJECTION, SOLUTION INTRAMUSCULAR; INTRAVENOUS; SUBCUTANEOUS PRN
Status: DISCONTINUED | OUTPATIENT
Start: 2024-09-05 | End: 2024-09-05 | Stop reason: HOSPADM

## 2024-09-05 RX ORDER — ERYTHROMYCIN 5 MG/G
OINTMENT OPHTHALMIC ONCE
Status: COMPLETED | OUTPATIENT
Start: 2024-09-05 | End: 2024-09-05

## 2024-09-05 RX ORDER — OXYCODONE AND ACETAMINOPHEN 5; 325 MG/1; MG/1
1 TABLET ORAL
Status: COMPLETED | OUTPATIENT
Start: 2024-09-05 | End: 2024-09-05

## 2024-09-05 RX ORDER — DEXAMETHASONE SODIUM PHOSPHATE 4 MG/ML
INJECTION, SOLUTION INTRA-ARTICULAR; INTRALESIONAL; INTRAMUSCULAR; INTRAVENOUS; SOFT TISSUE PRN
Status: DISCONTINUED | OUTPATIENT
Start: 2024-09-05 | End: 2024-09-05 | Stop reason: SDUPTHER

## 2024-09-05 RX ORDER — ONDANSETRON 2 MG/ML
INJECTION INTRAMUSCULAR; INTRAVENOUS PRN
Status: DISCONTINUED | OUTPATIENT
Start: 2024-09-05 | End: 2024-09-05 | Stop reason: SDUPTHER

## 2024-09-05 RX ORDER — SODIUM CHLORIDE 9 MG/ML
INJECTION, SOLUTION INTRAVENOUS CONTINUOUS PRN
Status: DISCONTINUED | OUTPATIENT
Start: 2024-09-05 | End: 2024-09-05 | Stop reason: SDUPTHER

## 2024-09-05 RX ORDER — LIDOCAINE HYDROCHLORIDE 20 MG/ML
INJECTION, SOLUTION EPIDURAL; INFILTRATION; INTRACAUDAL; PERINEURAL PRN
Status: DISCONTINUED | OUTPATIENT
Start: 2024-09-05 | End: 2024-09-05 | Stop reason: SDUPTHER

## 2024-09-05 RX ORDER — GINSENG 100 MG
CAPSULE ORAL PRN
Status: DISCONTINUED | OUTPATIENT
Start: 2024-09-05 | End: 2024-09-05 | Stop reason: ALTCHOICE

## 2024-09-05 RX ORDER — CLINDAMYCIN HCL 300 MG
300 CAPSULE ORAL 3 TIMES DAILY
Qty: 30 CAPSULE | Refills: 0 | Status: SHIPPED | OUTPATIENT
Start: 2024-09-05 | End: 2024-09-15

## 2024-09-05 RX ORDER — FENTANYL CITRATE 50 UG/ML
INJECTION, SOLUTION INTRAMUSCULAR; INTRAVENOUS PRN
Status: DISCONTINUED | OUTPATIENT
Start: 2024-09-05 | End: 2024-09-05 | Stop reason: SDUPTHER

## 2024-09-05 RX ORDER — SODIUM CHLORIDE 9 MG/ML
INJECTION, SOLUTION INTRAVENOUS CONTINUOUS PRN
Status: DISCONTINUED | OUTPATIENT
Start: 2024-09-05 | End: 2024-09-05

## 2024-09-05 RX ADMIN — SODIUM CHLORIDE: 9 INJECTION, SOLUTION INTRAVENOUS at 08:35

## 2024-09-05 RX ADMIN — LIDOCAINE HYDROCHLORIDE 30 MG: 20 INJECTION, SOLUTION EPIDURAL; INFILTRATION; INTRACAUDAL; PERINEURAL at 08:54

## 2024-09-05 RX ADMIN — FENTANYL CITRATE 50 MCG: 50 INJECTION, SOLUTION INTRAMUSCULAR; INTRAVENOUS at 08:54

## 2024-09-05 RX ADMIN — OXYCODONE HYDROCHLORIDE AND ACETAMINOPHEN 1 TABLET: 5; 325 TABLET ORAL at 13:08

## 2024-09-05 RX ADMIN — ERYTHROMYCIN: 5 OINTMENT OPHTHALMIC at 13:31

## 2024-09-05 RX ADMIN — FENTANYL CITRATE 50 MCG: 50 INJECTION, SOLUTION INTRAMUSCULAR; INTRAVENOUS at 09:17

## 2024-09-05 RX ADMIN — PROPOFOL 40 MG: 10 INJECTION, EMULSION INTRAVENOUS at 08:59

## 2024-09-05 RX ADMIN — VANCOMYCIN HYDROCHLORIDE 1000 MG: 1 INJECTION, POWDER, LYOPHILIZED, FOR SOLUTION INTRAVENOUS at 08:27

## 2024-09-05 RX ADMIN — ONDANSETRON 4 MG: 2 INJECTION INTRAMUSCULAR; INTRAVENOUS at 09:10

## 2024-09-05 RX ADMIN — OXYCODONE HYDROCHLORIDE 5 MG: 5 TABLET ORAL at 12:30

## 2024-09-05 RX ADMIN — DEXAMETHASONE SODIUM PHOSPHATE 4 MG: 4 INJECTION, SOLUTION INTRAMUSCULAR; INTRAVENOUS at 09:10

## 2024-09-05 RX ADMIN — PROPOFOL 120 MCG/KG/MIN: 10 INJECTION, EMULSION INTRAVENOUS at 08:54

## 2024-09-05 RX ADMIN — MIDAZOLAM 2 MG: 1 INJECTION INTRAMUSCULAR; INTRAVENOUS at 08:54

## 2024-09-05 RX ADMIN — FENTANYL CITRATE 50 MCG: 50 INJECTION, SOLUTION INTRAMUSCULAR; INTRAVENOUS at 10:57

## 2024-09-05 ASSESSMENT — PAIN DESCRIPTION - LOCATION: LOCATION: BACK

## 2024-09-05 ASSESSMENT — PAIN DESCRIPTION - DESCRIPTORS
DESCRIPTORS: ACHING;DISCOMFORT;DULL
DESCRIPTORS: ACHING;DISCOMFORT

## 2024-09-05 ASSESSMENT — PAIN SCALES - GENERAL
PAINLEVEL_OUTOF10: 10
PAINLEVEL_OUTOF10: 10

## 2024-09-05 ASSESSMENT — LIFESTYLE VARIABLES: SMOKING_STATUS: 0

## 2024-09-05 ASSESSMENT — PAIN - FUNCTIONAL ASSESSMENT: PAIN_FUNCTIONAL_ASSESSMENT: PREVENTS OR INTERFERES SOME ACTIVE ACTIVITIES AND ADLS

## 2024-09-05 ASSESSMENT — PAIN DESCRIPTION - PAIN TYPE: TYPE: SURGICAL PAIN

## 2024-09-05 NOTE — DISCHARGE INSTRUCTIONS
[facility] Pain Management Department  [place] Yhypiw-[phone]  Dr. Sumit Salgado   Post-Pain Block/Radiofrequency  Home Going Instructions    1-Go home, rest for the remainder of the day  2-Please do not lift over 20 pounds the day of the injection  3-If you received sedation No: alcohol, driving, operating lawn mowers, plows, tractors or other dangerous equipment until next morning. Do not make important decisions or sign legal documents for 24 hours. You may experience light headedness, dizziness, nausea or sleepiness after sedation. Do not stay alone. A responsible adult must be with you for 24 hours. You could be nauseated from the medications you have received. Your IV site may be sore and bruised.    4-No dietary restrictions     5-Resume all medications the same day, blood thinners to be resumed 24 hours after injection if you were instructed to stop any.    6-Keep the surgical site clean and dry, you may shower the next morning and remove the      dressing.     7- No sitz baths, tub baths or hot tubs/swimming for 24 hours.       8- If you have any pain at the injection site(s), application of an ice pack to the area should be       helpful, 20 minutes on/20 minutes off for next 48 hours.  9- Call [facility] Pain Management immediately at if you develop.  Fever greater than 100.4 F  Have bleeding or drainage from the puncture site  Have progressive Leg/arm numbness and or weakness  Loss of control of bowel and or bladder (wet/soil yourself)  Severe headache with inability to lift head  10-You may return to work the next day

## 2024-09-05 NOTE — ANESTHESIA POSTPROCEDURE EVALUATION
Department of Anesthesiology  Postprocedure Note    Patient: Tao Villareal  MRN: [id number]  YOB: 1970  Date of evaluation: 9/5/2024    Procedure Summary       Date: 09/05/24 Room / Location: [address]    Anesthesia Start: 0843 Anesthesia Stop:     Procedure: SPINAL CORD STIMULATOR LEAD AND IMPLANTABLE PULSE GENERATOR REMOVAL    ++MEDTRONIC++ (Back) Diagnosis:       DDD (degenerative disc disease), lumbar      (DDD (degenerative disc disease), lumbar [M51.36])    Surgeons: Francisco Arias MD Responsible Provider: Sue Booth DO    Anesthesia Type: MAC ASA Status: 3            Anesthesia Type: No value filed.    Philippe Phase I: Philippe Score: 10    Philippe Phase II:      Anesthesia Post Evaluation    Patient location during evaluation: PACU  Patient participation: complete - patient participated  Level of consciousness: awake  Airway patency: patent  Nausea & Vomiting: no nausea and no vomiting  Cardiovascular status: hemodynamically stable  Respiratory status: acceptable  Hydration status: euvolemic  Pain management: adequate        No notable events documented.

## 2024-09-05 NOTE — H&P
[facility]      Procedure History & Physical      Tao SHREYA FRED Dionna     HPI:    Patient  is here for SCS led and IPG removal for h/o malfunction of SCS.  Labs/imaging studies reviewed   All question and concerns addressed including R/B/A associated with the procedure    Past Medical History:   Diagnosis Date    Anxiety     Asthma     exercised induced    Cervicogenic headache 06/12/2019    Chronic generalized pain     COVID-19 2020    mild    Degenerative disc disease, cervical     Dermatitis     Diabetes mellitus (HCC)     PO meds only    Heart stopped beating (HCC) 06/05/2021    3 days after cervical fusion- \" chest pain , dyspnea, passed out- intubated for 5 days\".has not seen a cardiologist since. 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       Past Surgical History:   Procedure Laterality Date    ABDOMEN SURGERY      nissen fundoplication    BACK SURGERY  06/2021    neck fusion    CARPAL TUNNEL RELEASE Bilateral     CERVICAL FUSION N/A 06/02/2021    C4-C5, C5-C6, C6-C7  ANTERIOR CERVICAL DISCECTOMY AND FUSION -- NEEDS REGULAR BED WITH HORSE SHOE, PLATES, SCREWS -- GLOBUS performed by Suzi Marlow MD at [facility] OR    COLONOSCOPY      GASTRIC FUNDOPLICATION  2015    for acid reflux    HERNIA REPAIR  03/02/2021    UMBILICAL- [facility]    NERVE BLOCK N/A 3/29/2022    C7-T1 EPIDURAL STEROID INJECTION WITH X-RAY (SEDATION performed by Francisco Arias MD at [facility] OR    OTHER SURGICAL HISTORY  08/15/2018    implantation of medtronic lumbar spinal cord generator    PAIN MANAGEMENT PROCEDURE Right 4/26/2022    C7-T1 RIGHT PARAMEDIAN CERVICAL EPIDURAL STEROID INJECTION  UNDER X-RAY performed by Francisco Arias MD at [facility] OR    PAIN MANAGEMENT PROCEDURE N/A 7/21/2022    CERVICAL EPIDURAL STEROID INJECTION UNDER FLUOROSCOPIC GUIDANCE AT C7-T1 PARAMEDIAN performed by Francisco CASH  Penicillins      Family has history to penicillin        Social History     Socioeconomic History    Marital status:      Spouse name: Not on file    Number of children: 1    Years of education: Not on file    Highest education level: Not on file   Occupational History    Not on file   Tobacco Use    Smoking status: Former     Current packs/day: 0.00     Types: Cigarettes     Start date: 10/22/1983     Quit date: 10/22/1988     Years since quittin.8    Smokeless tobacco: Never    Tobacco comments:     quit    Vaping Use    Vaping status: Never Used   Substance and Sexual Activity    Alcohol use: Yes     Comment: socially- OCCASIONAL    Drug use: No    Sexual activity: Defer   Other Topics Concern    Not on file   Social History Narrative    Not on file     Social Determinants of Health     Financial Resource Strain: Not on file   Food Insecurity: No Food Insecurity (2024)    Hunger Vital Sign     Worried About Running Out of Food in the Last Year: Never true     Ran Out of Food in the Last Year: Never true   Transportation Needs: No Transportation Needs (2024)    PRAPARE - Transportation     Lack of Transportation (Medical): No     Lack of Transportation (Non-Medical): No   Physical Activity: Not on file   Stress: Not on file   Social Connections: Not on file   Intimate Partner Violence: Not on file   Housing Stability: Low Risk  (2024)    Housing Stability Vital Sign     Unable to Pay for Housing in the Last Year: No     Number of Places Lived in the Last Year: 1     Unstable Housing in the Last Year: No       Family History   Problem Relation Age of Onset    No Known Problems Mother     Prostate Cancer Father     No Known Problems Maternal Grandmother     No Known Problems Maternal Grandfather     No Known Problems Paternal Grandmother     No Known Problems Paternal Grandfather     No Known Problems Daughter          REVIEW OF SYSTEMS:    CONSTITUTIONAL:  negative for  fevers, chills,

## 2024-09-05 NOTE — ANESTHESIA PRE PROCEDURE
Tobacco Use    Smoking status: Former     Current packs/day: 0.00     Types: Cigarettes     Start date: 10/22/1983     Quit date: 10/22/1988     Years since quittin.8    Smokeless tobacco: Never    Tobacco comments:     quit    Substance Use Topics    Alcohol use: Yes     Comment: socially- OCCASIONAL                                Counseling given: Not Answered  Tobacco comments: quit       Vital Signs (Current):   Vitals:    24 1525 24 0815   BP:  126/83   Pulse:  90   Resp:  14   Temp:  97.6 °F (36.4 °C)   TempSrc:  Temporal   SpO2:  95%   Weight: 79.8 kg (176 lb)    Height: 1.651 m (5' 5\")                                               BP Readings from Last 3 Encounters:   24 126/83   24 (!) 136/90   24 (!) 140/79       NPO Status: Time of last liquid consumption:                         Time of last solid consumption:                         Date of last liquid consumption: 24                        Date of last solid food consumption: 24    BMI:   Wt Readings from Last 3 Encounters:   24 79.8 kg (176 lb)   24 78.9 kg (174 lb)   24 78.9 kg (174 lb)     Body mass index is 29.29 kg/m².    CBC:   Lab Results   Component Value Date/Time    WBC 9.1 2024 12:38 PM    RBC 6.01 2024 12:38 PM    HGB 14.3 2024 12:38 PM    HCT 44.7 2024 12:38 PM    MCV 74.4 2024 12:38 PM    RDW 16.2 2024 12:38 PM     2024 12:38 PM       CMP:   Lab Results   Component Value Date/Time     2024 12:38 PM    K 3.4 2024 12:38 PM    K 4.3 2021 10:05 AM     2024 12:38 PM    CO2 26 2024 12:38 PM    BUN 11 2024 12:38 PM    CREATININE 0.9 2024 12:38 PM    GFRAA >60 2021 04:29 AM    LABGLOM >90 2024 12:38 PM    GLUCOSE 92 2024 12:38 PM    CALCIUM 9.9 2024 12:38 PM    BILITOT 0.6 2024 11:12 AM    ALKPHOS 64 2024 11:12 AM    AST 40 
Ventricle   Left ventricular internal dimensions were normal in diastole and systole.   No regional wall motion abnormalities seen.   Normal left ventricular ejection fraction.   Ejection fraction is visually estimated at 65%.   Indeterminate diastolic function.      Right Ventricle   The right ventricle was not clearly visualized.   Normal right ventricular size and function.      Left Atrium   Normal sized left atrium.   Interatrial septum appears intact.      Right Atrium   Right atrium is not clearly visualized.      Mitral Valve   Normal mitral valve structure and function.      Tricuspid Valve   The tricuspid valve was not well visualized.      Aortic Valve   The aortic valve leaflets were not well visualized.      Pulmonic Valve   The pulmonic valve was not well visualized.      Pericardial Effusion   No evidence of pericardial effusion.      Aorta   Aortic root dimension within normal limits.      Conclusions      Summary   Left ventricular internal dimensions were normal in diastole and systole.   No regional wall motion abnormalities seen.   Normal left ventricular ejection fraction.      Signature     Anesthesia Plan      MAC     ASA 2       Induction: intravenous.    MIPS: Prophylactic antiemetics administered.  Anesthetic plan and risks discussed with patient.      Plan discussed with CRNA and attending.                    Hao Santacruz RN   9/5/2024

## 2024-09-05 NOTE — OP NOTE
Operative Note    Patient: Tao Villareal  YOB: 1970  MRN: [id number]    Date of Procedure: 2024    Pre-Op Diagnosis Codes:     spinal cord stimulator Malfunction     Post-Op Diagnosis: Same       Procedure(s):  SPINAL CORD STIMULATOR LEAD AND IMPLANTABLE PULSE GENERATOR REMOVAL        Surgeon(s):  Francisco Arias MD    Assistant:   * No surgical staff found *    Anesthesia: Monitor Anesthesia Care    Estimated Blood Loss (mL): less than 50     Complications: None    Specimens:   ID Type Source Tests Collected by Time Destination   A : SPINAL CORD STIMULATOR GENERATOR AND LEADS Hardware Back SURGICAL PATHOLOGY Francisco Arias MD 2024 0931        Implants:  * No implants in log *      Drains: * No LDAs found *    Findings:  Infection Present At Time Of Surgery (PATOS) (choose all levels that have infection present):  No infection present  Other Findings: lead tip intact. Complete removal of Leads / IPG and anchors.    Electronically signed by Francisco Arias MD on 2024 at 12:16 PM      2024    Patient: Tao Villareal  :  1970  Age:  53 y.o.  Sex:  male     PRE-OPERATIVE DIAGNOSIS: Spinal cord stimulator malfunction    POST-OPERATIVE DIAGNOSIS: Same.    PROCEDURE: Removal of spinal cord stimulator leads x 2 and IPG under fluoroscopic guidance.    COMPANY: [employer]    SURGEON: Francisco Arias MD    ANESTHESIA: MAC    ESTIMATED BLOOD LOSS: None.  ______________________________________________________________________    BRIEF HISTORY: Tao Villareal comes in today for removal of spinal cord stimulator leads and IPG under fluoroscopic guidance.  The patient had this implanted by a different physician.  Apparently the SCS is not providing him with adequate pain control and is not working as expected.  He has not been using the SCS and wanted it to be explanted.  He wants to explant the leads and IPG.  The potential complications of this procedure  were discussed with him again today. He has elected to undergo the aforementioned procedure.    Tao’s complete History & Physical examination were reviewed in depth, a copy of which is in the chart.      DESCRIPTION OF PROCEDURE:    After confirming written and informed consent, a time-out was performed and Tao’s name and date of birth, the procedure to be performed as well as the plan for the location of the needle insertion were confirmed.    The patient was brought into the procedure room and placed in the prone position on the fluoroscopy table. A pillow was placed under the patient's abdomen to increase lumbar interlaminar space. Standard monitors were placed, and vital signs were observed throughout the procedure.  Perioperative antibiotic was administered by anesthesia team.  The area of the thoracic and lumbar spine was prepped with chloraprep and draped in a sterile manner.   Previous scar from the lead entry site over the mid lumbar area and the IPG site over the left lumbar paraspinal area identified and marked.    Fluoroscopy was identified to confirm the leads and IPG positions. The skin and subcutaneous tissues at the above level were anesthestized.  An incision was marked along the previous scar over the lead insertion site and IPG site.  Hemostasis was obtained.  Blunt dissection was carried out and the use of electrocautery was avoided.  The leads were slowly dissected from the scar tissue and the the anchor was identified.  The anchor sutures were cut and the leads along with the anchors were removed from the epidural space easily without difficulty.  Lead tips were intact.    At this point of time blunt dissection was carried out over the IPG site and the anchor sutures for the IPG was cut and the IPG was externalized.  The leads were cut from the IPG.  The leads were pulled from the IPG site and removed completely.    At this point of time the scar tissue was removed with blunt

## 2024-09-10 ENCOUNTER — TELEPHONE (OUTPATIENT)
Dept: ORTHOPEDIC SURGERY | Age: 54
End: 2024-09-10

## 2024-09-10 NOTE — TELEPHONE ENCOUNTER
Patient is requesting a call back directly from doctor. I spoke to him at great length and he has some concerns about his medication not being approved. He has a letter from his insurance stating that insufficient documentation was submitted with his prior authorization. MA informed me they just received notification that his Aimovig was approved. I am calling the pharmacy to have them run the script. Patient still would like to talk directly to doctor. Please call at your earliest convenience. Thanks.

## 2024-09-11 LAB — SURGICAL PATHOLOGY REPORT: NORMAL

## 2024-09-13 ENCOUNTER — OFFICE VISIT (OUTPATIENT)
Dept: PAIN MANAGEMENT | Age: 54
End: 2024-09-13

## 2024-09-13 VITALS
WEIGHT: 176 LBS | HEART RATE: 81 BPM | TEMPERATURE: 97.4 F | HEIGHT: 65 IN | BODY MASS INDEX: 29.32 KG/M2 | SYSTOLIC BLOOD PRESSURE: 126 MMHG | OXYGEN SATURATION: 99 % | RESPIRATION RATE: 18 BRPM | DIASTOLIC BLOOD PRESSURE: 80 MMHG

## 2024-09-13 DIAGNOSIS — M47.817 LUMBOSACRAL SPONDYLOSIS WITHOUT MYELOPATHY: ICD-10-CM

## 2024-09-13 DIAGNOSIS — M51.36 DDD (DEGENERATIVE DISC DISEASE), LUMBAR: Primary | ICD-10-CM

## 2024-09-13 PROCEDURE — 99024 POSTOP FOLLOW-UP VISIT: CPT | Performed by: ANESTHESIOLOGY

## 2024-09-20 ENCOUNTER — OFFICE VISIT (OUTPATIENT)
Dept: PAIN MANAGEMENT | Age: 54
End: 2024-09-20
Payer: MEDICAID

## 2024-09-20 VITALS
SYSTOLIC BLOOD PRESSURE: 130 MMHG | HEART RATE: 75 BPM | TEMPERATURE: 97.4 F | RESPIRATION RATE: 18 BRPM | HEIGHT: 65 IN | WEIGHT: 176 LBS | DIASTOLIC BLOOD PRESSURE: 80 MMHG | OXYGEN SATURATION: 100 % | BODY MASS INDEX: 29.32 KG/M2

## 2024-09-20 DIAGNOSIS — M47.817 LUMBOSACRAL SPONDYLOSIS WITHOUT MYELOPATHY: ICD-10-CM

## 2024-09-20 DIAGNOSIS — M51.36 DDD (DEGENERATIVE DISC DISEASE), LUMBAR: Primary | ICD-10-CM

## 2024-09-20 PROCEDURE — 99213 OFFICE O/P EST LOW 20 MIN: CPT

## 2024-09-20 PROCEDURE — 3079F DIAST BP 80-89 MM HG: CPT | Performed by: ANESTHESIOLOGY

## 2024-09-20 PROCEDURE — 99212 OFFICE O/P EST SF 10 MIN: CPT | Performed by: ANESTHESIOLOGY

## 2024-09-20 PROCEDURE — 3075F SYST BP GE 130 - 139MM HG: CPT | Performed by: ANESTHESIOLOGY

## 2024-11-11 PROBLEM — S82.851A TRIMALLEOLAR FRACTURE OF ANKLE, CLOSED, RIGHT, INITIAL ENCOUNTER: Status: ACTIVE | Noted: 2024-11-11

## 2025-02-05 ENCOUNTER — APPOINTMENT (OUTPATIENT)
Dept: GENERAL RADIOLOGY | Age: 55
End: 2025-02-05
Payer: MEDICAID

## 2025-02-05 ENCOUNTER — HOSPITAL ENCOUNTER (EMERGENCY)
Age: 55
Discharge: HOME | End: 2025-02-05
Attending: EMERGENCY MEDICINE
Payer: MEDICAID

## 2025-02-05 VITALS
DIASTOLIC BLOOD PRESSURE: 90 MMHG | BODY MASS INDEX: 31.76 KG/M2 | RESPIRATION RATE: 18 BRPM | OXYGEN SATURATION: 98 % | TEMPERATURE: 97.7 F | WEIGHT: 185 LBS | SYSTOLIC BLOOD PRESSURE: 145 MMHG | HEART RATE: 109 BPM

## 2025-02-05 DIAGNOSIS — S49.92XA INJURY OF LEFT UPPER EXTREMITY, INITIAL ENCOUNTER: Primary | ICD-10-CM

## 2025-02-05 PROCEDURE — 73090 X-RAY EXAM OF FOREARM: CPT

## 2025-02-05 PROCEDURE — 73030 X-RAY EXAM OF SHOULDER: CPT

## 2025-02-05 PROCEDURE — 99283 EMERGENCY DEPT VISIT LOW MDM: CPT

## 2025-02-05 PROCEDURE — 6370000000 HC RX 637 (ALT 250 FOR IP): Performed by: EMERGENCY MEDICINE

## 2025-02-05 PROCEDURE — 73060 X-RAY EXAM OF HUMERUS: CPT

## 2025-02-05 RX ORDER — HYDROCODONE BITARTRATE AND ACETAMINOPHEN 5; 325 MG/1; MG/1
1 TABLET ORAL EVERY 8 HOURS PRN
Qty: 6 TABLET | Refills: 0 | Status: SHIPPED | OUTPATIENT
Start: 2025-02-05 | End: 2025-02-07

## 2025-02-05 RX ORDER — HYDROCODONE BITARTRATE AND ACETAMINOPHEN 5; 325 MG/1; MG/1
1 TABLET ORAL ONCE
Status: COMPLETED | OUTPATIENT
Start: 2025-02-05 | End: 2025-02-05

## 2025-02-05 RX ADMIN — HYDROCODONE BITARTRATE AND ACETAMINOPHEN 1 TABLET: 5; 325 TABLET ORAL at 13:04

## 2025-02-05 ASSESSMENT — PAIN DESCRIPTION - LOCATION
LOCATION: ARM

## 2025-02-05 ASSESSMENT — PAIN DESCRIPTION - ORIENTATION
ORIENTATION: LEFT

## 2025-02-05 ASSESSMENT — PAIN DESCRIPTION - DESCRIPTORS
DESCRIPTORS: ACHING;DISCOMFORT;THROBBING
DESCRIPTORS: DISCOMFORT;ACHING;THROBBING

## 2025-02-05 ASSESSMENT — PAIN SCALES - GENERAL
PAINLEVEL_OUTOF10: 9

## 2025-02-05 ASSESSMENT — PAIN DESCRIPTION - PAIN TYPE: TYPE: ACUTE PAIN

## 2025-02-05 ASSESSMENT — PAIN - FUNCTIONAL ASSESSMENT: PAIN_FUNCTIONAL_ASSESSMENT: 0-10

## 2025-02-05 NOTE — ED PROVIDER NOTES
HPI:  2/5/25, Time: 12:51 PM KOKI Villareal is a 54 y.o. male presenting to the ED for acute arm injury occurring just prior to arrival.  Patient states that he was sitting on a chair when it broke and went up under his armpit.  States that he has been having pain and difficulty extending his fingers since the injury as well as tingling.  He denies any other injuries.    Review of Systems:  Pertinent positives and negatives as per HPI.    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    Past Surgical History:  has a past surgical history that includes Abdomen surgery; Gastric fundoplication (2015); Colonoscopy; Carpal tunnel release (Bilateral); pr surgical arthroscopy eddy w/coracoacrm ligm rls (Right, 05/24/2018); other surgical history (08/15/2018); pr insj/rplcmt spinal npg/rcvr pocket crtj&connj (N/A, 08/15/2018); pr surgical arthroscopy eddy w/coracoacrm ligm rls (Left, 09/28/2018); Tonsillectomy (N/A, 01/21/2019); spinal cord stimulator implantation (N/A, 02/28/2019); pr impltj/rplcmt ithcl/edrl drug nfs prgrbl pump (N/A, 03/02/2019); Shoulder arthroscopy (Left, 01/28/2020); hernia repair (03/02/2021); cervical fusion (N/A, 06/02/2021); back surgery (06/2021); Nerve Block (N/A, 3/29/2022); Pain management procedure (Right, 4/26/2022); Pain management procedure (N/A, 7/21/2022); Pain management procedure (N/A, 12/15/2022); Pain management procedure (Left, 6/1/2023); Pain management procedure (N/A, 9/7/2023); Pain management procedure (N/A, 1/9/2024); Pain management procedure (Right, 5/2/2024); Shoulder arthroscopy (Right,

## 2025-02-25 ENCOUNTER — TELEPHONE (OUTPATIENT)
Dept: ORTHOPEDIC SURGERY | Age: 55
End: 2025-02-25

## 2025-02-25 NOTE — TELEPHONE ENCOUNTER
Patient called regarding an ER visit appointment. Patient was in the [facility] ER on 2/5/2025. Dr Sousa was on call.   I returned call to Neha letting him know that he needed to call Dr Rincon office and I left the number for him to call.

## 2025-02-26 ENCOUNTER — TELEPHONE (OUTPATIENT)
Dept: ORTHOPEDIC SURGERY | Age: 55
End: 2025-02-26

## 2025-02-26 SDOH — HEALTH STABILITY: PHYSICAL HEALTH: ON AVERAGE, HOW MANY DAYS PER WEEK DO YOU ENGAGE IN MODERATE TO STRENUOUS EXERCISE (LIKE A BRISK WALK)?: 5 DAYS

## 2025-02-26 SDOH — HEALTH STABILITY: PHYSICAL HEALTH: ON AVERAGE, HOW MANY MINUTES DO YOU ENGAGE IN EXERCISE AT THIS LEVEL?: 90 MIN

## 2025-02-26 NOTE — TELEPHONE ENCOUNTER
Referral received for patient via internal work-queue.     Referral reason/diagnosis: Klumpke Dejerine brachial plexus injury     Routed to providers for recommendations.    Future Appointments   Date Time Provider Department Center   2/27/2025  1:00 PM Mariano Shea MD [facility]   3/31/2025  9:15 AM Lexy Mcgarry PA-C [facility]       Electronically signed by Gabby Bates on 2/26/2025 at 11:03 AM

## 2025-02-26 NOTE — TELEPHONE ENCOUNTER
Recommend patient to follow up with Dr. Alejandro who was on call or a hand and upper extremity specialist.

## 2025-02-27 ENCOUNTER — OFFICE VISIT (OUTPATIENT)
Dept: ORTHOPEDIC SURGERY | Age: 55
End: 2025-02-27
Payer: MEDICAID

## 2025-02-27 VITALS
DIASTOLIC BLOOD PRESSURE: 90 MMHG | TEMPERATURE: 98 F | HEIGHT: 64 IN | BODY MASS INDEX: 31.58 KG/M2 | WEIGHT: 185 LBS | RESPIRATION RATE: 18 BRPM | SYSTOLIC BLOOD PRESSURE: 138 MMHG | HEART RATE: 70 BPM | OXYGEN SATURATION: 99 %

## 2025-02-27 DIAGNOSIS — R20.2 NUMBNESS AND TINGLING IN LEFT HAND: Primary | ICD-10-CM

## 2025-02-27 DIAGNOSIS — R20.0 NUMBNESS AND TINGLING IN LEFT HAND: Primary | ICD-10-CM

## 2025-02-27 PROCEDURE — 3080F DIAST BP >= 90 MM HG: CPT | Performed by: FAMILY MEDICINE

## 2025-02-27 PROCEDURE — 99204 OFFICE O/P NEW MOD 45 MIN: CPT | Performed by: FAMILY MEDICINE

## 2025-02-27 PROCEDURE — 3075F SYST BP GE 130 - 139MM HG: CPT | Performed by: FAMILY MEDICINE

## 2025-02-27 RX ORDER — GABAPENTIN 100 MG/1
100 CAPSULE ORAL NIGHTLY
Qty: 30 CAPSULE | Refills: 0 | Status: SHIPPED | OUTPATIENT
Start: 2025-02-27 | End: 2025-03-29

## 2025-02-27 NOTE — TELEPHONE ENCOUNTER
Left detailed message with recommendation for patient. Will print and fax referral to his ofc. I also provided pt with [facility] contact number for scheduling.

## 2025-02-27 NOTE — PROGRESS NOTES
[place]  [facility]  DATE OF VISIT : 2025    Patient : Tao Villareal  Age : 54 y.o.   : 1970  MRN : [id number]   ______________________________________________________________________    Chief Complaint :   Chief Complaint   Patient presents with    Arm Pain     Left arm pain.  Patient states that he was sitting in his chair and the chair broke, the arm of the plastic chair went into the patient armpit and since he has been having motor loss in hand and N/T into hand.         HPI : Tao Villareal is 54 y.o. male who presented to the clinic today for evaluation of left arm pain. The onset of the pain was about 1 month ago, after the injury described above.  Current symptoms include pain of the left arm that radiates from the armpit to the hand and weakness of the third fourth and fifth digits of the left hand. No history of dislocation. Patient endorses numbness and tingling of the left hand. Symptoms are aggravated by any use of the left upper extremity, pain is exacerbated by extending the fingers of the left hand both passively and actively. Evaluation to date: XRs of the left shoulder, humerus and radius/ulna which demonstrate no acute fractures or dislocations.  Treatment to date: avoidance of offending activity and OTC analgesics which are not very effective.     Past Medical History :  Past Medical History:   Diagnosis Date    Anxiety     Asthma     exercised induced    Cervicogenic headache 2019    Chronic generalized pain     COVID-19     mild    Degenerative disc disease, cervical     Dermatitis     Diabetes mellitus (HCC)     PO meds only    Heart stopped beating (HCC) 2021    3 days after cervical fusion- \" chest pain , dyspnea, passed out- intubated for 5 days\".has not seen a cardiologist since. 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

## 2025-03-13 ENCOUNTER — PROCEDURE VISIT (OUTPATIENT)
Dept: PHYSICAL MEDICINE AND REHAB | Age: 55
End: 2025-03-13
Payer: MEDICAID

## 2025-03-13 VITALS — HEIGHT: 64 IN | WEIGHT: 190 LBS | BODY MASS INDEX: 32.44 KG/M2

## 2025-03-13 DIAGNOSIS — R20.2 NUMBNESS AND TINGLING IN LEFT HAND: ICD-10-CM

## 2025-03-13 DIAGNOSIS — R20.0 NUMBNESS AND TINGLING IN LEFT HAND: ICD-10-CM

## 2025-03-13 PROCEDURE — 99203 OFFICE O/P NEW LOW 30 MIN: CPT | Performed by: PHYSICAL MEDICINE & REHABILITATION

## 2025-03-13 NOTE — PATIENT INSTRUCTIONS
Electrodiagnotic Laboratory  Accredited [facility] the [facility] with Exemplary status  TRAVIS Sanchez D.O.   [facility]  [address] [place] [address]  Phone: [phone]  Fax: [phone]        Today you had an electrodiagnostic exam which included nerve conduction studies (NCS) and electromyography (EMG). This test evaluated the electrical activity of your nerves and muscles to help determine if you have a nerve or muscle disease.  This test can help determine the location and type of a nerve or muscle problem. This will help your referring doctor diagnose your condition and determine the appropriate next step in your treatment plan.     After your test:    1. There are no long lasting side effects of the test.     2. You may resume your normal activities without restrictions.     3.  Resume any medications that were stopped for the test.     4  If you have sore areas or bruising in your muscles where the needle was placed, apply a cold pack to the sore area for 15-20 minutes three to four times a day as needed for pain.  The soreness should go away in about 1-2 days.     5. Your results were provided  Briefly at the end of your test and the final detailed report will be provided to your referring physician, and/or primary care physician and any other parties you requested within 1-2 days of the examination. You may wish to contact your referring provider after a few days to determine what they would like you to do next.     6.  Please call [phone] with any questions or concerns and if you develop increased body temperature/fever, swelling, tenderness, increased pain and/or drainage from the sites where the needle was placed.     Thank you for choosing us for your health care needs.

## 2025-03-13 NOTE — PROGRESS NOTES
[facility]  Electrodiagnostic Laboratory  *[facility]redited [facility] the [facility] with exemplary status  [address] [place] [address]  Phone: [phone]  Fax: [phone]    Referring Provider: Mariano Shea MD  Primary Care Physician: Anamika Calderon MD  Patient Name: Tao Villareal  Patient YOB: 1970  Gender: male  BMI: Body mass index is 32.61 kg/m².  Height 1.626 m (5' 4\"), weight 86.2 kg (190 lb).    3/13/2025    Reason for Referral: Numbness and tingling    Description of clinical problem:   Chief Complaint   Patient presents with    Tingling     Left hand numbness and tingling, states his fingers \"don't work\"       Sensory NCS      Nerve / Sites Rec. Site Peak Lat PP Amp Segments Distance Velocity Temp.     ms µV  cm m/s °C   L Median - Digit II (Antidromic)      Palm Dig II 1.51 33.6 Palm - Dig II 7 64 32      Wrist Dig II 3.28 29.0 Wrist - Dig II 14 57 32   L Ulnar - Digit V (Antidromic)      Wrist Dig V 3.39 18.8 Wrist - Dig V 14 52 32   L Radial - Anatomical snuff box (Forearm)      Forearm Wrist 2.34 17.3 Forearm - Wrist 10 56 32   L Medial antebrachial cutaneous - Forearm (Elbow)      Elbow Forearm 1.77 8.1 Elbow - Forearm 10 64 32   R Medial antebrachial cutaneous - Forearm (Elbow)      Elbow Forearm 1.67 5.3 Elbow - Forearm 10 71 32   L Lateral antebrachial cutaneous - Forearm (Elbow)      Elbow Forearm 3.07 18.0 Elbow - Forearm 10 41 32   R Lateral antebrachial cutaneous - Forearm (Elbow)      Elbow Forearm 2.14 13.6 Elbow - Forearm 10 58 32       Motor NCS      Nerve / Sites Muscle Onset Amplitude Segments Distance Velocity Temp.     ms mV  cm m/s °C   L Median - APB      Palm APB 1.93 11.9 Palm - APB   32      Wrist APB 3.70 10.5 Wrist - Palm 8 45* 32      Elbow APB 7.76 10.3 Elbow - Wrist 19 44* 32   L Ulnar - ADM      Wrist ADM 2.81 11.6 Wrist - ADM 8  32      B.Elbow ADM 5.94 8.6 B.Elbow - Wrist 19 61 32      A.Elbow ADM 7.76 7.9 A.Elbow -

## 2025-03-13 NOTE — PROGRESS NOTES
Electrodiagnostic Laboratory  *Accredited by the [facility] with exemplary status  [address] [place] [address]  Phone: [phone]  Fax: [phone]      Date of Examination: 03/13/25    Patient Name: Tao Villareal    An independent historian was not needed.     Tao Villareal  is a 54 y.o. year old male who was seen today regarding   Chief Complaint   Patient presents with    Tingling     Left hand numbness and tingling, states his fingers \"don't work\"     The symptoms are constant.       I have reviewed the referring provider's office note.    There is not a family history of neuromuscular conditions.     Physical Exam: General: The patient is in no apparent distress.  MSK: There is no joint effusion, deformity, instability, swelling, erythema or warmth.  AROM is full in the spine and extremities. Spurling is negative. Neurologic:  Weakness in finger flexion, wrist extension, finger extension, diminished light touch medial and lateral palmar hand, lateral dorsal hand and forearm to light touch, otherwise, no focal sensorimotor deficit.  Reflexes 2+ left biceps, brachioradialis, 1+ left triceps 2+ on right. Gait is normal.    Impression:     1. Numbness and tingling in left hand        Plan:   EMG is indicated to evaluate the above diagnosis.    EMG was done today and showed left cervical polyradiculopathy.  EMG is a very sensitive test for the presence of and approximate location of radiculopathy but the segmental level cannot always be definitively determined to a single level.  Paraspinal muscles can be spared in radiculopathy and the abnormality of paraspinals is useful in identifying a radiculopathy but not in localizing the segmental level.   Correlate with cervical spine imaging and if it is not concordant with a polyradiculopathy consider imaging the brachial plexus with contrast. The cervical paraspinals were spared on needle examination and differentiation between

## 2025-03-17 ENCOUNTER — TELEPHONE (OUTPATIENT)
Dept: NEUROSURGERY | Age: 55
End: 2025-03-17

## 2025-03-17 ENCOUNTER — TELEPHONE (OUTPATIENT)
Dept: ORTHOPEDIC SURGERY | Age: 55
End: 2025-03-17

## 2025-03-17 NOTE — TELEPHONE ENCOUNTER
Patient is scheduled with Lexy on 3/31. He is scheduled as a new patient as he was last seen by any of our providers in 2021. He has had an EMG done since scheduling. He called in as he is wondering if we could review the EMG to determine if he needs to be seen by Dr. Marlow. He does not want to be seen by a PA as it will prolong the process if he should need surgery. I explained our protocol. He states that he has accident insurance and it is only valid 90 days, starting 2/4/25 so he wants to be seen as soon as possible. I did explain even if we did schedule with Dr. Marlow we are going out mid April. Please advise.

## 2025-03-17 NOTE — TELEPHONE ENCOUNTER
Spoke to patient on the phone, advised him that at this point in time the only provider Dr. Shea would refer him to within [facility] would be Dr. Marlow. Patient understood this and was advised to contact PCP to see if any other provider would be willing to see him for this condition at their [facility] facility.  Patient was agreeable with this and our office phone and fax were provided to patient.

## 2025-03-28 NOTE — PROGRESS NOTES
Chief Complaint   Patient presents with    Cerumen Impaction     LOV: 9/2022 W/ GADIOGENESLER, EAR CLEANING, NARROW EAR CANALS     HPI:  Caroline Bond is a 78 y.o. female present with complaints of muffled hearing left greater than right.  Reports she has history of cerumen impactions and she is hoping that is the case today.      History of recurrent sinus infections. She has had sinus surgery in the past including revision complete endoscopic sinus surgery in 2018.  History of cerumen impactions.  PMH:  Past Medical History:   Diagnosis Date    Atypical ductal hyperplasia, breast     Benign neoplasm of unspecified breast 08/31/2017    Breast fibroadenoma    Encounter for screening mammogram for malignant neoplasm of breast     Visit for screening mammogram    Hyperlipidemia, unspecified 07/11/2014    Hyperlipidemia    Hypothyroidism, unspecified 07/11/2014    Hypothyroidism    Other chronic allergic conjunctivitis 01/06/2016    Chronic allergic conjunctivitis    Other conditions influencing health status     DEXA Body Composition Study    Other disorders of optic nerve, not elsewhere classified, right eye 06/30/2014    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    Personal history of malignant neoplasm, unspecified     History of malignant neoplasm    Personal history of other diseases of the digestive system 12/05/2014    History of oral aphthous ulcers    Personal history of other diseases of the musculoskeletal system and connective tissue     History of osteopenia    Personal history of other endocrine, nutritional and metabolic disease     History of hyperparathyroidism    Personal history of other specified conditions     History of edema    Primary hyperparathyroidism (Multi) 07/11/2014    Primary hyperparathyroidism    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 Blaire Rosas is complaining of pain cervical. The pain is constant. The pain is described as aching, throbbing, stabbing, sharp, burning, penetrating, nagging, and miserable. Pain is rated on his best day at a 6, on his worst day at a 10, and on average at a 8 on the VAS scale. He took his last dose of Norco and Flexeril  today. Any procedures since your last visit: Yes, with 65 % relief. He is not on NSAIDS and  is not on anticoagulation medications     Pacemaker or defibrillator: No   Medication Contract and Consent for Opioid Use Documents Filed       Patient Documents       Type of Document Status Date Received Received By Description    Medication Contract Received 11/19/2018  1:33 PM Sepideh MARTÍNEZ 11/14/18 [facility]     Medication Contract Received 11/4/2019  1:15 PM GARY, [address] pain management pt agressment    Medication Contract Received 2/1/2021 11:22 AM [facility] CONTRACT DR. POWERS                        There were no vitals taken for this visit. No LMP for male patient. breast    Unspecified benign mammary dysplasia of unspecified breast     Atypical lobular hyperplasia of breast    Unspecified exophthalmos 07/11/2014    Exophthalmos     Past Surgical History:   Procedure Laterality Date    BREAST BIOPSY Right 11/25/2009    Biopsy Breast Open    CHOLECYSTECTOMY  09/16/2013    Cholecystectomy Laparoscopic    COLONOSCOPY  06/26/2013    Complete Colonoscopy    HYSTERECTOMY  09/16/2013    Hysterectomy    OTHER SURGICAL HISTORY  09/29/2022    Sinus surgery    OTHER SURGICAL HISTORY  03/30/2018    Staging Laparotomy For Hodgkin's Disease Or Lymphoma    OTHER SURGICAL HISTORY  10/08/2019    Appendectomy    OTHER SURGICAL HISTORY  10/08/2019    Cataract surgery         Medications:     Current Outpatient Medications:     atorvastatin (Lipitor) 10 mg tablet, Take 1 tablet (10 mg) by mouth once daily at bedtime., Disp: 100 tablet, Rfl: 1    cholecalciferol (Vitamin D-3) 125 MCG (5000 UT) capsule, Take by mouth. TAKE AS DIRECTED., Disp: , Rfl:     fluticasone (Flonase) 50 mcg/actuation nasal spray, Administer 2 sprays into each nostril once daily., Disp: , Rfl:     levothyroxine (Synthroid, Levoxyl) 100 mcg tablet, TAKE 1 TABLET BY MOUTH DAILY, Disp: 100 tablet, Rfl: 2    multivit-min/iron/folic/lutein (CENTRUM SILVER WOMEN ORAL), Take by mouth., Disp: , Rfl:     psyllium husk, with sugar, (Metamucil, with sugar,) 3.4 gram/12 gram powder, Take 1 Package by mouth once daily., Disp: , Rfl:     vit C,C-Mo-wgoqq-lutein-zeaxan (PreserVision AREDS-2) 250-90-40-1 mg capsule, Take 1 capsule by mouth every other day., Disp: , Rfl:     aspirin 81 mg EC tablet, Take 1 tablet (81 mg) by mouth once daily. (Patient not taking: Reported on 3/28/2025), Disp: , Rfl:      Allergies:  Allergies   Allergen Reactions    Codeine Unknown    Ephedrine Unknown     heart palpitations  throat swelling    Moxifloxacin Unknown     neuropathy    Penicillins Unknown        ROS:  Review of systems normal unless stated  otherwise in the HPI and/or PMH.    Physical Exam:  not currently breastfeeding. There is no height or weight on file to calculate BMI.     GENERAL APPEARANCE: Well developed and well nourished.  Alert and oriented in no acute distress.  Normal vocal quality.      HEAD/FACE: No erythema or edema or facial tenderness.  Normal facial nerve function bilaterally.    EAR:       EXTERNAL: Bilateral external auditory canals noted to have cerumen impactions which caused obstruction of visualization of the tympanic membranes.  These were removed via procedure under direct microscopic visualization using instrumentation without any difficulty or complication. Normal pinnas and external auditory canals without lesion.       MIDDLE EAR: Tympanic membranes intact and mobile with normal landmarks.  Middle ear space appears well aerated.       TUBE STATUS: N/A       MASTOID CAVITY: N/A       HEARING: Gross hearing assessment is within normal limits.      NOSE:       VISUALIZED USING: Anterior rhinoscopy with headlight and nasal speculum.       DORSUM: Midline, nontraumatic appearance.       MUCOSA: Normal-appearing.       SECRETIONS: Normal.       SEPTUM: Midline and nonobstructing.       INFERIOR TURBINATES: Normal.       MIDDLE TURBINATES/MEATUS: N/A       BLEEDING: N/A         ORAL CAVITY/PHARYNX:       TEETH: Adequate dentition.       TONGUE: No mass or lesion.  Normal mobility.       FLOOR OF MOUTH: No mass or lesion.       PALATE: Normal hard palate, soft palate, and uvula.       OROPHARYNX: Normal without mass or lesion.       BUCCAL MUCOSA/GBS: Normal without mass or lesion.       LIPS: Normal.    LARYNX/HYPOPHARYNX/NASOPHARYNX: N/A    NECK: No palpable masses or abnormal adenopathy.  Trachea is midline.    THYROID: No thyromegaly or palpable nodule.    SALIVARY GLANDS: Normal bilateral parotid and submandibular glands by inspection and palpation.    TMJ's: Normal.    NEURO: Cranial nerve exam grossly normal bilaterally.        Assessment/Plan   Caroline was seen today for cerumen impaction.  Diagnoses and all orders for this visit:  Bilateral impacted cerumen (Primary)  Conductive hearing loss, bilateral     Caroline hearing has improved after her ear cleaning and she reports things are feeling much better.  I have asked her to update her audiogram it has been several years.    Follow up for Audiogram.     Ileana Hood, APRN-CNP

## 2025-03-30 DIAGNOSIS — R20.0 NUMBNESS AND TINGLING IN LEFT HAND: ICD-10-CM

## 2025-03-30 DIAGNOSIS — R20.2 NUMBNESS AND TINGLING IN LEFT HAND: ICD-10-CM

## 2025-04-01 RX ORDER — GABAPENTIN 100 MG/1
100 CAPSULE ORAL NIGHTLY
Qty: 30 CAPSULE | Refills: 0 | Status: SHIPPED | OUTPATIENT
Start: 2025-04-01 | End: 2025-05-01

## 2025-04-03 ENCOUNTER — OFFICE VISIT (OUTPATIENT)
Dept: NEUROSURGERY | Age: 55
End: 2025-04-03
Payer: MEDICAID

## 2025-04-03 VITALS — SYSTOLIC BLOOD PRESSURE: 130 MMHG | DIASTOLIC BLOOD PRESSURE: 83 MMHG | HEART RATE: 101 BPM

## 2025-04-03 DIAGNOSIS — R29.898 LEFT ARM WEAKNESS: Primary | ICD-10-CM

## 2025-04-03 PROCEDURE — 3075F SYST BP GE 130 - 139MM HG: CPT | Performed by: NEUROLOGICAL SURGERY

## 2025-04-03 PROCEDURE — 99204 OFFICE O/P NEW MOD 45 MIN: CPT | Performed by: NEUROLOGICAL SURGERY

## 2025-04-03 PROCEDURE — 99204 OFFICE O/P NEW MOD 45 MIN: CPT

## 2025-04-03 PROCEDURE — 3079F DIAST BP 80-89 MM HG: CPT | Performed by: NEUROLOGICAL SURGERY

## 2025-04-03 RX ORDER — HYDROCODONE BITARTRATE AND ACETAMINOPHEN 10; 325 MG/1; MG/1
1 TABLET ORAL
COMMUNITY
Start: 2016-02-26

## 2025-04-03 ASSESSMENT — ENCOUNTER SYMPTOMS
EYES NEGATIVE: 1
ALLERGIC/IMMUNOLOGIC NEGATIVE: 1
GASTROINTESTINAL NEGATIVE: 1

## 2025-04-03 NOTE — PROGRESS NOTES
Tao Villareal (:  1970) is a 54 y.o. male,New patient, here for evaluation of the following chief complaint(s):  New Patient (Patient here for a ED follow up due to a fall on 25. Has an EMG done. Pain, and numbness on left hand)         Assessment & Plan  Left arm weakness   New, not at goal (unstable), changes made today: recommend  cervical MRI and left brachial plexus MRI    Orders:    MRI CERVICAL SPINE WO CONTRAST; Future    MRI BRACHIAL PLEXUS LEFT W WO CONTRAST; Future    Basic Metabolic Panel; Future      No follow-ups on file.     54 year old male who fell out of a chair in February of this year.  Since then he has had left arm numbness, weakness and loss of function.  EMG shows either plexopathy or radiculopathy.  I will bryce cervical MRI and left brachial plexus MRI      Subjective   HPI  54 year old male who underwent anterior cervical fusion several years ago.  In February, he fell out of his chair and since then he has had numbness, weakness and loss of function in the left arm.  He has tried physical therapy.   He was unable to complete therapy because of the pain and loss of function in the left arm.  He denies loss of control of bowel or bladder function and continues to dealt with chronic pain.    Review of Systems   Constitutional: Negative.    HENT: Negative.     Eyes: Negative.    Cardiovascular: Negative.    Gastrointestinal: Negative.    Endocrine: Negative.    Genitourinary: Negative.    Musculoskeletal:  Positive for arthralgias, neck pain and neck stiffness.   Skin: Negative.    Allergic/Immunologic: Negative.    Neurological:  Positive for weakness and light-headedness.   Hematological: Negative.    Psychiatric/Behavioral: Negative.            Objective   Physical Exam  Vitals reviewed.   Constitutional:       General: He is not in acute distress.     Appearance: Normal appearance. He is normal weight. He is not ill-appearing, toxic-appearing or diaphoretic.   HENT:

## 2025-04-08 ENCOUNTER — TELEPHONE (OUTPATIENT)
Dept: NEUROSURGERY | Age: 55
End: 2025-04-08

## 2025-04-08 NOTE — TELEPHONE ENCOUNTER
Patient wants to know if we can schedule his MRI as stat.  He is trying to get it approved asap since he purchased an accident policy.  He is concerned about making a living and getting back to work is reason.

## 2025-04-11 LAB
BUN BLDV-MCNC: NORMAL MG/DL
CALCIUM SERPL-MCNC: NORMAL MG/DL
CHLORIDE BLD-SCNC: NORMAL MMOL/L
CO2: NORMAL
CREAT SERPL-MCNC: NORMAL MG/DL
EGFR: NORMAL
GLUCOSE BLD-MCNC: NORMAL MG/DL
POTASSIUM SERPL-SCNC: NORMAL MMOL/L
SODIUM BLD-SCNC: NORMAL MMOL/L

## 2025-04-14 DIAGNOSIS — R29.898 LEFT ARM WEAKNESS: ICD-10-CM

## 2025-04-16 ENCOUNTER — HOSPITAL ENCOUNTER (OUTPATIENT)
Dept: MRI IMAGING | Age: 55
Discharge: HOME | End: 2025-04-18
Attending: NEUROLOGICAL SURGERY
Payer: MEDICAID

## 2025-04-16 DIAGNOSIS — R29.898 LEFT ARM WEAKNESS: ICD-10-CM

## 2025-04-16 PROCEDURE — 6360000004 HC RX CONTRAST MEDICATION: Performed by: RADIOLOGY

## 2025-04-16 PROCEDURE — 72141 MRI NECK SPINE W/O DYE: CPT

## 2025-04-16 PROCEDURE — 73220 MRI UPPR EXTREMITY W/O&W/DYE: CPT

## 2025-04-16 PROCEDURE — A9579 GAD-BASE MR CONTRAST NOS,1ML: HCPCS | Performed by: RADIOLOGY

## 2025-04-16 RX ADMIN — GADOTERIDOL 13 ML: 279.3 INJECTION, SOLUTION INTRAVENOUS at 16:30

## 2025-04-17 ENCOUNTER — OFFICE VISIT (OUTPATIENT)
Dept: NEUROSURGERY | Age: 55
End: 2025-04-17
Payer: MEDICAID

## 2025-04-17 VITALS
OXYGEN SATURATION: 97 % | HEIGHT: 64 IN | HEART RATE: 94 BPM | SYSTOLIC BLOOD PRESSURE: 138 MMHG | DIASTOLIC BLOOD PRESSURE: 84 MMHG | BODY MASS INDEX: 32.44 KG/M2 | TEMPERATURE: 97.4 F | WEIGHT: 190 LBS

## 2025-04-17 DIAGNOSIS — R29.898 LEFT HAND WEAKNESS: Primary | ICD-10-CM

## 2025-04-17 PROCEDURE — 3079F DIAST BP 80-89 MM HG: CPT | Performed by: NEUROLOGICAL SURGERY

## 2025-04-17 PROCEDURE — 99212 OFFICE O/P EST SF 10 MIN: CPT

## 2025-04-17 PROCEDURE — 99212 OFFICE O/P EST SF 10 MIN: CPT | Performed by: NEUROLOGICAL SURGERY

## 2025-04-17 PROCEDURE — 3075F SYST BP GE 130 - 139MM HG: CPT | Performed by: NEUROLOGICAL SURGERY

## 2025-04-17 NOTE — PROGRESS NOTES
Patient is here for follow up for loss of function in left upper extremity.     Physical exam  Alert and Oriented X3  PERRLA, EOMI  ARGUETA 5/5 except 4-/5 in LUE in C6 innervated muscles.   Sensation intact to LT and PP  Reflexes are 2+ and symmetric    A/P: patient is here for follow up for: left arm dysfunction.  His MRI shows C6-C7 foraminal stenosis.  This does not completely explain his symptoms.  I am recommending second opinion at [facility] with Dr. Holcomb and also second opinion in [place]     Suzi Marlow MD

## 2025-05-15 ENCOUNTER — TELEPHONE (OUTPATIENT)
Age: 55
End: 2025-05-15

## 2025-05-19 DIAGNOSIS — G56.30 RADIAL NERVE PALSY: Primary | ICD-10-CM

## 2025-06-16 ENCOUNTER — TELEPHONE (OUTPATIENT)
Dept: PHYSICAL MEDICINE AND REHAB | Age: 55
End: 2025-06-16

## 2025-07-09 ENCOUNTER — HOSPITAL ENCOUNTER (EMERGENCY)
Age: 55
Discharge: HOME | End: 2025-07-09
Attending: EMERGENCY MEDICINE
Payer: MEDICAID

## 2025-07-09 ENCOUNTER — APPOINTMENT (OUTPATIENT)
Dept: GENERAL RADIOLOGY | Age: 55
End: 2025-07-09
Payer: MEDICAID

## 2025-07-09 VITALS
SYSTOLIC BLOOD PRESSURE: 148 MMHG | HEART RATE: 81 BPM | WEIGHT: 180 LBS | OXYGEN SATURATION: 98 % | BODY MASS INDEX: 30.88 KG/M2 | RESPIRATION RATE: 20 BRPM | DIASTOLIC BLOOD PRESSURE: 96 MMHG | TEMPERATURE: 98.2 F

## 2025-07-09 DIAGNOSIS — R07.9 CHEST PAIN, UNSPECIFIED TYPE: Primary | ICD-10-CM

## 2025-07-09 DIAGNOSIS — J20.9 ACUTE BRONCHITIS, UNSPECIFIED ORGANISM: ICD-10-CM

## 2025-07-09 LAB
ALBUMIN SERPL-MCNC: 5.1 G/DL (ref 3.5–5.2)
ALP SERPL-CCNC: 80 U/L (ref 40–129)
ALT SERPL-CCNC: 30 U/L (ref 0–50)
AMYLASE SERPL-CCNC: 176 U/L (ref 28–100)
ANION GAP SERPL CALCULATED.3IONS-SCNC: 14 MMOL/L (ref 7–16)
AST SERPL-CCNC: 34 U/L (ref 0–50)
BILIRUB DIRECT SERPL-MCNC: 0.3 MG/DL (ref 0–0.2)
BILIRUB INDIRECT SERPL-MCNC: 0.4 MG/DL (ref 0–1)
BILIRUB SERPL-MCNC: 0.8 MG/DL (ref 0–1.2)
BNP SERPL-MCNC: <36 PG/ML (ref 0–125)
BUN SERPL-MCNC: 15 MG/DL (ref 6–20)
CALCIUM SERPL-MCNC: 10.2 MG/DL (ref 8.6–10)
CHLORIDE SERPL-SCNC: 101 MMOL/L (ref 98–107)
CK SERPL-CCNC: 249 U/L (ref 0–190)
CO2 SERPL-SCNC: 25 MMOL/L (ref 22–29)
CREAT SERPL-MCNC: 0.9 MG/DL (ref 0.7–1.2)
D-DIMER QUANTITATIVE: <200 NG/ML DDU (ref 0–230)
ERYTHROCYTE [DISTWIDTH] IN BLOOD BY AUTOMATED COUNT: 17.5 % (ref 11.5–15)
GFR, ESTIMATED: >90 ML/MIN/1.73M2
GLUCOSE SERPL-MCNC: 90 MG/DL (ref 74–99)
HCT VFR BLD AUTO: 41.8 % (ref 37–54)
HGB BLD-MCNC: 13.4 G/DL (ref 12.5–16.5)
INR PPP: 1.1
LIPASE SERPL-CCNC: 33 U/L (ref 13–60)
MAGNESIUM SERPL-MCNC: 1.9 MG/DL (ref 1.6–2.6)
MCH RBC QN AUTO: 22.4 PG (ref 26–35)
MCHC RBC AUTO-ENTMCNC: 32.1 G/DL (ref 32–34.5)
MCV RBC AUTO: 69.9 FL (ref 80–99.9)
PLATELET # BLD AUTO: 277 K/UL (ref 130–450)
PMV BLD AUTO: 10.3 FL (ref 7–12)
POTASSIUM SERPL-SCNC: 4.4 MMOL/L (ref 3.5–5.1)
PROT SERPL-MCNC: 8.4 G/DL (ref 6.4–8.3)
PROTHROMBIN TIME: 12.2 SEC (ref 9.3–12.4)
RBC # BLD AUTO: 5.98 M/UL (ref 3.8–5.8)
SODIUM SERPL-SCNC: 141 MMOL/L (ref 136–145)
TROPONIN I SERPL HS-MCNC: <6 NG/L (ref 0–22)
WBC OTHER # BLD: 7.5 K/UL (ref 4.5–11.5)

## 2025-07-09 PROCEDURE — 83690 ASSAY OF LIPASE: CPT

## 2025-07-09 PROCEDURE — 82550 ASSAY OF CK (CPK): CPT

## 2025-07-09 PROCEDURE — 85610 PROTHROMBIN TIME: CPT

## 2025-07-09 PROCEDURE — 84484 ASSAY OF TROPONIN QUANT: CPT

## 2025-07-09 PROCEDURE — 99285 EMERGENCY DEPT VISIT HI MDM: CPT

## 2025-07-09 PROCEDURE — 6370000000 HC RX 637 (ALT 250 FOR IP): Performed by: EMERGENCY MEDICINE

## 2025-07-09 PROCEDURE — 71045 X-RAY EXAM CHEST 1 VIEW: CPT

## 2025-07-09 PROCEDURE — 6360000002 HC RX W HCPCS: Performed by: EMERGENCY MEDICINE

## 2025-07-09 PROCEDURE — 96374 THER/PROPH/DIAG INJ IV PUSH: CPT

## 2025-07-09 PROCEDURE — 80053 COMPREHEN METABOLIC PANEL: CPT

## 2025-07-09 PROCEDURE — 82248 BILIRUBIN DIRECT: CPT

## 2025-07-09 PROCEDURE — 82150 ASSAY OF AMYLASE: CPT

## 2025-07-09 PROCEDURE — 83735 ASSAY OF MAGNESIUM: CPT

## 2025-07-09 PROCEDURE — 85379 FIBRIN DEGRADATION QUANT: CPT

## 2025-07-09 PROCEDURE — 85027 COMPLETE CBC AUTOMATED: CPT

## 2025-07-09 PROCEDURE — 93005 ELECTROCARDIOGRAM TRACING: CPT | Performed by: EMERGENCY MEDICINE

## 2025-07-09 PROCEDURE — 83880 ASSAY OF NATRIURETIC PEPTIDE: CPT

## 2025-07-09 RX ORDER — AZITHROMYCIN 250 MG/1
TABLET, FILM COATED ORAL
Qty: 1 PACKET | Refills: 0 | Status: SHIPPED | OUTPATIENT
Start: 2025-07-09 | End: 2025-07-09

## 2025-07-09 RX ORDER — ASPIRIN 81 MG/1
324 TABLET, CHEWABLE ORAL ONCE
Status: COMPLETED | OUTPATIENT
Start: 2025-07-09 | End: 2025-07-09

## 2025-07-09 RX ORDER — AZITHROMYCIN 250 MG/1
500 TABLET, FILM COATED ORAL ONCE
Status: COMPLETED | OUTPATIENT
Start: 2025-07-09 | End: 2025-07-09

## 2025-07-09 RX ORDER — ALBUTEROL SULFATE 90 UG/1
2 INHALANT RESPIRATORY (INHALATION) EVERY 6 HOURS PRN
Qty: 18 G | Refills: 3 | Status: SHIPPED | OUTPATIENT
Start: 2025-07-09 | End: 2025-07-09

## 2025-07-09 RX ORDER — KETOROLAC TROMETHAMINE 30 MG/ML
15 INJECTION, SOLUTION INTRAMUSCULAR; INTRAVENOUS ONCE
Status: COMPLETED | OUTPATIENT
Start: 2025-07-09 | End: 2025-07-09

## 2025-07-09 RX ORDER — ALBUTEROL SULFATE 90 UG/1
2 INHALANT RESPIRATORY (INHALATION) EVERY 6 HOURS PRN
Qty: 18 G | Refills: 3 | Status: SHIPPED | OUTPATIENT
Start: 2025-07-09

## 2025-07-09 RX ORDER — AZITHROMYCIN 250 MG/1
TABLET, FILM COATED ORAL
Qty: 1 PACKET | Refills: 0 | Status: SHIPPED | OUTPATIENT
Start: 2025-07-09 | End: 2025-07-13

## 2025-07-09 RX ADMIN — ASPIRIN 324 MG: 81 TABLET, CHEWABLE ORAL at 21:06

## 2025-07-09 RX ADMIN — KETOROLAC TROMETHAMINE 15 MG: 30 INJECTION, SOLUTION INTRAMUSCULAR at 21:08

## 2025-07-09 RX ADMIN — AZITHROMYCIN DIHYDRATE 500 MG: 250 TABLET ORAL at 21:08

## 2025-07-09 ASSESSMENT — PAIN - FUNCTIONAL ASSESSMENT
PAIN_FUNCTIONAL_ASSESSMENT: PREVENTS OR INTERFERES WITH MANY ACTIVE NOT PASSIVE ACTIVITIES
PAIN_FUNCTIONAL_ASSESSMENT: 0-10

## 2025-07-09 ASSESSMENT — PAIN SCALES - GENERAL
PAINLEVEL_OUTOF10: 7
PAINLEVEL_OUTOF10: 6

## 2025-07-09 ASSESSMENT — PAIN DESCRIPTION - DESCRIPTORS
DESCRIPTORS: DISCOMFORT
DESCRIPTORS: ACHING;DISCOMFORT;TENDER

## 2025-07-09 ASSESSMENT — PAIN DESCRIPTION - LOCATION
LOCATION: CHEST
LOCATION: CHEST

## 2025-07-09 ASSESSMENT — PAIN DESCRIPTION - PAIN TYPE: TYPE: ACUTE PAIN

## 2025-07-09 NOTE — PROGRESS NOTES
Better  Dc all etoh w improvement  Rec for CPAP  Meds same     improve independence/tolerance for self-care routine  * Functional transfer/mobility training/DME recommendations for increased independence, safety, and fall prevention  * Patient/Family education to increase follow through with safety techniques and functional independence  * Recommendation of environmental modifications for increased safety with functional transfers/mobility and ADLs  * Therapeutic activities to facilitate/challenge dynamic balance, stand tolerance for increased safety and independence with ADLs  * Therapeutic activities to facilitate gross/fine motor skills for increased independence with ADLs  * Positioning to improve skin integrity, interaction with environment and functional independence      Recommended Adaptive Equipment: TBD - Adaptive equipment -> Issued 6/14/21     Home Living: Pt lives alone in a 1 story home with 3 ALBERT (1HR) to enter, pt reports HHA 10 hours/day    Bathroom setup: Claw Tub, Low Commode    Equipment owned: [facility]    Prior Level of Function: Mod I with ADLs , Assistance with IADLs; ambulated without AD   Driving: no   Occupation: musician     Pain Level: 2/10 neck/generalized;  Therapist facilitated repositioning to address pain    Cognition: A&O: 4/4; Follows 2 step directions   Memory:  Good   Sequencing: Good   Problem solving:  Good(-)   Judgement/safety:  Good(-)     Functional Assessment:  AM-PAC Daily Activity Raw Score: 18/24   Re - Eval Status  Date: 6/11/21 Treatment Status  Date:  6-14-21 STGs = LTGs  Time frame: 10-14 days   Feeding Independent      Grooming Min A    Simulated in standing, Min A for safety w/ standing ax, pt ed for techs to improve safety/adherene to Spinal prec w/ task    Mod Independent    UB Dressing Minimal Assist     Donning Robe in standing  Mod Independent    LB Dressing Mod A     Able to don/doff socks w/ cross legged tech, thread LEs into pants, Min A to pull pants over hips + Min A for standing balance + Set up, Pt ed for spinal precautions/safety   Min A    Pt ed/demo/return demo for use of Adaptive equip/techs for LB dressing - Mod VCs for improved safety/adherence to Spinal Precautions SUP    Bathing Mod Assist NT    Extensive Pt/Family ed re; appropriate use of Shower chair, grab bars - may be unsafe d/t pt having a High Claw-Tub. Recommended Sponge bathing to optimize safety - can consult [place] OT at the home after D/C. Minimal Assist    Toileting Mod Assist  SUP   Bed Mobility  Supine to sit: NT  Sit to supine: SUP - w/ HOB elevated    Pt in chair start of session  Pt ed for log-rolling tech, pt requested to transfer w/ HOB elevated   Supine to sit: SUP  Sit to supine: NT Supine to sit: Mod I   Sit to supine: Mod I   Functional Transfers Minimal Assist     Standing from Low chair, SUP to sit on EOB, Min VCs for safety/hand placement   SUP    EOB, Low chair - Min VCs for safety/hand placement Modified [place]    Functional Mobility Min Assist     Functional mobility using w/w short distances at b/s - in preparation for ambulation to/from bathroom; limited due to c/o pain, O2 requirements   SUP    Short distances at b/s, Mod VCs for improved safety awareness  Modified [place]    Balance Sitting:     Static:  IND in chair, SBA EOB    Dynamic:Close SUP  Standing: Min A w/ [employer] Sitting:     Static:  IND in chair, SBA EOB    Dynamic: SUP EOB/Chair  Standing: SUP w/o AD  Sitting:     Static:  Independent    Dynamic:Independent  Standing: Mod I   Activity Tolerance F-    O2 sats 96% w 12L HF   Fair F+   Visual/  Perceptual Glasses: yes, wfl                  Comments: Nursing clearance obtained prior to session. Upon arrival patient found in high-dorado position. Agreeable to OT session. Daughter was present for duration of session. Therapist educated pt on role of OT. At end of session, patient was positioned in b/s chair. Call light and phone within reach, all lines and tubes intact.   Overall patient demonstrated decreased independence and safety during completion of ADL/functional transfer/mobility tasks. Pt would benefit from continued skilled OT to increase safety and independence with completion of ADL/IADL tasks for functional independence and quality of life. Treatment: OT treatment provided this date includes: Therapist educated pt on role of OT and cervical spinal precautions. Therapist facilitated bed mobility (supine to sit with HOB elevated, scooting), functional transfers from multiple surfaces (sit<>stands to/from bed/chair), graded functional activities (static/dynamic sitting at EOB/chair, static/dynamic standing, functional reaching) within precautions to address activity tolerance and functional ambulation (short household distance). Therapist provided moderate verbal/tactile cueing on adherence to precautions, sequencing, hand placement, body mechanics, posture, energy conservation, and safety. Therapist facilitated ADL retraining LB dressing (socks - cueing on modified techniques/use of AE within precautions). Therapist provided moderate verbal/tactile cueing on adherence to precautions, sequencing, hand placement, body mechanics, posture, energy conservation, and safety. Skilled monitoring of O2 sats, HR, and pt response throughout treatment. Rehab Potential: Good for established goals     Patient / Family Goal: Return home this date       Patient and/or family were instructed on functional diagnosis, prognosis/goals and OT plan of care. Demonstrated Fair understanding.     Time In: 1443  Time Out: 3920  Total Treatment Time: 18 minutes    Min Units   OT Eval Low 18726       OT Eval Medium 23883      OT Eval High [id number]      OT Re-Eval [id number]      Therapeutic Ex [id number]       Therapeutic Activities 06700       ADL/Self Care [id number]  3   Orthotic Management [id number]       Manual [id number]     Neuro Re-Ed 27577       Non-Billable Time              Myrtie Simmonds, MOT, OTR/L  # [id number]

## 2025-07-10 LAB
EKG ATRIAL RATE: 87 BPM
EKG P AXIS: 32 DEGREES
EKG P-R INTERVAL: 160 MS
EKG Q-T INTERVAL: 362 MS
EKG QRS DURATION: 84 MS
EKG QTC CALCULATION (BAZETT): 435 MS
EKG R AXIS: -31 DEGREES
EKG T AXIS: 9 DEGREES
EKG VENTRICULAR RATE: 87 BPM

## 2025-07-10 PROCEDURE — 93010 ELECTROCARDIOGRAM REPORT: CPT | Performed by: INTERNAL MEDICINE

## 2025-07-10 NOTE — ED PROVIDER NOTES
acute changes  Comparison to prior EKG: stable as compared to patient's most recent EKG      ------------------------- NURSING NOTES AND VITALS REVIEWED ---------------------------   The nursing notes within the ED encounter and vital signs as below have been reviewed by myself.  BP (!) 139/93   Pulse 87   Temp 98.2 °F (36.8 °C)   Resp 22   Wt 81.6 kg (180 lb)   SpO2 97%   BMI 30.88 kg/m²   Oxygen Saturation Interpretation: Normal    The patient’s available past medical records and past encounters were reviewed.        ------------------------------ ED COURSE/MEDICAL DECISION MAKING----------------------  Medications   ketorolac (TORADOL) injection 15 mg (has no administration in time range)   aspirin chewable tablet 324 mg (has no administration in time range)   azithromycin (ZITHROMAX) tablet 500 mg (has no administration in time range)             Medical Decision Making:     Tao Villareal is a 54 y.o. male presenting to the ED for chest pain mostly when he breathes and coughs and somewhat reproducible his vital signs are are normal he is not febrile he is not tachycardic he is not hypoxic he has no leg swelling no calf pain he has been coughing up sputum he does not smoke sugar, beginning days ago.  The complaint has been persistent, moderate in severity, and worsened by nothing.  No leg swelling no calf    Re-Evaluations:             Re-evaluation.  Patient’s symptoms are improving      Consultations:                 Critical Care:         This patient's ED course included: a personal history and physicial examination, re-evaluation prior to disposition, and a personal history and physicial eaxmination    This patient has remained hemodynamically stable during their ED course.    Counseling:   The emergency provider has spoken with the patient and discussed today’s results, in addition to providing specific details for the plan of care and counseling regarding the diagnosis and prognosis.  Questions

## 2025-07-10 NOTE — DISCHARGE INSTRUCTIONS
Return if increased chest pain shortness of breath follow-up with cardiologist and pulmonologist as soon as possible take an aspirin every day continue the antibiotic

## 2025-07-24 ENCOUNTER — TRANSCRIBE ORDERS (OUTPATIENT)
Dept: ADMINISTRATIVE | Age: 55
End: 2025-07-24

## 2025-07-24 DIAGNOSIS — S43.432A SUPERIOR GLENOID LABRUM LESION OF LEFT SHOULDER, INITIAL ENCOUNTER: ICD-10-CM

## 2025-07-24 DIAGNOSIS — M19.011 OSTEOARTHRITIS OF RIGHT SHOULDER, UNSPECIFIED OSTEOARTHRITIS TYPE: ICD-10-CM

## 2025-07-24 DIAGNOSIS — M75.41 IMPINGEMENT SYNDROME OF RIGHT SHOULDER: ICD-10-CM

## 2025-07-24 DIAGNOSIS — M75.21 BICIPITAL TENDINITIS OF SHOULDER, RIGHT: Primary | ICD-10-CM

## 2025-07-24 DIAGNOSIS — M75.112 NONTRAUMATIC PARTIAL BILATERAL ROTATOR CUFF TEAR: ICD-10-CM

## 2025-07-24 DIAGNOSIS — M75.111 NONTRAUMATIC PARTIAL BILATERAL ROTATOR CUFF TEAR: ICD-10-CM

## 2025-07-24 DIAGNOSIS — M75.122 COMPLETE TEAR OF LEFT ROTATOR CUFF, UNSPECIFIED WHETHER TRAUMATIC: ICD-10-CM

## 2025-07-24 DIAGNOSIS — M75.42 IMPINGEMENT SYNDROME OF LEFT SHOULDER: ICD-10-CM

## 2025-07-24 DIAGNOSIS — M75.22 BICIPITAL TENDINITIS OF SHOULDER, LEFT: ICD-10-CM

## 2025-07-24 DIAGNOSIS — M19.012 OSTEOARTHRITIS OF LEFT SHOULDER, UNSPECIFIED OSTEOARTHRITIS TYPE: ICD-10-CM

## 2025-07-24 DIAGNOSIS — S63.501A SPRAIN OF RIGHT FOREARM, INITIAL ENCOUNTER: ICD-10-CM

## 2025-07-31 ENCOUNTER — PROCEDURE VISIT (OUTPATIENT)
Dept: PHYSICAL MEDICINE AND REHAB | Age: 55
End: 2025-07-31

## 2025-07-31 VITALS — WEIGHT: 180 LBS | BODY MASS INDEX: 29.99 KG/M2 | HEIGHT: 65 IN

## 2025-07-31 DIAGNOSIS — M54.10 POLYRADICULOPATHY: Primary | ICD-10-CM

## 2025-07-31 DIAGNOSIS — G56.22 LESION OF LEFT ULNAR NERVE: ICD-10-CM

## 2025-07-31 NOTE — PATIENT INSTRUCTIONS
Electrodiagnotic Laboratory  Accredited by the [facility] with Exemplary status  TRAVIS Sanchez D.O.   [facility]  [address] [place] [address]  Phone: [phone]  Fax: [phone]        Today you had an electrodiagnostic exam which included nerve conduction studies (NCS) and electromyography (EMG). This test evaluated the electrical activity of your nerves and muscles to help determine if you have a nerve or muscle disease.  This test can help determine the location and type of a nerve or muscle problem. This will help your referring doctor diagnose your condition and determine the appropriate next step in your treatment plan.     After your test:    1. There are no long lasting side effects of the test.     2. You may resume your normal activities without restrictions.     3.  Resume any medications that were stopped for the test.     4  If you have sore areas or bruising in your muscles where the needle was placed, apply a cold pack to the sore area for 15-20 minutes three to four times a day as needed for pain.  The soreness should go away in about 1-2 days.     5. Your results were provided  Briefly at the end of your test and the final detailed report will be provided to your referring physician, and/or primary care physician and any other parties you requested within 1-2 days of the examination. You may wish to contact your referring provider after a few days to determine what they would like you to do next.     6.  Please call [phone] with any questions or concerns and if you develop increased body temperature/fever, swelling, tenderness, increased pain and/or drainage from the sites where the needle was placed.     Thank you for choosing us for your health care needs.

## 2025-07-31 NOTE — PROGRESS NOTES
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      Follow up EMG is recommended if clinically warranted.      Technologist: Tess Bloom  Physician:    Reshma Avendano D.O., P.T.  Medical Director, [facility]  Board Certified Electrodiagnostic Medicine  Board Certified Physical Medicine and Rehabilitation    Nerve conduction studies and electromyography were performed according to our laboratory policies and procedures which can be provided upon request. All abnormal values are identified in the table. Laboratory normal values can also be provided upon request.       Cc: Johnnie Phelps DO Padubidri, Apurva, MD

## 2025-08-15 ENCOUNTER — OFFICE VISIT (OUTPATIENT)
Age: 55
End: 2025-08-15
Payer: MEDICAID

## 2025-08-15 VITALS
RESPIRATION RATE: 16 BRPM | SYSTOLIC BLOOD PRESSURE: 126 MMHG | HEART RATE: 86 BPM | BODY MASS INDEX: 29.99 KG/M2 | DIASTOLIC BLOOD PRESSURE: 80 MMHG | WEIGHT: 180 LBS | HEIGHT: 65 IN | OXYGEN SATURATION: 95 % | TEMPERATURE: 97.1 F

## 2025-08-15 DIAGNOSIS — M47.817 LUMBOSACRAL SPONDYLOSIS WITHOUT MYELOPATHY: ICD-10-CM

## 2025-08-15 DIAGNOSIS — M50.30 DDD (DEGENERATIVE DISC DISEASE), CERVICAL: ICD-10-CM

## 2025-08-15 DIAGNOSIS — Z98.1 S/P CERVICAL SPINAL FUSION: Primary | ICD-10-CM

## 2025-08-15 DIAGNOSIS — M51.369 DEGENERATION OF INTERVERTEBRAL DISC OF LUMBAR REGION, UNSPECIFIED WHETHER PAIN PRESENT: ICD-10-CM

## 2025-08-15 DIAGNOSIS — M54.12 CERVICAL RADICULAR PAIN: ICD-10-CM

## 2025-08-15 DIAGNOSIS — M47.812 CERVICAL SPONDYLOSIS: ICD-10-CM

## 2025-08-15 PROCEDURE — 3079F DIAST BP 80-89 MM HG: CPT | Performed by: ANESTHESIOLOGY

## 2025-08-15 PROCEDURE — 3074F SYST BP LT 130 MM HG: CPT | Performed by: ANESTHESIOLOGY

## 2025-08-15 PROCEDURE — 99214 OFFICE O/P EST MOD 30 MIN: CPT | Performed by: ANESTHESIOLOGY

## 2025-08-15 PROCEDURE — 99213 OFFICE O/P EST LOW 20 MIN: CPT | Performed by: ANESTHESIOLOGY

## 2025-08-15 RX ORDER — GABAPENTIN 300 MG/1
CAPSULE ORAL
COMMUNITY
Start: 2025-05-29

## 2025-08-26 ENCOUNTER — HOSPITAL ENCOUNTER (OUTPATIENT)
Dept: MRI IMAGING | Age: 55
Discharge: HOME | End: 2025-08-28
Attending: ANESTHESIOLOGY

## 2025-08-26 DIAGNOSIS — M51.369 DEGENERATION OF INTERVERTEBRAL DISC OF LUMBAR REGION, UNSPECIFIED WHETHER PAIN PRESENT: ICD-10-CM

## 2025-08-26 DIAGNOSIS — M47.817 LUMBOSACRAL SPONDYLOSIS WITHOUT MYELOPATHY: ICD-10-CM

## 2025-08-29 ENCOUNTER — TELEPHONE (OUTPATIENT)
Age: 55
End: 2025-08-29

## (undated) DEVICE — COVER HNDL LT DISP

## (undated) DEVICE — GLOVE SURG 8.5 PF POLYMER WHT STRL SIGN LTX ESSENTIAL LTX

## (undated) DEVICE — 4-PORT MANIFOLD: Brand: NEPTUNE 2

## (undated) DEVICE — BANDAGE ADH W1XL3IN NAT FAB WVN FLX DURABLE N ADH PD SEAL

## (undated) DEVICE — PATIENT RETURN ELECTRODE, SINGLE-USE, CONTACT QUALITY MONITORING, ADULT, WITH 9FT CORD, FOR PATIENTS WEIGING OVER 33LBS. (15KG): Brand: MEGADYNE

## (undated) DEVICE — ALCOHOL RUBBING ISO 16OZ 70%

## (undated) DEVICE — 12 ML SYRINGE,LUER-LOCK TIP: Brand: MONOJECT

## (undated) DEVICE — HALF SHEET: Brand: CONVERTORS

## (undated) DEVICE — SYRINGE MED 30ML STD CLR PLAS LUERLOCK TIP N CTRL DISP

## (undated) DEVICE — SLEEVE TRAC SPANDEX LAT W/ 4IN COBAN SUPERFICIAL RAD NRV PD

## (undated) DEVICE — PAD,ABDOMINAL,5"X9",ST,LF,25/BX: Brand: MEDLINE INDUSTRIES, INC.

## (undated) DEVICE — SYRINGE, LUER LOCK, 5ML: Brand: MEDLINE

## (undated) DEVICE — ADHESIVE SKIN CLOSURE TOP 36 CC HI VISC DERMBND MINI

## (undated) DEVICE — TAPE,ELASTIC,FOAM,CURAD,3"X5.5YD,LF: Brand: CURAD

## (undated) DEVICE — [AGGRESSIVE PLUS CUTTER, ARTHROSCOPIC SHAVER BLADE,  DO NOT RESTERILIZE,  DO NOT USE IF PACKAGE IS DAMAGED,  KEEP DRY,  KEEP AWAY FROM SUNLIGHT]: Brand: FORMULA

## (undated) DEVICE — Z DISCONTINUED USE 2132709 NEEDLE HYPO 18GA L1.5IN PNK POLYPR HUB S STL THN WALL FILL

## (undated) DEVICE — T-MAX DISPOSABLE FACE MASK 8 PER BOX

## (undated) DEVICE — INTENDED FOR TISSUE SEPARATION, AND OTHER PROCEDURES THAT REQUIRE A SHARP SURGICAL BLADE TO PUNCTURE OR CUT.: Brand: BARD-PARKER ® STAINLESS STEEL BLADES

## (undated) DEVICE — NON-DEHP CATHETER EXTENSION SET, MALE LUER LOCK ADAPTER

## (undated) DEVICE — C-ARMOR C-ARM EQUIPMENT COVERS CLEAR STERILE UNIVERSAL FIT 12 PER CASE: Brand: C-ARMOR

## (undated) DEVICE — HYPODERMIC SAFETY NEEDLE: Brand: MAGELLAN

## (undated) DEVICE — MAT SURG W36XL56IN GRN FOAM ANTIFATIGUE NONSLIP DRISAFE

## (undated) DEVICE — SOLUTION IV IRRIG WATER 1000ML POUR BRL 2F7114

## (undated) DEVICE — SYRINGE MED 50ML LUERLOCK TIP

## (undated) DEVICE — TOWEL,OR,DSP,ST,BLUE,DLX,10/PK,8PK/CS: Brand: MEDLINE

## (undated) DEVICE — 3M(TM) MEDIPORE(TM) +PAD SOFT CLOTH ADHESIVE WOUND DRESSING 3569: Brand: 3M™ MEDIPORE™

## (undated) DEVICE — EXTRAS UGOKWE

## (undated) DEVICE — Device

## (undated) DEVICE — KIT,ANTI FOG,W/SPONGE & FLUID,SOFT PACK: Brand: MEDLINE

## (undated) DEVICE — PACK,SHOULDER SPLIT: Brand: MEDLINE

## (undated) DEVICE — DRESSING,GAUZE,XEROFORM,CURAD,1"X8",ST: Brand: CURAD

## (undated) DEVICE — SUPER TURBOVAC 90 INTEGRATED CABLE WAND ICW: Brand: COBLATION

## (undated) DEVICE — GLOVE SURG SZ 65 THK91MIL LTX FREE SYN POLYISOPRENE

## (undated) DEVICE — GLOVE ORANGE PI 7 1/2   MSG9075

## (undated) DEVICE — CHLORAPREP 26ML ORANGE

## (undated) DEVICE — SYRINGE, LUER LOCK, 10ML: Brand: MEDLINE

## (undated) DEVICE — COUNTER NDL 10 COUNT HLD 20 FOAM BLK SGL MAG

## (undated) DEVICE — DEVICE NEUROSTIMULATOR W2.9XL3.1IN THK0.8IN 71GM

## (undated) DEVICE — TOWEL,OR,DSP,ST,BLUE,STD,6/PK,12PK/CS: Brand: MEDLINE

## (undated) DEVICE — TRAP,MUCUS SPECIMEN,40CC: Brand: MEDLINE

## (undated) DEVICE — TRAY EPI SGL DOSE 18GA NDL CUST AULTMAN HOSP

## (undated) DEVICE — Z DISCONTINUED APPLICATOR SURG PREP 0.35OZ 2% CHG 70% ISO ALC W/ HI LT

## (undated) DEVICE — SHEET, T, LAPAROTOMY, STERILE: Brand: MEDLINE

## (undated) DEVICE — GAUZE,SPONGE,4"X4",12PLY,STERILE,LF,2'S: Brand: MEDLINE

## (undated) DEVICE — SHOULDER STABILIZATION KIT,                                    DISPOSABLE 12 PER BOX

## (undated) DEVICE — DRESSING HYDROFIBER AQUACEL AG ADVANTAGE 3.5X6 IN

## (undated) DEVICE — TOWEL OR BLUEE 16X26IN ST 8 PACK ORB08 16X26ORTWL

## (undated) DEVICE — 5.0MM PRECISION ROUND

## (undated) DEVICE — CASPAR DISTR PIN12MMSTER: Brand: AESCULAP

## (undated) DEVICE — DRILL SYSTEM 7

## (undated) DEVICE — TUBING SUCT 12FR MAL ALUM SHFT FN CAP VENT UNIV CONN W/ OBT

## (undated) DEVICE — TUBING, SUCTION, 9/32" X 10', STRAIGHT: Brand: MEDLINE

## (undated) DEVICE — [STANDARD 12-FLUTE BARREL BUR, ARTHROSCOPIC SHAVER BLADE,  DO NOT RESTERILIZE,  DO NOT USE IF PACKAGE IS DAMAGED,  KEEP DRY,  KEEP AWAY FROM SUNLIGHT]: Brand: FORMULA

## (undated) DEVICE — SURGICAL PROCEDURE PACK LAMINECTOMY LUMBAR

## (undated) DEVICE — IMMOBILIZER SHLDR L10.5-17IN D7IN SLNG W/ 15DEG ABD PLLW

## (undated) DEVICE — NEEDLE HYPO 18GA L1.5IN PNK POLYPR HUB S STL THN WALL FILL

## (undated) DEVICE — GLOVE SURG SZ 85 STD WHT LTX SYN POLYMER BEAD REINF ANTI RL

## (undated) DEVICE — DOUBLE BASIN SET: Brand: MEDLINE INDUSTRIES, INC.

## (undated) DEVICE — 3 ML SYRINGE LUER-LOCK TIP: Brand: MONOJECT

## (undated) DEVICE — GAUZE,SPONGE,4"X4",8PLY,STRL,LF,10/TRAY: Brand: MEDLINE

## (undated) DEVICE — SURGICAL PROCEDURE PACK BASIC

## (undated) DEVICE — BLADE CLIPPER GEN PURP NS

## (undated) DEVICE — 3M™ IOBAN™ 2 ANTIMICROBIAL INCISE DRAPE 6640EZ: Brand: IOBAN™ 2

## (undated) DEVICE — 1.5L THIN WALL CAN: Brand: CRD

## (undated) DEVICE — 3M™ STERI-DRAPE™ U-DRAPE 1015: Brand: STERI-DRAPE™

## (undated) DEVICE — GLOVE ORANGE PI 7   MSG9070

## (undated) DEVICE — CODMAN® SURGICAL PATTIES 1/2" X 1" (1.27CM X 2.54CM): Brand: CODMAN®

## (undated) DEVICE — PACK,SHOULDER,DRAPE,POUCH: Brand: MEDLINE

## (undated) DEVICE — INSTRUMENT EXTRAS ACF SINGLE ORANGE OR W

## (undated) DEVICE — SYRINGE MED 20ML STD CLR PLAS LUERSLIP TIP N CTRL DISP

## (undated) DEVICE — NEEDLE HYPO 25GA L1.5IN BLU POLYPR HUB S STL REG BVL STR

## (undated) DEVICE — GAUZE,SPONGE,4"X4",16PLY,XRAY,STRL,LF: Brand: MEDLINE

## (undated) DEVICE — CONTROLLER NEUROSTIMULATOR HND HELD PRGMR WIRELESS PTM FOR

## (undated) DEVICE — 3M™ RED DOT™ MONITORING ELECTRODE WITH FOAM TAPE AND STICKY GEL 2560, 50/BAG, 20/CASE, 72/PLT: Brand: RED DOT™

## (undated) DEVICE — TUBING, SUCTION, 3/16" X 12', STRAIGHT: Brand: MEDLINE

## (undated) DEVICE — CONVERTORS STOCKINETTE: Brand: CONVERTORS

## (undated) DEVICE — SYSTEM RECHARGING NEUROSTIMULATOR RECHRG BTTRY PK PWR SUPL

## (undated) DEVICE — SUTURE TIGERTAPE TIGERWIRE SZ 2-0 L30IN NONABSORBABLE AR72377T

## (undated) DEVICE — 1810 FOAM BLOCK NEEDLE COUNTER: Brand: DEVON

## (undated) DEVICE — COVER,TABLE,60X90,STERILE: Brand: MEDLINE

## (undated) DEVICE — Z CONVERTED USE 2275207 CLOTH PREP W7.5XL7.5IN 2% CHG SKIN ALC AND RNS FREE

## (undated) DEVICE — 3M™ IOBAN™ 2 ANTIMICROBIAL INCISE DRAPE 6650EZ: Brand: IOBAN™ 2

## (undated) DEVICE — E-Z CLEAN, NON-STICK, PTFE COATED, ELECTROSURGICAL BLADE ELECTRODE, MODIFIED EXTENDED INSULATION, 2.5 INCH (6.35 CM): Brand: MEGADYNE

## (undated) DEVICE — BUR SHAVER 5 MMX13 CM 8 FLUT OVL FOR AGGRESSIVE BNE COOLCUT

## (undated) DEVICE — PACK PROCEDURE SURG GEN CUST

## (undated) DEVICE — NEEDLE HYPO 18GA L1.5IN PNK POLYPR HUB S STL REG BVL STR

## (undated) DEVICE — NEEDLE SUT PASS FOR ROT CUF LABRAL REP MULTFI SCORPION

## (undated) DEVICE — NEEDLE SPNL L3.5IN PNK HUB S STL REG WALL FIT STYL W/ QNCKE

## (undated) DEVICE — MARKER,SKIN,WI/RULER AND LABELS: Brand: MEDLINE

## (undated) DEVICE — SHOECOVER, HIGHTOP: Brand: CARDINAL HEALTH

## (undated) DEVICE — BLADE CLP TAPR HD WET DRY CAPABILITY GTT IN CHARGING USE

## (undated) DEVICE — BLADE,STAINLESS-STEEL,11,STRL,DISPOSABLE: Brand: MEDLINE

## (undated) DEVICE — CANNULA ARTHSCP L5CM DIA8.25MM TRNSLUC THRD FLX W/ NO

## (undated) DEVICE — CLOTH SURG PREP PREOPERATIVE CHLORHEXIDINE GLUC 2% READYPREP

## (undated) DEVICE — GOWN SURG XL SMS FAB NONREINFORCED RAGLAN SLV HK LOOP CLSR

## (undated) DEVICE — 6 ML SYRINGE LUER-LOCK TIP: Brand: MONOJECT

## (undated) DEVICE — HANDLE CVR PATENTED RETENTION DISC STRL LIGHT SHLD

## (undated) DEVICE — GOWN,SIRUS,FABRNF,XL,20/CS: Brand: MEDLINE

## (undated) DEVICE — 3.0MM PRECISION NEURO (MATCH HEAD)

## (undated) DEVICE — DECANTER: Brand: UNBRANDED

## (undated) DEVICE — DRAPE,LAPAROTOMY,PCH,STERILE: Brand: MEDLINE

## (undated) DEVICE — SPONGE,TONSIL,DBL STRNG,XRAY,MED,1",STRL: Brand: MEDLINE INDUSTRIES, INC.

## (undated) DEVICE — GLOVE ORANGE PI 8   MSG9080

## (undated) DEVICE — WIPES SKIN CLOTH READYPREP 9 X 10.5 IN 2% CHLORHEX GLUCONATE CHG PREOP

## (undated) DEVICE — COVER,BOOT,FOAM,NON-SKID,HOOK-LOOP,XLG: Brand: MEDLINE INDUSTRIES, INC.

## (undated) DEVICE — APPLICATOR PREP 26ML 0.7% IOD POVACRYLEX 74% ISO ALC ST

## (undated) DEVICE — TUBING PMP L16FT MAIN DISP FOR AR-6400 AR-6475

## (undated) DEVICE — SYRINGE MED 20ML STD CLR PLAS LUERLOCK TIP N CTRL DISP

## (undated) DEVICE — SET SPINAL NEURO STNEU1

## (undated) DEVICE — SOLUTION IV 500ML 0.9% SOD CHL PH 5 INJ USP VIAFLX PLAS

## (undated) DEVICE — PAD ABD CURITY TENDERSORB 5X9IN

## (undated) DEVICE — SOLUTION IV IRRIG LACTATED RINGERS 3000ML 2B7487

## (undated) DEVICE — Device: Brand: PORTEX

## (undated) DEVICE — CANNULA ARTHSCP L7CM DIA7MM TRNSLUC THRD FLX W/ NO SQUIRT

## (undated) DEVICE — E-Z CLEAN, NON-STICK, PTFE COATED, ELECTROSURGICAL BLADE ELECTRODE, 6.5 INCH (16.5 CM): Brand: MEGADYNE

## (undated) DEVICE — [RESECTOR CUTTER, ARTHROSCOPIC SHAVER BLADE,  DO NOT RESTERILIZE,  DO NOT USE IF PACKAGE IS DAMAGED,  KEEP DRY,  KEEP AWAY FROM SUNLIGHT]: Brand: FORMULA

## (undated) DEVICE — CATHETER ETER SUCT 14FR RED POLYPR STR OPN W VLV

## (undated) DEVICE — DRILL BIT, 12MM

## (undated) DEVICE — 1000 S-DRAPE TOWEL DRAPE 10/BX: Brand: STERI-DRAPE™

## (undated) DEVICE — PROBE ABLAT XL 90DEG ASPIR BPLR RF 1 PC ELECTRD ERGO HNDL

## (undated) DEVICE — COVER DSG W7 7 8XL11IN WHT POR CLTH PRECUT WTRPRF MEDIPORE

## (undated) DEVICE — BANDAGE COBAN 4 IN COMPR W4INXL5YD FOAM COHESIVE QUIK STK SELF ADH SFT

## (undated) DEVICE — TAPE ADH W3INXL10YD WHT COT WVN BK POWERFUL RUB BASE HIGHLY

## (undated) DEVICE — GLOVE SURG SZ 75 L12IN FNGR THK79MIL GRN LTX FREE

## (undated) DEVICE — GLOVE RAD REDUC 8.5 IN

## (undated) DEVICE — SHOECOVER ANTI-SKID: Brand: CARDINAL HEALTH

## (undated) DEVICE — READY WET SKIN SCRUB TRAY-LF: Brand: MEDLINE INDUSTRIES, INC.

## (undated) DEVICE — APPLICATOR MEDICATED 10.5 CC SOLUTION HI LT ORNG CHLORAPREP

## (undated) DEVICE — 22GX5" WHITACRE SPINAL NEEDLE: Brand: MEDLINE

## (undated) DEVICE — 3M™ COBAN™ NL STERILE NON-LATEX SELF-ADHERENT WRAP, 2084S, 4 IN X 5 YD (10 CM X 4,5 M), 18 ROLLS/CASE: Brand: 3M™ COBAN™

## (undated) DEVICE — SET NEURO BLKBELT RETRACTOR

## (undated) DEVICE — BLADE SHV L13CM DIA4MM DBL CUT COOLCUT

## (undated) DEVICE — DRAPE 64X41IN RADIOLOGY C ARM EQUIP STER

## (undated) DEVICE — NEEDLE, QUINCKE, 22GX5": Brand: MEDLINE

## (undated) DEVICE — GARMENT,MEDLINE,DVT,INT,CALF,MED, GEN2: Brand: MEDLINE

## (undated) DEVICE — STRIP,CLOSURE,WOUND,MEDI-STRIP,1/2X4: Brand: MEDLINE

## (undated) DEVICE — CONTROL SYRINGE LUER-LOCK TIP: Brand: MONOJECT

## (undated) DEVICE — PACK,UNIV, II AURORA: Brand: MEDLINE

## (undated) DEVICE — 3M™ TRANSPORE™ WHITE SURGICAL TAPE 1534-2, 2 INCH X 10 YARD (5CM X 9,1M), 6 ROLLS/CARTON 10 CARTONS/CASE: Brand: 3M™ TRANSPORE™

## (undated) DEVICE — SUTURE VCRL L48IN 2 0 VLT BRAID CART ABSRB CNPJ482C

## (undated) DEVICE — MANIFOLD REPROC 4 PRT NEP 1

## (undated) DEVICE — BANDAGE,GAUZE,4.5"X4.1YD,STERILE,LF: Brand: MEDLINE

## (undated) DEVICE — DRESSING FOAM W3.5XL6IN POSTOP STRP GENTLE OPTIFOAM AG

## (undated) DEVICE — 1 ML TUBERCULIN SYRINGE,DETACHABLE NEEDLE: Brand: MONOJECT

## (undated) DEVICE — LAPAROTOMY PACK WITH POLYREINFORCED GOWNS: Brand: CONVERTORS

## (undated) DEVICE — BANDAGE,GAUZE,BULKEE II,4.5"X4.1YD,STRL: Brand: MEDLINE

## (undated) DEVICE — SINGLE SHOT EPIDURAL: Brand: MEDLINE INDUSTRIES, INC.

## (undated) DEVICE — SOLUTION IV IRRIG POUR BRL 0.9% SODIUM CHL 2F7124

## (undated) DEVICE — BLADE,STAINLESS-STEEL,10,STRL,DISPOSABLE: Brand: MEDLINE

## (undated) DEVICE — DRAPE,U/ SHT,SPLIT,PLAS,STERIL: Brand: MEDLINE

## (undated) DEVICE — DRAPE,REIN 53X77,STERILE: Brand: MEDLINE

## (undated) DEVICE — EVAC 70 XTRA WAND: Brand: COBLATION

## (undated) DEVICE — NEEDLE FLTR 18GA L1.5IN MEM THK5UM BLNT DISP

## (undated) DEVICE — SYRINGE 20ML LL S/C 50

## (undated) DEVICE — DRESSING BORDERED ADH GZ UNIV GEN USE 8INX4IN AND 6INX2IN

## (undated) DEVICE — GOWN,SIRUS,POLYRNF,BRTHSLV,XL,30/CS: Brand: MEDLINE

## (undated) DEVICE — SPONGE,DISSECTOR,ROUND CHERRY,XR,ST,5/PK: Brand: MEDLINE

## (undated) DEVICE — DRAPE SHEET, X-LARGE: Brand: CONVERTORS

## (undated) DEVICE — LABEL MED 4 IN SURG PANEL W/ PEN STRL

## (undated) DEVICE — 3M™ TEGADERM™ TRANSPARENT FILM DRESSING FRAME STYLE, 1628, 6 IN X 8 IN (15 CM X 20 CM), 10/CT 8CT/CASE: Brand: 3M™ TEGADERM™

## (undated) DEVICE — SPONGE,DISSECTOR,K,XRAY,9/16"X1/4",STRL: Brand: MEDLINE

## (undated) DEVICE — SWABSTICK MEDICATED L4IN BENZ TINC SKIN PREP APLICARE

## (undated) DEVICE — DRAPE 24X23IN RADIOLOGY CASS ADHES STRIP

## (undated) DEVICE — TOTAL TRAY, DB, 100% SILI FOLEY, 16FR 10: Brand: MEDLINE

## (undated) DEVICE — APPLICATOR MEDICATED 26 CC SOLUTION HI LT ORNG CHLORAPREP

## (undated) DEVICE — BINDER ABD UNISX 4 PNL PREM 6INX6INX12IN L XL 4

## (undated) DEVICE — STANDARD HYPODERMIC NEEDLE,ALUMINUM HUB: Brand: MONOJECT

## (undated) DEVICE — DECANTER BAG 9": Brand: MEDLINE INDUSTRIES, INC.

## (undated) DEVICE — GRADUATE

## (undated) DEVICE — GOWN,SIRUS,FABRNF,L,20/CS: Brand: MEDLINE

## (undated) DEVICE — THE 46 CM PERITONEAL INTRODUCER SHEATH IS DESIGNED TO BE USED WITH A PERITONEAL INTRODUCER OF APPROPRIATE LENGTH (46 CM) DURING THE PLACEMENT OF A PERITONEAL DRAINAGE CATHETER IN THE TREATMENT OF HYDROCEPHALUS. IT FEATURES A TAPERED TIP TO FACILITATE SUBCUTANEOUS INSERTION AND PASSAGE.: Brand: INTEGRA®

## (undated) DEVICE — PEN: MARKING STD 100/CS: Brand: MEDICAL ACTION INDUSTRIES

## (undated) DEVICE — KIT POS W/ FOAM ARM CRADL SHEARGUARD CHST PD CVR FOR SPNL

## (undated) DEVICE — 2958 MEDIPORE PRE-CUT DSG CVR 20CMX28CM: Brand: 3M COMPANY

## (undated) DEVICE — 3.0MM NEURO (MATCH HEAD)

## (undated) DEVICE — CORD,CAUTERY,BIPOLAR,STERILE: Brand: MEDLINE

## (undated) DEVICE — STAPLER SKIN L39MM DIA0.53MM CRWN 5.7MM S STL FIX HD PROX

## (undated) DEVICE — MASTISOL ADHESIVE LIQ 2/3ML

## (undated) DEVICE — SKN PREP SPNG STKS PVP PNT STR: Brand: MEDLINE INDUSTRIES, INC.

## (undated) DEVICE — BASIC SINGLE BASIN 1-LF: Brand: MEDLINE INDUSTRIES, INC.

## (undated) DEVICE — DRAPE CAM W7XL96IN W/ BLU KRATON TIP FOR LSR VID

## (undated) DEVICE — TOWEL SURG W17XL27IN STD BLU COT NONFENESTRATED PREWASHED

## (undated) DEVICE — ELECTRODE PT RET AD L9FT HI MOIST COND ADH HYDRGEL CORDED

## (undated) DEVICE — SYRINGE,EAR/ULCER, 2 OZ, STERILE: Brand: MEDLINE

## (undated) DEVICE — SYRINGE MED 10ML LUERLOCK TIP W/O SFTY DISP

## (undated) DEVICE — 1530S-1, 1 IN X 1.5 YD (2,5 CM X 1,37 M), UNPACKAGED ROLLS, 100 RL/CARTON, 5 CARTONS/CASE, 500 RL/CA: Brand: 3M™ MICROPORE™

## (undated) DEVICE — SKIN AFFIX SURG ADHESIVE 72/CS 0.55ML: Brand: MEDLINE

## (undated) DEVICE — DRAPE,UTILTY,TAPE,15X26, 4EA/PK: Brand: MEDLINE

## (undated) DEVICE — ASPIRATOR FLR FLUID DIAMOND

## (undated) DEVICE — KIT SURG W7XL11IN 2 PKT UNTREATED NA

## (undated) DEVICE — SUTURE ETHLN SZ 2-0 L18IN NONABSORBABLE BLK L26MM FS 3/8 664G

## (undated) DEVICE — STERILE POLYISOPRENE POWDER-FREE SURGICAL GLOVES: Brand: PROTEXIS

## (undated) DEVICE — DRAPE C ARM W41XL74IN UNIV MOB W RUBBERBAND CLP

## (undated) DEVICE — KIT SURG PREP POVIDONE IOD PRESATURATED PAINT WET FOR UNIV

## (undated) DEVICE — SURGICAL PROCEDURE PACK EENT CUST

## (undated) DEVICE — GAUZE,SPONGE,4"X4",16PLY,STRL,LF,10/TRAY: Brand: MEDLINE

## (undated) DEVICE — SPONGE,PEANUT,XRAY,ST,SM,3/8",5/CARD: Brand: MEDLINE INDUSTRIES, INC.

## (undated) DEVICE — GLOVE SURG SZ 8 L12IN FNGR THK79MIL GRN LTX FREE

## (undated) DEVICE — SUTURE SUTTAPE FIBERLINK 1.3MM WHT BLU CLS LOOP AR7535

## (undated) DEVICE — [TOMCAT CUTTER, ARTHROSCOPIC SHAVER BLADE,  DO NOT RESTERILIZE,  DO NOT USE IF PACKAGE IS DAMAGED,  KEEP DRY,  KEEP AWAY FROM SUNLIGHT]: Brand: FORMULA